# Patient Record
Sex: MALE | Race: WHITE | NOT HISPANIC OR LATINO | Employment: PART TIME | URBAN - METROPOLITAN AREA
[De-identification: names, ages, dates, MRNs, and addresses within clinical notes are randomized per-mention and may not be internally consistent; named-entity substitution may affect disease eponyms.]

---

## 2021-01-04 ENCOUNTER — TRANSFERRED RECORDS (OUTPATIENT)
Dept: HEALTH INFORMATION MANAGEMENT | Facility: CLINIC | Age: 66
End: 2021-01-04

## 2021-02-02 ENCOUNTER — OFFICE VISIT (OUTPATIENT)
Dept: FAMILY MEDICINE | Facility: CLINIC | Age: 66
End: 2021-02-02

## 2021-02-02 VITALS
RESPIRATION RATE: 18 BRPM | SYSTOLIC BLOOD PRESSURE: 132 MMHG | OXYGEN SATURATION: 99 % | HEART RATE: 78 BPM | DIASTOLIC BLOOD PRESSURE: 62 MMHG | TEMPERATURE: 97.8 F | BODY MASS INDEX: 31.58 KG/M2 | WEIGHT: 208.4 LBS | HEIGHT: 68 IN

## 2021-02-02 DIAGNOSIS — M54.50 LUMBAR SPINE PAIN: Primary | ICD-10-CM

## 2021-02-02 PROCEDURE — 99213 OFFICE O/P EST LOW 20 MIN: CPT | Performed by: PHYSICIAN ASSISTANT

## 2021-02-02 SDOH — HEALTH STABILITY: MENTAL HEALTH: HOW OFTEN DO YOU HAVE 6 OR MORE DRINKS ON ONE OCCASION?: NOT ASKED

## 2021-02-02 SDOH — HEALTH STABILITY: MENTAL HEALTH: HOW MANY STANDARD DRINKS CONTAINING ALCOHOL DO YOU HAVE ON A TYPICAL DAY?: 1 OR 2

## 2021-02-02 SDOH — HEALTH STABILITY: MENTAL HEALTH: HOW OFTEN DO YOU HAVE A DRINK CONTAINING ALCOHOL?: 2-3 TIMES A WEEK

## 2021-02-02 ASSESSMENT — PAIN SCALES - GENERAL: PAINLEVEL: SEVERE PAIN (6)

## 2021-02-02 ASSESSMENT — MIFFLIN-ST. JEOR: SCORE: 1714.8

## 2021-02-02 NOTE — PATIENT INSTRUCTIONS
Can try - Diclofenac - Voltaren 1% gel for pain. Do not take ibuprofen or Aleve with this topical medication.

## 2021-02-02 NOTE — PROGRESS NOTES
Assessment & Plan     Lumbar spine pain  Patient has been seen by his primary care physician in [place] at [facility]. I am unable to review notes however he did bring a x-ray result (x-ray from 1/4/21). This shows arthritis at multiple levels, greatest at the L5/S1 space. This would coincide with where he is having the most pain. He has been doing physical therapy exercises he had from many years ago. He is interested in what is next as he continues to have significant pain.  Referral to Spine ordered. We discussed PT as well which was ordered. Patient would like to see spine first. I cautioned him that spine may still want formal PT prior to moving forward with other treatments. Patient states understanding but would like to wait to see ortho spine first.  Discussed treatment for pain. Diclofenac gel discussed; he can get this over the counter. Do not take with Aleve or ibuprofen.  ANI signed to obtained records from other physician office.  - [facility]RA[facility] REFERRAL; Future  - Orthopedic & Spine  Referral; Future    Allison Og PA-C  [facility] GLENDY Segal is a 65 year old who presents to clinic today for the following health issues     HPI       Back Pain  Onset/Duration: early november   Description:   Location of pain: low back right  Character of pain: sharp  Pain radiation: none  New numbness or weakness in legs, not attributed to pain: no   Intensity: Currently 7/10  Progression of Symptoms: improving  History:   Specific cause: work-related  Pain interferes with job: YES  History of back problems: no prior back problems  Any previous MRI or X-rays: Yes on xray, no mRI   Sees a specialist for back pain: yes 15 years ago   Alleviating factors:   Improved by: acetaminophen (Tylenol), Ibuprofen, Aleve, cold, heat and muscle relaxants    Precipitating factors:  Worsened by: Walking and stand   Therapies tried and outcome: As  "noted above    Accompanying Signs & Symptoms:  Risk of Fracture: None  Risk of Cauda Equina: None  Risk of Infection: None  Risk of Cancer: None    When the pain first came on the patient notes he was a  and he was walking a lot (the two days prior to the pain starting). 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 It was after this that the pain worsened significantly. He has a history of back pain. He has back exercises from many years ago. He always does them. Has not see physical therapy since all this started.    He has tried a muscle relaxer and cortisone shot (perhaps a trigger point injection).        Review of Systems   GENERAL:  No fevers  MUSCULOSKELETAL: As noted in HPI          Objective    /62 (BP Location: Right arm, Patient Position: Sitting, Cuff Size: Adult Regular)   Pulse 78   Temp 97.8  F (36.6  C) (Oral)   Resp 18   Ht 1.727 m (5' 8\")   Wt 94.5 kg (208 lb 6.4 oz)   SpO2 99%   BMI 31.69 kg/m    Body mass index is 31.69 kg/m .  Physical Exam   GENERAL: No acute distress  HEENT: Normocephalic  EXTREMITIES: No midline tenderness over the cervical or thoracic. Mild tenderness midline lower lumbar back. No tenderness over the right lumbar paraspinous muscles. Tenderness over the left lumbar paraspinous muscles.  Right lower extremity: 5/5 strength with flexion of the knee, 5/5 strength with extension of the knee, 5/5 strength with flexion of the hip, 5/5 strength with dorsiflexion, 5/5 strength with plantar flexion. Straight leg raise negative.  Left lower extremity: 5/5 strength with flexion of the knee, 5/5 strength with extension of the knee, 5/5 strength with flexion of the hip, 5/5 strength with dorsiflexion, 5/5 strength with plantar flexion. Straight leg raise negative.  NEURO: Alert, non-focal          "

## 2021-02-08 ENCOUNTER — OFFICE VISIT (OUTPATIENT)
Dept: NEUROSURGERY | Facility: CLINIC | Age: 66
End: 2021-02-08
Attending: PHYSICIAN ASSISTANT

## 2021-02-08 VITALS
OXYGEN SATURATION: 97 % | DIASTOLIC BLOOD PRESSURE: 98 MMHG | HEART RATE: 102 BPM | SYSTOLIC BLOOD PRESSURE: 156 MMHG | HEIGHT: 68 IN | BODY MASS INDEX: 31.52 KG/M2 | WEIGHT: 208 LBS

## 2021-02-08 DIAGNOSIS — M54.50 LUMBAR SPINE PAIN: ICD-10-CM

## 2021-02-08 PROCEDURE — 99203 OFFICE O/P NEW LOW 30 MIN: CPT | Performed by: PHYSICIAN ASSISTANT

## 2021-02-08 ASSESSMENT — MIFFLIN-ST. JEOR: SCORE: 1712.98

## 2021-02-08 NOTE — LETTER
2/8/2021         RE: Philipp Bland  [address]  [place] [address]        Dear Colleague,    Thank you for referring your patient, Philipp Bland, to the [facility] NEUROSURGERY [facility]INIC [place]. Please see a copy of my visit note below.    NEUROSURGERY CLINIC CONSULT NOTE     DATE OF VISIT: 2/8/2021     SUBJECTIVE:     Philipp Bland is a pleasant 65 year old male who presents to the clinic today for consultation on lumbar spine pain. He is referred to the Neurosurgery Clinic by Dr. Og in Primary Care.   Today, he reports a multi year with a 3-month exacerbation of symptoms. He describes constant, dull, aching pain that initiates in the midline low lumbar region and radiates horizontally in a belt line distribution, nothing distally. This pain is not accompanied by paresthesia, numbness and/or perceived weakness. Prolonged walking and standing seem to aggravate the symptoms, while alleviation is obtained by sitting. No mechanism of injury such as trauma or a fall is associated with the onset of the pain. There are no bowel or bladder changes. He denies saddle anesthesia. He denies changes in gait, instability, or falling episodes, although a walker is used at times for comfort. h  He has not participated in any formal conservative therapies.          Current Outpatient Medications:      lisinopril-hydrochlorothiazide (PRINZIDE,ZESTORETIC) 20-12.5 MG per tablet, Take 2 tablets by mouth daily (with breakfast)., Disp: 60 tablet, Rfl: 0     Allergies   Allergen Reactions     No Known Drug Allergies         Past Medical History:   Diagnosis Date     Essential hypertension, benign 2001     Family history of alcoholism         ROS: 10 point review of symptoms are negative other than the symptoms noted above in the HPI.     Family History has been reviewed with the patient, there are no pertinent findings to presenting concern.     No past surgical history on file.     Social History  "    Tobacco Use     Smoking status: Never Smoker     Smokeless tobacco: Never Used   Substance Use Topics     Alcohol use: Yes     Frequency: 2-3 times a week     Drinks per session: 1 or 2     Drug use: No        OBJECTIVE:   BP (!) 156/98   Pulse 102   Ht 5' 8\" (1.727 m)   Wt 208 lb (94.3 kg)   SpO2 97%   BMI 31.63 kg/m     Body mass index is 31.63 kg/m .     Imaging:     [facility]     Full radiological report in chart. Imaging was reviewed with with patient today.     Exam:     Patient appears comfortable, conversational, and in no apparent distress.   Head: Normocephalic, without obvious abnormality, atraumatic, no facial asymmetry.   Eyes: conjunctivae/corneas clear. PERRL, EOM's intact.   Throat: lips, mucosa, and tongue normal; teeth and gums normal.   Neck: supple, symmetrical, trachea midline, no adenopathy and thyroid: not enlarged, symmetric, no tenderness/mass/nodules.   Lungs: clear to auscultation bilaterally.   Heart: regular rate and rhythm.   Abdomen: soft, non-tender; bowel sounds normal; no masses, no organomegaly.   Pulses: 2+ and symmetric.   Skin: Skin color, texture, turgor normal. No rashes or lesions.     CN II-XII grossly intact, alert and appropriate with conversation and following commands.   Gait is non-antalgic. Able to tandem walk. Able to walk on toes and heels without difficulty.   Cervical spine is non tender to palpation. Appropriate range of motion of neck, not concerning for lhermitte's phenomenon.   Bilateral bicep 2/4 and tricep reflexes 1/4. Sensation intact throughout upper extremities.     UE muscle strength  Right  Left    Deltoid  5/5  5/5    Biceps  5/5  5/5    Triceps  5/5  5/5    Hand intrinsics  5/5  5/5    Hand grasp  5/5  5/5    Hogan signs  neg  neg      Lumbar spine is non tender to palpation   Intact sensation throughout lower extremities.   Bilateral patellar 2/4 and achilles reflex 1/4. Negative for pain with straight leg raise. "     LE muscle strength  Right  Left    Iliopsoas (hip flexion)  5/5  5/5    Quad (knee extension)  5/5  5/5    Hamstring (knee flexion)  5/5  5/5    Gastrocnemius (PF)  5/5  5/5    Tibialis Ant. (DF)  5/5  5/5    EHL  5/5  5/5      Negative Babinski bilaterally. Negative for clonus.   Calves are soft and non-tender bilaterally.     ASSESSMENT/PLAN:     Mr. Bland's most recent imaging exhibits multilevel degenerative changes with a L5-S1 spondylolisthesis. However, we do not recommend surgical intervention at this time.     We feel that it would be in Mr. Bland's best interest to try a conservative approach by participating in a program initiated by our colleagues at the [place] [facility]. He did inquire about possible treatment options that [facility] may consider so we briefly discussed core stretching and strengthening exercises with other modalities as indicated, but again, we explained that treatments will be determined by the team at [facility]. We would like to see him back as needed to further discuss possible surgical interventions. In the event that patient's symptoms worsen or change we would like to see him sooner.     Should he fail to obtain adequate alleviation of his symptoms utilizing non-operative measures, we briefly discussed obtaining a lumbar MRI and possibly an injection.     We also discussed signs of a worsening problem that he should seek being evaluated.        Respectfully,     RICHMOND Duran PA-C  [facility] Neurosurgery  [facility]  Tel: [phone]  Pager: [phone]     Exam, imaging, and plan reviewed by Dr. Aggarwal.       Again, thank you for allowing me to participate in the care of your patient.        Sincerely,        Jeff Lopez PA-C

## 2021-02-08 NOTE — PROGRESS NOTES
"NEUROSURGERY CLINIC CONSULT NOTE     DATE OF VISIT: 2/8/2021     SUBJECTIVE:     Philipp Bland is a pleasant 65 year old male who presents to the clinic today for consultation on lumbar spine pain. He is referred to the Neurosurgery Clinic by Dr. Og in Primary Care.   Today, he reports a multi year with a 3-month exacerbation of symptoms. He describes constant, dull, aching pain that initiates in the midline low lumbar region and radiates horizontally in a belt line distribution, nothing distally. This pain is not accompanied by paresthesia, numbness and/or perceived weakness. Prolonged walking and standing seem to aggravate the symptoms, while alleviation is obtained by sitting. No mechanism of injury such as trauma or a fall is associated with the onset of the pain. There are no bowel or bladder changes. He denies saddle anesthesia. He denies changes in gait, instability, or falling episodes, although a walker is used at times for comfort. h  He has not participated in any formal conservative therapies.          Current Outpatient Medications:      lisinopril-hydrochlorothiazide (PRINZIDE,ZESTORETIC) 20-12.5 MG per tablet, Take 2 tablets by mouth daily (with breakfast)., Disp: 60 tablet, Rfl: 0     Allergies   Allergen Reactions     No Known Drug Allergies         Past Medical History:   Diagnosis Date     Essential hypertension, benign 2001     Family history of alcoholism         ROS: 10 point review of symptoms are negative other than the symptoms noted above in the HPI.     Family History has been reviewed with the patient, there are no pertinent findings to presenting concern.     No past surgical history on file.     Social History     Tobacco Use     Smoking status: Never Smoker     Smokeless tobacco: Never Used   Substance Use Topics     Alcohol use: Yes     Frequency: 2-3 times a week     Drinks per session: 1 or 2     Drug use: No        OBJECTIVE:   BP (!) 156/98   Pulse 102   Ht 5' 8\" (1.727 m)  "  Wt 208 lb (94.3 kg)   SpO2 97%   BMI 31.63 kg/m     Body mass index is 31.63 kg/m .     Imaging:     [facility]     Full radiological report in chart. Imaging was reviewed with with patient today.     Exam:     Patient appears comfortable, conversational, and in no apparent distress.   Head: Normocephalic, without obvious abnormality, atraumatic, no facial asymmetry.   Eyes: conjunctivae/corneas clear. PERRL, EOM's intact.   Throat: lips, mucosa, and tongue normal; teeth and gums normal.   Neck: supple, symmetrical, trachea midline, no adenopathy and thyroid: not enlarged, symmetric, no tenderness/mass/nodules.   Lungs: clear to auscultation bilaterally.   Heart: regular rate and rhythm.   Abdomen: soft, non-tender; bowel sounds normal; no masses, no organomegaly.   Pulses: 2+ and symmetric.   Skin: Skin color, texture, turgor normal. No rashes or lesions.     CN II-XII grossly intact, alert and appropriate with conversation and following commands.   Gait is non-antalgic. Able to tandem walk. Able to walk on toes and heels without difficulty.   Cervical spine is non tender to palpation. Appropriate range of motion of neck, not concerning for lhermitte's phenomenon.   Bilateral bicep 2/4 and tricep reflexes 1/4. Sensation intact throughout upper extremities.     UE muscle strength  Right  Left    Deltoid  5/5  5/5    Biceps  5/5  5/5    Triceps  5/5  5/5    Hand intrinsics  5/5  5/5    Hand grasp  5/5  5/5    Hogan signs  neg  neg      Lumbar spine is non tender to palpation   Intact sensation throughout lower extremities.   Bilateral patellar 2/4 and achilles reflex 1/4. Negative for pain with straight leg raise.     LE muscle strength  Right  Left    Iliopsoas (hip flexion)  5/5  5/5    Quad (knee extension)  5/5  5/5    Hamstring (knee flexion)  5/5  5/5    Gastrocnemius (PF)  5/5  5/5    Tibialis Ant. (DF)  5/5  5/5    EHL  5/5  5/5      Negative Babinski bilaterally. Negative for  clonus.   Calves are soft and non-tender bilaterally.     ASSESSMENT/PLAN:     Mr. Bland's most recent imaging exhibits multilevel degenerative changes with a L5-S1 spondylolisthesis. However, we do not recommend surgical intervention at this time.     We feel that it would be in Mr. Bland's best interest to try a conservative approach by participating in a program initiated by our colleagues at the [place] [facility]. He did inquire about possible treatment options that [facility] may consider so we briefly discussed core stretching and strengthening exercises with other modalities as indicated, but again, we explained that treatments will be determined by the team at [facility]. We would like to see him back as needed to further discuss possible surgical interventions. In the event that patient's symptoms worsen or change we would like to see him sooner.     Should he fail to obtain adequate alleviation of his symptoms utilizing non-operative measures, we briefly discussed obtaining a lumbar MRI and possibly an injection.     We also discussed signs of a worsening problem that he should seek being evaluated.        Respectfully,     RICHMOND Duran, PAHILTON  [facility] Neurosurgery  [facility]  Tel: [phone]  Pager: [phone]     Exam, imaging, and plan reviewed by Dr. Aggarwal.

## 2021-02-08 NOTE — NURSING NOTE
"February 8, 2021 1:53 PM   Philipp Bland is a 65 year old male who presents for:    Chief Complaint   Patient presents with     Consult     Lumbar pain     Initial Vitals: BP (!) 156/98   Pulse 102   Ht 5' 8\" (1.727 m)   Wt 208 lb (94.3 kg)   SpO2 97%   BMI 31.63 kg/m   Estimated body mass index is 31.63 kg/m  as calculated from the following:    Height as of this encounter: 5' 8\" (1.727 m).    Weight as of this encounter: 208 lb (94.3 kg). Body surface area is 2.13 meters squared.  Data Unavailable Comment: Data Unavailable       Clinical concerns: Philipp Bland is here today for low back pain  Alysia Merrill MA  "

## 2021-02-12 ENCOUNTER — THERAPY VISIT (OUTPATIENT)
Dept: PHYSICAL THERAPY | Facility: CLINIC | Age: 66
End: 2021-02-12
Attending: PHYSICIAN ASSISTANT

## 2021-02-12 DIAGNOSIS — M43.16 SPONDYLOLISTHESIS OF LUMBAR REGION: ICD-10-CM

## 2021-02-12 DIAGNOSIS — M54.50 BILATERAL LOW BACK PAIN WITHOUT SCIATICA: ICD-10-CM

## 2021-02-12 DIAGNOSIS — M54.50 LUMBAR SPINE PAIN: ICD-10-CM

## 2021-02-12 DIAGNOSIS — M48.062 SPINAL STENOSIS OF LUMBAR REGION WITH NEUROGENIC CLAUDICATION: ICD-10-CM

## 2021-02-12 PROCEDURE — 97162 PT EVAL MOD COMPLEX 30 MIN: CPT | Mod: GP | Performed by: PHYSICAL THERAPIST

## 2021-02-12 PROCEDURE — 97110 THERAPEUTIC EXERCISES: CPT | Mod: GP | Performed by: PHYSICAL THERAPIST

## 2021-02-12 NOTE — PROGRESS NOTES
[place] [facility] Initial Evaluation  Subjective:  Patient is referred with c/o LBP, L>R along with bilateral L/E weakness and abnormal gait pattern. He has a long hx of LB issues but sxs seemed to increase in November 2020 after doing some heavy lifting. He is unable to stand for more than 5' or walk more than 75 yards before having to sit down. Better sitting. Noting difficulty climbing stairs.     Therapist Generated HPI Evaluation         Type of problem:  Lumbar.    This is a chronic condition.  Condition occurred with:  Degenerative joint disease, insidious onset and lifting.  Where condition occurred: in the community.  Patient reports pain:  Lumbar spine left, lumbar spine right and central lumbar spine.  Pain is described as aching and is intermittent.  Pain radiates to:  No radiation. Pain is the same all the time.  Since onset symptoms are unchanged.  Associated symptoms:  Loss of strength and loss of balance (altered gait). Symptoms are exacerbated by standing, walking and lifting  and relieved by rest.  Special tests included:  X-ray.    Restrictions due to condition include:  Working in normal job without restrictions.      Patient Health History  Philipp Bland being seen for LBP, B L/E weakness.          Pain is reported as 7/10 on pain scale.  General health as reported by patient is excellent.  Pertinent medical history includes: high blood pressure.   Red flags:  Progressive neurological deficits and significant weakness.     Surgeries include:  Orthopedic surgery (ankle).    Current medications:  High blood pressure medication.    Current occupation is teacher, parts delivery.   Primary job tasks include:  Driving and lifting/carrying.                                    Objective:    Gait:    Assistive Devices:  Walker  Deviations:  Hip:  Trendelenberg R and Trendelenberg L               Lumbar/SI Evaluation  ROM:    AROM Lumbar:   Flexion:          Normal  Ext:                     Minor loss   Side Bend:        Left:  Normal    Right:  Normal  Rotation:           Left:     Right:   Side Glide:        Left:     Right:           Lumbar Myotomes:    T12-L3 (Hip Flex):  Left: 3    Right: 5  L2-4 (Quads):  Left:  4    Right:  5  L4 (Ankle DF):  Left:  2+    Right:  5  L5 (Great Toe Ext): Left: 3    Right: 5   S1 (Toe Raise):  Left: 2    Right: 2+  Lumbar DTR's:  not assessed      Cord Signs:  not assessed    Lumbar Dermtomes:  normal                Neural Tension/Mobility:  Lumbar:  Normal        Lumbar Palpation:  normal      Functional Tests:  not assessed        Lumbar Provocation:  not assessed      Spinal Segmental Conclusions: Neurological deficit related to probable central and foraminal stenosis. Neurogenic claudication. BilateralTrendelenberg gait pattern due to gluteus medius weakness. L hip flexor weakness, L anterior tibialis weakness noted as well.                                                       General     ROS    Assessment/Plan:    Patient is a 65 year old male with lumbar complaints.    Patient has the following significant findings with corresponding treatment plan.                Diagnosis 1:  LBP, lumbar stenosis  Pain -  mechanical traction, self management, education, directional preference exercise and home program  Decreased strength - therapeutic exercise, therapeutic activities and home program  Impaired balance - neuro re-education, therapeutic activities and home program  Impaired gait - gait training, assistive devices and home program  Impaired muscle performance - neuro re-education and home program  Decreased function - therapeutic activities and home program    Therapy Evaluation Codes:   1) History comprised of:   Personal factors that impact the plan of care:      Time since onset of symptoms.    Comorbidity factors that impact the plan of care are:      None.     Medications impacting care: None.  2) Examination of Body Systems comprised of:   Body  structures and functions that impact the plan of care:      Lumbar spine.   Activity limitations that impact the plan of care are:      Lifting, Stairs, Standing and Walking.  3) Clinical presentation characteristics are:   Evolving/Changing.  4) Decision-Making    Moderate complexity using standardized patient assessment instrument and/or measureable assessment of functional outcome.  Cumulative Therapy Evaluation is: Moderate complexity.    Previous and current functional limitations:  (See Goal Flow Sheet for this information)    Short term and Long term goals: (See Goal Flow Sheet for this information)     Communication ability:  Patient appears to be able to clearly communicate and understand verbal and written communication and follow directions correctly.  Treatment Explanation - The following has been discussed with the patient:   RX ordered/plan of care  Anticipated outcomes  Possible risks and side effects  This patient would benefit from PT intervention to resume normal activities.   Rehab potential is fair.    Frequency:  1 X week, once daily  Duration:  for 3 weeks  Discharge Plan:  Achieve all LTG.  Independent in home treatment program.  Reach maximal therapeutic benefit.    Please refer to the daily flowsheet for treatment today, total treatment time and time spent performing 1:1 timed codes.

## 2021-03-16 PROBLEM — M43.16 SPONDYLOLISTHESIS OF LUMBAR REGION: Status: RESOLVED | Noted: 2021-02-12 | Resolved: 2021-03-16

## 2021-03-16 PROBLEM — M54.50 BILATERAL LOW BACK PAIN WITHOUT SCIATICA: Status: RESOLVED | Noted: 2021-02-12 | Resolved: 2021-03-16

## 2021-03-16 PROBLEM — M48.062 SPINAL STENOSIS OF LUMBAR REGION WITH NEUROGENIC CLAUDICATION: Status: RESOLVED | Noted: 2021-02-12 | Resolved: 2021-03-16

## 2021-04-15 ENCOUNTER — TELEPHONE (OUTPATIENT)
Dept: FAMILY MEDICINE | Facility: CLINIC | Age: 66
End: 2021-04-15

## 2021-04-15 DIAGNOSIS — M54.50 LUMBAR SPINE PAIN: Primary | ICD-10-CM

## 2021-04-15 NOTE — TELEPHONE ENCOUNTER
Pt is wondering your thoughts on medical marijuana for his back pain. He currently does not have money for surgery so he is looking for a short time option until he can afford surgery. Pt is aware you do not prescribe this.

## 2021-04-15 NOTE — TELEPHONE ENCOUNTER
Medical marijuana is for intractable pain (this means we have tried all other avenues). It may be reasonable to have him see pain medicine to see what they would recommend. I do not provide approvals the medical marijuana cards, and I do not know anyone who does. Some of the pain clinics have providers that do this.

## 2021-04-15 NOTE — TELEPHONE ENCOUNTER
Patient left a message with patient relations team to have care team contact him.    Name : Philipp Bland     Email : [email or web address]     Location : [facility]     Message : I have a question for my doc Dr. Allison Og. please call [phone]. thanks     Want to be contacted : Yes       Please call patient  Macey Quintero, Clinic Manager

## 2021-04-15 NOTE — TELEPHONE ENCOUNTER
Referral placed. If patient does not hear from them in 2 business days he can call [phone]. It can take some time to get in. In the mean time he can do the PT that neurosurgery recommended.

## 2021-04-19 ENCOUNTER — TELEPHONE (OUTPATIENT)
Dept: PALLIATIVE MEDICINE | Facility: CLINIC | Age: 66
End: 2021-04-19

## 2021-04-19 NOTE — TELEPHONE ENCOUNTER
[facility] Referral      1. Confirmed address with patient? Yes  2. Confirmed phone number with patient? Yes  3. Confirmed referring provider? Yes  4. Is the PCP the same as the referring provider? Yes  5. Has the patient been to any previous pain clinics? No  (If yes, send ANI with welcome letter)  6. Which insurance are we to bill for this appointment?  Self Pay - waiting on [employer]    7. Informed pt of cancellation (48 hour) policy? Yes    REGARDING OPIOID MEDICATIONS: We will always address appropriateness of opioid pain medications, but we generally will not automatically take on a prescribing role. When we do take on prescribing of opioids for chronic pain, it is in collaboration with the referring physician for an intermediate period of time (months), with an expectation that the primary physician or provider will assume the prescribing role if medications are effective at stable doses with demonstrated compliance. Therefore, please do not assume that your prescribing responsibilities end on the day of pain clinic consultation.  8. Informed pt of prescribing policy? Yes    9.Please be aware that once you are established with a pain provider and location, you will need to continue have all future visits with that provider and location. It is best to determine what location is the most convenient for you and schedule with that one.    ** PATIENT INFORMED OF THIS POLICY Yes      9. Referring Provider: Allison Og PA-C    10. Criteria for Triage Eval:   -Missed/Failed 1st appointment? N/A     -Medication Focused? N/A     -Mental Health Concerns? (e.g. Recent psych hospitalization/snap shot)? N/A     -Active substance abuse? N/A     -Patient behaviors (e.g. Offensive language/raised voice)? N/A    Luzma STOVER    [facility]

## 2021-04-27 ENCOUNTER — VIRTUAL VISIT (OUTPATIENT)
Dept: PALLIATIVE MEDICINE | Facility: CLINIC | Age: 66
End: 2021-04-27

## 2021-04-27 DIAGNOSIS — M54.50 LUMBAR SPINE PAIN: ICD-10-CM

## 2021-04-27 PROCEDURE — 99207 PR NO CHARGE LOS: CPT | Performed by: PHYSICAL MEDICINE & REHABILITATION

## 2021-04-27 NOTE — PROGRESS NOTES
Patient requested to have an in clinic visit. He does not have the equipment to do a video appointment.

## 2021-07-23 ENCOUNTER — TELEPHONE (OUTPATIENT)
Dept: FAMILY MEDICINE | Facility: CLINIC | Age: 66
End: 2021-07-23

## 2021-07-23 NOTE — TELEPHONE ENCOUNTER
RN placed call to patient to discuss concerns he placed to patient relations     Patient states he is completely out of his blood pressure medication     RN asked who filled that medication originally? As we last filled bp medication in 2014   Patient states he has a doctor in [place] that provided the last refills, however he has last seen them approximately 1 year ago     RN explained that we are happy to see him in clinic and assist with refills of blood pressure medication - however we would need to make an appt, as blood pressure and labs will need to be completed, patient states understanding     Plan:   Patient to contact [facility] in  to see if they can give an emergency supply as patient completely out of meds     Patient to contact his old provider in [place] to see if they are able to provide a refill - which likely will only be a small amount as he also has not seen them in over a year     RN offered to schedule appt for 7/26/2021 and patient states he will call back to schedule if unable to get refills from previous provider     Patient is to return call to schedule an appointment with provider in clinic for HTN visit and medication     Muna Charlton, Registered Nurse, PAL (Patient Advocate Liason)   [facility]

## 2021-08-23 ENCOUNTER — TRANSFERRED RECORDS (OUTPATIENT)
Dept: HEALTH INFORMATION MANAGEMENT | Facility: CLINIC | Age: 66
End: 2021-08-23

## 2021-08-26 NOTE — PROGRESS NOTES
Pre-Visit Planning   Next 5 appointments (look out 90 days)    Aug 27, 2021  8:00 AM  (Arrive by 7:35 AM)  Adult Preventative Visit with Trang Timmons MD  [facility] ([facility] ) [address] [facility] [address] [phone]        Appointment Notes for this encounter:   physical    Questionnaires Reviewed/Assigned  No additional questionnaires are needed      Patient preferred phone number: [phone]    Unable to reach. Left voicemail. Advised patient to call clinic back at [phone].       Rosalind Smiley/

## 2021-08-27 ENCOUNTER — OFFICE VISIT (OUTPATIENT)
Dept: FAMILY MEDICINE | Facility: CLINIC | Age: 66
End: 2021-08-27

## 2021-08-27 VITALS
BODY MASS INDEX: 30.31 KG/M2 | HEIGHT: 68 IN | WEIGHT: 200 LBS | HEART RATE: 94 BPM | OXYGEN SATURATION: 99 % | RESPIRATION RATE: 18 BRPM | SYSTOLIC BLOOD PRESSURE: 138 MMHG | TEMPERATURE: 97 F | DIASTOLIC BLOOD PRESSURE: 86 MMHG

## 2021-08-27 DIAGNOSIS — I10 BENIGN ESSENTIAL HYPERTENSION: Primary | ICD-10-CM

## 2021-08-27 LAB
ANION GAP SERPL CALCULATED.3IONS-SCNC: 9 MMOL/L (ref 3–14)
BUN SERPL-MCNC: 9 MG/DL (ref 7–30)
CALCIUM SERPL-MCNC: 9.1 MG/DL (ref 8.5–10.1)
CHLORIDE BLD-SCNC: 100 MMOL/L (ref 94–109)
CO2 SERPL-SCNC: 23 MMOL/L (ref 20–32)
CREAT SERPL-MCNC: 0.77 MG/DL (ref 0.66–1.25)
GFR SERPL CREATININE-BSD FRML MDRD: >90 ML/MIN/1.73M2
GLUCOSE BLD-MCNC: 86 MG/DL (ref 70–99)
POTASSIUM BLD-SCNC: 4.4 MMOL/L (ref 3.4–5.3)
SODIUM SERPL-SCNC: 132 MMOL/L (ref 133–144)

## 2021-08-27 PROCEDURE — 36415 COLL VENOUS BLD VENIPUNCTURE: CPT | Performed by: FAMILY MEDICINE

## 2021-08-27 PROCEDURE — 99213 OFFICE O/P EST LOW 20 MIN: CPT | Performed by: FAMILY MEDICINE

## 2021-08-27 PROCEDURE — 80048 BASIC METABOLIC PNL TOTAL CA: CPT | Performed by: FAMILY MEDICINE

## 2021-08-27 RX ORDER — CHLORTHALIDONE 25 MG/1
25 TABLET ORAL DAILY
Qty: 90 TABLET | Refills: 4 | Status: SHIPPED | OUTPATIENT
Start: 2021-08-27 | End: 2022-09-21

## 2021-08-27 RX ORDER — LISINOPRIL 40 MG/1
40 TABLET ORAL DAILY
Qty: 90 TABLET | Refills: 4 | Status: SHIPPED | OUTPATIENT
Start: 2021-08-27 | End: 2022-09-21

## 2021-08-27 RX ORDER — CHLORTHALIDONE 25 MG/1
25 TABLET ORAL DAILY
COMMUNITY
End: 2021-08-27

## 2021-08-27 RX ORDER — LISINOPRIL 40 MG/1
40 TABLET ORAL DAILY
COMMUNITY
End: 2021-08-27

## 2021-08-27 ASSESSMENT — ENCOUNTER SYMPTOMS
FREQUENCY: 0
PARESTHESIAS: 0
SORE THROAT: 0
ARTHRALGIAS: 1
MYALGIAS: 0
CONSTIPATION: 0
HEMATURIA: 0
HEARTBURN: 0
EYE PAIN: 0
HEMATOCHEZIA: 0
WEAKNESS: 0
DIARRHEA: 0
ABDOMINAL PAIN: 0
CHILLS: 0
DIZZINESS: 0
NERVOUS/ANXIOUS: 0
JOINT SWELLING: 0
DYSURIA: 0
COUGH: 0
HEADACHES: 0
SHORTNESS OF BREATH: 0
PALPITATIONS: 0
FEVER: 0
NAUSEA: 0

## 2021-08-27 ASSESSMENT — MIFFLIN-ST. JEOR: SCORE: 1676.69

## 2021-08-27 NOTE — PATIENT INSTRUCTIONS
Preventive Health Recommendations  Male Ages 50 - 64    Yearly exam:             See your health care provider every year in order to  o   Review health changes.   o   Discuss preventive care.    o   Review your medicines if your doctor has prescribed any.     Have a cholesterol test every 5 years, or more frequently if you are at risk for high cholesterol/heart disease.     Have a diabetes test (fasting glucose) every three years. If you are at risk for diabetes, you should have this test more often.     Have a colonoscopy at age 50, or have a yearly FIT test (stool test). These exams will check for colon cancer.      Talk with your health care provider about whether or not a prostate cancer screening test (PSA) is right for you.    You should be tested each year for STDs (sexually transmitted diseases), if you re at risk.     Shots: Get a flu shot each year. Get a tetanus shot every 10 years.     Nutrition:    Eat at least 5 servings of fruits and vegetables daily.     Eat whole-grain bread, whole-wheat pasta and brown rice instead of white grains and rice.     Get adequate Calcium and Vitamin D.     Lifestyle    Exercise for at least 150 minutes a week (30 minutes a day, 5 days a week). This will help you control your weight and prevent disease.     Limit alcohol to one drink per day.     No smoking.     Wear sunscreen to prevent skin cancer.     See your dentist every six months for an exam and cleaning.     See your eye doctor every 1 to 2 years.  Due for colonoscopy , prostate cancer screening , lipid panel screening .

## 2021-08-27 NOTE — PROGRESS NOTES
"    Assessment & Plan      Patient describes he only wants his Blood pressure medication renewed .  He do not have insurance and want to defer screening lab and test till he get his insurance  .    Following with neurosurgery for lower back pain     Benign essential hypertension    - Basic metabolic panel  (Ca, Cl, CO2, Creat, Gluc, K, Na, BUN)  - lisinopril (ZESTRIL) 40 MG tablet; Take 1 tablet (40 mg) by mouth daily  - chlorthalidone (HYGROTON) 25 MG tablet; Take 1 tablet (25 mg) by mouth daily         BMI:   Estimated body mass index is 30.41 kg/m  as calculated from the following:    Height as of this encounter: 1.727 m (5' 8\").    Weight as of this encounter: 90.7 kg (200 lb).           Return in about 3 months (around 11/27/2021) for Routine preventive, patient will call.    Trang Timmons MD  [facility] JUAN MANUEL Segal is a 65 year old who presents for the following health issues     HPI     Hypertension Follow-up      Do you check your blood pressure regularly outside of the clinic? Yes     Are you following a low salt diet? No    Are your blood pressures ever more than 140 on the top number (systolic) OR more   than 90 on the bottom number (diastolic), for example 140/90? Yes      How many servings of fruits and vegetables do you eat daily?  0-1    On average, how many sweetened beverages do you drink each day (Examples: soda, juice, sweet tea, etc.  Do NOT count diet or artificially sweetened beverages)?   1    How many days per week do you exercise enough to make your heart beat faster? 3 or less    How many minutes a day do you exercise enough to make your heart beat faster? 9 or less    How many days per week do you miss taking your medication? 0        Review of Systems   Constitutional: Negative for chills and fever.   HENT: Negative for congestion, ear pain, hearing loss and sore throat.    Eyes: Negative for pain and visual disturbance.   Respiratory: Negative for cough " "and shortness of breath.    Cardiovascular: Negative for chest pain, palpitations and peripheral edema.   Gastrointestinal: Negative for abdominal pain, constipation, diarrhea, heartburn, hematochezia and nausea.   Genitourinary: Negative for discharge, dysuria, frequency, genital sores, hematuria, impotence and urgency.   Musculoskeletal: Positive for arthralgias. Negative for joint swelling and myalgias.   Skin: Negative for rash.   Neurological: Negative for dizziness, weakness, headaches and paresthesias.   Psychiatric/Behavioral: Negative for mood changes. The patient is not nervous/anxious.             Objective    /86 (Cuff Size: Adult Large)   Pulse 94   Temp 97  F (36.1  C) (Tympanic)   Resp 18   Ht 1.727 m (5' 8\")   Wt 90.7 kg (200 lb)   SpO2 99%   BMI 30.41 kg/m    Body mass index is 30.41 kg/m .  Physical Exam  Cardiovascular:      Rate and Rhythm: Normal rate and regular rhythm.      Pulses: Normal pulses.      Heart sounds: Normal heart sounds.   Pulmonary:      Effort: Pulmonary effort is normal.      Breath sounds: Normal breath sounds.   Abdominal:      General: Abdomen is flat.   Musculoskeletal:         General: Normal range of motion.      Comments: Gait limited uses walker .   Neurological:      Mental Status: He is alert.                "

## 2021-08-27 NOTE — LETTER
August 27, 2021      Philipp Bland  [address]  [address] [place] [id number]        Dear ,    We are writing to inform you of your test results.    Your kidney function return back normal .   Sodium mildly low recommend to continue with hydration .       Resulted Orders   Basic metabolic panel  (Ca, Cl, CO2, Creat, Gluc, K, Na, BUN)   Result Value Ref Range    Sodium 132 (L) 133 - 144 mmol/L    Potassium 4.4 3.4 - 5.3 mmol/L    Chloride 100 94 - 109 mmol/L    Carbon Dioxide (CO2) 23 20 - 32 mmol/L    Anion Gap 9 3 - 14 mmol/L    Urea Nitrogen 9 7 - 30 mg/dL    Creatinine 0.77 0.66 - 1.25 mg/dL    Calcium 9.1 8.5 - 10.1 mg/dL    Glucose 86 70 - 99 mg/dL    GFR Estimate >90 >60 mL/min/1.73m2      Comment:      As of July 11, 2021, eGFR is calculated by the CKD-EPI creatinine equation, without race adjustment. eGFR can be influenced by muscle mass, exercise, and diet. The reported eGFR is an estimation only and is only applicable if the renal function is stable.       If you have any questions or concerns, please call the clinic at the number listed above.       Sincerely,      Trang Timmons MD

## 2021-08-27 NOTE — PROGRESS NOTES
Review of Systems   Constitutional: Negative for chills and fever.   HENT: Negative for congestion, ear pain, hearing loss and sore throat.    Eyes: Negative for pain and visual disturbance.   Respiratory: Negative for cough and shortness of breath.    Cardiovascular: Negative for chest pain, palpitations and peripheral edema.   Gastrointestinal: Negative for abdominal pain, constipation, diarrhea, heartburn, hematochezia and nausea.   Genitourinary: Negative for discharge, dysuria, frequency, genital sores, hematuria, impotence and urgency.   Musculoskeletal: Positive for arthralgias. Negative for joint swelling and myalgias.   Skin: Negative for rash.   Neurological: Negative for dizziness, weakness, headaches and paresthesias.   Psychiatric/Behavioral: Negative for mood changes. The patient is not nervous/anxious.

## 2021-11-02 ENCOUNTER — OFFICE VISIT (OUTPATIENT)
Dept: FAMILY MEDICINE | Facility: CLINIC | Age: 66
End: 2021-11-02
Payer: COMMERCIAL

## 2021-11-02 VITALS
OXYGEN SATURATION: 100 % | DIASTOLIC BLOOD PRESSURE: 88 MMHG | TEMPERATURE: 97.8 F | SYSTOLIC BLOOD PRESSURE: 138 MMHG | BODY MASS INDEX: 33.15 KG/M2 | HEART RATE: 109 BPM | WEIGHT: 218 LBS

## 2021-11-02 DIAGNOSIS — M54.50 LUMBAR SPINE PAIN: Primary | ICD-10-CM

## 2021-11-02 DIAGNOSIS — M88.9 PAGET DISEASE OF BONE: ICD-10-CM

## 2021-11-02 PROCEDURE — 99214 OFFICE O/P EST MOD 30 MIN: CPT | Performed by: PHYSICIAN ASSISTANT

## 2021-11-02 RX ORDER — LISINOPRIL 40 MG/1
40 TABLET ORAL DAILY
Qty: 90 TABLET | Refills: 4 | Status: CANCELLED | OUTPATIENT
Start: 2021-11-02

## 2021-11-10 ENCOUNTER — TELEPHONE (OUTPATIENT)
Dept: FAMILY MEDICINE | Facility: CLINIC | Age: 66
End: 2021-11-10
Payer: COMMERCIAL

## 2021-11-10 ENCOUNTER — HOSPITAL ENCOUNTER (OUTPATIENT)
Dept: MRI IMAGING | Facility: CLINIC | Age: 66
Discharge: HOME | End: 2021-11-10
Attending: PHYSICIAN ASSISTANT | Admitting: PHYSICIAN ASSISTANT
Payer: COMMERCIAL

## 2021-11-10 DIAGNOSIS — M54.50 LUMBAR SPINE PAIN: ICD-10-CM

## 2021-11-10 PROCEDURE — 72148 MRI LUMBAR SPINE W/O DYE: CPT

## 2021-11-10 NOTE — TELEPHONE ENCOUNTER
Called pt, discussed, agrees, discussed how to sign up for Mychart, will f/u with specialists for appointments    Tatyana Farah RN, BSN  Message handled by CLINIC NURSE.

## 2021-11-10 NOTE — TELEPHONE ENCOUNTER
----- Message from Allison Og PA-C sent at 11/10/2021 12:00 PM CST -----  Please call patient and let him know that MRI shows severe arthritis/degenerative changes in the lumbar spine. This includes some narrowing of the spinal canal as well as narrowing where the spinal nerves come out at several levels. I would like him to follow up with spine again. Referral placed. They should call him to schedule.    MRI also shows possible signs of something called Paget's disease in the left pelvic bone. This is a bone remodeling disease (causing reabsorption of bone in certain areas and overgrowth of bone in others). I would like him to follow up with endocrinology to discuss next steps. They should call him to schedule.

## 2021-11-15 ENCOUNTER — OFFICE VISIT (OUTPATIENT)
Dept: NEUROSURGERY | Facility: CLINIC | Age: 66
End: 2021-11-15
Attending: PHYSICIAN ASSISTANT
Payer: COMMERCIAL

## 2021-11-15 VITALS — OXYGEN SATURATION: 98 % | HEART RATE: 112 BPM | DIASTOLIC BLOOD PRESSURE: 92 MMHG | SYSTOLIC BLOOD PRESSURE: 129 MMHG

## 2021-11-15 DIAGNOSIS — M54.50 LUMBAR SPINE PAIN: ICD-10-CM

## 2021-11-15 PROCEDURE — 99203 OFFICE O/P NEW LOW 30 MIN: CPT | Performed by: PHYSICIAN ASSISTANT

## 2021-11-15 PROCEDURE — G0463 HOSPITAL OUTPT CLINIC VISIT: HCPCS

## 2021-11-15 ASSESSMENT — PAIN SCALES - GENERAL: PAINLEVEL: MILD PAIN (2)

## 2021-11-15 NOTE — PROGRESS NOTES
NEUROSURGERY CLINIC CONSULT NOTE- [facility]     DATE OF VISIT: 11/15/2021     SUBJECTIVE:     Philipp Bland is a pleasant 65 year old male who presents to the clinic today for consultation on low back pain with LLE weakness. He is referred to the Neurosurgery Clinic by Dr. Allison Og in Primary Care. Pertinent medical history consists of injury 1 year ago when lifting heavy boxes for work with low back pain starting morning after this incident.     Today, he reports a 12-month history of symptoms. He describes constant, dull, aching pain that initiates in the low lumbar region (left>>right) and does not radiate distally. This pain is not accompanied by paresthesia, numbness. Pain is accompanied by perceived weakness in the left lower extremity. Prolonged walking, standing aggravate the symptoms, while alleviation is obtained by PT at home exercises. Lifting injury 1 year ago is associated with the onset of the pain. There are no bowel or bladder changes. He denies saddle anesthesia. Admits to LLE giving out on him while walking 3 months ago. Since then, he has been using a walker 2/2 LLE weakness.     He has participated in conservative therapies to include physical therapy, NSAIDs, muscle relaxant. None of these modalities have provided any significant long term relief.        Current Outpatient Medications:      chlorthalidone (HYGROTON) 25 MG tablet, Take 1 tablet (25 mg) by mouth daily, Disp: 90 tablet, Rfl: 4     lisinopril (ZESTRIL) 40 MG tablet, Take 1 tablet (40 mg) by mouth daily, Disp: 90 tablet, Rfl: 4     Allergies   Allergen Reactions     No Known Drug Allergies         Past Medical History:   Diagnosis Date     Essential hypertension, benign 2001     Family history of alcoholism         ROS: 10 point review of symptoms are negative other than the symptoms noted above in the HPI.     Family History has been reviewed with the patient, there are no pertinent findings to presenting concern.     No  past surgical history on file.     Social History     Tobacco Use     Smoking status: Never Smoker     Smokeless tobacco: Never Used   Vaping Use     Vaping Use: Never used   Substance Use Topics     Alcohol use: Yes     Drug use: No        OBJECTIVE:   BP (!) 129/92   Pulse 112   SpO2 98%    There is no height or weight on file to calculate BMI.     Imaging:     MRI LUMBAR SPINE WITHOUT CONTRAST   11/10/2021 10:23 AM      HISTORY: Low back pain, no red flags; Lumbar spine pain.     TECHNIQUE: Multiplanar multisequence MRI of the lumbar spine without  contrast.     COMPARISON: Lumbar spine x-rays 1/4/2021.      FINDINGS:  Transitional lumbosacral segment is considered to be S1 with  transverse process on the right and sacralization on the left.  Alignment is significant for slight dextroconvex curvature. There is  also grade 2 anterolisthesis of L5 on S1 related to bilateral L5 pars  defects. Grade 1 retrolisthesis of T12 on L1, L1 on 2, L2 on L3, and  L3 on L4. Bone marrow demonstrates scattered degenerative endplate  change with marrow edema (Modic 1) most conspicuous surrounding the  L2-3 disc joint. Conus medullaris unremarkable terminating at the  level of the L1-2 disc. Cauda equina demonstrates slight bunching  related to spinal canal stenosis and is otherwise unremarkable.  Bilateral sacroiliac joint degenerative change. Heterogeneous marrow  signal and slight expansion involving the left iliac bone, potentially  related to Paget's disease. Nonspecific T2 hyperintense lesions within  the bilateral kidneys which are incompletely characterized but  statistically likely benign.     Segmental Analysis:      T12-L1:  Mild disc height loss. No herniation. Mild bilateral facet  arthropathy. No foraminal or spinal canal stenosis.      L1-L2:  Mild disc height loss. No herniation. Mild bilateral facet  arthropathy. No foraminal or spinal canal stenosis.       L2-L3:  Severe disc height loss. Disc bulge with  bilateral foraminal  protrusion components. Moderate bilateral facet arthropathy. Severe  bilateral foraminal stenosis. Moderate spinal canal stenosis.       L3-L4:  Moderate disc height loss. Disc bulge with right foraminal  protrusion component. Moderate bilateral facet arthropathy. Moderate  bilateral foraminal stenosis. Moderate spinal canal stenosis.     L4-L5:  Mild disc height loss. Disc bulge with central  protrusion/annular fissure. Moderate right and severe left facet  arthropathy. A 6 mm synovial cyst projects anteriorly off the right  facet joint contributing to spinal canal and right lateral recess  narrowing. Mild right foraminal stenosis. Mild-to-moderate left  foraminal stenosis. Moderate spinal canal stenosis.       L5-S1:  Bilateral L5 pars defects with grade 2 anterolisthesis and  severe disc height loss. Severe bilateral foraminal stenosis. No  spinal canal stenosis.     S1-S2: No foraminal or spinal canal stenosis.                                                                      IMPRESSION:       1. Severe degenerative change with multiple stenoses.  2. Bilateral L5 pars defects with grade 2 anterolisthesis of L5 on S1.  3. Slight expansion and heterogeneous marrow signal involving the left  iliac bone which may be related to Paget's disease.     YOUNG RUIZ MD     Full radiological report in chart. Imaging was reviewed with with patient today.     Exam:   CN II-XII grossly intact, alert and appropriate with conversation and following commands.   Drags left leg when walking and primarily using right leg or ambulating with walker   Cervical spine is non tender to palpation. Appropriate range of motion of neck.   Sensation intact throughout upper extremities.     UE muscle strength  Right  Left    Deltoid  5/5  5/5    Biceps  5/5  5/5    Triceps  5/5  5/5    Hand intrinsics  5/5  5/5    Hand grasp  5/5  5/5    Hogan signs  neg  neg      Lumbar spine is non tender to palpation.  Intact  sensation throughout lower extremities.   Bilateral patellar 1/4 and achilles reflex 1/4.    LE muscle strength  Right  Left    Iliopsoas (hip flexion)  5/5  2/5    Quad (knee extension)  5/5  3/5    Hamstring (knee flexion)  5/5  5/5    Gastrocnemius (PF)  5/5  5/5    Tibialis Ant. (DF)  5/5  2/5    EHL  5/5  5/5      Negative for clonus.   Calves are soft and non-tender bilaterally.     ASSESSMENT/PLAN:     Philipp Bland is a pleasant 65 year old male who presents to the clinic today for consultation on low back pain with LLE weakness. He is referred to the Neurosurgery Clinic by Dr. Allison Og in Primary Care. Pertinent medical history consists of injury 1 year ago when lifting heavy boxes for work with low back pain starting morning after this incident. The patient's most recent imaging was reviewed with him today. On exam, he is noted to have decreased strength in left lower extremity with hip flexion, knee extension and DF. He has attempted conservative management without resolution of symptoms.     Referral placed for pain management. Will review clinical history, imaging and further plans with Dr. Aggarwal and update patient and his son Patrice accordingly. Tate in agreement with plans.     We would like to see him back after pain team visit to further discuss possible surgical interventions or other conservative therapies. In the event that patient's symptoms worsen or change we would like to see him sooner. We also discussed signs of a worsening problem that he should seek being evaluated.     Respectfully,     Macey RAMIREZ [facility]  [address] [address]    Tel [phone]

## 2021-11-15 NOTE — LETTER
11/15/2021         RE: Philipp Bland  [address]  [address] [place] [id number]        Dear Colleague,    Thank you for referring your patient, Philipp Bland, to the [facility] [place]. Please see a copy of my visit note below.    NEUROSURGERY CLINIC CONSULT NOTE- [facility]     DATE OF VISIT: 11/15/2021     SUBJECTIVE:     Philipp Bland is a pleasant 65 year old male who presents to the clinic today for consultation on low back pain with LLE weakness. He is referred to the Neurosurgery Clinic by Dr. Allison Og in Primary Care. Pertinent medical history consists of injury 1 year ago when lifting heavy boxes for work with low back pain starting morning after this incident.     Today, he reports a 12-month history of symptoms. He describes constant, dull, aching pain that initiates in the low lumbar region (left>>right) and does not radiate distally. This pain is not accompanied by paresthesia, numbness. Pain is accompanied by perceived weakness in the left lower extremity. Prolonged walking, standing aggravate the symptoms, while alleviation is obtained by PT at home exercises. Lifting injury 1 year ago is associated with the onset of the pain. There are no bowel or bladder changes. He denies saddle anesthesia. Admits to LLE giving out on him while walking 3 months ago. Since then, he has been using a walker 2/2 LLE weakness.     He has participated in conservative therapies to include physical therapy, NSAIDs, muscle relaxant. None of these modalities have provided any significant long term relief.        Current Outpatient Medications:      chlorthalidone (HYGROTON) 25 MG tablet, Take 1 tablet (25 mg) by mouth daily, Disp: 90 tablet, Rfl: 4     lisinopril (ZESTRIL) 40 MG tablet, Take 1 tablet (40 mg) by mouth daily, Disp: 90 tablet, Rfl: 4     Allergies   Allergen Reactions     No Known Drug Allergies         Past Medical History:   Diagnosis Date      Essential hypertension, benign 2001     Family history of alcoholism         ROS: 10 point review of symptoms are negative other than the symptoms noted above in the HPI.     Family History has been reviewed with the patient, there are no pertinent findings to presenting concern.     No past surgical history on file.     Social History     Tobacco Use     Smoking status: Never Smoker     Smokeless tobacco: Never Used   Vaping Use     Vaping Use: Never used   Substance Use Topics     Alcohol use: Yes     Drug use: No        OBJECTIVE:   BP (!) 129/92   Pulse 112   SpO2 98%    There is no height or weight on file to calculate BMI.     Imaging:     MRI LUMBAR SPINE WITHOUT CONTRAST   11/10/2021 10:23 AM      HISTORY: Low back pain, no red flags; Lumbar spine pain.     TECHNIQUE: Multiplanar multisequence MRI of the lumbar spine without  contrast.     COMPARISON: Lumbar spine x-rays 1/4/2021.      FINDINGS:  Transitional lumbosacral segment is considered to be S1 with  transverse process on the right and sacralization on the left.  Alignment is significant for slight dextroconvex curvature. There is  also grade 2 anterolisthesis of L5 on S1 related to bilateral L5 pars  defects. Grade 1 retrolisthesis of T12 on L1, L1 on 2, L2 on L3, and  L3 on L4. Bone marrow demonstrates scattered degenerative endplate  change with marrow edema (Modic 1) most conspicuous surrounding the  L2-3 disc joint. Conus medullaris unremarkable terminating at the  level of the L1-2 disc. Cauda equina demonstrates slight bunching  related to spinal canal stenosis and is otherwise unremarkable.  Bilateral sacroiliac joint degenerative change. Heterogeneous marrow  signal and slight expansion involving the left iliac bone, potentially  related to Paget's disease. Nonspecific T2 hyperintense lesions within  the bilateral kidneys which are incompletely characterized but  statistically likely benign.     Segmental Analysis:      T12-L1:  Mild disc  height loss. No herniation. Mild bilateral facet  arthropathy. No foraminal or spinal canal stenosis.      L1-L2:  Mild disc height loss. No herniation. Mild bilateral facet  arthropathy. No foraminal or spinal canal stenosis.       L2-L3:  Severe disc height loss. Disc bulge with bilateral foraminal  protrusion components. Moderate bilateral facet arthropathy. Severe  bilateral foraminal stenosis. Moderate spinal canal stenosis.       L3-L4:  Moderate disc height loss. Disc bulge with right foraminal  protrusion component. Moderate bilateral facet arthropathy. Moderate  bilateral foraminal stenosis. Moderate spinal canal stenosis.     L4-L5:  Mild disc height loss. Disc bulge with central  protrusion/annular fissure. Moderate right and severe left facet  arthropathy. A 6 mm synovial cyst projects anteriorly off the right  facet joint contributing to spinal canal and right lateral recess  narrowing. Mild right foraminal stenosis. Mild-to-moderate left  foraminal stenosis. Moderate spinal canal stenosis.       L5-S1:  Bilateral L5 pars defects with grade 2 anterolisthesis and  severe disc height loss. Severe bilateral foraminal stenosis. No  spinal canal stenosis.     S1-S2: No foraminal or spinal canal stenosis.                                                                      IMPRESSION:       1. Severe degenerative change with multiple stenoses.  2. Bilateral L5 pars defects with grade 2 anterolisthesis of L5 on S1.  3. Slight expansion and heterogeneous marrow signal involving the left  iliac bone which may be related to Paget's disease.     YOUNG RUIZ MD     Full radiological report in chart. Imaging was reviewed with with patient today.     Exam:   CN II-XII grossly intact, alert and appropriate with conversation and following commands.   Drags left leg when walking and primarily using right leg or ambulating with walker   Cervical spine is non tender to palpation. Appropriate range of motion of neck.    Sensation intact throughout upper extremities.     UE muscle strength  Right  Left    Deltoid  5/5  5/5    Biceps  5/5  5/5    Triceps  5/5  5/5    Hand intrinsics  5/5  5/5    Hand grasp  5/5  5/5    Hogan signs  neg  neg      Lumbar spine is non tender to palpation.  Intact sensation throughout lower extremities.   Bilateral patellar 1/4 and achilles reflex 1/4.    LE muscle strength  Right  Left    Iliopsoas (hip flexion)  5/5  2/5    Quad (knee extension)  5/5  3/5    Hamstring (knee flexion)  5/5  5/5    Gastrocnemius (PF)  5/5  5/5    Tibialis Ant. (DF)  5/5  2/5    EHL  5/5  5/5      Negative for clonus.   Calves are soft and non-tender bilaterally.     ASSESSMENT/PLAN:     Philipp Bland is a pleasant 65 year old male who presents to the clinic today for consultation on low back pain with LLE weakness. He is referred to the Neurosurgery Clinic by Dr. Allison Og in Primary Care. Pertinent medical history consists of injury 1 year ago when lifting heavy boxes for work with low back pain starting morning after this incident. The patient's most recent imaging was reviewed with him today. On exam, he is noted to have decreased strength in left lower extremity with hip flexion, knee extension and DF. He has attempted conservative management without resolution of symptoms.     Referral placed for pain management. Will review clinical history, imaging and further plans with Dr. Aggarwal and update patient and his son Patrice accordingly. Tate in agreement with plans.     We would like to see him back after pain team visit to further discuss possible surgical interventions or other conservative therapies. In the event that patient's symptoms worsen or change we would like to see him sooner. We also discussed signs of a worsening problem that he should seek being evaluated.     Respectfully,     Macey RAMIREZ [facility] Neurosurgery  [address]  [place] [address]    Tel [phone]        Again, thank you for allowing me to participate in the care of your patient.        Sincerely,        Macey Danielson PA-C

## 2021-11-15 NOTE — PATIENT INSTRUCTIONS
- Pain team referral placed. They will call you to schedule appointment   - Continue at home physical therapy exercises  - Our office will arrange appointment for 2 weeks after injection to review next steps should symptoms continue or progress    Macey Danielson PA-C  [facility] Neurosurgery  [address] [address]    Tel [phone]

## 2021-11-16 DIAGNOSIS — M54.50 LUMBAR SPINE PAIN: Primary | ICD-10-CM

## 2021-11-16 NOTE — PROGRESS NOTES
Reviewed clinical history, imaging and plans with Dr. Jasen Aggarwal recommended EMG/NCS for further evaluation of LLE weakness   I have placed this order and updated Philipp over the phone    I will have our office ensure EMG order has been sent and patient gets reached out to    Philipp and Dr. Aggarwal in agreement

## 2021-11-16 NOTE — PROGRESS NOTES
"[facility] PROCEDURE ONLY Consultation    Date of visit: 11/16/2021    Reason for consultation:    Philipp Bland is a 65 year old male who is seen in consultation today at the request of RHONDA YA PA-C, neurosurgery clinic.     Consultation and Evaluation for: PROCEDURE ONLY CONSULT  \"low back pain with left lower extremity radiculopathy\"    Review of [place] Prescription Monitoring Program (): Today I have also reviewed the patient's history of controlled substance use, as provided by [place] licensed pharmacies and prescriber dispensers. YES, no controlled substances for the last 12 months    Review of Electronic Chart: Today I have also reviewed available medical information in the patient's medical record at [facility] ([facility]), including relevant provider notes, laboratory work, and imaging.       Chief Complaint:    Chief Complaint   Patient presents with     Pain       Pain history:  Philipp Bland is a 65 year old male who presents for initial evaluation of chief pain complaint of left sided low back pain that does not radiate to his buttock or leg.     He also complains of left leg weakness and a foot drop.     His pain began a year ago. He was lifting 70 lb boxes of oil by himself and felt something pop.  The next day he could not get out of bed.  His back has been progressively worsening since then and has been particularly painful for the last 3 months.     He was previously very active with golf, walking and basketball but can no longer do these things.  He is using a walker to ambulate.     He has no history of spine surgery or injections.  He has not completed PT in the past.     Red Flags: The patient denies bowel or bladder incontinence, parasthesias, weakness, saddle anesthesia, unintentional weight loss, or fever/chills/sweats.       Pain Treatments:  Medications:       Current pain medications: NONE          IMAGING:  LUMBAR MRI " 11/10/2021  FINDINGS:  Transitional lumbosacral segment is considered to be S1 with  transverse process on the right and sacralization on the left.  Alignment is significant for slight dextroconvex curvature. There is  also grade 2 anterolisthesis of L5 on S1 related to bilateral L5 pars  defects. Grade 1 retrolisthesis of T12 on L1, L1 on 2, L2 on L3, and  L3 on L4. Bone marrow demonstrates scattered degenerative endplate  change with marrow edema (Modic 1) most conspicuous surrounding the  L2-3 disc joint. Conus medullaris unremarkable terminating at the  level of the L1-2 disc. Cauda equina demonstrates slight bunching  related to spinal canal stenosis and is otherwise unremarkable.  Bilateral sacroiliac joint degenerative change. Heterogeneous marrow  signal and slight expansion involving the left iliac bone, potentially  related to Paget's disease. Nonspecific T2 hyperintense lesions within  the bilateral kidneys which are incompletely characterized but  statistically likely benign.     Segmental Analysis:      T12-L1:  Mild disc height loss. No herniation. Mild bilateral facet  arthropathy. No foraminal or spinal canal stenosis.      L1-L2:  Mild disc height loss. No herniation. Mild bilateral facet  arthropathy. No foraminal or spinal canal stenosis.       L2-L3:  Severe disc height loss. Disc bulge with bilateral foraminal  protrusion components. Moderate bilateral facet arthropathy. Severe  bilateral foraminal stenosis. Moderate spinal canal stenosis.       L3-L4:  Moderate disc height loss. Disc bulge with right foraminal  protrusion component. Moderate bilateral facet arthropathy. Moderate  bilateral foraminal stenosis. Moderate spinal canal stenosis.     L4-L5:  Mild disc height loss. Disc bulge with central  protrusion/annular fissure. Moderate right and severe left facet  arthropathy. A 6 mm synovial cyst projects anteriorly off the right  facet joint contributing to spinal canal and right lateral  recess  narrowing. Mild right foraminal stenosis. Mild-to-moderate left  foraminal stenosis. Moderate spinal canal stenosis.       L5-S1:  Bilateral L5 pars defects with grade 2 anterolisthesis and  severe disc height loss. Severe bilateral foraminal stenosis. No  spinal canal stenosis.     S1-S2: No foraminal or spinal canal stenosis.                                                                   IMPRESSION:    1. Severe degenerative change with multiple stenoses.  2. Bilateral L5 pars defects with grade 2 anterolisthesis of L5 on S1.  3. Slight expansion and heterogeneous marrow signal involving the left  iliac bone which may be related to Paget's disease.    LUMBAR BENDING ONLY TWO - THREE VIEWS 11/17/2021 1:42 PM      COMPARISON: Lumbar spine MRI 11/10/2021.     HISTORY: Spondylolisthesis of lumbar region.                                                                   IMPRESSION: 5 lumbar type vertebrae. Mild degenerative retrolisthesis  of L1 upon L2 and L3 upon L4, moderate degenerative retrolisthesis of  L2 upon L3 and grade 1-2 spondylitic anterolisthesis of L5 upon S1  again noted. Retrolisthesis of L2 upon L3 and L3 upon L4 worsens in  extension compared to flexion. Alignment is otherwise unchanged.  Vertebral body heights normal.      Past Medical History:  Past Medical History:   Diagnosis Date     Essential hypertension, benign 2001     Family history of alcoholism        Past Surgical History:  No past surgical history on file.    Medications:  Current Outpatient Medications   Medication Sig Dispense Refill     chlorthalidone (HYGROTON) 25 MG tablet Take 1 tablet (25 mg) by mouth daily 90 tablet 4     lisinopril (ZESTRIL) 40 MG tablet Take 1 tablet (40 mg) by mouth daily 90 tablet 4       Allergies:     Allergies   Allergen Reactions     No Known Drug Allergies        Family history:  Family History   Problem Relation Age of Onset     Hypertension Father        ROS:  Constitutional, neuro, ENT,  endocrine, pulmonary, cardiac, gastrointestinal, genitourinary, musculoskeletal, integument and psychiatric systems are negative, except as otherwise noted.    Physical Exam:  Vitals:    11/17/21 1254   BP: 122/75   Pulse: (!) 126   SpO2: 97%       GENERAL: Healthy, alert and no distress  EYES: Eyes grossly normal to inspection.  No discharge or erythema, or obvious scleral/conjunctival abnormalities.  RESP: No audible wheeze, cough, or visible cyanosis.  No visible retractions or increased work of breathing.    SKIN: Visible skin clear. No significant rash, abnormal pigmentation or lesions.  NEURO: Cranial nerves grossly intact.  Mentation and speech appropriate for age.  PSYCH: Mentation appears normal, affect normal/bright, judgement and insight intact, normal speech and appearance well-groomed.      Musculoskeletal exam:  Gait/Station/Posture:   Normal stance, arm swing, and stride; no antalgia or Trendelenburg  Normal bulk and tone. Unremarkable spinal curvature. ASIS heights even.     Lumbar spine:  Range of motion within normal limits    Rotation/ext to right: painful    Rotation/ext to left: painful   Myofascial tenderness:  Left lumbar paraspinals  Focal tenderness: No SI joint, gluteal, piriformis, GT, or IT tenderness    Neurologic exam:  CN:  Cranial nerves 2-12 are grossly intact  Motor Strength:  5/5 symmetric UE and LE strength, except for   Hip extension (gluteus maxiumus L5, S1,2)   Hip abduction (gluteus medius L4,5, S1)  Knee flexion (hamstrings L5, S1-2)  Knee extension (quadricepts L2,3,4)  Ankle dorsiflexion (tibialis anterior L4,5)    Sensory:  (upper and lower extremities):   Light touch: normal    Allodynia: absent    Hyperalgesia: absent       ASSESSMENT/PLAN:  The following recommendations were given to the patient. Diagnosis, treatment options, risks, benefits, and alternatives were discussed, and all questions were answered. The patient expressed understanding of the plan for management.        1. Other spondylosis, lumbar region  I believe the etiology of his left sided axial back pain to be secondary to facet arthritis.  We are going to try facet joint injections (LEFT L4-5, L5-S1) which will be both diagnostic and therapeutic. If he gets great pain relief but it does not last long we discussed moving to Left 3,4,5 medial branch block to RFA as a next step.  I also think he has some overlying myofascial pain and does have a muscle band that when palpated reproduces some of his pain.      - PAIN INJECTION EVAL/TREAT/FOLLOW UP    2. Lumbar spine pain  He should consider PT when he is feeling like he can handle the pain better.  Possibly after this injection.     - Pain Management Referral    3. Spondylolisthesis of lumbar region  I obtained an xray today to make sure there was not significant movement of his multiple spondylolithesis in the lumbar spine.      - XR Lumbar Bending Only 2/3 Views; Future      MEDICATIONS:     Orders Placed This Encounter   Medications     ibuprofen (ADVIL/MOTRIN) 200 MG capsule     Sig: Take 200 mg by mouth every 4 hours as needed for fever     zinc gluconate 50 MG tablet     Sig: Take 50 mg by mouth daily     Vitamin D, Cholecalciferol, 25 MCG (1000 UT) CAPS     omeprazole (PRILOSEC) 40 MG DR capsule     Sig: Take 40 mg by mouth daily          - Continue other medications without change           FOLLOW UP: with me for your injection.  Follow up with neurosurgery a couple weeks after the injection to discuss PT and surgical options.       BILLING TIME DOCUMENTATION:   The total TIME spent on this patient on the date of the encounter/appointment was 41 minutes.      TOTAL TIME includes:   Time spent preparing to see the patient (reviewing records and tests) - 4 min  Time spent face to face (or over the phone) with the patient - 25 min  Time spent ordering tests, medications, procedures and referrals - 2 min  Time spent Referring and communicating with other healthcare  professionals - 0 min  Time spent documenting clinical information in Epic - 10 min       LONG PHELPS MD   Pain Management & Addiction Medicine

## 2021-11-17 ENCOUNTER — ANCILLARY PROCEDURE (OUTPATIENT)
Dept: GENERAL RADIOLOGY | Facility: CLINIC | Age: 66
End: 2021-11-17
Attending: ANESTHESIOLOGY
Payer: COMMERCIAL

## 2021-11-17 ENCOUNTER — OFFICE VISIT (OUTPATIENT)
Dept: PALLIATIVE MEDICINE | Facility: CLINIC | Age: 66
End: 2021-11-17
Payer: COMMERCIAL

## 2021-11-17 VITALS — DIASTOLIC BLOOD PRESSURE: 75 MMHG | OXYGEN SATURATION: 97 % | HEART RATE: 126 BPM | SYSTOLIC BLOOD PRESSURE: 122 MMHG

## 2021-11-17 DIAGNOSIS — M43.16 SPONDYLOLISTHESIS OF LUMBAR REGION: ICD-10-CM

## 2021-11-17 DIAGNOSIS — M47.896 OTHER SPONDYLOSIS, LUMBAR REGION: Primary | ICD-10-CM

## 2021-11-17 DIAGNOSIS — M54.50 LUMBAR SPINE PAIN: ICD-10-CM

## 2021-11-17 PROCEDURE — 99203 OFFICE O/P NEW LOW 30 MIN: CPT | Performed by: ANESTHESIOLOGY

## 2021-11-17 PROCEDURE — 72120 X-RAY BEND ONLY L-S SPINE: CPT | Performed by: RADIOLOGY

## 2021-11-17 RX ORDER — FAMOTIDINE 20 MG
TABLET ORAL
Status: ON HOLD | COMMUNITY
End: 2023-01-05

## 2021-11-17 RX ORDER — ZINC GLUCONATE 50 MG
50 TABLET ORAL DAILY
Status: ON HOLD | COMMUNITY
End: 2023-01-05

## 2021-11-17 RX ORDER — OMEGA-3 FATTY ACIDS/FISH OIL 300-1000MG
600-800 CAPSULE ORAL EVERY 8 HOURS PRN
Status: ON HOLD | COMMUNITY
End: 2022-12-23

## 2021-11-17 RX ORDER — OMEPRAZOLE 40 MG/1
40 CAPSULE, DELAYED RELEASE ORAL DAILY
Status: ON HOLD | COMMUNITY
End: 2023-01-05

## 2021-11-17 ASSESSMENT — PAIN SCALES - GENERAL: PAINLEVEL: MILD PAIN (2)

## 2021-11-17 NOTE — PATIENT INSTRUCTIONS
We are going to try a lumbar facet joint injection on the left side at 2 levels (L4-5 and L5-S1).      If you do not get prolonged pain relief from this we can consider a Radiofrequency Ablation in the future.     We did a bending xray today and I will call you if there problem.     Pat Cook MD         ----------------------------------------------------------------  Clinic Number:  [phone]     Call with any questions about your care and for scheduling assistance.     Calls are returned Monday through Friday between 8 AM and 4:30 PM. We usually get back to you within 2 business days depending on the issue/request.    If we are prescribing your medications:    For opioid medication refills, call the clinic or send a [facility] message 7 days in advance.  Please include:    Name of requested medication    Name of the pharmacy.    For non-opioid medications, call your pharmacy directly to request a refill. Please allow 3-4 days to be processed.     Per [place] State Law:    All controlled substance prescriptions must be filled within 30 days of being written.      For those controlled substances allowing refills, pickup must occur within 30 days of last fill.      We believe regular attendance is key to your success in our program!      Any time you are unable to keep your appointment we ask that you call us at least 24 hours in advance to cancel.This will allow us to offer the appointment time to another patient.     Multiple missed appointments may lead to dismissal from the clinic.

## 2021-11-17 NOTE — PROGRESS NOTES
Attempted to reach out to patient, no answer. Left voice message for patient to call clinic back to further discuss.     Contacted scheduling and the referral is being processed.   Left a message with the number if he is not contacted.   Neuro Scheduling [phone]

## 2021-11-18 ENCOUNTER — TELEPHONE (OUTPATIENT)
Dept: PALLIATIVE MEDICINE | Facility: CLINIC | Age: 66
End: 2021-11-18
Payer: COMMERCIAL

## 2021-11-18 NOTE — TELEPHONE ENCOUNTER
Screening Questions for Radiology Injections:    Injection to be done at which interventional clinic site? [facility]    If [facility] location, tell patient that this procedure requires a COVID-19 lab test be done within 4 days of the procedure. Would you still like to move forward with scheduling the procedure?  Not Applicable   If YES, let patient know that someone will call them to schedule the COVID-19 test and that they will only receive a call back if the result is positive. Route to nursing to enter order.     Instruct patient to arrive as directed prior to the scheduled appointment time:    [place] minutes before      [place]: 30 minutes before; if IV needed 1 hour before     Procedure ordered by Joey    Procedure ordered? LEFT L4-5 and L5-S1 facet joint injection and lumbar paraspinal TPI       Transforaminal Cervical JONATHAN -  [facility] does NOT have providers that do these- [facility] and [facility] do have providers that do    As a reminder, receiving steroids can decrease your body's ability to fight infection.   Would you still like to move forward with scheduling the injection?  Yes    What insurance would patient like us to bill for this procedure? [employer]      Worker's comp or MVA (motor vehicle accident) -Any injection DO NOT SCHEDULE and route to Rebecca Tejada.      [employer] insurance - For SI joint injections, DO NOT SCHEDULE and route Rebecca Tejada.       ALL BCBS, [employer] and [employer]-Route to Rebecca for review DO NOT SCHEDULE      IF SCHEDULING IN [place] AND NEEDS A PA, IT IS OKAY TO SCHEDULE. [place] HANDLES THEIR OWN PA'S AFTER THE PATIENT IS SCHEDULED. PLEASE SCHEDULE AT LEAST 1 WEEK OUT SO A PA CAN BE OBTAINED.    Any chance of pregnancy? NO   If YES, do NOT schedule and route to RN pool    Is an  needed? No     Patient has a drive home? (mandatory) YES: ok    Is patient taking any blood thinners (That is: Coumadin, Warfarin, Jantoven, Pradaxa Xarelto,  Eliquis, Edoxaban, Enoxaparin, Lovenox, Heparin, Arixtra, Fondaparinux, or Fragmin? OR Antiplatelet medication such as Plavix, Brilinta, or Effient? )? No   If hold needed, do NOT schedule, route to RN pool     Is patient taking any aspirin products (includes Excedrin and Fiorinal)? No     If more than 325mg/day, OK to schedule; Instruct pt to decrease to less than 325 mg for 7 days AND route to RN pool    For CERVICAL procedures, hold all aspirin products for 6 days.     Tell pt that if aspirin product is not held for 6 days, the procedure WILL BE cancelled.      Does the patient have a bleeding or clotting disorder? No     If YES, okay to schedule AND route to RN nurse pool    For any patients with platelet count <100, must be forwarded to provider    Any allergies to contrast dye, iodine, shellfish, or numbing and steroid medications? No    If YES, add allergy information to appointment notes AND route to the RN pool     If JONATHAN and Contrast Dye / Iodine Allergy? DO NOT SCHEDULE, route to RN pool    Allergies: No known drug allergies     Is patient diabetic?  No  If YES, instruct them to bring their glucometer.    Does patient have an active infection or treated for one within the past week? No     Is patient currently taking any antibiotics?  No     For patients on chronic, preventative, or prophylactic antibiotics, procedures may be scheduled.     For patients on antibiotics for active or recent infection:antibiotic course must have been completed for 4 days    Is patient currently taking any steroid medications? (i.e. Prednisone, Medrol)  No     For patients on steroid medications, course must have been completed for 4 days    Is patient actively being treated for cancer or immunocompromised? No  If YES, do NOT schedule and route to RN pool     Are you able to get on and off an exam table with minimal or no assistance? Yes  If NO, do NOT schedule and route to RN pool    Are you able to roll over and lay on your  stomach with minimal or no assistance? Yes  If NO, do NOT schedule and route to RN pool     Has the patient had a flu shot or any other vaccinations within 7 days before or after the procedure.  No     Have you recently had a COVID vaccine or have plans to get it in the near future? No    If yes, explain that for the vaccine to work best they need to:       wait 1 week before and 1 week after getting Vaccine #1    wait 1 week before and 2 weeks after getting Vaccine #2    wait 1 week before and 2 weeks after getting Vaccine BOOSTER    If patient has concerns about the timing, send to RN pool     Does patient have an MRI/CT?  YES: MRI  Check Procedure Scheduling Grid to see if required.      Was the MRI done within the last 3 years?  Yes    If yes, where was the MRI done i.e.[facility], [facility], [place], [facility] etc? [facility]      If no, do not schedule and route to RN pool    If MRI was not done at [place], [facility] or [facility] do NOT schedule and route to RN pool.      If pt has an imaging disc, the injection MAY be scheduled but pt has to bring disc to appt.     If they show up without the disc the injection cannot be done    Procedure Specific Instructions:      If celiac plexus block, informed patient NPO for 6 hours and that it is okay to take medications with sips of water, especially blood pressure medications  Not Applicable         If this is for a cervical procedure, informed patient that aspirin needs to be held for 6 days.   Not Applicable      If IV needed:    Do not schedule procedures requiring IV placement in the first appointment of the day or first appointment after lunch. Do NOT schedule at 0745, 0815 or 1245. ok    Instructed pt to arrive 30 minutes early for IV start if required. (Check Procedure Scheduling Grid)  Not Applicable    Reminders:      If you are started on any steroids or antibiotics between now and your appointment, you must contact us because the procedure may  need to be cancelled.  Yes      For all procedures except radiofrequency ablations (RFAs) and spinal cord stimulator (SCS) trials, informed patient:    IV sedation is not provided for this procedure.  If you feel that an oral anti-anxiety medication is needed, you can discuss this further with your referring provider or primary care provider.  The Pain Clinic provider will discuss specifics of what the procedure includes at your appointment.  Most procedures last 10-20 minutes.  We use numbing medications to help with any discomfort during the procedure.  Not Applicable      For patients 85 or older we recommend having an adult stay w/ them for the remainder of the day.   ok    Does the patient have any questions?  NO  Dulce Edwards  [facility]

## 2021-11-18 NOTE — TELEPHONE ENCOUNTER
Reason for call:  Other   Patient called regarding (reason for call):   Additional comments: pt calling to follow up with Dr Cook on what injection was recommended for him    Phone number to reach patient:  Home number on file [phone] (home)    Can we leave a detailed message on this number?  YES    Travel screening: Not Applicable         Gabriel RAMIREZ    [facility] [place]

## 2021-11-18 NOTE — TELEPHONE ENCOUNTER
**routing to  to contact pt to schedule facet joint injections with Dr Cook    Interventional Evaluation:     Interventional Injection Only - Type of Injection: LEFT L4-5 and L5-S1 facet joint injection and lumbar paraspinal TPI Radiology? Yes      Called pt. He is confused about where/how to proceed. Received call to schedule EMG, can not get in until Jan for this.     Explained Dr Cook has ordered facet joint injections. EMG ordered through referring Neurosurg clinic.   Per Dr Cook  note:FOLLOW UP: with me for your injection.  Follow up with neurosurgery a couple weeks after the injection to discuss PT and surgical options.     Advised to schedule injection and then call to schedule f/u with neuro surg approx 3 weeks after injection. Advised to schedule his EMG as well.     Stacia RECINOS, RN Care Coordinator  [facility]  Pain Management

## 2021-11-22 NOTE — TELEPHONE ENCOUNTER
Chart review: injection scheduled for 12/01/21    Stacia RECINOS, RN Care Coordinator  [facility]  Pain [facility]

## 2021-11-30 ENCOUNTER — TELEPHONE (OUTPATIENT)
Dept: PALLIATIVE MEDICINE | Facility: CLINIC | Age: 66
End: 2021-11-30
Payer: COMMERCIAL

## 2021-11-30 NOTE — TELEPHONE ENCOUNTER
LVM, reminded patient of date, time and location of appointment.     Asked patient if they have received anti-biotics or vaccines in the past 7 days?     Reminded patient to eat and drink as usual.    Remained to hold any blood thinners or pain medications that they were asked to hold per nurse.     Reminded patient they will need a  for this appointment.      Reminded patient that both patient and  must wear a mask during their visit.        Amber Maki MA  [facility]

## 2021-12-01 ENCOUNTER — RADIOLOGY INJECTION OFFICE VISIT (OUTPATIENT)
Dept: PALLIATIVE MEDICINE | Facility: CLINIC | Age: 66
End: 2021-12-01
Attending: ANESTHESIOLOGY
Payer: COMMERCIAL

## 2021-12-01 VITALS — DIASTOLIC BLOOD PRESSURE: 96 MMHG | SYSTOLIC BLOOD PRESSURE: 133 MMHG | OXYGEN SATURATION: 98 % | HEART RATE: 101 BPM

## 2021-12-01 DIAGNOSIS — M47.897 OTHER SPONDYLOSIS, LUMBOSACRAL REGION: Primary | ICD-10-CM

## 2021-12-01 DIAGNOSIS — M47.816 FACET ARTHROPATHY, LUMBAR: ICD-10-CM

## 2021-12-01 PROCEDURE — 64494 INJ PARAVERT F JNT L/S 2 LEV: CPT | Mod: LT | Performed by: ANESTHESIOLOGY

## 2021-12-01 PROCEDURE — 20552 NJX 1/MLT TRIGGER POINT 1/2: CPT | Performed by: ANESTHESIOLOGY

## 2021-12-01 PROCEDURE — 64493 INJ PARAVERT F JNT L/S 1 LEV: CPT | Mod: LT | Performed by: ANESTHESIOLOGY

## 2021-12-01 RX ORDER — TRIAMCINOLONE ACETONIDE 40 MG/ML
40 INJECTION, SUSPENSION INTRA-ARTICULAR; INTRAMUSCULAR ONCE
Status: COMPLETED | OUTPATIENT
Start: 2021-12-01 | End: 2021-12-01

## 2021-12-01 RX ADMIN — TRIAMCINOLONE ACETONIDE 40 MG: 40 INJECTION, SUSPENSION INTRA-ARTICULAR; INTRAMUSCULAR at 08:31

## 2021-12-01 NOTE — NURSING NOTE
Pre-procedure Intake  If YES to any questions or NO to having a   Please complete laminated checklist and leave on the computer keyboard for Provider, verbally inform provider if able.    For SCS Trial, RFA's or any sedation procedure:  Have you been fasting? NA    If yes, for how long?     Are you taking any any blood thinners such as Coumadin, Warfarin, Jantoven, Pradaxa Xarelto, Eliquis, Edoxaban, Enoxaparin, Lovenox, Heparin, Arixtra, Fondaparinux, or Fragmin? OR Antiplatelet medication such as Plavix, Brilinta, or Effient?   No     If yes, when did you take your last dose?     Do you take aspirin?  No    If cervical procedure, have you held aspirin for 6 days?   NA    Do you have any allergies to contrast dye, iodine, steroid and/or numbing medications?  NO    Are you currently taking antibiotics or have an active infection?  NO    Have you had a fever/elevated temperature within the past week? NO    Are you currently taking oral steroids? NO    Do you have a ? Yes    Are you pregnant or breastfeeding?  NO    Have you received the COVID-19 vaccine? No     If yes, was it your 1st, 2nd or only dose needed?     Date of most recent vaccine:     Vitals: B/P 172/96    Notify provider and RNs if systolic BP >170, diastolic BP >100, P >100 or O2 sats < 90%      Amber Maki MA  [facility] [place]

## 2021-12-01 NOTE — PATIENT INSTRUCTIONS
[facility] Procedure Discharge Instructions    Today you saw:   Dr. Pat Cook      Your procedure:   Facet joint injection     Medications used:  Lidocaine (anesthetic)  Bupivacaine (anesthetic)   Kenalog (steroid)  Omnipaque (contrast)           Be cautious when walking as numbness and/or weakness in the legs may occur up to 6-8 hours after the procedure due to effect of the local anesthetic    Do not drive for 6 hours. The effect of the local anesthetic could slow your reflexes.     Avoid strenuous activity for the first 24 hours. You may resume your regular activities after that.     You may shower, however avoid swimming, tub baths or hot tubs for 24 hours following your procedure    You may have a mild to moderate increase in pain for several days following the injection.      You may use ice packs for 10-15 minutes, 3 to 4 times a day at the injection site for comfort    Do not use heat to painful areas for 6 to 8 hours. This will give the local anesthetic time to wear off and prevent you from accidentally burning your skin.    Unless you have been directed to avoid the use of anti-inflammatory medications (NSAIDS-ibuprofen, Aleve, Motrin), you may use these medications or Tylenol for pain control if needed.     With diabetes, check your blood sugar more frequently than usual as your blood sugar may be higher than normal for 10-14 days following a steroid injection. Contact your doctor who manages your diabetes if your blood sugar is higher than usual    Possible side effects of steroids that you may experience include flushing, elevated blood pressure, increased appetite, mild headaches and restlessness.  All of these symptoms will get better with time.    It may take up to 14 days for the steroid medication to start working although you may feel the effect as early as a few days after the procedure.     Follow up with your referring provider in 2-3 weeks      If you experience any of  the following, call the pain center line during work hours at [phone] or on-call physician after hours at [phone]:  -Fever over 100 degree F  -Swelling, bleeding, redness, drainage, warmth at the injection site  -Progressive weakness or numbness in your legs or arms  -Loss of bowel or bladder function  -Unusual headache that is not relieved by Tylenol or your regular headache medication  -Unusual new onset of pain that is not improving

## 2021-12-01 NOTE — NURSING NOTE
Discharge Information    IV Discontiued Time:  NA    Amount of Fluid Infused:  NA    Discharge Criteria = When patient returns to baseline or as per MD order    Consciousness:  Pt is fully awake    Circulation:  BP +/- 20% of pre-procedure level    Respiration:  Patient is able to breathe deeply    O2 Sat:  Patient is able to maintain O2 Sat >92% on room air    Activity:  Moves 4 extremities on command    Ambulation:  Patient is able to stand and walk or stand and pivot into wheelchair    Dressing:  Clean/dry or No Dressing    Notes:   Discharge instructions and AVS given to patient    Patient meets criteria for discharge?  YES    Admitted to PCU?  No    Responsible adult present to accompany patient home?  Yes    Signature/Title:    Altagracia Hills RN  RN Care Coordinator  [facility] [place]

## 2021-12-22 ENCOUNTER — TELEPHONE (OUTPATIENT)
Dept: NEUROSURGERY | Facility: CLINIC | Age: 66
End: 2021-12-22

## 2021-12-22 ENCOUNTER — VIRTUAL VISIT (OUTPATIENT)
Dept: NEUROSURGERY | Facility: CLINIC | Age: 66
End: 2021-12-22
Attending: PHYSICIAN ASSISTANT
Payer: COMMERCIAL

## 2021-12-22 DIAGNOSIS — M54.50 LUMBAR SPINE PAIN: Primary | ICD-10-CM

## 2021-12-22 DIAGNOSIS — M43.16 SPONDYLOLISTHESIS OF LUMBAR REGION: ICD-10-CM

## 2021-12-22 PROCEDURE — 99213 OFFICE O/P EST LOW 20 MIN: CPT | Mod: 95 | Performed by: PHYSICIAN ASSISTANT

## 2021-12-22 NOTE — TELEPHONE ENCOUNTER
As per Macey WILSON:  Can we please send EMG referral to the [facility] and ensure they call patient to schedule EMG     He will need to see Dr. Aggarwal after EMG is obtained    EMG scheduled for 1/31 with his appointment with Dr Aggarwal scheduled for 2/7/21.

## 2021-12-22 NOTE — PROGRESS NOTES
NEUROSURGERY CLINIC PROGRESS NOTE    DATE OF VISIT: 12/22/2021    HPI:   Philipp Bland is a pleasant 65 year old male who presents via phone call visit today for consultation on low back pain with LLE weakness. Pertinent medical history consists of injury 1 year ago when lifting heavy boxes for work with low back pain starting morning after this incident.    Injections including LEFT L4-5 & L5-S1 facet joint injections & lumbar paraspinal trigger point injections were performed 12/1/2021 by Pain Management Team. After injections, pain improved 15-20%. He admits resting pain is 2/10 and when he is ambulating, pain is 8-9/10. Patrice his son notes that he has noted atrophy in his left lower extremity. Philipp admits there has been no changes with his left lower extremity weakness. He has not obtained EMG yet. No new signs or symptoms since last seen.     MRI LUMBAR SPINE WITHOUT CONTRAST   11/10/2021 10:23 AM                                                           IMPRESSION:       1. Severe degenerative change with multiple stenoses.  2. Bilateral L5 pars defects with grade 2 anterolisthesis of L5 on S1.  3. Slight expansion and heterogeneous marrow signal involving the left  iliac bone which may be related to Paget's disease.     YOUNG RUIZ MD     Current Outpatient Medications   Medication     chlorthalidone (HYGROTON) 25 MG tablet     ibuprofen (ADVIL/MOTRIN) 200 MG capsule     lisinopril (ZESTRIL) 40 MG tablet     omeprazole (PRILOSEC) 40 MG DR capsule     Vitamin D, Cholecalciferol, 25 MCG (1000 UT) CAPS     zinc gluconate 50 MG tablet     No current facility-administered medications for this visit.       Allergies   Allergen Reactions     No Known Drug Allergies        Past Medical History:   Diagnosis Date     Essential hypertension, benign 2001     Family history of alcoholism        Review Of Systems     ROS: 10 point ROS neg other than the symptoms noted above in the HPI.    OBJECTIVE:    There were no  vitals taken for this visit.    Imaging:  MRI LUMBAR SPINE WITHOUT CONTRAST   11/10/2021 10:23 AM      HISTORY: Low back pain, no red flags; Lumbar spine pain.     TECHNIQUE: Multiplanar multisequence MRI of the lumbar spine without  contrast.     COMPARISON: Lumbar spine x-rays 1/4/2021.      FINDINGS:  Transitional lumbosacral segment is considered to be S1 with  transverse process on the right and sacralization on the left.  Alignment is significant for slight dextroconvex curvature. There is  also grade 2 anterolisthesis of L5 on S1 related to bilateral L5 pars  defects. Grade 1 retrolisthesis of T12 on L1, L1 on 2, L2 on L3, and  L3 on L4. Bone marrow demonstrates scattered degenerative endplate  change with marrow edema (Modic 1) most conspicuous surrounding the  L2-3 disc joint. Conus medullaris unremarkable terminating at the  level of the L1-2 disc. Cauda equina demonstrates slight bunching  related to spinal canal stenosis and is otherwise unremarkable.  Bilateral sacroiliac joint degenerative change. Heterogeneous marrow  signal and slight expansion involving the left iliac bone, potentially  related to Paget's disease. Nonspecific T2 hyperintense lesions within  the bilateral kidneys which are incompletely characterized but  statistically likely benign.     Segmental Analysis:      T12-L1:  Mild disc height loss. No herniation. Mild bilateral facet  arthropathy. No foraminal or spinal canal stenosis.      L1-L2:  Mild disc height loss. No herniation. Mild bilateral facet  arthropathy. No foraminal or spinal canal stenosis.       L2-L3:  Severe disc height loss. Disc bulge with bilateral foraminal  protrusion components. Moderate bilateral facet arthropathy. Severe  bilateral foraminal stenosis. Moderate spinal canal stenosis.       L3-L4:  Moderate disc height loss. Disc bulge with right foraminal  protrusion component. Moderate bilateral facet arthropathy. Moderate  bilateral foraminal stenosis.  Moderate spinal canal stenosis.     L4-L5:  Mild disc height loss. Disc bulge with central  protrusion/annular fissure. Moderate right and severe left facet  arthropathy. A 6 mm synovial cyst projects anteriorly off the right  facet joint contributing to spinal canal and right lateral recess  narrowing. Mild right foraminal stenosis. Mild-to-moderate left  foraminal stenosis. Moderate spinal canal stenosis.       L5-S1:  Bilateral L5 pars defects with grade 2 anterolisthesis and  severe disc height loss. Severe bilateral foraminal stenosis. No  spinal canal stenosis.     S1-S2: No foraminal or spinal canal stenosis.                                                                      IMPRESSION:       1. Severe degenerative change with multiple stenoses.  2. Bilateral L5 pars defects with grade 2 anterolisthesis of L5 on S1.  3. Slight expansion and heterogeneous marrow signal involving the left  iliac bone which may be related to Paget's disease.     YOUNG RUIZ MD     Radiographic Findings: Full radiological report in chart. I personally reviewed the images with the patient today.    Exam:    Phone call visit, exam deferred       ASSESSMENT:    1. Lumbar spine pain    2. Spondylolisthesis of lumbar region        PLAN:    Philipp Bland is a pleasant 65 year old male who presents via phone call visit today for consultation on low back pain with LLE weakness. Pertinent medical history consists of injury 1 year ago when lifting heavy boxes for work with low back pain starting morning after this incident.    Injections including LEFT L4-5 & L5-S1 facet joint injections & lumbar paraspinal trigger point injections were performed 12/1/2021 by Pain Management Team. After injections, pain improved 15-20%. He admits resting pain is 2/10 and when he is ambulating, pain is 8-9/10. Patrice his son notes that he has noted atrophy in his left lower extremity. Philipp admits there has been no changes with his left lower extremity  weakness. He has not obtained EMG yet. No new signs or symptoms since last seen.     Dr. Aggarwal has reviewed all clinical history and imaging and recommends further evaluation with bilateral lower extremity EMG. This referral was sent again to the [facility]. Philipp will need to call and schedule appointment after EMG has been completed to set up follow up appointment with Dr. Aggarwal.     Philipp and his son Patrice in agreement with plans. Dr. Aggarwal in agreement with plans.     Respectfully,     Macey Danielson PA-C  [facility]  [address] [address]    Tel [phone]

## 2021-12-22 NOTE — LETTER
12/22/2021         RE: Philipp Bland  [address]  [facility] [address]        Dear Colleague,    Thank you for referring your patient, Philipp Bland, to the [facility] NEUROSURGERY CLINIC [place]. Please see a copy of my visit note below.    NEUROSURGERY CLINIC PROGRESS NOTE    DATE OF VISIT: 12/22/2021    HPI:   Philipp Bland is a pleasant 65 year old male who presents via phone call visit today for consultation on low back pain with LLE weakness. Pertinent medical history consists of injury 1 year ago when lifting heavy boxes for work with low back pain starting morning after this incident.    Injections including LEFT L4-5 & L5-S1 facet joint injections & lumbar paraspinal trigger point injections were performed 12/1/2021 by Pain Management Team. After injections, pain improved 15-20%. He admits resting pain is 2/10 and when he is ambulating, pain is 8-9/10. [place] his son notes that he has noted atrophy in his left lower extremity. Philipp admits there has been no changes with his left lower extremity weakness. He has not obtained EMG yet. No new signs or symptoms since last seen.     MRI LUMBAR SPINE WITHOUT CONTRAST   11/10/2021 10:23 AM                                                           IMPRESSION:       1. Severe degenerative change with multiple stenoses.  2. Bilateral L5 pars defects with grade 2 anterolisthesis of L5 on S1.  3. Slight expansion and heterogeneous marrow signal involving the left  iliac bone which may be related to Paget's disease.     YOUNG RUIZ MD     Current Outpatient Medications   Medication     chlorthalidone (HYGROTON) 25 MG tablet     ibuprofen (ADVIL/MOTRIN) 200 MG capsule     lisinopril (ZESTRIL) 40 MG tablet     omeprazole (PRILOSEC) 40 MG DR capsule     Vitamin D, Cholecalciferol, 25 MCG (1000 UT) CAPS     zinc gluconate 50 MG tablet     No current facility-administered medications for this visit.       Allergies   Allergen  Reactions     No Known Drug Allergies        Past Medical History:   Diagnosis Date     Essential hypertension, benign 2001     Family history of alcoholism        Review Of Systems     ROS: 10 point ROS neg other than the symptoms noted above in the HPI.    OBJECTIVE:    There were no vitals taken for this visit.    Imaging:  MRI LUMBAR SPINE WITHOUT CONTRAST   11/10/2021 10:23 AM      HISTORY: Low back pain, no red flags; Lumbar spine pain.     TECHNIQUE: Multiplanar multisequence MRI of the lumbar spine without  contrast.     COMPARISON: Lumbar spine x-rays 1/4/2021.      FINDINGS:  Transitional lumbosacral segment is considered to be S1 with  transverse process on the right and sacralization on the left.  Alignment is significant for slight dextroconvex curvature. There is  also grade 2 anterolisthesis of L5 on S1 related to bilateral L5 pars  defects. Grade 1 retrolisthesis of T12 on L1, L1 on 2, L2 on L3, and  L3 on L4. Bone marrow demonstrates scattered degenerative endplate  change with marrow edema (Modic 1) most conspicuous surrounding the  L2-3 disc joint. Conus medullaris unremarkable terminating at the  level of the L1-2 disc. Cauda equina demonstrates slight bunching  related to spinal canal stenosis and is otherwise unremarkable.  Bilateral sacroiliac joint degenerative change. Heterogeneous marrow  signal and slight expansion involving the left iliac bone, potentially  related to Paget's disease. Nonspecific T2 hyperintense lesions within  the bilateral kidneys which are incompletely characterized but  statistically likely benign.     Segmental Analysis:      T12-L1:  Mild disc height loss. No herniation. Mild bilateral facet  arthropathy. No foraminal or spinal canal stenosis.      L1-L2:  Mild disc height loss. No herniation. Mild bilateral facet  arthropathy. No foraminal or spinal canal stenosis.       L2-L3:  Severe disc height loss. Disc bulge with bilateral foraminal  protrusion components.  Moderate bilateral facet arthropathy. Severe  bilateral foraminal stenosis. Moderate spinal canal stenosis.       L3-L4:  Moderate disc height loss. Disc bulge with right foraminal  protrusion component. Moderate bilateral facet arthropathy. Moderate  bilateral foraminal stenosis. Moderate spinal canal stenosis.     L4-L5:  Mild disc height loss. Disc bulge with central  protrusion/annular fissure. Moderate right and severe left facet  arthropathy. A 6 mm synovial cyst projects anteriorly off the right  facet joint contributing to spinal canal and right lateral recess  narrowing. Mild right foraminal stenosis. Mild-to-moderate left  foraminal stenosis. Moderate spinal canal stenosis.       L5-S1:  Bilateral L5 pars defects with grade 2 anterolisthesis and  severe disc height loss. Severe bilateral foraminal stenosis. No  spinal canal stenosis.     S1-S2: No foraminal or spinal canal stenosis.                                                                      IMPRESSION:       1. Severe degenerative change with multiple stenoses.  2. Bilateral L5 pars defects with grade 2 anterolisthesis of L5 on S1.  3. Slight expansion and heterogeneous marrow signal involving the left  iliac bone which may be related to Paget's disease.     YOUNG RUIZ MD     Radiographic Findings: Full radiological report in chart. I personally reviewed the images with the patient today.    Exam:    Phone call visit, exam deferred       ASSESSMENT:    1. Lumbar spine pain    2. Spondylolisthesis of lumbar region        PLAN:    Philipp Bland is a pleasant 65 year old male who presents via phone call visit today for consultation on low back pain with LLE weakness. Pertinent medical history consists of injury 1 year ago when lifting heavy boxes for work with low back pain starting morning after this incident.    Injections including LEFT L4-5 & L5-S1 facet joint injections & lumbar paraspinal trigger point injections were performed 12/1/2021  by Pain Management Team. After injections, pain improved 15-20%. He admits resting pain is 2/10 and when he is ambulating, pain is 8-9/10. Patrice his son notes that he has noted atrophy in his left lower extremity. Philipp admits there has been no changes with his left lower extremity weakness. He has not obtained EMG yet. No new signs or symptoms since last seen.     Dr. Aggarwal has reviewed all clinical history and imaging and recommends further evaluation with bilateral lower extremity EMG. This referral was sent again to the  of . Philipp will need to call and schedule appointment after EMG has been completed to set up follow up appointment with Dr. Aggarwal.     Philipp and his son Patrice in agreement with plans. Dr. Aggarwal in agreement with plans.     Respectfully,     Macey Danielson PA-C  [facility] Neurosurgery  [address] [address]    Tel [phone]          Again, thank you for allowing me to participate in the care of your patient.        Sincerely,        Macey Danielson PA-C

## 2022-01-31 ENCOUNTER — OFFICE VISIT (OUTPATIENT)
Dept: NEUROLOGY | Facility: CLINIC | Age: 67
End: 2022-01-31
Attending: PHYSICIAN ASSISTANT
Payer: COMMERCIAL

## 2022-01-31 DIAGNOSIS — M54.50 LUMBAR SPINE PAIN: ICD-10-CM

## 2022-01-31 DIAGNOSIS — R29.898 WEAKNESS OF BOTH LOWER EXTREMITIES: Primary | ICD-10-CM

## 2022-01-31 PROCEDURE — 95886 MUSC TEST DONE W/N TEST COMP: CPT | Mod: RT | Performed by: PSYCHIATRY & NEUROLOGY

## 2022-01-31 PROCEDURE — 95911 NRV CNDJ TEST 9-10 STUDIES: CPT | Performed by: PSYCHIATRY & NEUROLOGY

## 2022-01-31 NOTE — LETTER
1/31/2022         RE: Philipp Bland  [address]  [facility] [address]        Dear Colleague,    Thank you for referring your patient, Philipp Bland, to the [facility] NEUROLOGY CLINIC [place]. Please see a copy of my visit note below.    See procedure note.       Again, thank you for allowing me to participate in the care of your patient.        Sincerely,        Gerald Cruz MD

## 2022-01-31 NOTE — PROCEDURES
[facility]    Formerly [facility]  [address], [address]  [place] [address]  Tel: [phone]  Fax: [phone]          Full Name: Philipp Bland Gender: Male  Patient ID: [id number] YOB: 1955      Visit Date: 1/31/2022 09:46  Age: 66 Years 1 Months Old  Interpreted By: Gerald Cruz MD   Ref Dr.: Macey WILSON  Tech: ST   Height: 5 feet 9 inch  Reason for referral: Evaluate bilateral lowers. c/o pain, weakness in both legs > 1 year. Left > Right.      Motor NCS      Nerve / Sites Lat Amp Dist Gilberto    ms mV cm m/s   L Peroneal - EDB      Ankle NR NR 8       Fib head NR NR 31 NR      Pop fossa NR NR 10 NR   R Peroneal - EDB      Ankle 7.40 0.1 8       Fib head 18.80 0.1 30 26      Pop fossa 15.10 0.2 10 27   L Tibial - AH      Ankle 4.01 3.4 8       Pop fossa 15.36 3.4 41 36   R Tibial - AH      Ankle 4.43 5.1 8       Pop fossa 15.10 4.7 40 37   L Peroneal - Tib Ant      Fib Head NR NR        Pop fossa NR NR 10 NR   R Peroneal - Tib Ant      Fib Head 4.64 1.2        Pop fossa 6.46 1.3 10 55       F  Wave      Nerve Fmin    ms   L Tibial - AH 62.86   R Tibial - AH 59.27       Sensory NCS      Nerve / Sites Onset Lat Pk Lat Amp.2-3 Dist Gilberto    ms ms  V cm m/s   L Sural - Ankle (Calf)      Calf 3.23 3.91 6.3 14 43   R Sural - Ankle (Calf)      Calf 3.02 3.91 4.0 14 46   L Superficial peroneal - Ankle      Lat leg NR NR NR 12 NR   R Superficial peroneal - Ankle      Lat leg NR NR NR 12 NR       EMG Summary Table     Spontaneous MUAP Rcmt Note   Muscle Fib PSW Fasc IA # Amp Dur PPP Rate Type   L. Gluteus medius 2+ 2+ None N Mod Red Incr Incr N N N   L. Gluteus jayson 1+ 1+ None N Sl Mod Red Incr Incr N N N   L. L3 paraspinal None None None N N N N N N N   L. L4 paraspinal None None None N N N N N N N   L. L5 paraspinal 2+ 2+ None N N N N N N N   L. S1 paraspinal 2+ 2+ None N N N N N N N   L. Iliopsoas 1+ 1+ None N Sl Red Incr Incr N N  N   L. Adductor alissa 2+ 2+ None N Sl Red Incr Incr N N N   L. Quadriceps 2+ 2+ None N Mod Red Incr Incr N N N   L. Gastrocnemius (Medial head) 2+ 2+ None N Mod Red Incr Incr N N N   L. Gastrocnemius (Lateral head) 2+ 2+ None N Mod Red Incr Incr N N N   L. Tibialis anterior 2+ 2+ None N None - - - - Can't Contract   L. Peroneus longus 2+ 2+ None N None - - - - Can't Contract   L. Tibialis posterior 2+ 2+ None N Mod Red Incr Incr N N N   R. L3 paraspinal None None None N N N N N N N   R. L4 paraspinal None None None N N N N N N N   R. L5 paraspinal 1+ 1+ None N N N N N N N   R. S1 paraspinal 1+ 1+ None N N N N N N N   R. Iliopsoas 2+ 2+ None N Sl Red Incr Incr N N N   R. Adductor alissa 2+ 2+ None N Sl Mod Red Incr Incr N N N   R. Quadriceps 2+ 2+ None N Sl Mod Red Incr Incr N N N   R. Tibialis anterior 2+ 2+ None N Mod Red Incr Incr N N N   R. Peroneus longus 2+ 2+ None N Mod Philipp Red Incr Incr N N N   R. Gastrocnemius (Medial head) 2+ 2+ None N Mod Philipp Red Incr Incr N N N   R. Tibialis posterior 2+ 2+ None N Mod Philipp Red Incr Incr N N N     SUMMARY  Nerve conduction and EMG study of bilateral lower extremities shows:    Prolonged right peroneal distal motor latency decreased amplitude and decreased conduction velocity.  Absent left peroneal CMAP.  Normal right tibial distal motor latency with low normal amplitude and decreased conduction velocity.  Decreased amplitude of right peroneal motor nerve conduction study from the anterior tibial.   Normal left tibial distal motor latency with decreased normal amplitude and decreased conduction velocity.  Absent left peroneal motor nerve conduction study CMAP from the anterior tibial.  Abnormal bilateral sural SNAP due to low amplitude and and absent bilateral superficial peroneal sensory SNAP.  Monopolar needle exam as above.      CLINICAL INTERPRETATION:  This is an abnormal nerve conduction and EMG study.  The study is suggestive of a severe sensorimotor  polyneuropathy in both legs.  The study is unable to rule out a superimposed active radiculopathy due to ongoing severe neuropathy.  Further clinical correlation is needed.     Gerald Cruz MD  Neurologist  [facility] Neurology [facility]  Tel:- [phone]     none

## 2022-02-07 ENCOUNTER — OFFICE VISIT (OUTPATIENT)
Dept: NEUROSURGERY | Facility: CLINIC | Age: 67
End: 2022-02-07
Attending: NEUROLOGICAL SURGERY
Payer: COMMERCIAL

## 2022-02-07 VITALS
SYSTOLIC BLOOD PRESSURE: 131 MMHG | OXYGEN SATURATION: 98 % | BODY MASS INDEX: 32.29 KG/M2 | HEART RATE: 105 BPM | DIASTOLIC BLOOD PRESSURE: 90 MMHG | HEIGHT: 69 IN | WEIGHT: 218 LBS

## 2022-02-07 DIAGNOSIS — M54.50 LUMBAR SPINE PAIN: ICD-10-CM

## 2022-02-07 DIAGNOSIS — G62.9 NEUROPATHY: Primary | ICD-10-CM

## 2022-02-07 DIAGNOSIS — M43.16 SPONDYLOLISTHESIS OF LUMBAR REGION: ICD-10-CM

## 2022-02-07 PROCEDURE — G0463 HOSPITAL OUTPT CLINIC VISIT: HCPCS

## 2022-02-07 PROCEDURE — 99214 OFFICE O/P EST MOD 30 MIN: CPT | Performed by: NEUROLOGICAL SURGERY

## 2022-02-07 ASSESSMENT — PAIN SCALES - GENERAL: PAINLEVEL: MILD PAIN (2)

## 2022-02-07 ASSESSMENT — MIFFLIN-ST. JEOR: SCORE: 1769.22

## 2022-02-07 NOTE — PROGRESS NOTES
"It was a pleasure to see Philipp Bland today in Neurosurgery Clinic. He is a 66 year old male who is previously seen Macey Danielson PA-C in our clinic.  Please see her note for full details.  He returns after undergoing EMG nerve conduction study.    To summarize the patient has back pain that began after lifting many heavy boxes at work.  He has back pain is his main symptom without significant numbness or tingling or radicular symptoms.    He has significant imaging findings on his x-rays and MRI including grade 2 spondylolisthesis at L5-S1 and multiple levels of significant degenerative changes with varying degrees of stenosis.    He underwent EMG nerve conduction study which revealed a severe polyneuropathy and was unable to identify any radicular findings because of the severity of the neuropathy.    The patient has left leg weakness greater than right.  He denies any upper extremity symptoms.    The patient is unable to walk without a walker and has significant weakness that limits his ability to walk any significant distance.    Vitals:    02/07/22 1132   BP: (!) 131/90   Pulse: 105   SpO2: 98%   Weight: 98.9 kg (218 lb)   Height: 1.753 m (5' 9\")     Estimated body mass index is 32.19 kg/m  as calculated from the following:    Height as of this encounter: 1.753 m (5' 9\").    Weight as of this encounter: 98.9 kg (218 lb).  Mild Pain (2)    Seated in a wheelchair.  Right lower extremity strength is 4 out of 5 diffusely  Left lower extremity strength is 2-3 out of 5 diffusely  Reflexes zero bilaterally patella.    Upper extremity shows 5 out of 5 strength except for 4 out of 5 deltoid.      Imaging: As above his MRI and x-rays show severe degenerative changes with the spondylolisthesis.  However the degree of stenosis and degenerative changes really do not explain his neurologic findings and these are likely more related to his severe neuropathy.    Assessment: 1.  Back pain 2.  Severe polyneuropathy.    Plan: I " have recommended that he be evaluated by neurology to to help determine the etiology of his neuropathy as I think this is the main cause of his weakness at this stage.  I have also recommended physical therapy as I think this would help improve his function and strength regardless of what happens down the road.  I have recommended that he return to clinic to see me in a few months once he is finished with neurology to see if there would be any other potential intervention that would be reasonable to consider.

## 2022-02-07 NOTE — PROGRESS NOTES
"Philipp Bland is a 66 year old male who presents for:  Chief Complaint   Patient presents with     RECHECK     Lumbar spine pain: EMG review (1-31-22) in chart         Initial Vitals:  BP (!) 131/90   Pulse 105   Ht 5' 9\" (1.753 m)   Wt 218 lb (98.9 kg)   SpO2 98%   BMI 32.19 kg/m   Estimated body mass index is 32.19 kg/m  as calculated from the following:    Height as of this encounter: 5' 9\" (1.753 m).    Weight as of this encounter: 218 lb (98.9 kg).. Body surface area is 2.19 meters squared. BP completed using cuff size: regular  Mild Pain (2)    Nursing Comments:     Suzanne Stokes MA  "

## 2022-02-07 NOTE — PATIENT INSTRUCTIONS
Patient Next Steps:      Order placed for physical therapy. You can call the phone number highlighted in the order to schedule your appointment. Please call our clinic if symptoms persist after your course of physical therapy.      A referral has been placed for you to see neurology.     Please call us if you have any further questions or concerns.    [facility] Neurosurgery [facility]   Phone: [phone]  Fax: [phone]

## 2022-02-07 NOTE — LETTER
"    2/7/2022         RE: Philipp Bland  [address]  [facility] [address]        Dear Colleague,    Thank you for referring your patient, Philipp Bland, to the [facility] NEUROSURGERY CLINIC [place]. Please see a copy of my visit note below.    Philipp Bland is a 66 year old male who presents for:  Chief Complaint   Patient presents with     RECHECK     Lumbar spine pain: EMG review (1-31-22) in chart         Initial Vitals:  BP (!) 131/90   Pulse 105   Ht 5' 9\" (1.753 m)   Wt 218 lb (98.9 kg)   SpO2 98%   BMI 32.19 kg/m   Estimated body mass index is 32.19 kg/m  as calculated from the following:    Height as of this encounter: 5' 9\" (1.753 m).    Weight as of this encounter: 218 lb (98.9 kg).. Body surface area is 2.19 meters squared. BP completed using cuff size: regular  Mild Pain (2)    Nursing Comments:     Suzanne Stokes MA    It was a pleasure to see Philipp Bland today in Neurosurgery Clinic. He is a 66 year old male who is previously seen Macey Danielson PA-C in our clinic.  Please see her note for full details.  He returns after undergoing EMG nerve conduction study.    To summarize the patient has back pain that began after lifting many heavy boxes at work.  He has back pain is his main symptom without significant numbness or tingling or radicular symptoms.    He has significant imaging findings on his x-rays and MRI including grade 2 spondylolisthesis at L5-S1 and multiple levels of significant degenerative changes with varying degrees of stenosis.    He underwent EMG nerve conduction study which revealed a severe polyneuropathy and was unable to identify any radicular findings because of the severity of the neuropathy.    The patient has left leg weakness greater than right.  He denies any upper extremity symptoms.    The patient is unable to walk without a walker and has significant weakness that limits his ability to walk any significant distance.    Vitals: " "   02/07/22 1132   BP: (!) 131/90   Pulse: 105   SpO2: 98%   Weight: 98.9 kg (218 lb)   Height: 1.753 m (5' 9\")     Estimated body mass index is 32.19 kg/m  as calculated from the following:    Height as of this encounter: 1.753 m (5' 9\").    Weight as of this encounter: 98.9 kg (218 lb).  Mild Pain (2)    Seated in a wheelchair.  Right lower extremity strength is 4 out of 5 diffusely  Left lower extremity strength is 2-3 out of 5 diffusely  Reflexes zero bilaterally patella.    Upper extremity shows 5 out of 5 strength except for 4 out of 5 deltoid.      Imaging: As above his MRI and x-rays show severe degenerative changes with the spondylolisthesis.  However the degree of stenosis and degenerative changes really do not explain his neurologic findings and these are likely more related to his severe neuropathy.    Assessment: 1.  Back pain 2.  Severe polyneuropathy.    Plan: I have recommended that he be evaluated by neurology to to help determine the etiology of his neuropathy as I think this is the main cause of his weakness at this stage.  I have also recommended physical therapy as I think this would help improve his function and strength regardless of what happens down the road.  I have recommended that he return to clinic to see me in a few months once he is finished with neurology to see if there would be any other potential intervention that would be reasonable to consider.         Again, thank you for allowing me to participate in the care of your patient.        Sincerely,        Eliseo Aggarwal MD    "

## 2022-02-11 ENCOUNTER — THERAPY VISIT (OUTPATIENT)
Dept: PHYSICAL THERAPY | Facility: CLINIC | Age: 67
End: 2022-02-11
Attending: NEUROLOGICAL SURGERY
Payer: COMMERCIAL

## 2022-02-11 DIAGNOSIS — M54.50 LUMBAR SPINE PAIN: ICD-10-CM

## 2022-02-11 DIAGNOSIS — M43.16 SPONDYLOLISTHESIS OF LUMBAR REGION: Primary | ICD-10-CM

## 2022-02-11 DIAGNOSIS — G62.9 NEUROPATHY: ICD-10-CM

## 2022-02-11 PROCEDURE — 97112 NEUROMUSCULAR REEDUCATION: CPT | Mod: GP | Performed by: PHYSICAL THERAPIST

## 2022-02-11 PROCEDURE — 97161 PT EVAL LOW COMPLEX 20 MIN: CPT | Mod: GP | Performed by: PHYSICAL THERAPIST

## 2022-02-11 PROCEDURE — 97110 THERAPEUTIC EXERCISES: CPT | Mod: GP | Performed by: PHYSICAL THERAPIST

## 2022-02-11 NOTE — PROGRESS NOTES
Physical Therapy Initial Evaluation  Subjective:  The history is provided by the patient. No  was used.   Therapist Generated HPI Evaluation  Problem details: Pt c/o LBP since lifting boxes (70#) 11/2022.  Since then has had progressively worsening LBP and L>R L/E weakness.  MD order date 2/7/2022.  Now using walker but only able to do transfer/short distances.  PT 3/2021 for 1 visit and cont with that HEP..         Type of problem:  Lumbar.    This is a chronic condition.  Condition occurred with:  Lifting.  Where condition occurred: at work.  Patient reports pain:  Lower lumbar spine, central lumbar spine, lumbar spine left, lumbar spine right, mid lumbar spine and upper lumbar spine.  Pain is described as aching, sharp, shooting and stabbing and is constant.  Radiates to: weakness vs pain in L/Es. Pain is the same all the time.  Since onset symptoms are unchanged.  Associated symptoms:  Loss of strength and pregnancy. Symptoms are exacerbated by standing, twisting, lifting and walking  and relieved by rest and NSAID's.  Special tests included:  MRI and EMG (EMG: limited by polyneuropathy, MRI: spondy, stenosis, DJD/DDD).    Restrictions due to condition include:  Working in an alternate job.  Barriers include:  None as reported by patient.    Patient Health History  Philipp Bland being seen for Low back pain and leg weakness.     Date of Onset: 11/2020.   Problem occurred: lifting 70# boxes   Pain score: 2/10 at rest, 9/10 stand.  General health as reported by patient is good.  Pertinent medical history includes: high blood pressure.   Red flags:  Significant weakness.  Medical allergies: none.   Surgeries include:  Orthopedic surgery.    Current medications:  High blood pressure medication.    Current occupation is Transport cars.   Primary job tasks include:  Driving.                                    Objective:    Gait:    Gait Type:  Antalgic   Assistive Devices:  Walker  Deviations:   Lumbar:  Trunk flexionHip:  Trendelenberg L and Trendelenberg R               Lumbar/SI Evaluation  ROM:  Arom wnl lumbar: testing limited due to poor tolerance to standing.  AROM Lumbar:   Flexion:          ERT  Ext:                    Mod loss +   Side Bend:        Left:  Min/mod loss +/-    Right:  Min/mod loss +/-  Rotation:           Left:     Right:   Side Glide:        Left:     Right:         Strength: Fair core stab recruitment  Lumbar Myotomes:    T12-L3 (Hip Flex):  Left: 3+    Right: 4-  L2-4 (Quads):  Left:  3+    Right:  4-  L4 (Ankle DF):  Left:  2    Right:  4-  L5 (Great Toe Ext): Left: 2+    Right: 3+   S1 (Toe Raise):  Left: 3+    Right: 3+                                                               General     ROS    Assessment/Plan:    Patient is a 66 year old male with lumbar complaints.    Patient has the following significant findings with corresponding treatment plan.                Diagnosis 1:  LBP with L>R L/E weakness  Pain -  hot/cold therapy, directional preference exercise and home program  Decreased ROM/flexibility - manual therapy and therapeutic exercise  Decreased strength - therapeutic exercise and therapeutic activities  Decreased proprioception - neuro re-education and therapeutic activities  Impaired gait - gait training  Impaired muscle performance - neuro re-education  Decreased function - therapeutic activities  Impaired posture - neuro re-education    Therapy Evaluation Codes:   Cumulative Therapy Evaluation is: Low complexity.    Previous and current functional limitations:  (See Goal Flow Sheet for this information)    Short term and Long term goals: (See Goal Flow Sheet for this information)     Communication ability:  Patient appears to be able to clearly communicate and understand verbal and written communication and follow directions correctly.  Treatment Explanation - The following has been discussed with the patient:   RX ordered/plan of care  Anticipated  outcomes  Possible risks and side effects  This patient would benefit from PT intervention to resume normal activities.   Rehab potential is fair.    Frequency:  1 X week, once daily  Duration:  for 12 weeks  Discharge Plan:  Achieve all LTG.  Independent in home treatment program.  Reach maximal therapeutic benefit.    Please refer to the daily flowsheet for treatment today, total treatment time and time spent performing 1:1 timed codes.

## 2022-02-11 NOTE — PROGRESS NOTES
[facility]    OUTPATIENT Physical Therapy ORTHOPEDIC EVALUATION  PLAN OF TREATMENT FOR OUTPATIENT REHABILITATION  (COMPLETE FOR INITIAL CLAIMS ONLY)  Patient's Last Name, First Name, M.I.  YOB: 1955  Philipp Bland    Provider s Name:  [facility]   Medical Record No.  [id number]   Start of Care Date:  02/11/22   Onset Date:    (11/2020, MD order 2/7/2022)   Type:     _X__PT   ___OT Medical Diagnosis:    Encounter Diagnoses   Name Primary?    Neuropathy     Spondylolisthesis of lumbar region Yes    Lumbar spine pain         Treatment Diagnosis:  LBP        Goals:     02/11/22 0500   Body Part   Goals listed below are for LBP   Goal #1   Goal #1 ambulation   Previous Functional Level No restrictions   Current Functional Level Feet patient can walk;with walker   Performance Level 50 9/10 pain   STG Target Performance Feet patient will be able to walk;with walker   Performance Level 200 with 5/10 pain   Rationale for safe household ambulation;for safe outdoor household ambulation;for safe community ambulation;to maintain proper body mechanics/posture while ambulating to avoid additional compensatory injury due to improper gait mechanics;to promote a healthy and active lifestyle   Due Date 03/04/22    LTG Target Performance Feet patient will be able to walk   Performance Level 500 with 2/10 pain   Rationale for safe household ambulation;for safe outdoor household ambulation;for safe community ambulation;to maintain proper body mechanics/posture while ambulating to avoid additional compensatory injury due to improper gait mechanics;to promote a healthy and active lifestyle   Due Date 05/06/22       Therapy Frequency:  1x/week  Predicted Duration of Therapy Intervention:  12 weeks    Kristofer Ponce PT                 I CERTIFY THE NEED FOR THESE SERVICES FURNISHED  UNDER        THIS PLAN OF TREATMENT AND WHILE UNDER MY CARE     (Physician attestation of this document indicates review and certification of the therapy plan).                       Certification Date From:  02/11/22   Certification Date To:  05/11/22    Referring Provider:  Eliseo Aggarwal    Initial Assessment        See Epic Evaluation SOC Date: 02/11/22

## 2022-06-17 ENCOUNTER — OFFICE VISIT (OUTPATIENT)
Dept: NEUROLOGY | Facility: CLINIC | Age: 67
End: 2022-06-17
Attending: NEUROLOGICAL SURGERY
Payer: COMMERCIAL

## 2022-06-17 ENCOUNTER — LAB (OUTPATIENT)
Dept: LAB | Facility: CLINIC | Age: 67
End: 2022-06-17
Payer: COMMERCIAL

## 2022-06-17 VITALS — HEART RATE: 102 BPM | SYSTOLIC BLOOD PRESSURE: 133 MMHG | DIASTOLIC BLOOD PRESSURE: 92 MMHG

## 2022-06-17 DIAGNOSIS — R29.898 WEAKNESS OF BOTH LOWER EXTREMITIES: ICD-10-CM

## 2022-06-17 DIAGNOSIS — G62.9 NEUROPATHY: ICD-10-CM

## 2022-06-17 DIAGNOSIS — R29.898 WEAKNESS OF BOTH LOWER EXTREMITIES: Primary | ICD-10-CM

## 2022-06-17 LAB
ALBUMIN SERPL-MCNC: 4 G/DL (ref 3.5–5)
ALP SERPL-CCNC: 127 U/L (ref 45–120)
ALT SERPL W P-5'-P-CCNC: 39 U/L (ref 0–45)
ANION GAP SERPL CALCULATED.3IONS-SCNC: 10 MMOL/L (ref 5–18)
AST SERPL W P-5'-P-CCNC: 33 U/L (ref 0–40)
BASOPHILS # BLD AUTO: 0.1 10E3/UL (ref 0–0.2)
BASOPHILS NFR BLD AUTO: 1 %
BILIRUB SERPL-MCNC: 0.7 MG/DL (ref 0–1)
BUN SERPL-MCNC: 13 MG/DL (ref 8–22)
CALCIUM SERPL-MCNC: 9.5 MG/DL (ref 8.5–10.5)
CHLORIDE BLD-SCNC: 101 MMOL/L (ref 98–107)
CK SERPL-CCNC: 112 U/L (ref 30–190)
CO2 SERPL-SCNC: 28 MMOL/L (ref 22–31)
CREAT SERPL-MCNC: 0.75 MG/DL (ref 0.7–1.3)
EOSINOPHIL # BLD AUTO: 0.5 10E3/UL (ref 0–0.7)
EOSINOPHIL NFR BLD AUTO: 11 %
ERYTHROCYTE [DISTWIDTH] IN BLOOD BY AUTOMATED COUNT: 18.4 % (ref 10–15)
ERYTHROCYTE [SEDIMENTATION RATE] IN BLOOD BY WESTERGREN METHOD: 60 MM/HR (ref 0–15)
GFR SERPL CREATININE-BSD FRML MDRD: >90 ML/MIN/1.73M2
GLUCOSE BLD-MCNC: 100 MG/DL (ref 70–125)
HCT VFR BLD AUTO: 36.3 % (ref 40–53)
HGB BLD-MCNC: 10.8 G/DL (ref 13.3–17.7)
IMM GRANULOCYTES # BLD: 0 10E3/UL
IMM GRANULOCYTES NFR BLD: 0 %
LYMPHOCYTES # BLD AUTO: 0.7 10E3/UL (ref 0.8–5.3)
LYMPHOCYTES NFR BLD AUTO: 14 %
MCH RBC QN AUTO: 23.2 PG (ref 26.5–33)
MCHC RBC AUTO-ENTMCNC: 29.8 G/DL (ref 31.5–36.5)
MCV RBC AUTO: 78 FL (ref 78–100)
MONOCYTES # BLD AUTO: 0.6 10E3/UL (ref 0–1.3)
MONOCYTES NFR BLD AUTO: 12 %
NEUTROPHILS # BLD AUTO: 2.8 10E3/UL (ref 1.6–8.3)
NEUTROPHILS NFR BLD AUTO: 62 %
NRBC # BLD AUTO: 0 10E3/UL
NRBC BLD AUTO-RTO: 0 /100
PLATELET # BLD AUTO: 267 10E3/UL (ref 150–450)
POTASSIUM BLD-SCNC: 4.1 MMOL/L (ref 3.5–5)
PROT SERPL-MCNC: 7.5 G/DL (ref 6–8)
RBC # BLD AUTO: 4.66 10E6/UL (ref 4.4–5.9)
RHEUMATOID FACT SER NEPH-ACNC: <15 IU/ML (ref 0–30)
SODIUM SERPL-SCNC: 139 MMOL/L (ref 136–145)
TSH SERPL DL<=0.005 MIU/L-ACNC: 4.17 UIU/ML (ref 0.3–5)
WBC # BLD AUTO: 4.6 10E3/UL (ref 4–11)

## 2022-06-17 PROCEDURE — 84443 ASSAY THYROID STIM HORMONE: CPT

## 2022-06-17 PROCEDURE — 85652 RBC SED RATE AUTOMATED: CPT

## 2022-06-17 PROCEDURE — 80053 COMPREHEN METABOLIC PANEL: CPT

## 2022-06-17 PROCEDURE — 99205 OFFICE O/P NEW HI 60 MIN: CPT | Performed by: PSYCHIATRY & NEUROLOGY

## 2022-06-17 PROCEDURE — 82550 ASSAY OF CK (CPK): CPT

## 2022-06-17 PROCEDURE — 83520 IMMUNOASSAY QUANT NOS NONAB: CPT

## 2022-06-17 PROCEDURE — 85014 HEMATOCRIT: CPT

## 2022-06-17 PROCEDURE — 36415 COLL VENOUS BLD VENIPUNCTURE: CPT

## 2022-06-17 PROCEDURE — 86431 RHEUMATOID FACTOR QUANT: CPT

## 2022-06-17 NOTE — LETTER
6/17/2022         RE: Philipp Bland  [address]  [place] [address]        Dear Colleague,    Thank you for referring your patient, Philipp Bland, to the [facility]. Please see a copy of my visit note below.    New patient referral for evaluation of a diagnosis of neuropathy. He is a 66 year old man who reports the following:    Nov 2020: LBP after heavy physical work. In retrospect, change in his gait going back as much as 20 years - hyperextending at the hip. No positive sensory symptoms. Muscle atrophy L leg more than right, left foot or toe drop. KING but no orthopnea, sleeps well. No UE symptoms. No bulbar symptoms or dysphagia, no cognitive or behavioral symptoms, mood normal. Occasional calf cramping. Rare twitching left thigh only. Negative psych history. Weight stable. Appetite good. Malignancy ROS negative.    Family history:  One sister - no neuromuscular symptoms, query RLS  Mother has developed memory loss and gait difficulty in her 80s.   Father no neuromuscular or neurological symptoms.   4 healthy children. One son had an ablation.    Social history:  No smoking. 2-3 alcoholic beverages 4 times a week. Remote history of heavier drinking during social stressors.  Not a .    Examination    Mental state: Alert, appropriate, speech, language, and thought content normal.     Cranial nerves II-XII normal. No facial weakness, EOMI, no ptosis    Sensory examination:     Right Left   Light touch Normal Normal   Vibration (timed) 10 10   Vibration (Rydell-Seiffer)     Temp     Pin MF Normal   Pos     Legend:   MM = medial malleolus, TT = tibial tuberosity, K = patella, MCP = MCP joint  MF = mid-foot, DC = distal calf, MC = mid calf, PC = proximal calf      Motor examination:     Right Left   Shoulder abduction  5 4   Elbow extension 5 5   Elbow flexion 5 5   Wrist extension  5 5   Finger extension 5 5   FDI 4 5   APB 4 4   Hip flexion 5 5   Knee  flexion 4+ 4   Knee extension 5 4   Dorsiflexion 5 5   Plantar flexion 5 5   A=atrophy    Hip add, abd, extension all 5  FDP, FPL all 5    No convincing scapular winging with any positioning    Tone normal     Reflexes:   Right Left   Biceps 2+ w/spread 2   BRD 2+ 2   Triceps 2+ 2   Nina 0 0   Patellar 2 Tr   Achilles 0 0   Plantar Flexor Flexor   Clonus Absent Absent      Jaw negative    Coordination:  Finger-nose normal.  Heel-shin normal.  RRMs normal.    Gait:  Markedly abnormal, requires 2WW.    Electrodiagnostic studies personally reviewed; they demonstrate a motor-predominant or pure motor process.    Impression:  Progressive asymmetric weakness with a broad differential diagnosis. Of note, imaging raises question of Paget's disease.     Recommendations:  Extend EDx to include UEs and scrutinize findings on needle electromyography.  Extend laboratory studies    Further decisions, including possible genetic testing, to follow.    Babar Issa M.D.      60 minutes spent on the date of the encounter on chart review, history and examination, documentation and further activities as noted above.            Again, thank you for allowing me to participate in the care of your patient.        Sincerely,        Babar Issa MD

## 2022-06-17 NOTE — PATIENT INSTRUCTIONS
1. Lab tests today  2. Another EMG to include arms and hands.  3. Further testing depends upon the findings.

## 2022-06-17 NOTE — PROGRESS NOTES
New patient referral for evaluation of a diagnosis of neuropathy. He is a 66 year old man who reports the following:    Nov 2020: LBP after heavy physical work. In retrospect, change in his gait going back as much as 20 years - hyperextending at the hip. No positive sensory symptoms. Muscle atrophy L leg more than right, left foot or toe drop. KING but no orthopnea, sleeps well. No UE symptoms. No bulbar symptoms or dysphagia, no cognitive or behavioral symptoms, mood normal. Occasional calf cramping. Rare twitching left thigh only. Negative psych history. Weight stable. Appetite good. Malignancy ROS negative.    Family history:  One sister - no neuromuscular symptoms, query RLS  Mother has developed memory loss and gait difficulty in her 80s.   Father no neuromuscular or neurological symptoms.   4 healthy children. One son had an ablation.    Social history:  No smoking. 2-3 alcoholic beverages 4 times a week. Remote history of heavier drinking during social stressors.  Not a .    Examination    Mental state: Alert, appropriate, speech, language, and thought content normal.     Cranial nerves II-XII normal. No facial weakness, EOMI, no ptosis    Sensory examination:     Right Left   Light touch Normal Normal   Vibration (timed) 10 10   Vibration (Rydell-Seiffer)     Temp     Pin MF Normal   Pos     Legend:   MM = medial malleolus, TT = tibial tuberosity, K = patella, MCP = MCP joint  MF = mid-foot, DC = distal calf, MC = mid calf, PC = proximal calf      Motor examination:     Right Left   Shoulder abduction  5 4   Elbow extension 5 5   Elbow flexion 5 5   Wrist extension  5 5   Finger extension 5 5   FDI 4 5   APB 4 4   Hip flexion 5 5   Knee flexion 4+ 4   Knee extension 5 4   Dorsiflexion 5 5   Plantar flexion 5 5   A=atrophy    Hip add, abd, extension all 5  FDP, FPL all 5    No convincing scapular winging with any positioning    Tone normal     Reflexes:   Right Left   Biceps 2+ w/spread 2   BRD 2+ 2    Triceps 2+ 2   Nina 0 0   Patellar 2 Tr   Achilles 0 0   Plantar Flexor Flexor   Clonus Absent Absent      Jaw negative    Coordination:  Finger-nose normal.  Heel-shin normal.  RRMs normal.    Gait:  Markedly abnormal, requires 2WW.    Electrodiagnostic studies personally reviewed; they demonstrate a motor-predominant or pure motor process.    Impression:  Progressive asymmetric weakness with a broad differential diagnosis. Of note, imaging raises question of Paget's disease.     Recommendations:  Extend EDx to include UEs and scrutinize findings on needle electromyography.  Extend laboratory studies    Further decisions, including possible genetic testing, to follow.    Babar Issa M.D.      60 minutes spent on the date of the encounter on chart review, history and examination, documentation and further activities as noted above.

## 2022-06-20 ENCOUNTER — TELEPHONE (OUTPATIENT)
Dept: FAMILY MEDICINE | Facility: CLINIC | Age: 67
End: 2022-06-20
Payer: COMMERCIAL

## 2022-06-20 DIAGNOSIS — M88.9 PAGET DISEASE OF BONE: Primary | ICD-10-CM

## 2022-06-20 DIAGNOSIS — M54.50 LUMBAR SPINE PAIN: ICD-10-CM

## 2022-06-20 PROCEDURE — 99207 E-CONSULT TO ENDOCRINOLOGY (ADULT OUTPT PROVIDER TO SPECIALIST WRITTEN QUESTION & RESPONSE): CPT | Performed by: PHYSICIAN ASSISTANT

## 2022-06-20 NOTE — TELEPHONE ENCOUNTER
Torri calling from Dr. Issa's neurology office to inform of 2 labs obtained on 6/17/22:    Elevated ESR- 60    Hgb- 10.8 (mild anemia)    Routing to provider to review and advise.    Alyson Nicholson RN

## 2022-06-28 ENCOUNTER — VIRTUAL VISIT (OUTPATIENT)
Dept: FAMILY MEDICINE | Facility: CLINIC | Age: 67
End: 2022-06-28
Payer: COMMERCIAL

## 2022-06-28 DIAGNOSIS — D50.9 IRON DEFICIENCY ANEMIA, UNSPECIFIED IRON DEFICIENCY ANEMIA TYPE: ICD-10-CM

## 2022-06-28 DIAGNOSIS — M54.50 LUMBAR SPINE PAIN: Primary | ICD-10-CM

## 2022-06-28 DIAGNOSIS — Z12.11 SCREEN FOR COLON CANCER: ICD-10-CM

## 2022-06-28 DIAGNOSIS — M88.9 PAGET DISEASE OF BONE: ICD-10-CM

## 2022-06-28 PROCEDURE — 99214 OFFICE O/P EST MOD 30 MIN: CPT | Mod: 95 | Performed by: PHYSICIAN ASSISTANT

## 2022-06-28 PROCEDURE — 99207 E-CONSULT TO ENDOCRINOLOGY (ADULT OUTPT PROVIDER TO SPECIALIST WRITTEN QUESTION & RESPONSE): CPT | Performed by: PHYSICIAN ASSISTANT

## 2022-06-28 NOTE — PROGRESS NOTES
Philipp is a 66 year old who is being evaluated via a billable telephone visit.      What phone number would you like to be contacted at? [phone]  How would you like to obtain your AVS? Mail a copy    Assessment & Plan     Lumbar spine pain  Has seen neurosurgery and now seeing neurology.  Following chiropractic care.  EMG scheduled for tomorrow.  Unclear etiology for elevated ESR. Further labs were ordered by neurology.    Iron deficiency anemia, unspecified iron deficiency anemia type  Presumed iron deficiency anemia (need to confirm with labs).  Consider hematology referral if iron testing normal.   - Fecal colorectal cancer screen (FIT); Future  - Iron and iron binding capacity; Future  - Ferritin; Future  - Reticulocyte count; Future    Screen for colon cancer  Due for colon cancer screening. Ordered today.  - GASTROENTEROLOGY ADULT REF PROCEDURE ONLY; Future    Review of external notes as documented elsewhere in note  Ordering of each unique test  30 minutes spent on the date of the encounter doing chart review, history and exam, documentation and further activities per the note        Return in about 2 months (around 8/28/2022) for Annual physical with a PCP.    Allison Og PA-C  [facility]   Philipp is a 66 year old, presenting for the following health issues: follow up lab results      HPI     Patient is here to discuss neurology lab results (elevated ESR and anemia).    The patient is currently seeing neurology (has previously seen neurosurgery and had MRI of the low back). He is having an EMG performed tomorrow.  He is currently seeing a chiropractor, which has improved his back pain (about 40%).    He is having to use a walker to walk. He notes he has to rest secondary to pain (see neurology's notes).  He did have a fall and injured the right shoulder. Otherwise he denies shoulder pain, no hip pain.      Review of Systems   GENERAL:  No fevers  RESP:  No  "shortness of breath  MUSCULOSKELETAL: As noted in HPI          Objective    Vitals - Patient Reported  Systolic (Patient Reported): 130  Diastolic (Patient Reported): (!) 90  Weight (Patient Reported): 90.7 kg (200 lb)  Height (Patient Reported): 175.3 cm (5' 9\")  BMI (Based on Pt Reported Ht/Wt): 29.53      Vitals:  No vitals were obtained today due to virtual visit.    Physical Exam   healthy, alert and no distress  PSYCH: Alert and oriented; coherent speech, normal   rate and volume, able to articulate logical thoughts, able   to abstract reason, no tangential thoughts, no hallucinations   or delusions  His affect is normal  RESP: No cough, no audible wheezing, able to talk in full sentences  Remainder of exam unable to be completed due to telephone visits      Phone call duration: 23 minutes    .  ..  "

## 2022-06-29 ENCOUNTER — OFFICE VISIT (OUTPATIENT)
Dept: NEUROLOGY | Facility: CLINIC | Age: 67
End: 2022-06-29
Payer: COMMERCIAL

## 2022-06-29 DIAGNOSIS — R29.898 WEAKNESS OF BOTH LOWER EXTREMITIES: ICD-10-CM

## 2022-06-29 PROCEDURE — 95887 MUSC TST DONE W/N TST NONEXT: CPT | Performed by: PSYCHIATRY & NEUROLOGY

## 2022-06-29 PROCEDURE — 95910 NRV CNDJ TEST 7-8 STUDIES: CPT | Performed by: PSYCHIATRY & NEUROLOGY

## 2022-06-29 PROCEDURE — 95886 MUSC TEST DONE W/N TEST COMP: CPT | Performed by: PSYCHIATRY & NEUROLOGY

## 2022-06-29 NOTE — PROGRESS NOTES
Results discussed briefly with patient. While his presentation raised concern for a neuromuscular degenerative process, I am not confident of such a process after today's study. At this point I suggest following up the question of Paget's disease and completing his pending laboratory studies. The combination of Paget's disease, progressive weakness, and a family history of dementia may merit consideration of genetic testing for causes of multisystem proteinopathy, but I think this is unlikely and currently favor spinal structural disease as the most likely cause of his weakness.

## 2022-06-29 NOTE — TELEPHONE ENCOUNTER
"Called pt, wants to confirm f/u plan for this with LG, informs saw neurologist today and discussed Paget's, he ordered labs to start work up for this, wants you to \"talk to Dr Issa\" or review his note and confirm it he needs to see endocrinology now, discussed making appointment since booked out and can cancel if not needed    ROUTED to LG, please confirm, route again to inform pt  Tatyana Farah RN, BSN  [facility]      "

## 2022-06-29 NOTE — TELEPHONE ENCOUNTER
Please call patient. I am looking back, and recommended endocrinology evaluation due to the possible Paget's noted on MRI in November (see 11/10/21 phone encounter). I do not see that patient has seen endocrinology. New referral placed. Please assist him with getting this scheduled if needed.

## 2022-06-29 NOTE — Clinical Note
"Jesus Cooper,  I recommend completing your workup for systemic illnesses that might cause elevated ESR and anemia, and determining whether he does or does not have Paget's. If he does I can test for certain conditions that cause Paget's and rare neuromuscular disorders, but I suspect at the end of the day this will be due to his spinal degenerative disease. Jesus, if you think he has markedly thickened PLL perhaps we can test for amyloidosis too - but again these are \"zebras\" and as you know most hoofbeats are caused by horses. I would like to know if you think the weakness is out of proportion to the imaging findings, but I think they may be sufficient to explain the problem. - Babar"

## 2022-06-29 NOTE — Clinical Note
Please arrange a virtual visit to review test results on July 22 at 1 PM. It will be a double book.

## 2022-06-29 NOTE — LETTER
2022       RE: Philipp Bland  [address]  [facility] [address]     Dear Colleague,    Thank you for referring your patient, Philipp Bland, to the [facility] [place] at [facility]. Please see a copy of my visit note below.                        [facility]  Electrodiagnostic Laboratory                 Department of Neurology                                                                                                         Test Date:  2022    Patient: Philipp Bland : 1955 Physician: Babar Issa MD   Sex: Male AGE: 66 year Ref Phys:    ID#: [id number]   Technician: Kristy Behling     History and Examination:  Philipp Bland is a 66 year old man with leg weakness and spinal stenosis. He carries a diagnosis of neuropathy. He is referred for further evaluation in follow up to a study performed in January and to evaluate the upper limb to help distinguish a local from a widespread process.    Techniques:  Motor conduction studies were done with surface recording electrodes. Sensory conduction studies were performed with surface electrodes, unless indicated otherwise by (n), designating the use of subdermal recording electrodes. Temperature was monitored and recorded throughout the study. Upper extremities were maintained at a temperature of 32 degrees Centigrade or higher.  EMG was done with a concentric needle electrode.     Results:  Evaluation of the left Median (APB) motor nerve showed prolonged distal onset latency (4.5 ms) and reduced amplitude (3.8 mV).  The left Ulnar (ADM) motor nerve showed decreased conduction velocity (Abv Elbow-Bel Elbow, 36 m/s).  The left median sensory nerve showed decreased conduction velocity (42 m/s).  The left sural sensory and the left ulnar sensory nerves showed reduced amplitude (L4, L5, L7  V) and reduced amplitude (4  V).  All remaining nerves (as indicated in the  following tables) were within normal limits.      Needle evaluation of the left Vastus Lateralis muscle showed increased Fibs/PSW, slightly increased motor unit amplitude, slightly increased motor unit duration, slightly increased polyphasic potentials, and severely reduced recruitment.  The left Tibialis Anterior muscle showed moderately increased Fibs/PSW and none recruitment.  The left Gastrocnemius (Medial Hd) muscle showed very increased Fibs/PSW and recruitment.  The left Biceps Femoris (Short Hd) muscle showed moderately increased Fibs/PSW, slightly increased motor unit amplitude, slightly increased motor unit duration, slightly increased polyphasic potentials, and moderately reduced recruitment.  The left Gluteus Medius muscle showed moderately increased Fibs/PSW, moderately increased motor unit amplitude, moderately increased motor unit duration, and recruitment.  All remaining muscles (as indicated in the following table) showed no evidence of electrical instability.      Interpretation:  This is an abnormal study, demonstrating electrophysiologic evidence of the followin. Moderately severe left lumbosacral polyradiculopathy. Anterior myelopathy, plexopathy, or a more widespread neuromuscular disorder are not fully excluded, but taken in total and in the clinical context a polyradiculopathy remains most likely.   2. No evidence of myopathy.  3. Marginal attenuation of the sural sensory nerve action potential, inadequate to confirm a diagnosis of sensory polyneuropathy.  4. Moderate left ulnar neuropathy at the elbow.  5. Mild left median neuropathy at the wrist.    _____________________________  Babar Issa MD  Board Certified in Clinical Neurophysiology and Neuromuscular Medicine        Nerve Conduction Studies  Motor Sites      Latency Amplitude Neg. Amp Diff Segment Distance Velocity Neg. Dur Neg Area Diff Temperature Comment   Site (ms) Norm (mV) Norm %  cm m/s Norm ms %  C    Left Median (APB)  Motor   Wrist 4.5  < 4.4 3.8  > 5.0  Wrist-APB 8   5.1  34.1    Elbow 9.1 - 3.6 - -5.3 Elbow-Wrist 24 52  > 48 4.9 -5.9 34.1    Left Median/Ulnar (Lumb-Dors Int II) Motor        Median (Lumb I)   Wrist 5.0 - 1.26 -      5.0  33.8         Ulnar (Dorsal Int (manus))   Wrist 3.3 - 1.28 -      4.8  33.7    Left Tibial (AHB) Motor   Ankle 4.9  < 6.5 4.8  > 4.4  Ankle-AHB 8   5.0  31.2    Knee 14.9 - 2.6 - -45.8 Knee-Ankle 39 39  > 38 5.3 -45.5 31.1    Left Ulnar (ADM) Motor   Wrist 2.9  < 3.5 7.7  > 5.0  Wrist-ADM 8   4.8  34    Bel Elbow 6.9 - 6.2 - -19.5 Bel Elbow-Wrist 20 50  > 48 4.9 -19.8 34    Abv Elbow 9.4 - 6.0 - -3.2 Abv Elbow-Bel Elbow 9 36  > 48 5.0 8.2 34      Sensory Sites      Onset Lat Peak Lat Amp (O-P) Amp (P-P) Segment Distance Velocity Temperature   Site ms ms  V Norm  V  cm m/s Norm  C   Left Median Sensory   Wrist-Dig II 3.3 4.6 13  > 10 14 Wrist-Dig II 14 42  > 48 34.2   Left Radial Sensory   Forearm-Wrist 1.43 2.1 17  > 15 14 Forearm-Wrist 8 56 - 33.8   Left Sural Sensory   Calf-Lat Mall 2.7 3.4 4  > 5 4 Calf-Lat Mall 14 52  > 38 30.3   Left Ulnar Sensory   Wrist-Dig V 2.4 3.0 5  > 8 7 Wrist-Dig V 12.5 52  > 48 34.2       Electromyography     Side Muscle Ins Act Fibs/PSW Fasc HF Amp Dur Poly Recrt Int Pat   Left Vastus Lat Nml 1+ Nml 0 1+ 1+ 1+ SevRed Nml   Left Tib Anterior Nml 2+ Nml 0   0 None Nml   Left Gastroc MH Nml 3+ Nml 0 Nml Nml 0 Norah Nml   Left FCR Nml None Nml 0 Nml Nml 0 Nml Nml   Left EDC Nml None Nml 0 Nml Nml 0 Nml Nml   Left FDI Nml None Nml 0 Nml Nml 0 Nml Nml   Left Biceps Nml None Nml 0 Nml Nml 0 Nml Nml   Left Triceps Nml None Nml 0 Nml Nml 0 Nml Nml   Left Flex dig prof (uln) Nml None Nml 0 Nml Nml 0 Nml Nml   Left Biceps Fem SH Nml 2+ Nml 0 1+ 1+ 1+ ModRed Nml   Left Gluteus Med Nml 2+ Nml 0 2+ 2+ 0 Discrete Nml   Left L5 Parasp Nml None Nml 0        Left T6 Parasp Nml None Nml 0              NCS Waveforms:    Motor                Sensory                  Results  discussed briefly with patient. While his presentation raised concern for a neuromuscular degenerative process, I am not confident of such a process after today's study. At this point I suggest following up the question of Paget's disease and completing his pending laboratory studies. The combination of Paget's disease, progressive weakness, and a family history of dementia may merit consideration of genetic testing for causes of multisystem proteinopathy, but I think this is unlikely and currently favor spinal structural disease as the most likely cause of his weakness.      Sincerely,    Babar Issa MD

## 2022-06-29 NOTE — PROGRESS NOTES
[facility]  Electrodiagnostic Laboratory                 Department of Neurology                                                                                                         Test Date:  2022    Patient: Philipp Bland : 1955 Physician: Babar Issa MD   Sex: Male AGE: 66 year Ref Phys:    ID#: [id number]   Technician: Kristy Behling     History and Examination:  Philipp Bland is a 66 year old man with leg weakness and spinal stenosis. He carries a diagnosis of neuropathy. He is referred for further evaluation in follow up to a study performed in January and to evaluate the upper limb to help distinguish a local from a widespread process.    Techniques:  Motor conduction studies were done with surface recording electrodes. Sensory conduction studies were performed with surface electrodes, unless indicated otherwise by (n), designating the use of subdermal recording electrodes. Temperature was monitored and recorded throughout the study. Upper extremities were maintained at a temperature of 32 degrees Centigrade or higher.  EMG was done with a concentric needle electrode.     Results:  Evaluation of the left Median (APB) motor nerve showed prolonged distal onset latency (4.5 ms) and reduced amplitude (3.8 mV).  The left Ulnar (ADM) motor nerve showed decreased conduction velocity (Abv Elbow-Bel Elbow, 36 m/s).  The left median sensory nerve showed decreased conduction velocity (42 m/s).  The left sural sensory and the left ulnar sensory nerves showed reduced amplitude (L4, L5, L7  V) and reduced amplitude (4  V).  All remaining nerves (as indicated in the following tables) were within normal limits.      Needle evaluation of the left Vastus Lateralis muscle showed increased Fibs/PSW, slightly increased motor unit amplitude, slightly increased motor unit duration, slightly increased polyphasic potentials, and severely reduced recruitment.  The left Tibialis Anterior  muscle showed moderately increased Fibs/PSW and none recruitment.  The left Gastrocnemius (Medial Hd) muscle showed very increased Fibs/PSW and recruitment.  The left Biceps Femoris (Short Hd) muscle showed moderately increased Fibs/PSW, slightly increased motor unit amplitude, slightly increased motor unit duration, slightly increased polyphasic potentials, and moderately reduced recruitment.  The left Gluteus Medius muscle showed moderately increased Fibs/PSW, moderately increased motor unit amplitude, moderately increased motor unit duration, and recruitment.  All remaining muscles (as indicated in the following table) showed no evidence of electrical instability.      Interpretation:  This is an abnormal study, demonstrating electrophysiologic evidence of the followin. Moderately severe left lumbosacral polyradiculopathy. Anterior myelopathy, plexopathy, or a more widespread neuromuscular disorder are not fully excluded, but taken in total and in the clinical context a polyradiculopathy remains most likely.   2. No evidence of myopathy.  3. Marginal attenuation of the sural sensory nerve action potential, inadequate to confirm a diagnosis of sensory polyneuropathy.  4. Moderate left ulnar neuropathy at the elbow.  5. Mild left median neuropathy at the wrist.    _____________________________  Babar Issa MD  Board Certified in Clinical Neurophysiology and Neuromuscular Medicine        Nerve Conduction Studies  Motor Sites      Latency Amplitude Neg. Amp Diff Segment Distance Velocity Neg. Dur Neg Area Diff Temperature Comment   Site (ms) Norm (mV) Norm %  cm m/s Norm ms %  C    Left Median (APB) Motor   Wrist 4.5  < 4.4 3.8  > 5.0  Wrist-APB 8   5.1  34.1    Elbow 9.1 - 3.6 - -5.3 Elbow-Wrist 24 52  > 48 4.9 -5.9 34.1    Left Median/Ulnar (Lumb-Dors Int II) Motor        Median (Lumb I)   Wrist 5.0 - 1.26 -      5.0  33.8         Ulnar (Dorsal Int (manus))   Wrist 3.3 - 1.28 -      4.8  33.7    Left Tibial  (AHB) Motor   Ankle 4.9  < 6.5 4.8  > 4.4  Ankle-AHB 8   5.0  31.2    Knee 14.9 - 2.6 - -45.8 Knee-Ankle 39 39  > 38 5.3 -45.5 31.1    Left Ulnar (ADM) Motor   Wrist 2.9  < 3.5 7.7  > 5.0  Wrist-ADM 8   4.8  34    Bel Elbow 6.9 - 6.2 - -19.5 Bel Elbow-Wrist 20 50  > 48 4.9 -19.8 34    Abv Elbow 9.4 - 6.0 - -3.2 Abv Elbow-Bel Elbow 9 36  > 48 5.0 8.2 34      Sensory Sites      Onset Lat Peak Lat Amp (O-P) Amp (P-P) Segment Distance Velocity Temperature   Site ms ms  V Norm  V  cm m/s Norm  C   Left Median Sensory   Wrist-Dig II 3.3 4.6 13  > 10 14 Wrist-Dig II 14 42  > 48 34.2   Left Radial Sensory   Forearm-Wrist 1.43 2.1 17  > 15 14 Forearm-Wrist 8 56 - 33.8   Left Sural Sensory   Calf-Lat Mall 2.7 3.4 4  > 5 4 Calf-Lat Mall 14 52  > 38 30.3   Left Ulnar Sensory   Wrist-Dig V 2.4 3.0 5  > 8 7 Wrist-Dig V 12.5 52  > 48 34.2       Electromyography     Side Muscle Ins Act Fibs/PSW Fasc HF Amp Dur Poly Recrt Int Pat   Left Vastus Lat Nml 1+ Nml 0 1+ 1+ 1+ SevRed Nml   Left Tib Anterior Nml 2+ Nml 0   0 None Nml   Left Gastroc MH Nml 3+ Nml 0 Nml Nml 0 Norah Nml   Left FCR Nml None Nml 0 Nml Nml 0 Nml Nml   Left EDC Nml None Nml 0 Nml Nml 0 Nml Nml   Left FDI Nml None Nml 0 Nml Nml 0 Nml Nml   Left Biceps Nml None Nml 0 Nml Nml 0 Nml Nml   Left Triceps Nml None Nml 0 Nml Nml 0 Nml Nml   Left Flex dig prof (uln) Nml None Nml 0 Nml Nml 0 Nml Nml   Left Biceps Fem SH Nml 2+ Nml 0 1+ 1+ 1+ ModRed Nml   Left Gluteus Med Nml 2+ Nml 0 2+ 2+ 0 Discrete Nml   Left L5 Parasp Nml None Nml 0        Left T6 Parasp Nml None Nml 0              NCS Waveforms:    Motor                Sensory

## 2022-06-30 ENCOUNTER — E-CONSULT (OUTPATIENT)
Dept: ENDOCRINOLOGY | Facility: CLINIC | Age: 67
End: 2022-06-30
Payer: COMMERCIAL

## 2022-06-30 PROCEDURE — 99207 PR NO BILLABLE SERVICE THIS VISIT: CPT

## 2022-06-30 NOTE — TELEPHONE ENCOUNTER
I have reviewed his (Dr. Issa's) note and he actually sent me a message as well. Paget's work up would need to be completed with endocrinology (to confirm the diagnosis). We have imaging that suggests it and labs (slightly elevated alk phos) that also points to Paget's. We will continue the rest of the work up, we have been, with labs for the elevated ESR and anemia.    We might be able to do an e-consult. This is where endocrinology reviews all the information and makes a recommendation based on chart review and not actually a consult with the patient. If he would like to do this we could hear back from endocrinology in 1-3 days. Sometimes they will still want to see the patient with a visit. The cost is 114 dollars if insurance dose not cover it. If consult/appointment is recommended patient does not get charged this fee.

## 2022-06-30 NOTE — PROGRESS NOTES
ALL SMARTFIELDS MUST BE COMPLETED FOR PATIENT CARE AND BILLING    6/30/2022     E-Consult has been denied due to: Doesn't meet criteria for E-Consult - Did not include a specific clinical question, or no question provided.    Interprofessional consultation requested by:  Allison Og PA-C      Clinical Question/Purpose: MY CLINICAL QUESTION IS: Possible diagnosis of Paget's        The recommendations provided in this E-Consult are based on a review of clinical data pertinent to the clinical question presented, without a review of the patient's complete medical record or, the benefit of a comprehensive in-person or virtual patient evaluation. This consultation should not replace the clinical judgement and evaluation of the provider ordering this E-Consult. Any new clinical issues, or changes in patient status since the filing of this E-Consult will need to be taken into account when assessing these recommendations. Please contact me if you have further questions.    My total time spent reviewing clinical information and formulating assessment was 5 minutes.    Report sent automatically to requesting provider once signed.     Samantha He MD

## 2022-07-05 ENCOUNTER — LAB (OUTPATIENT)
Dept: LAB | Facility: CLINIC | Age: 67
End: 2022-07-05
Payer: COMMERCIAL

## 2022-07-05 ENCOUNTER — TELEPHONE (OUTPATIENT)
Dept: NEUROSURGERY | Facility: CLINIC | Age: 67
End: 2022-07-05

## 2022-07-05 ENCOUNTER — E-CONSULT (OUTPATIENT)
Dept: ENDOCRINOLOGY | Facility: CLINIC | Age: 67
End: 2022-07-05
Payer: COMMERCIAL

## 2022-07-05 DIAGNOSIS — D50.9 IRON DEFICIENCY ANEMIA, UNSPECIFIED IRON DEFICIENCY ANEMIA TYPE: ICD-10-CM

## 2022-07-05 LAB
FERRITIN SERPL-MCNC: 12 NG/ML (ref 26–388)
RETICS # AUTO: 0.06 10E6/UL (ref 0.03–0.1)
RETICS/RBC NFR AUTO: 1.3 % (ref 0.5–2)

## 2022-07-05 PROCEDURE — 82728 ASSAY OF FERRITIN: CPT

## 2022-07-05 PROCEDURE — 85045 AUTOMATED RETICULOCYTE COUNT: CPT

## 2022-07-05 PROCEDURE — 99207 PR NO BILLABLE SERVICE THIS VISIT: CPT

## 2022-07-05 PROCEDURE — 83550 IRON BINDING TEST: CPT

## 2022-07-05 PROCEDURE — 36415 COLL VENOUS BLD VENIPUNCTURE: CPT

## 2022-07-05 NOTE — TELEPHONE ENCOUNTER
CLAY- Dr. Aggarwal would be willing to schedule the video visit following the appointment with Dr. Issa on 7-22-22

## 2022-07-05 NOTE — PROGRESS NOTES
ALL SMARTFIELDS MUST BE COMPLETED FOR PATIENT CARE AND BILLING    7/5/2022     E-Consult has been denied due to: Complexity of question, needs in-person referral.      Interprofessional consultation requested by:  Allison Og PA-C      Clinical Question/Purpose: MY CLINICAL QUESTION IS: Patient has a possible diagnosis of Paget's. I am wondering if there is anything further needed to confirm this diagnosis or if he needs to see endocrinology to discuss?    Patient assessment and information reviewed:   Imaging dating to 11/2021 raising questions of paget disease.     4/8/13 alk phos 117  6/17/2022 alk phos 127, Ca 9.5, albumin 4, TSH 4.17,     Recommendations:   Patient should be seen face to face by either Dr Aguilar or Dr Collazo.        The recommendations provided in this E-Consult are based on a review of clinical data pertinent to the clinical question presented, without a review of the patient's complete medical record or, the benefit of a comprehensive in-person or virtual patient evaluation. This consultation should not replace the clinical judgement and evaluation of the provider ordering this E-Consult. Any new clinical issues, or changes in patient status since the filing of this E-Consult will need to be taken into account when assessing these recommendations. Please contact me if you have further questions.    My total time spent reviewing clinical information and formulating assessment was 10 minutes.    Report sent automatically to requesting provider once signed.     Samantha He MD

## 2022-07-05 NOTE — TELEPHONE ENCOUNTER
Patient notified he is to see either Dr Aguilar or Dr Collazo.      Patient was given an opportunity to ask questions, verbalized understanding of plan, and is agreeable.     Zehra Rose RN

## 2022-07-05 NOTE — TELEPHONE ENCOUNTER
Please call patient.  E-consult recommendation is to do an in person visit see below.  Recommendations:   Patient should be seen face to face by either Dr Aguilar or Dr Collazo.    I placed the endocrine order again to inform scheduling of this recommendation (for these specific physicians).

## 2022-07-06 LAB
IRON SATN MFR SERPL: 5 % (ref 15–46)
IRON SERPL-MCNC: 20 UG/DL (ref 35–180)
TIBC SERPL-MCNC: 378 UG/DL (ref 240–430)

## 2022-07-07 ENCOUNTER — TELEPHONE (OUTPATIENT)
Dept: FAMILY MEDICINE | Facility: CLINIC | Age: 67
End: 2022-07-07

## 2022-07-07 RX ORDER — FERROUS GLUCONATE 324(38)MG
324 TABLET ORAL
Qty: 30 TABLET | Refills: 5 | Status: SHIPPED | OUTPATIENT
Start: 2022-07-07 | End: 2022-12-02

## 2022-07-07 NOTE — TELEPHONE ENCOUNTER
----- Message from Allison Og PA-C sent at 7/7/2022 10:19 AM CDT -----  Please let patient know that his labs show iron deficiency. I will send in iron tablets for him to start. He should take them daily with vitamin C or a small glass of orange juice (this helps with absorption). Recheck in 2-3 months.

## 2022-07-07 NOTE — TELEPHONE ENCOUNTER
Called pt and relayed provider message below. Patient was given an opportunity to ask questions, verbalized understanding of plan, and is agreeable.    Scheduled pt for 9/23/22 for lab only to recheck iron levels.    Alyson Nicholson RN

## 2022-07-08 LAB — SCANNED LAB RESULT: NORMAL

## 2022-07-10 LAB — HEMOCCULT STL QL IA: NEGATIVE

## 2022-07-10 PROCEDURE — 82274 ASSAY TEST FOR BLOOD FECAL: CPT

## 2022-07-11 ENCOUNTER — E-CONSULT (OUTPATIENT)
Dept: ENDOCRINOLOGY | Facility: CLINIC | Age: 67
End: 2022-07-11
Payer: COMMERCIAL

## 2022-07-11 PROCEDURE — 99207 PR NO BILLABLE SERVICE THIS VISIT: CPT

## 2022-07-11 NOTE — TELEPHONE ENCOUNTER
Please let patient know that his stool testing shows no blood. This further confirms that iron deficiency is the cause of his low hemoglobin (no signs of blood loss in stool).

## 2022-07-11 NOTE — TELEPHONE ENCOUNTER
Called pt and relayed provider message below. Patient was given an opportunity to ask questions, verbalized understanding of plan, and is agreeable.    Alyson Nicholson RN

## 2022-07-12 NOTE — PROGRESS NOTES
ALL SMARTFIELDS MUST BE COMPLETED FOR PATIENT CARE AND BILLING    7/11/2022     E-Consult has been denied due to: Doesn't meet criteria for E-Consult - Asked a logistical question (scheduling, referral request).    Interprofessional consultation requested by:  Allison Og PA-C      Clinical Question/Purpose: MY CLINICAL QUESTION IS: Patient has a possible diagnosis of Paget's. I am wondering if there is anything further needed to confirm this diagnosis or if he needs to see endocrinology to discuss?     Patient assessment and information reviewed: this appears to be a duplicate     Recommendations: this appears to be a duplicate       The recommendations provided in this E-Consult are based on a review of clinical data pertinent to the clinical question presented, without a review of the patient's complete medical record or, the benefit of a comprehensive in-person or virtual patient evaluation. This consultation should not replace the clinical judgement and evaluation of the provider ordering this E-Consult. Any new clinical issues, or changes in patient status since the filing of this E-Consult will need to be taken into account when assessing these recommendations. Please contact me if you have further questions.    My total time spent reviewing clinical information and formulating assessment was 5 minutes.    Report sent automatically to requesting provider once signed.     Samantha He MD

## 2022-07-22 ENCOUNTER — VIRTUAL VISIT (OUTPATIENT)
Dept: NEUROLOGY | Facility: CLINIC | Age: 67
End: 2022-07-22
Payer: COMMERCIAL

## 2022-07-22 ENCOUNTER — VIRTUAL VISIT (OUTPATIENT)
Dept: NEUROSURGERY | Facility: CLINIC | Age: 67
End: 2022-07-22
Payer: COMMERCIAL

## 2022-07-22 DIAGNOSIS — Z81.8 FAMILY HISTORY OF DEMENTIA: ICD-10-CM

## 2022-07-22 DIAGNOSIS — M80.00XA AGE-RELATED OSTEOPOROSIS WITH CURRENT PATHOLOGICAL FRACTURE, INITIAL ENCOUNTER: Primary | ICD-10-CM

## 2022-07-22 DIAGNOSIS — M88.9 PAGET DISEASE OF BONE: Primary | ICD-10-CM

## 2022-07-22 DIAGNOSIS — M43.16 SPONDYLOLISTHESIS OF LUMBAR REGION: ICD-10-CM

## 2022-07-22 DIAGNOSIS — M48.061 SPINAL STENOSIS OF LUMBAR REGION WITH RADICULOPATHY: ICD-10-CM

## 2022-07-22 DIAGNOSIS — M54.16 SPINAL STENOSIS OF LUMBAR REGION WITH RADICULOPATHY: ICD-10-CM

## 2022-07-22 PROCEDURE — 99215 OFFICE O/P EST HI 40 MIN: CPT | Mod: 95 | Performed by: PSYCHIATRY & NEUROLOGY

## 2022-07-22 PROCEDURE — 99213 OFFICE O/P EST LOW 20 MIN: CPT | Mod: TEL | Performed by: NEUROLOGICAL SURGERY

## 2022-07-22 NOTE — NURSING NOTE
"Philipp Bland is a 66 year old male who presents for:  Chief Complaint   Patient presents with     RECHECK     2 spondylolisthesis at L5-S1   Multiple levels of significant degenerative         Initial Vitals:  There were no vitals taken for this visit. Estimated body mass index is 32.19 kg/m  as calculated from the following:    Height as of 2/7/22: 5' 9\" (1.753 m).    Weight as of 2/7/22: 218 lb (98.9 kg).. There is no height or weight on file to calculate BSA. BP completed using cuff size: NA (Not Taken)      Fredy Devries    "

## 2022-07-22 NOTE — NURSING NOTE
Chief Complaint   Patient presents with     Follow Up       JENNY Philippe on 7/22/2022 at 1:47 PM

## 2022-07-22 NOTE — PROGRESS NOTES
"It was a pleasure to see Philipp Bland today in Neurosurgery Clinic. He is a 66 year old male who is been previously seen by myself and one of our PAs.    Please see her previous notes for his full symptoms.    The patient has been evaluated by neurology and initially felt to have neuropathy but reevaluation suggestive of a lumbar radiculopathy that would be consistent with his imaging findings on MRI.    We discussed this finding and that in this setting, surgical intervention for his back and lower extremity symptoms would actually be a reasonable undertaking.    There were no vitals filed for this visit.  Estimated body mass index is 32.19 kg/m  as calculated from the following:    Height as of 2/7/22: 1.753 m (5' 9\").    Weight as of 2/7/22: 98.9 kg (218 lb).  Data Unavailable    Phone visit, no exam.    Imaging: MRI and x-rays of the lumbar spine show grade 2 spondylolisthesis at L5-S1 as well as significant degenerative changes at L4-5 with significant stenosis correlating with his radicular symptoms and EMG findings.    Assessment: Lumbar stenosis, spondylolisthesis with radiculopathy.    Plan: The patient is scheduled to see endocrinology later this year for evaluation of potential Paget's disease.  I think this would be important to do prior to considering any lumbar fusion if it would impact his ability to heal.  I have also recommended a DEXA scan to ensure there are no other bone health abnormalities prior to considering any fusion.     This phone visit took 8 minutes.   "

## 2022-07-22 NOTE — PROGRESS NOTES
I spoke with Mr Bland and explained the followin. EMG did not demonstrate evidence of myopathy or neuromuscular disease beyond the L4-S1 segments. Imaging findings may be sufficient to explain this.  2. Labs had been ordered in mid- to follow up on elevated ESR but were not drawn. These need to be done and followed up by PCP. A systemic inflammatory, infectious, or neoplastic disorder should be excluded.  3. Endocrine e-consult recommended a full consultation. Unfortunately this is not scheduled until November. Discussed with [facility]; they feel Paget's is likely and recommended a dedicated pelvis x-ray and bone scan.  4. Consider genetic testing; however a + SQSTM1 or VCP mutation does not confirm a diagnosis of multisystem proteinopathy, and could be presenting with Paget's alone.    Babar Issa M.D.        45 minutes spent on the date of the encounter on chart review, history and examination, documentation and further activities as noted above.

## 2022-07-22 NOTE — NURSING NOTE
Ordered Dexascan per Dr. Aggarwal. Called patient to provide scheduling number. Patient to call our clinic later this year after following up with his endocrinologist. Sent patient [employer] activation via epic text.

## 2022-07-22 NOTE — Clinical Note
Jesus - I explained that he needs workup for elevated ESR but I have found nothing other than moderately severe radiculopathy. I'm looking into a rare condition, multisystem proteinopathy (causes Paget's disease and usually IBM but can cause ALS and dementia), but that is rare and I would not want to deny him a needed surgical procedure because of the possibility of a zebra that is not strongly supported by EMG. Do you think his imaging is bad enough to explain his weakness?

## 2022-07-26 ENCOUNTER — TELEPHONE (OUTPATIENT)
Dept: FAMILY MEDICINE | Facility: CLINIC | Age: 67
End: 2022-07-26

## 2022-07-26 NOTE — TELEPHONE ENCOUNTER
Patient Quality Outreach    Patient is due for the following:   Colon Cancer Screening -  FIT    NEXT STEPS:   No follow up needed at this time.    Type of outreach:    Chart review performed, no outreach needed.    Next Steps:  Reach out within 90 days via Not needed at this time. Patient completed a FIT test 7/10/2022.    Max number of attempts reached: Yes. Will try again in 90 days if patient still on fail list.    Questions for provider review:    None     Jacquelyn Camara MA  Chart routed to Care Team.

## 2022-08-01 ENCOUNTER — ANCILLARY PROCEDURE (OUTPATIENT)
Dept: GENERAL RADIOLOGY | Facility: CLINIC | Age: 67
End: 2022-08-01
Attending: PSYCHIATRY & NEUROLOGY
Payer: COMMERCIAL

## 2022-08-01 ENCOUNTER — ANCILLARY PROCEDURE (OUTPATIENT)
Dept: BONE DENSITY | Facility: CLINIC | Age: 67
End: 2022-08-01
Attending: NEUROLOGICAL SURGERY
Payer: COMMERCIAL

## 2022-08-01 DIAGNOSIS — M88.9 PAGET DISEASE OF BONE: ICD-10-CM

## 2022-08-01 DIAGNOSIS — M80.00XA AGE-RELATED OSTEOPOROSIS WITH CURRENT PATHOLOGICAL FRACTURE, INITIAL ENCOUNTER: ICD-10-CM

## 2022-08-01 PROCEDURE — 72170 X-RAY EXAM OF PELVIS: CPT | Mod: TC | Performed by: RADIOLOGY

## 2022-08-01 PROCEDURE — 77080 DXA BONE DENSITY AXIAL: CPT | Performed by: INTERNAL MEDICINE

## 2022-08-02 ENCOUNTER — HOSPITAL ENCOUNTER (OUTPATIENT)
Dept: NUCLEAR MEDICINE | Facility: CLINIC | Age: 67
Setting detail: NUCLEAR MEDICINE
Discharge: HOME | End: 2022-08-02
Attending: PSYCHIATRY & NEUROLOGY
Payer: COMMERCIAL

## 2022-08-02 ENCOUNTER — HOSPITAL ENCOUNTER (OUTPATIENT)
Dept: NUCLEAR MEDICINE | Facility: CLINIC | Age: 67
Setting detail: OBSERVATION
Discharge: HOME | End: 2022-08-02
Attending: PSYCHIATRY & NEUROLOGY
Payer: COMMERCIAL

## 2022-08-02 DIAGNOSIS — M88.9 PAGET DISEASE OF BONE: ICD-10-CM

## 2022-08-02 PROCEDURE — 343N000001 HC RX 343: Performed by: PSYCHIATRY & NEUROLOGY

## 2022-08-02 PROCEDURE — A9561 TC99M OXIDRONATE: HCPCS | Performed by: PSYCHIATRY & NEUROLOGY

## 2022-08-02 PROCEDURE — 78306 BONE IMAGING WHOLE BODY: CPT

## 2022-08-02 RX ADMIN — Medication 26 MILLICURIE: at 09:57

## 2022-08-11 NOTE — TELEPHONE ENCOUNTER
RECORDS RECEIVED FROM: internal    DATE RECEIVED:8/17/22    NOTES (FOR ALL VISITS) STATUS DETAILS   OFFICE NOTES from referring provider internal  Eliseo Aggarwal MD   OFFICE NOTES from other specialist internal  6.28.22 [facility]      ED NOTES     OPERATIVE REPORT  (thyroid, pituitary, adrenal, parathyroid)     MEDICATION LIST internal     IMAGING      DEXASCAN internal  8/1/22     LABS     DIABETES: HBGA1C, CREATININE, FASTING LIPIDS, MICROALBUMIN URINE, POTASSIUM, TSH, T4    THYROID: TSH, T4, CBC, THYRODLONULIN, TOTAL T3, FREE T4, CALCITONIN, CEA internal  Cbc- 6/17/22  CMP- 6/17/22  TSH/T4- 6/17/22  BMP- 8/27/21

## 2022-08-17 ENCOUNTER — PRE VISIT (OUTPATIENT)
Dept: ENDOCRINOLOGY | Facility: CLINIC | Age: 67
End: 2022-08-17

## 2022-08-17 ENCOUNTER — OFFICE VISIT (OUTPATIENT)
Dept: ENDOCRINOLOGY | Facility: CLINIC | Age: 67
End: 2022-08-17
Payer: COMMERCIAL

## 2022-08-17 VITALS — SYSTOLIC BLOOD PRESSURE: 133 MMHG | DIASTOLIC BLOOD PRESSURE: 89 MMHG | HEART RATE: 89 BPM

## 2022-08-17 DIAGNOSIS — M81.8 OTHER OSTEOPOROSIS WITHOUT CURRENT PATHOLOGICAL FRACTURE: Primary | ICD-10-CM

## 2022-08-17 DIAGNOSIS — M54.50 LUMBAR SPINE PAIN: ICD-10-CM

## 2022-08-17 DIAGNOSIS — M88.9 PAGET DISEASE OF BONE: ICD-10-CM

## 2022-08-17 PROCEDURE — 99204 OFFICE O/P NEW MOD 45 MIN: CPT | Performed by: INTERNAL MEDICINE

## 2022-08-17 ASSESSMENT — PAIN SCALES - GENERAL: PAINLEVEL: NO PAIN (0)

## 2022-08-17 NOTE — LETTER
8/17/2022       RE: Philipp Bland  [address]  [place] [address]     Dear Colleague,    Thank you for referring your patient, Philipp Bland, to the [facility] ENDOCRINOLOGY CLINIC [place] at [facility]. Please see a copy of my visit note below.      Philipp Bland is a 66 year old male who is being evaluated via a billable video visit.        Endocrinology and Diabetes Clinic    Consulting provider: Referred Self, MD  No address on file    Reason for consultation: Question of Paget's disease, recent DXA scan    HPI:   Philipp Bland is a 66 year old male coming in regards to evaluation of Paget's disease, abnormal DXA scan in the setting of radiculopathy with spinal stenosis and spondylolisthesis and immobility.  Pt notes pain in his lower back towards the left, worse with ambulation, better with rest, better with laying down or sitting.    Pt was in his usual state of health until about 11/2020, when he moved a number of heavy boxes. The next day pt had pain in his left lower back. Since then, back pain has persisted, and patient developed progressive left sided weakness. Pt has required a walker for ambulation. With PT over the last 6 months, he is able to ambulate for about 24 yards, requiring a walker. He remains unable to lift his left leg.  Evaluation by neurology Dr Segura on 6/29/22 reveals  moderately severe left lumbosacral polyradiculopathy.    Pt notes that prior to the incidence in 11/2020 he had only minor back issues and never missed work for this beforehand. 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     Calcium intake: some cheese, not much dairy otherwise, not many vegetables, some legumes    Vitamin D: 1000 units daily for 2 years    Dentition: in his 20s oral injury, resulting in damage of several teeth, had this initially repaired  however unsuccessful and not lasting. He has not seen a dentist for 5 years or longer. No dental pain. Does daily oral care.      Current Problem List:   Patient Active Problem List   Diagnosis     Onychomycosis     CARDIOVASCULAR SCREENING; LDL GOAL LESS THAN 130     HTN, goal below 140/90     Advanced directives, counseling/discussion     Spondylolisthesis of lumbar region     Spinal stenosis of lumbar region with radiculopathy     Other spondylosis, lumbar region     Lumbar spine pain         Assessment:  1. Lumbar spine pain    2. Paget disease of bone    Middle aged man with lower back pain with mildly increase in alk phos and abnormal bone scan with radiculopathy with limitation in ambulation, abnormal DXA scan spinal stenosis and spondylolisthesis.     Increased alk phos and abnormal bone scan/Paget's disease  Most consistent with Pagets disease at the left iliac one and right major trochanter. Pt's pain is in the area of the left iliac site of increased uptake. Nature of pain not quite consistent with Pagets, as pain with Pagets typically is worse at rest and better with ambulation, his pattern is the opposite. Pagets location at the iliac bone poses concern for being a weight bearing site. Therefore treatment is indicated.   Typically Zoledronic acid 5 mg IV x 1 is given. Indication Paget's affecting weight bearing bone area; pain that would be related to Paget's is expected to improve starting within 2months and with further improvement over the 6 months after the infusion  Adverse effects including flu like symptoms and ONJ were discussed.  Pt has poor dentition, however does not seem to have acute oral infection and does not plan dental treatments in the near future.    Abnormal bone density  T-score in the osteoporotic range at the left and right femur, however positioning is quite suboptimal, L-spine is in the normal range. Unable to make clear diagnosis. As patient ambulation has been limited, he is  at increased risk for low bone density/osteoporosis    Plan:   Recheck labs for alk phos, VitD, C-telopeptide  Order Zoledronic acid infusion 5 mg iv x1 if ok with Neurosurgery  Follow up labs 10 days post infusion, where alk phos and C-telopeptide should be at a loida, however this is optional  Labs need to be done fasting, in am, VitD needs to be held day of lab draw     It reviewed imaging as below, notes from neurology and neurosurgery, labs, medication list, history    Katarina Aguilar MD  Endocrinology and Diabetes  Telephone contact:  [facility] [place] [phone]  [facility] [phone]          Past Medical and Past Surgical History:  Past Medical History:   Diagnosis Date     Essential hypertension, benign 2001     Family history of alcoholism        No past surgical history on file.    Medications:   Current Outpatient Medications   Medication Sig Dispense Refill     chlorthalidone (HYGROTON) 25 MG tablet Take 1 tablet (25 mg) by mouth daily 90 tablet 4     ferrous gluconate (FERGON) 324 (38 Fe) MG tablet Take 1 tablet (324 mg) by mouth daily (with breakfast) 30 tablet 5     ibuprofen (ADVIL/MOTRIN) 200 MG capsule Take 200 mg by mouth every 4 hours as needed for fever       lisinopril (ZESTRIL) 40 MG tablet Take 1 tablet (40 mg) by mouth daily 90 tablet 4     omeprazole (PRILOSEC) 40 MG DR capsule Take 40 mg by mouth daily       Vitamin D, Cholecalciferol, 25 MCG (1000 UT) CAPS        Vitamin K 100 MCG TABS        zinc gluconate 50 MG tablet Take 50 mg by mouth daily         Allergies:   Allergies   Allergen Reactions     No Known Drug Allergies        Social History     Tobacco Use     Smoking status: Never Smoker     Smokeless tobacco: Never Used   Substance Use Topics     Alcohol use: Yes       Family History   Problem Relation Age of Onset     Hypertension Father        Review of Systems:  12 point review of system was negative except for above  mentioned    Physical Examination:  Blood pressure 133/89, pulse 89.  There is no height or weight on file to calculate BMI.    Wt Readings from Last 6 Encounters:   22 98.9 kg (218 lb)   21 98.9 kg (218 lb)   21 90.7 kg (200 lb)   21 94.3 kg (208 lb)   21 94.5 kg (208 lb 6.4 oz)   13 100.1 kg (220 lb 11.2 oz)       Reported vitals:  There were no vitals taken for this visit.   healthy, alert and no distress  PSYCH: Alert and oriented times 3; coherent speech, normal   rate and volume, able to articulate logical thoughts, able   to abstract reason, no tangential thoughts, no hallucinations   or delusions  Her affect is normal and pleasant  RESP: No cough, no audible wheezing, able to talk in full sentences  Remainder of exam unable to be completed due to telephone visits     Labs and Studies:   Lab Results   Component Value Date     2022    CO2 28 2022    CHLORIDE 101 2022    CR 0.75 2022    TRIG 229 (H) 2010    HDL 51 2010    HGB 10.8 (L) 2022     @resufast(tsh:5    No results found for this or any previous visit.  ;  Results for orders placed in visit on 22    DX Hip/Pelvis/Spine    Narrative  BONE DENSITOMETRY  [facility] [address] [place] [address]  2022    PATIENT: Philipp Bland  CHART: [id number]  :  1955  AGE:  66 year old  SEX:  male  REFERRING PROVIDER:  Eliseo Aggarwal MD    PROCEDURE:  Bone density scanning was performed using DXA technology of the lumbar spine and hip.  Scanning was performed on a Lunar Prodigy scanner.  Reporting is completed in the form of a T-score.  The T-score represents the standard deviation from peak bone mass based on a young healthy adult.    REFERENCE T-SCORES:  Normal                -1.0 and greater  Osteopenia         Between -1.0 and -2.5  Osteoporosis     -2.5 and less    RISK FACTORS:  Height loss of 3 inches, possible Paget's  "disease  CURRENT TREATMENT:  Vitamin D    FINDINGS:  Lumbar Spine (L1-L4)      T-score:  0.7, marked degenerative changes present  Left Femoral Neck            T-score:  -3.0  Right Femoral Neck          T-score:  -1.7    Lumbar (L1-L4) BMD: 1.323  Total Hip Mean BMD: 0.747    IMPRESSION  Osteoporosis., Degenerative changes of the lumbar spine which may falsely elevate results.    Recommendations include ensuring adequate Calcium and Vitamin D.    The current NOF Guidelines recommend treatment for patients with prior hip or vertebral fracture, T-score -2.5 or below, or 10 year risk of any major osteoporotic fracture 20% or greater, or 10 year risk of hip fracture 3% or greater as calculated using the FRAX calculator (www.shef.ac.uk/FRAX or you can google \"FRAX\").  Based on these guidelines, treatment (in addition to calcium and vitamin D) is recommended for this patient, after ruling out other causes of osteoporosis.  This is meant as an aid to clinical decision making; one must still use clinical judgement.    Follow up can be considered in 2 years.  ___________________  Tatyana Georges M.D.  Electronically signed        Results for orders placed during the hospital encounter of 08/02/22    NM Bone Scan Whole Body    Narrative  EXAM: NM BONE SCAN WHOLE BODY  LOCATION: [facility]  DATE/TIME: 8/2/2022 9:37 AM    INDICATION:  Paget disease of bone.  COMPARISON: Pelvic x-ray dated 08/01/2022.  TECHNIQUE: 26.0 mCi technetium-99m MDP, IV. Anterior and posterior delayed whole-body images at 3 hours with additional spot images of the skull.    FINDINGS: Radiotracer uptake in a pattern typical of degenerative change involving the shoulders, right sternoclavicular joint, spine, knees, and ankle/mid feet. Asymmetric uptake with associated expansion involving the left iliac bone and right proximal  femur typical of Paget's disease.    Impression  IMPRESSION:    Findings typical of Paget's disease involving " the left iliac bone and right proximal femur.                Again, thank you for allowing me to participate in the care of your patient.      Sincerely,    Katarina Aguilar MD

## 2022-08-17 NOTE — NURSING NOTE
"Chief Complaint   Patient presents with     Endocrine Problem     Vital signs:      BP: 133/89 Pulse: 89                Estimated body mass index is 32.19 kg/m  as calculated from the following:    Height as of 2/7/22: 1.753 m (5' 9\").    Weight as of 2/7/22: 98.9 kg (218 lb).          " alert/follows commands

## 2022-08-17 NOTE — LETTER
Date:August 19, 2022      Patient was self referred, no letter generated. Do not send.        [facility] Health Information

## 2022-08-17 NOTE — PROGRESS NOTES
Philipp Bland is a 66 year old male who is being evaluated via a billable video visit.        Endocrinology and Diabetes Clinic    Consulting provider: Referred Self, MD  No address on file    Reason for consultation: Question of Paget's disease, recent DXA scan    HPI:   Philipp Bland is a 66 year old male coming in regards to evaluation of Paget's disease, abnormal DXA scan in the setting of radiculopathy with spinal stenosis and spondylolisthesis and immobility.  Pt notes pain in his lower back towards the left, worse with ambulation, better with rest, better with laying down or sitting.    Pt was in his usual state of health until about 11/2020, when he moved a number of heavy boxes. The next day pt had pain in his left lower back. Since then, back pain has persisted, and patient developed progressive left sided weakness. Pt has required a walker for ambulation. With PT over the last 6 months, he is able to ambulate for about 24 yards, requiring a walker. He remains unable to lift his left leg.  Evaluation by neurology Dr Segura on 6/29/22 reveals  moderately severe left lumbosacral polyradiculopathy.    Pt notes that prior to the incidence in 11/2020 he had only minor back issues and never missed work for this beforehand. 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     Calcium intake: some cheese, not much dairy otherwise, not many vegetables, some legumes    Vitamin D: 1000 units daily for 2 years    Dentition: in his 20s oral injury, resulting in damage of several teeth, had this initially repaired however unsuccessful and not lasting. He has not seen a dentist for 5 years or longer. No dental pain. Does daily oral care.      Current Problem List:   Patient Active Problem List   Diagnosis     Onychomycosis     CARDIOVASCULAR SCREENING; LDL GOAL LESS THAN 130     HTN, goal below 140/90     Advanced directives,  counseling/discussion     Spondylolisthesis of lumbar region     Spinal stenosis of lumbar region with radiculopathy     Other spondylosis, lumbar region     Lumbar spine pain         Assessment:  1. Lumbar spine pain    2. Paget disease of bone    Middle aged man with lower back pain with mildly increase in alk phos and abnormal bone scan with radiculopathy with limitation in ambulation, abnormal DXA scan spinal stenosis and spondylolisthesis.     Increased alk phos and abnormal bone scan/Paget's disease  Most consistent with Pagets disease at the left iliac one and right major trochanter. Pt's pain is in the area of the left iliac site of increased uptake. Nature of pain not quite consistent with Pagets, as pain with Pagets typically is worse at rest and better with ambulation, his pattern is the opposite. Pagets location at the iliac bone poses concern for being a weight bearing site. Therefore treatment is indicated.   Typically Zoledronic acid 5 mg IV x 1 is given. Indication Paget's affecting weight bearing bone area; pain that would be related to Paget's is expected to improve starting within 2months and with further improvement over the 6 months after the infusion  Adverse effects including flu like symptoms and ONJ were discussed.  Pt has poor dentition, however does not seem to have acute oral infection and does not plan dental treatments in the near future.    Abnormal bone density  T-score in the osteoporotic range at the left and right femur, however positioning is quite suboptimal, L-spine is in the normal range. Unable to make clear diagnosis. As patient ambulation has been limited, he is at increased risk for low bone density/osteoporosis    Plan:   Recheck labs for alk phos, VitD, C-telopeptide  Order Zoledronic acid infusion 5 mg iv x1 if ok with Neurosurgery  Follow up labs 10 days post infusion, where alk phos and C-telopeptide should be at a loida, however this is optional  Labs need to be done  fasting, in am, VitD needs to be held day of lab draw     It reviewed imaging as below, notes from neurology and neurosurgery, labs, medication list, history    Katarina Aguilar MD  Endocrinology and Diabetes  Telephone contact:  [facility] [place] [phone]  [facility] [place] [phone]          Past Medical and Past Surgical History:  Past Medical History:   Diagnosis Date     Essential hypertension, benign 2001     Family history of alcoholism        No past surgical history on file.    Medications:   Current Outpatient Medications   Medication Sig Dispense Refill     chlorthalidone (HYGROTON) 25 MG tablet Take 1 tablet (25 mg) by mouth daily 90 tablet 4     ferrous gluconate (FERGON) 324 (38 Fe) MG tablet Take 1 tablet (324 mg) by mouth daily (with breakfast) 30 tablet 5     ibuprofen (ADVIL/MOTRIN) 200 MG capsule Take 200 mg by mouth every 4 hours as needed for fever       lisinopril (ZESTRIL) 40 MG tablet Take 1 tablet (40 mg) by mouth daily 90 tablet 4     omeprazole (PRILOSEC) 40 MG DR capsule Take 40 mg by mouth daily       Vitamin D, Cholecalciferol, 25 MCG (1000 UT) CAPS        Vitamin K 100 MCG TABS        zinc gluconate 50 MG tablet Take 50 mg by mouth daily         Allergies:   Allergies   Allergen Reactions     No Known Drug Allergies        Social History     Tobacco Use     Smoking status: Never Smoker     Smokeless tobacco: Never Used   Substance Use Topics     Alcohol use: Yes       Family History   Problem Relation Age of Onset     Hypertension Father        Review of Systems:  12 point review of system was negative except for above mentioned    Physical Examination:  Blood pressure 133/89, pulse 89.  There is no height or weight on file to calculate BMI.    Wt Readings from Last 6 Encounters:   02/07/22 98.9 kg (218 lb)   11/02/21 98.9 kg (218 lb)   08/27/21 90.7 kg (200 lb)   02/08/21 94.3 kg (208 lb)   02/02/21 94.5 kg (208 lb 6.4 oz)   04/08/13  100.1 kg (220 lb 11.2 oz)       Reported vitals:  There were no vitals taken for this visit.   healthy, alert and no distress  PSYCH: Alert and oriented times 3; coherent speech, normal   rate and volume, able to articulate logical thoughts, able   to abstract reason, no tangential thoughts, no hallucinations   or delusions  Her affect is normal and pleasant  RESP: No cough, no audible wheezing, able to talk in full sentences  Remainder of exam unable to be completed due to telephone visits     Labs and Studies:   Lab Results   Component Value Date     2022    CO2 28 2022    CHLORIDE 101 2022    CR 0.75 2022    TRIG 229 (H) 2010    HDL 51 2010    HGB 10.8 (L) 2022     @resufast(tsh:5    No results found for this or any previous visit.  ;  Results for orders placed in visit on 22    DX Hip/Pelvis/Spine    Narrative  BONE DENSITOMETRY  [facility] [address] [place] [address]  2022    PATIENT: Philipp Bland  CHART: [id number]  :  1955  AGE:  66 year old  SEX:  male  REFERRING PROVIDER:  Eliseo Aggarwal MD    PROCEDURE:  Bone density scanning was performed using DXA technology of the lumbar spine and hip.  Scanning was performed on a Lunar Prodigy scanner.  Reporting is completed in the form of a T-score.  The T-score represents the standard deviation from peak bone mass based on a young healthy adult.    REFERENCE T-SCORES:  Normal                -1.0 and greater  Osteopenia         Between -1.0 and -2.5  Osteoporosis     -2.5 and less    RISK FACTORS:  Height loss of 3 inches, possible Paget's disease  CURRENT TREATMENT:  Vitamin D    FINDINGS:  Lumbar Spine (L1-L4)      T-score:  0.7, marked degenerative changes present  Left Femoral Neck            T-score:  -3.0  Right Femoral Neck          T-score:  -1.7    Lumbar (L1-L4) BMD: 1.323  Total Hip Mean BMD: 0.747    IMPRESSION  Osteoporosis., Degenerative  "changes of the lumbar spine which may falsely elevate results.    Recommendations include ensuring adequate Calcium and Vitamin D.    The current NOF Guidelines recommend treatment for patients with prior hip or vertebral fracture, T-score -2.5 or below, or 10 year risk of any major osteoporotic fracture 20% or greater, or 10 year risk of hip fracture 3% or greater as calculated using the FRAX calculator (www.shef.ac.uk/FRAX or you can google \"FRAX\").  Based on these guidelines, treatment (in addition to calcium and vitamin D) is recommended for this patient, after ruling out other causes of osteoporosis.  This is meant as an aid to clinical decision making; one must still use clinical judgement.    Follow up can be considered in 2 years.  ___________________  Tatyana Georges M.D.  Electronically signed        Results for orders placed during the hospital encounter of 08/02/22    NM Bone Scan Whole Body    Narrative  EXAM: NM BONE SCAN WHOLE BODY  LOCATION: [facility]  DATE/TIME: 8/2/2022 9:37 AM    INDICATION:  Paget disease of bone.  COMPARISON: Pelvic x-ray dated 08/01/2022.  TECHNIQUE: 26.0 mCi technetium-99m MDP, IV. Anterior and posterior delayed whole-body images at 3 hours with additional spot images of the skull.    FINDINGS: Radiotracer uptake in a pattern typical of degenerative change involving the shoulders, right sternoclavicular joint, spine, knees, and ankle/mid feet. Asymmetric uptake with associated expansion involving the left iliac bone and right proximal  femur typical of Paget's disease.    Impression  IMPRESSION:    Findings typical of Paget's disease involving the left iliac bone and right proximal femur.            "

## 2022-08-23 ENCOUNTER — TELEPHONE (OUTPATIENT)
Dept: ENDOCRINOLOGY | Facility: CLINIC | Age: 67
End: 2022-08-23

## 2022-08-23 DIAGNOSIS — M81.8 OTHER OSTEOPOROSIS WITHOUT CURRENT PATHOLOGICAL FRACTURE: ICD-10-CM

## 2022-08-23 NOTE — TELEPHONE ENCOUNTER
Pagets disease and osteoporosis, start Zoledronic acid.    Katarina Aguilar MD  Staff Physician  Division of Endocrinology  [facility] [place]  Pager [phone]

## 2022-08-29 ENCOUNTER — OFFICE VISIT (OUTPATIENT)
Dept: NEUROSURGERY | Facility: CLINIC | Age: 67
End: 2022-08-29
Attending: NEUROLOGICAL SURGERY
Payer: COMMERCIAL

## 2022-08-29 VITALS
OXYGEN SATURATION: 99 % | HEIGHT: 68 IN | WEIGHT: 200 LBS | TEMPERATURE: 98.1 F | BODY MASS INDEX: 30.31 KG/M2 | HEART RATE: 112 BPM | SYSTOLIC BLOOD PRESSURE: 143 MMHG | DIASTOLIC BLOOD PRESSURE: 93 MMHG

## 2022-08-29 DIAGNOSIS — M43.16 SPONDYLOLISTHESIS OF LUMBAR REGION: ICD-10-CM

## 2022-08-29 DIAGNOSIS — M48.061 SPINAL STENOSIS OF LUMBAR REGION WITH RADICULOPATHY: Primary | ICD-10-CM

## 2022-08-29 DIAGNOSIS — M54.16 SPINAL STENOSIS OF LUMBAR REGION WITH RADICULOPATHY: Primary | ICD-10-CM

## 2022-08-29 PROCEDURE — G0463 HOSPITAL OUTPT CLINIC VISIT: HCPCS

## 2022-08-29 PROCEDURE — 99213 OFFICE O/P EST LOW 20 MIN: CPT | Performed by: NEUROLOGICAL SURGERY

## 2022-08-29 ASSESSMENT — PAIN SCALES - GENERAL: PAINLEVEL: MILD PAIN (2)

## 2022-08-29 NOTE — LETTER
"    8/29/2022         RE: Philipp Bland  [address]  [place] [place] [facility] [place] [address]        Dear Colleague,    Thank you for referring your patient, Philipp Bland, to the [facility] NEUROSURGERY CLINIC [place]. Please see a copy of my visit note below.    It was a pleasure to see Philipp Bland today in Neurosurgery Clinic. He is a 66 year old male who is been previously seen by me for his lumbar stenosis and radiculopathy and spondylolisthesis.    He has had an extensive evaluation including neurology and endocrinology.  He has seen Dr. Aguilar who diagnosed Paget's disease and is planning a zoledronic acid injection to treat this in the near future.    After discussion with Dr. Callaway as well we feel that his lower extremity symptoms are most consistent with a radiculopathy in the areas of his lumbar stenosis and spondylolisthesis.    He is here today to discuss further intervention.    Vitals:    08/29/22 1134   BP: (!) 143/93   Pulse: 112   Temp: 98.1  F (36.7  C)   TempSrc: Oral   SpO2: 99%   Weight: 90.7 kg (200 lb)   Height: 1.727 m (5' 8\")     Estimated body mass index is 30.41 kg/m  as calculated from the following:    Height as of this encounter: 1.727 m (5' 8\").    Weight as of this encounter: 90.7 kg (200 lb).  Mild Pain (2)    Exam stable.    Imaging: MRI of the lumbar spine shows spondylolisthesis at L5-S1 with multiple levels of stenosis..  The imaging was reviewed with the patient shown to the patient in clinic today.    Assessment: Lumbar stenosis and spondylolisthesis with radiculopathy.    Plan: At this point I think it would be reasonable to consider proceeding with surgical intervention.  We discussed that this would be fairly extensive surgery with L2 to pelvis fusion.  He should have his zoledronic acid infusion prior to any surgical consideration.    Before surgery we would like to also obtain standing scoliosis films on the EOS machine as well as CT of the lumbar spine " "for planning for robotic hardware placement.  The patient will contact us when he is ready to proceed with surgery and begin the scheduling process.       Philipp Bland is a 66 year old male who presents for:  Chief Complaint   Patient presents with     Back Pain        Initial Vitals:  BP (!) 143/93   Pulse 112   Temp 98.1  F (36.7  C) (Oral)   Ht 5' 8\" (1.727 m)   Wt 200 lb (90.7 kg)   SpO2 99%   BMI 30.41 kg/m   Estimated body mass index is 30.41 kg/m  as calculated from the following:    Height as of this encounter: 5' 8\" (1.727 m).    Weight as of this encounter: 200 lb (90.7 kg).. Body surface area is 2.09 meters squared. BP completed using cuff size: regular  Mild Pain (2)    Nursing Comments:     Suzanne Stokes MA        Again, thank you for allowing me to participate in the care of your patient.        Sincerely,        Eliseo Aggarwal MD    "

## 2022-08-29 NOTE — PROGRESS NOTES
"It was a pleasure to see Philipp Bland today in Neurosurgery Clinic. He is a 66 year old male who is been previously seen by me for his lumbar stenosis and radiculopathy and spondylolisthesis.    He has had an extensive evaluation including neurology and endocrinology.  He has seen Dr. Aguilar who diagnosed Paget's disease and is planning a zoledronic acid injection to treat this in the near future.    After discussion with Dr. Callaway as well we feel that his lower extremity symptoms are most consistent with a radiculopathy in the areas of his lumbar stenosis and spondylolisthesis.    He is here today to discuss further intervention.    Vitals:    08/29/22 1134   BP: (!) 143/93   Pulse: 112   Temp: 98.1  F (36.7  C)   TempSrc: Oral   SpO2: 99%   Weight: 90.7 kg (200 lb)   Height: 1.727 m (5' 8\")     Estimated body mass index is 30.41 kg/m  as calculated from the following:    Height as of this encounter: 1.727 m (5' 8\").    Weight as of this encounter: 90.7 kg (200 lb).  Mild Pain (2)    Exam stable.    Imaging: MRI of the lumbar spine shows spondylolisthesis at L5-S1 with multiple levels of stenosis..  The imaging was reviewed with the patient shown to the patient in clinic today.    Assessment: Lumbar stenosis and spondylolisthesis with radiculopathy.    Plan: At this point I think it would be reasonable to consider proceeding with surgical intervention.  We discussed that this would be fairly extensive surgery with L2 to pelvis fusion.  He should have his zoledronic acid infusion prior to any surgical consideration.    Before surgery we would like to also obtain standing scoliosis films on the EOS machine as well as CT of the lumbar spine for planning for robotic hardware placement.  The patient will contact us when he is ready to proceed with surgery and begin the scheduling process.     "

## 2022-08-29 NOTE — PROGRESS NOTES
"Philipp Bland is a 66 year old male who presents for:  Chief Complaint   Patient presents with     Back Pain        Initial Vitals:  BP (!) 143/93   Pulse 112   Temp 98.1  F (36.7  C) (Oral)   Ht 5' 8\" (1.727 m)   Wt 200 lb (90.7 kg)   SpO2 99%   BMI 30.41 kg/m   Estimated body mass index is 30.41 kg/m  as calculated from the following:    Height as of this encounter: 5' 8\" (1.727 m).    Weight as of this encounter: 200 lb (90.7 kg).. Body surface area is 2.09 meters squared. BP completed using cuff size: regular  Mild Pain (2)    Nursing Comments:     Suzanne Stokes MA    "

## 2022-09-12 ENCOUNTER — LAB (OUTPATIENT)
Dept: LAB | Facility: CLINIC | Age: 67
End: 2022-09-12
Payer: COMMERCIAL

## 2022-09-12 DIAGNOSIS — M81.8 OTHER OSTEOPOROSIS WITHOUT CURRENT PATHOLOGICAL FRACTURE: ICD-10-CM

## 2022-09-12 DIAGNOSIS — M54.50 LUMBAR SPINE PAIN: ICD-10-CM

## 2022-09-12 DIAGNOSIS — M88.9 PAGET DISEASE OF BONE: ICD-10-CM

## 2022-09-12 DIAGNOSIS — D50.9 IRON DEFICIENCY ANEMIA, UNSPECIFIED IRON DEFICIENCY ANEMIA TYPE: ICD-10-CM

## 2022-09-12 LAB
FERRITIN SERPL-MCNC: 20 NG/ML (ref 26–388)
IRON SATN MFR SERPL: 11 % (ref 15–46)
IRON SERPL-MCNC: 35 UG/DL (ref 35–180)
TIBC SERPL-MCNC: 332 UG/DL (ref 240–430)

## 2022-09-12 PROCEDURE — 83550 IRON BINDING TEST: CPT

## 2022-09-12 PROCEDURE — 36415 COLL VENOUS BLD VENIPUNCTURE: CPT

## 2022-09-12 PROCEDURE — 82306 VITAMIN D 25 HYDROXY: CPT

## 2022-09-12 PROCEDURE — 82728 ASSAY OF FERRITIN: CPT

## 2022-09-12 PROCEDURE — 99000 SPECIMEN HANDLING OFFICE-LAB: CPT

## 2022-09-12 PROCEDURE — 84075 ASSAY ALKALINE PHOSPHATASE: CPT

## 2022-09-12 PROCEDURE — 84080 ASSAY ALKALINE PHOSPHATASES: CPT | Mod: 90

## 2022-09-13 ENCOUNTER — TELEPHONE (OUTPATIENT)
Dept: FAMILY MEDICINE | Facility: CLINIC | Age: 67
End: 2022-09-13

## 2022-09-13 LAB
ALP BONE SERPL-MCNC: 23.9 UG/L
ALP BONE SERPL-MCNC: 30.5 UG/L
ALP SERPL-CCNC: 138 U/L (ref 40–150)

## 2022-09-13 NOTE — TELEPHONE ENCOUNTER
"Called [facility] pharmacy who informs ferrous gluconate is on hold due to insurance issues.    Pharmacy informs no ferrous gluconate in stock that is completely covered by pt's insurance. Pharmacy informs they have a  of ferrous gluconate in stock that will cost pt $2.57 for 30 day supply (pt's insurance covers half of cost).    Called pt to inform of this. Pt states \"That works for me. I can pay $2.57. I won't be back in [place] until this weekend though but I will  the medication then\"    Alyson Nicholson RN    "

## 2022-09-13 NOTE — TELEPHONE ENCOUNTER
----- Message from Allison Og PA-C sent at 9/13/2022  7:34 AM CDT -----  Please call patient and let him know that the iron levels are looking much better. I would recommend continuing with iron supplements at this time.

## 2022-09-13 NOTE — TELEPHONE ENCOUNTER
"Called pt and relayed provider message below. Pt states \"the pharmacy retracted my ferrous gluconate tablet prescription\". Upon review of pt chart, rx looks active (see below):    ferrous gluconate (FERGON) 324 (38 Fe) MG tablet 30 tablet 5 7/7/2022  --   Sig - Route: Take 1 tablet (324 mg) by mouth daily (with breakfast) - Oral   Sent to pharmacy as: Ferrous Gluconate 324 (38 Fe) MG Oral Tablet (FERGON)   Class: E-Prescribe   Order: [id number]   E-Prescribing Status: Receipt confirmed by pharmacy (7/7/2022 10:21 AM CDT)       Called [facility] pharmacy in [place] to confirm rx is active but pharmacy is closed and opens at 9am. Will try pharmacy back at 9am to speak with pharmacy to confirm rx is still active and then call pt back to inform.    Alyson Nicholson RN      .    "

## 2022-09-14 LAB
COLLAGEN CTX SERPL-MCNC: 498 PG/ML
COLLAGEN CTX SERPL-MCNC: 520 PG/ML

## 2022-09-15 ENCOUNTER — TELEPHONE (OUTPATIENT)
Dept: ENDOCRINOLOGY | Facility: CLINIC | Age: 67
End: 2022-09-15

## 2022-09-15 NOTE — RESULT ENCOUNTER NOTE
Please let pt know, labs shows increase bone specific alk phos, indicating and consistent with Pagest disease.  He has an infusion order for Zoledronic acid, if has has not done so yet, he should set up an appointment for the infusion soon. C-telopeptide is an bone degradation marker, that is also higher than desired.    Thanks    Katarina Aguilar MD  Staff Physician  Division of Endocrinology  [facility]  Pager [phone]

## 2022-09-15 NOTE — TELEPHONE ENCOUNTER
Spoke w/ Pt: Confirms understanding of review and recommendation from Dr Aguilar. Number to schedule Infusion provided.   Ariella Mendieta, RN on 9/15/2022 at 11:33 AM       Re     Katarina Aguilar MD   9/15/2022 10:58 AM CDT Back to Top        Please let pt know, labs shows increase bone specific alk phos, indicating and consistent with Pagest disease.  He has an infusion order for Zoledronic acid, if has has not done so yet, he should set up an appointment for the infusion soon. C-telopeptide is an bone degradation marker, that is also higher than desired.     Thanks     Katarina Aguilar MD  Staff Physician

## 2022-09-20 DIAGNOSIS — I10 BENIGN ESSENTIAL HYPERTENSION: ICD-10-CM

## 2022-09-20 RX ORDER — LISINOPRIL 40 MG/1
40 TABLET ORAL DAILY
Qty: 90 TABLET | Refills: 4 | OUTPATIENT
Start: 2022-09-20

## 2022-09-20 RX ORDER — CHLORTHALIDONE 25 MG/1
25 TABLET ORAL DAILY
Qty: 90 TABLET | Refills: 4 | OUTPATIENT
Start: 2022-09-20

## 2022-09-20 NOTE — TELEPHONE ENCOUNTER
Routing refill request to provider for review/approval because:  Labs out of range:  BP    BP Readings from Last 3 Encounters:   08/29/22 (!) 143/93   08/17/22 133/89   06/17/22 (!) 133/92     Yosef SIERRA RN

## 2022-09-21 DIAGNOSIS — I10 BENIGN ESSENTIAL HYPERTENSION: ICD-10-CM

## 2022-09-21 RX ORDER — LISINOPRIL 40 MG/1
40 TABLET ORAL DAILY
Qty: 30 TABLET | Refills: 0 | Status: SHIPPED | OUTPATIENT
Start: 2022-09-21 | End: 2022-10-07

## 2022-09-21 RX ORDER — CHLORTHALIDONE 25 MG/1
25 TABLET ORAL DAILY
Qty: 30 TABLET | Refills: 0 | Status: SHIPPED | OUTPATIENT
Start: 2022-09-21 | End: 2022-10-07

## 2022-09-21 NOTE — TELEPHONE ENCOUNTER
Has 2 bp medications and both given by Dr. Timmons.  Need to know what he is asking for and route for refill protocol.  Had virtual with Allison 6/28/22.  Was advised to RTC in 2 months for physical.  No showed physical/est care visit with Fallon 8/4/22.  Allison is listed as provider and is not a provider.  Natasha Centeno RN

## 2022-09-21 NOTE — TELEPHONE ENCOUNTER
Reason for Call:  Other prescription    Detailed comments: patient called and is out of medication for BP prescribed by Allison Og CNP at [facility].  Doesn't know the name.    Would like to pickup today at [facility].    Please contact patient today.  Thank you.    Phone Number Patient can be reached at: Home number on file [phone] (home)    Best Time: any    Can we leave a detailed message on this number? YES    Call taken on 9/21/2022 at 2:34 PM by Bobbi Arroyo

## 2022-09-21 NOTE — TELEPHONE ENCOUNTER
Spoke with patient, he is not able to come into clinic due to back issues, he was asking if someone could just look at his records to see BP results as he has been in and out of clinics so many times due to this back issue. Informed unfortunately we would need to see him for visit as it has been over 1 year. He wondered if his PCP would be willing to refill until he can get in, he will connect with his PCP. Offered to set up appointment and he declines at this time.  Cristiane Denton,

## 2022-09-25 RX ORDER — ALBUTEROL SULFATE 0.83 MG/ML
2.5 SOLUTION RESPIRATORY (INHALATION)
Status: CANCELLED | OUTPATIENT
Start: 2022-09-25

## 2022-09-25 RX ORDER — METHYLPREDNISOLONE SODIUM SUCCINATE 125 MG/2ML
125 INJECTION, POWDER, LYOPHILIZED, FOR SOLUTION INTRAMUSCULAR; INTRAVENOUS
Status: CANCELLED
Start: 2022-09-25

## 2022-09-25 RX ORDER — HEPARIN SODIUM,PORCINE 10 UNIT/ML
5 VIAL (ML) INTRAVENOUS
Status: CANCELLED | OUTPATIENT
Start: 2022-09-25

## 2022-09-25 RX ORDER — MEPERIDINE HYDROCHLORIDE 25 MG/ML
25 INJECTION INTRAMUSCULAR; INTRAVENOUS; SUBCUTANEOUS EVERY 30 MIN PRN
Status: CANCELLED | OUTPATIENT
Start: 2022-09-25

## 2022-09-25 RX ORDER — DIPHENHYDRAMINE HYDROCHLORIDE 50 MG/ML
50 INJECTION INTRAMUSCULAR; INTRAVENOUS
Status: CANCELLED
Start: 2022-09-25

## 2022-09-25 RX ORDER — HEPARIN SODIUM (PORCINE) LOCK FLUSH IV SOLN 100 UNIT/ML 100 UNIT/ML
5 SOLUTION INTRAVENOUS
Status: CANCELLED | OUTPATIENT
Start: 2022-09-25

## 2022-09-25 RX ORDER — EPINEPHRINE 1 MG/ML
0.3 INJECTION, SOLUTION INTRAMUSCULAR; SUBCUTANEOUS EVERY 5 MIN PRN
Status: CANCELLED | OUTPATIENT
Start: 2022-09-25

## 2022-09-25 RX ORDER — ALBUTEROL SULFATE 90 UG/1
1-2 AEROSOL, METERED RESPIRATORY (INHALATION)
Status: CANCELLED
Start: 2022-09-25

## 2022-09-25 RX ORDER — ZOLEDRONIC ACID 5 MG/100ML
5 INJECTION, SOLUTION INTRAVENOUS ONCE
Status: CANCELLED
Start: 2022-09-25

## 2022-09-26 ENCOUNTER — INFUSION THERAPY VISIT (OUTPATIENT)
Dept: INFUSION THERAPY | Facility: CLINIC | Age: 67
End: 2022-09-26
Attending: INTERNAL MEDICINE
Payer: COMMERCIAL

## 2022-09-26 ENCOUNTER — APPOINTMENT (OUTPATIENT)
Dept: LAB | Facility: CLINIC | Age: 67
End: 2022-09-26
Attending: INTERNAL MEDICINE
Payer: COMMERCIAL

## 2022-09-26 VITALS
WEIGHT: 200 LBS | DIASTOLIC BLOOD PRESSURE: 91 MMHG | OXYGEN SATURATION: 100 % | BODY MASS INDEX: 30.41 KG/M2 | HEART RATE: 103 BPM | RESPIRATION RATE: 16 BRPM | SYSTOLIC BLOOD PRESSURE: 149 MMHG | TEMPERATURE: 98 F

## 2022-09-26 DIAGNOSIS — M81.8 OTHER OSTEOPOROSIS WITHOUT CURRENT PATHOLOGICAL FRACTURE: Primary | ICD-10-CM

## 2022-09-26 LAB
CALCIUM SERPL-MCNC: 9.6 MG/DL (ref 8.8–10.2)
CREAT SERPL-MCNC: 0.64 MG/DL (ref 0.67–1.17)
GFR SERPL CREATININE-BSD FRML MDRD: >90 ML/MIN/1.73M2

## 2022-09-26 PROCEDURE — 250N000011 HC RX IP 250 OP 636: Performed by: INTERNAL MEDICINE

## 2022-09-26 PROCEDURE — 82310 ASSAY OF CALCIUM: CPT | Performed by: INTERNAL MEDICINE

## 2022-09-26 PROCEDURE — 36415 COLL VENOUS BLD VENIPUNCTURE: CPT | Performed by: INTERNAL MEDICINE

## 2022-09-26 PROCEDURE — 96365 THER/PROPH/DIAG IV INF INIT: CPT

## 2022-09-26 PROCEDURE — 82565 ASSAY OF CREATININE: CPT | Performed by: INTERNAL MEDICINE

## 2022-09-26 RX ORDER — ZOLEDRONIC ACID 5 MG/100ML
5 INJECTION, SOLUTION INTRAVENOUS ONCE
Status: COMPLETED | OUTPATIENT
Start: 2022-09-26 | End: 2022-09-26

## 2022-09-26 RX ADMIN — ZOLEDRONIC ACID 5 MG: 0.05 INJECTION, SOLUTION INTRAVENOUS at 13:49

## 2022-09-26 ASSESSMENT — PAIN SCALES - GENERAL: PAINLEVEL: MILD PAIN (3)

## 2022-09-26 NOTE — PROGRESS NOTES
Nursing Note  Philipp Bland [facility] for:   Chief Complaint   Patient presents with     Infusion     Reclast     During today's [facility] appointment, orders from Dr. Aguilar were completed.  Frequency: once    Progress note:  Patient identification verified by name and date of birth.  Assessment completed.  Vitals recorded in Doc Flowsheets.  Patient was provided with education regarding medication/procedure and possible side effects.  Patient verbalized understanding.     present during visit today: Not Applicable.    Treatment Conditions: Patient's Creatinine Clearance and calcium level are within paramaters to administer medication per orders.    Premedications: were not ordered.    Drug Waste Record: No    Infusion length and rate:  infusion given over approximately 30 minutes at 200 ml/hr.    Labs: drawn today, prior to appointment in second floor lab.    Vascular access: peripheral IV placed today, prior to appointment in second floor lab.    Is the next appt scheduled? NA  Asymptomatic COVID test completed? no    Post Infusion Assessment:  Patient tolerated infusion without incident.     Discharge Plan:   Follow up plan of care with: ongoing infusions at [facility]., ordering provider as scheduled. and after visit summary given to patient  Discharge instructions were reviewed with patient.  Patient/representative verbalized understanding of discharge instructions and all questions answered.  Patient discharged from [facility] in stable condition.    Tierra Luciano RN       Administrations This Visit     zoledronic Acid (RECLAST) infusion 5 mg     Admin Date  09/26/2022 Action  New Bag Dose  5 mg Rate  200 mL/hr Route  Intravenous Administered By  Tierra Luciano, RN                BP (!) 149/91   Pulse 103   Temp 98  F (36.7  C) (Oral)   Resp 16   Wt 90.7 kg  (200 lb)   SpO2 100%   BMI 30.41 kg/m

## 2022-09-26 NOTE — NURSING NOTE
Chief Complaint   Patient presents with     Infusion     Reclast     Blood Draw     Labs drawn from PIV placed by RN. Line flushed with saline. Vitals taken. Pt checked in for appointment(s).        Ashlie CHOI RN PHN BSN  BMT/Oncology Lab

## 2022-09-26 NOTE — LETTER
9/26/2022         RE: Philipp Bland  [address]  [place] [facility] [address]        Dear Colleague,    Thank you for referring your patient, Philipp Bland, to the [facility]. Please see a copy of my visit note below.    Nursing Note  Philipp Bland presents [facility] for:   Chief Complaint   Patient presents with     Infusion     Reclast     During [facility]'s [facility] appointment, orders from Dr. Aguilar were completed.  Frequency: once    Progress note:  Patient identification verified by name and date of birth.  Assessment completed.  Vitals recorded in Doc Flowsheets.  Patient was provided with education regarding medication/procedure and possible side effects.  Patient verbalized understanding.     present during visit today: Not Applicable.    Treatment Conditions: Patient's Creatinine Clearance and calcium level are within paramaters to administer medication per orders.    Premedications: were not ordered.    Drug Waste Record: No    Infusion length and rate:  infusion given over approximately 30 minutes at 200 ml/hr.    Labs: drawn today, prior to appointment in second floor lab.    Vascular access: peripheral IV placed today, prior to appointment in second floor lab.    Is the next appt scheduled? NA  Asymptomatic COVID test completed? no    Post Infusion Assessment:  Patient tolerated infusion without incident.     Discharge Plan:   Follow up plan of care with: ongoing infusions at [facility]., ordering provider as scheduled. and after visit summary given to patient  Discharge instructions were reviewed with patient.  Patient/representative verbalized understanding of discharge instructions and all questions answered.  Patient discharged from [facility] in stable condition.    Tierra Lee RN        Administrations This Visit     zoledronic Acid (RECLAST) infusion 5 mg     Admin Date  09/26/2022 Action  New Bag Dose  5 mg Rate  200 mL/hr Route  Intravenous Administered By  Tierra Luciano, RN                BP (!) 149/91   Pulse 103   Temp 98  F (36.7  C) (Oral)   Resp 16   Wt 90.7 kg (200 lb)   SpO2 100%   BMI 30.41 kg/m          Again, thank you for allowing me to participate in the care of your patient.        Sincerely,        [facility]

## 2022-09-26 NOTE — PATIENT INSTRUCTIONS
Dear Philipp Bland    Thank you for choosing [facility] ([facility]) for your Reclast infusion.  The following information is a summary of our appointment as well as important reminders.      We look forward in seeing you on your next appointment here at [facility] ([facility]).  Please don t hesitate to call us at [phone] to reschedule any of your appointments or to speak with one of the [facility] registered nurses.  It was a pleasure taking care of you today.    Sincerely,    [facility]  [facility]  [address] [place] [address]  [id number]  Phone:  [phone]

## 2022-09-27 LAB
DEPRECATED CALCIDIOL+CALCIFEROL SERPL-MC: <100 UG/L (ref 20–75)
VITAMIN D2 SERPL-MCNC: <5 UG/L
VITAMIN D3 SERPL-MCNC: 95 UG/L

## 2022-10-04 DIAGNOSIS — M41.56 OTHER SECONDARY SCOLIOSIS, LUMBAR REGION: ICD-10-CM

## 2022-10-04 DIAGNOSIS — M43.16 SPONDYLOLISTHESIS OF LUMBAR REGION: Primary | ICD-10-CM

## 2022-10-04 DIAGNOSIS — M48.061 SPINAL STENOSIS OF LUMBAR REGION WITH RADICULOPATHY: ICD-10-CM

## 2022-10-04 DIAGNOSIS — M54.16 SPINAL STENOSIS OF LUMBAR REGION WITH RADICULOPATHY: ICD-10-CM

## 2022-10-07 ENCOUNTER — OFFICE VISIT (OUTPATIENT)
Dept: FAMILY MEDICINE | Facility: CLINIC | Age: 67
End: 2022-10-07
Payer: COMMERCIAL

## 2022-10-07 VITALS
BODY MASS INDEX: 30.41 KG/M2 | HEIGHT: 68 IN | SYSTOLIC BLOOD PRESSURE: 124 MMHG | DIASTOLIC BLOOD PRESSURE: 80 MMHG | RESPIRATION RATE: 20 BRPM | TEMPERATURE: 98 F | OXYGEN SATURATION: 98 % | HEART RATE: 116 BPM

## 2022-10-07 DIAGNOSIS — M43.16 SPONDYLOLISTHESIS OF LUMBAR REGION: ICD-10-CM

## 2022-10-07 DIAGNOSIS — M88.9 PAGET DISEASE OF BONE: ICD-10-CM

## 2022-10-07 DIAGNOSIS — G62.9 NEUROPATHY: ICD-10-CM

## 2022-10-07 DIAGNOSIS — D47.2 MONOCLONAL GAMMOPATHY: ICD-10-CM

## 2022-10-07 DIAGNOSIS — R97.20 ELEVATED PROSTATE SPECIFIC ANTIGEN (PSA): ICD-10-CM

## 2022-10-07 DIAGNOSIS — Z13.220 SCREENING FOR HYPERLIPIDEMIA: ICD-10-CM

## 2022-10-07 DIAGNOSIS — I10 BENIGN ESSENTIAL HYPERTENSION: Primary | ICD-10-CM

## 2022-10-07 DIAGNOSIS — R29.898 WEAKNESS OF BOTH LOWER EXTREMITIES: ICD-10-CM

## 2022-10-07 DIAGNOSIS — Z12.5 SCREENING FOR PROSTATE CANCER: ICD-10-CM

## 2022-10-07 LAB
CRP SERPL-MCNC: <3 MG/L
TOTAL PROTEIN SERUM FOR ELP: 7 G/DL (ref 6.4–8.3)

## 2022-10-07 PROCEDURE — 84155 ASSAY OF PROTEIN SERUM: CPT | Performed by: NURSE PRACTITIONER

## 2022-10-07 PROCEDURE — 86038 ANTINUCLEAR ANTIBODIES: CPT | Performed by: NURSE PRACTITIONER

## 2022-10-07 PROCEDURE — 86160 COMPLEMENT ANTIGEN: CPT | Mod: 59 | Performed by: NURSE PRACTITIONER

## 2022-10-07 PROCEDURE — 86160 COMPLEMENT ANTIGEN: CPT | Performed by: NURSE PRACTITIONER

## 2022-10-07 PROCEDURE — 86036 ANCA SCREEN EACH ANTIBODY: CPT | Performed by: NURSE PRACTITIONER

## 2022-10-07 PROCEDURE — 86140 C-REACTIVE PROTEIN: CPT | Performed by: NURSE PRACTITIONER

## 2022-10-07 PROCEDURE — 80048 BASIC METABOLIC PNL TOTAL CA: CPT | Performed by: NURSE PRACTITIONER

## 2022-10-07 PROCEDURE — 99214 OFFICE O/P EST MOD 30 MIN: CPT | Performed by: NURSE PRACTITIONER

## 2022-10-07 PROCEDURE — 80061 LIPID PANEL: CPT | Performed by: NURSE PRACTITIONER

## 2022-10-07 PROCEDURE — 84165 PROTEIN E-PHORESIS SERUM: CPT

## 2022-10-07 PROCEDURE — G0103 PSA SCREENING: HCPCS | Performed by: NURSE PRACTITIONER

## 2022-10-07 PROCEDURE — 86256 FLUORESCENT ANTIBODY TITER: CPT | Performed by: NURSE PRACTITIONER

## 2022-10-07 PROCEDURE — 36415 COLL VENOUS BLD VENIPUNCTURE: CPT | Performed by: NURSE PRACTITIONER

## 2022-10-07 PROCEDURE — 86334 IMMUNOFIX E-PHORESIS SERUM: CPT

## 2022-10-07 PROCEDURE — 86803 HEPATITIS C AB TEST: CPT | Performed by: NURSE PRACTITIONER

## 2022-10-07 RX ORDER — LISINOPRIL 40 MG/1
40 TABLET ORAL DAILY
Qty: 90 TABLET | Refills: 1 | Status: ON HOLD | OUTPATIENT
Start: 2022-10-07 | End: 2023-01-05

## 2022-10-07 RX ORDER — CHLORTHALIDONE 25 MG/1
25 TABLET ORAL DAILY
Qty: 90 TABLET | Refills: 1 | Status: ON HOLD | OUTPATIENT
Start: 2022-10-07 | End: 2023-01-05

## 2022-10-07 NOTE — PROGRESS NOTES
Assessment & Plan     Benign essential hypertension  Controlled. Continue current medication. Wishes to defer annual exam until after upcoming spinal surgery.   - Basic metabolic panel  (Ca, Cl, CO2, Creat, Gluc, K, Na, BUN)  - chlorthalidone (HYGROTON) 25 MG tablet  Dispense: 90 tablet; Refill: 1  - lisinopril (ZESTRIL) 40 MG tablet  Dispense: 90 tablet; Refill: 1  - Basic metabolic panel  (Ca, Cl, CO2, Creat, Gluc, K, Na, BUN)    Neuropathy  Per Neurology   - Anti Nuclear Karen IgG by IFA with Reflex  - ELP  - Hepatitis C antibody  - ANCA IgG by IFA with Reflex to Titer  - CRP inflammation  - Complement C3  - Complement C4    Weakness of both lower extremities  Per Neurology   - Anti Nuclear Karen IgG by IFA with Reflex  - ELP  - Hepatitis C antibody  - ANCA IgG by IFA with Reflex to Titer  - CRP inflammation  - Complement C3  - Complement C4    Screening for prostate cancer  screening  - PSA, screen  - PSA, screen    Screening for hyperlipidemia  screening  - Lipid panel reflex to direct LDL Non-fasting  - Lipid panel reflex to direct LDL Non-fasting                       No follow-ups on file.    DAWIT Castrejon [facility] GLENDY Segal is a 66 year old presenting for the following health issues:  Hypertension      History of Present Illness       Reason for visit:  Blood pressure meds    He eats 2-3 servings of fruits and vegetables daily.He consumes 1 sweetened beverage(s) daily.He exercises with enough effort to increase his heart rate 10 to 19 minutes per day.  He exercises with enough effort to increase his heart rate 7 days per week.   He is taking medications regularly.       Hypertension Follow-up      Do you check your blood pressure regularly outside of the clinic? No     Are you following a low salt diet? Yes    Are your blood pressures ever more than 140 on the top number (systolic) OR more   than 90 on the bottom number (diastolic), for example 140/90? Does  "not check      Review of Systems   Constitutional, HEENT, cardiovascular, pulmonary, gi and gu systems are negative, except as otherwise noted.      Objective    /80 (BP Location: Right arm, Patient Position: Sitting, Cuff Size: Adult Large)   Pulse 116   Temp 98  F (36.7  C) (Oral)   Resp 20   Ht 1.727 m (5' 8\")   SpO2 98%   BMI 30.41 kg/m    Body mass index is 30.41 kg/m .  Physical Exam   GENERAL: healthy, alert and no distress  RESP: lungs clear to auscultation - no rales, rhonchi or wheezes  CV: regular rate and rhythm, normal S1 S2, no S3 or S4, no murmur, click or rub, no peripheral edema and peripheral pulses strong  MS: no gross musculoskeletal defects noted, no edema                    "

## 2022-10-08 LAB
ANION GAP SERPL CALCULATED.3IONS-SCNC: 9 MMOL/L (ref 3–14)
BUN SERPL-MCNC: 13 MG/DL (ref 7–30)
CALCIUM SERPL-MCNC: 8.8 MG/DL (ref 8.5–10.1)
CHLORIDE BLD-SCNC: 99 MMOL/L (ref 94–109)
CHOLEST SERPL-MCNC: 180 MG/DL
CO2 SERPL-SCNC: 24 MMOL/L (ref 20–32)
CREAT SERPL-MCNC: 0.65 MG/DL (ref 0.66–1.25)
FASTING STATUS PATIENT QL REPORTED: NO
GFR SERPL CREATININE-BSD FRML MDRD: >90 ML/MIN/1.73M2
GLUCOSE BLD-MCNC: 97 MG/DL (ref 70–99)
HDLC SERPL-MCNC: 66 MG/DL
LDLC SERPL CALC-MCNC: 96 MG/DL
NONHDLC SERPL-MCNC: 114 MG/DL
POTASSIUM BLD-SCNC: 4.1 MMOL/L (ref 3.4–5.3)
PSA SERPL-MCNC: 15 UG/L (ref 0–4)
SODIUM SERPL-SCNC: 132 MMOL/L (ref 133–144)
TRIGL SERPL-MCNC: 90 MG/DL

## 2022-10-10 ENCOUNTER — TELEPHONE (OUTPATIENT)
Dept: NEUROSURGERY | Facility: CLINIC | Age: 67
End: 2022-10-10

## 2022-10-10 LAB
ALBUMIN SERPL ELPH-MCNC: 4 G/DL (ref 3.7–5.1)
ALPHA1 GLOB SERPL ELPH-MCNC: 0.4 G/DL (ref 0.2–0.4)
ALPHA2 GLOB SERPL ELPH-MCNC: 0.6 G/DL (ref 0.5–0.9)
ANA SER QL IF: NEGATIVE
ANCA AB PATTERN SER IF-IMP: NORMAL
B-GLOBULIN SERPL ELPH-MCNC: 0.9 G/DL (ref 0.6–1)
C-ANCA TITR SER IF: NORMAL {TITER}
C3 SERPL-MCNC: 99 MG/DL (ref 81–157)
C4 SERPL-MCNC: 35 MG/DL (ref 13–39)
GAMMA GLOB SERPL ELPH-MCNC: 1.1 G/DL (ref 0.7–1.6)
HCV AB SERPL QL IA: NONREACTIVE
M PROTEIN SERPL ELPH-MCNC: 0.1 G/DL
PROT PATTERN SERPL ELPH-IMP: ABNORMAL

## 2022-10-10 NOTE — TELEPHONE ENCOUNTER
----- Message from Eliseo Aggarwal MD sent at 10/4/2022  1:13 PM CDT -----  Regarding: RE: Surgical Order Request  I have placed orders for standing scoli xrays on the EOS machine at the [facility] and a CT mazor protocol.     Once these are done, I can put in a surgery request.     Natasha: we will also want to use the prebent UNiD rods for this, so Cain Alfonso will need to know once the Xray and CT are done.    Jesus  ----- Message -----  From: Natasha Lucas  Sent: 9/28/2022   1:20 PM CDT  To: Eliseo Aggarwal MD  Subject: Surgical Order Request                           Pt would like to schedule surgery

## 2022-10-10 NOTE — TELEPHONE ENCOUNTER
Called patient and given phone number to the [facility] schedulers to get the needed xr at the EOS and the CT Mazor scheduled. Once done, will notify Dr Aggarwal to enter surgery request

## 2022-10-11 ENCOUNTER — DOCUMENTATION ONLY (OUTPATIENT)
Dept: NEUROLOGY | Facility: CLINIC | Age: 67
End: 2022-10-11

## 2022-10-11 ENCOUNTER — TELEPHONE (OUTPATIENT)
Dept: UROLOGY | Facility: CLINIC | Age: 67
End: 2022-10-11

## 2022-10-11 DIAGNOSIS — D47.2 MONOCLONAL GAMMOPATHY: ICD-10-CM

## 2022-10-11 DIAGNOSIS — M43.16 SPONDYLOLISTHESIS OF LUMBAR REGION: Primary | ICD-10-CM

## 2022-10-11 DIAGNOSIS — M88.9 PAGET DISEASE OF BONE: ICD-10-CM

## 2022-10-11 NOTE — TELEPHONE ENCOUNTER
[facility] Call Center    Phone Message    May a detailed message be left on voicemail: yes     Reason for Call: Appointment Intake    Referring Provider Name: Patient is being referred to Urology for an urgent appointment in 1-2 weeks. Please triage and call patient to schedule          Action Taken: Message routed to:  [facility] ([facility] Urology     Travel Screening: Not Applicable

## 2022-10-11 NOTE — PROGRESS NOTES
Needs further evaluation with hematology consultation, kappa lambda ratio, and LISA. This was ordered in June as far as I can determine but not run until last week, and I do not know why.    Discussed with patient. He will have labs done tomorrow.

## 2022-10-12 ENCOUNTER — ANCILLARY PROCEDURE (OUTPATIENT)
Dept: CT IMAGING | Facility: CLINIC | Age: 67
End: 2022-10-12
Attending: NEUROLOGICAL SURGERY
Payer: COMMERCIAL

## 2022-10-12 ENCOUNTER — ANCILLARY PROCEDURE (OUTPATIENT)
Dept: GENERAL RADIOLOGY | Facility: CLINIC | Age: 67
End: 2022-10-12
Attending: NEUROLOGICAL SURGERY
Payer: COMMERCIAL

## 2022-10-12 DIAGNOSIS — M43.16 SPONDYLOLISTHESIS OF LUMBAR REGION: ICD-10-CM

## 2022-10-12 DIAGNOSIS — M41.56 OTHER SECONDARY SCOLIOSIS, LUMBAR REGION: ICD-10-CM

## 2022-10-12 DIAGNOSIS — M48.061 SPINAL STENOSIS OF LUMBAR REGION WITH RADICULOPATHY: ICD-10-CM

## 2022-10-12 DIAGNOSIS — M54.16 SPINAL STENOSIS OF LUMBAR REGION WITH RADICULOPATHY: ICD-10-CM

## 2022-10-12 LAB — PROT PATTERN SERPL IFE-IMP: NORMAL

## 2022-10-12 PROCEDURE — 72131 CT LUMBAR SPINE W/O DYE: CPT | Performed by: STUDENT IN AN ORGANIZED HEALTH CARE EDUCATION/TRAINING PROGRAM

## 2022-10-12 PROCEDURE — 72082 X-RAY EXAM ENTIRE SPI 2/3 VW: CPT | Performed by: STUDENT IN AN ORGANIZED HEALTH CARE EDUCATION/TRAINING PROGRAM

## 2022-10-13 ENCOUNTER — PATIENT OUTREACH (OUTPATIENT)
Dept: ONCOLOGY | Facility: CLINIC | Age: 67
End: 2022-10-13

## 2022-10-13 NOTE — PROGRESS NOTES
Late entry October 11, 2022    Hematology/Oncology  referral reviewed.   Referred By 10/11/2022 10:02 AM  Referred To   Babar Issa MD    My Clinical Question Is:monoclonal gammopathy, elevated ESR      [id number]   [place] [facility] [place] [address]   Phone: [phone]   Fax: [phone]    Diagnoses: Spondylolisthesis of lumbar region   Paget disease of bone   Monoclonal gammopathy   Order: Adult Oncology/Hematology  Referral    My Clinical Question Is:monoclonal gammopathy, elevated ESR       Hematology         Pertinent labs -- BOOKMARKED    Referring provider  note -- BOOKMARKED    October 13, 2022 OUTGOING CALL to pt:  Introduced my role as nurse navigator with [facility] Hematology/Oncology dept and that we have recd the referral to hematology from Dr Issa. Explained to pt that he needs to have the labs drawn as well that Dr Issa ordered.   Pt confirms they are aware of the referral and that labs were supposed to be drawn but he reports he was told by [facility] staff yesterday that they couldn't see the lab orders.  He declined being transferred to hematology  now, will call his local [facility] clinic for a lab draw appt, undersands our intake  will call him to set up the hematology consult Pt voiced understanding of above instructions and information and denied further questions    Deena Piedra RN, BSN, OCN  [facility] Hematology/Oncology Nurse Navigator  [phone]

## 2022-10-19 ENCOUNTER — OFFICE VISIT (OUTPATIENT)
Dept: UROLOGY | Facility: CLINIC | Age: 67
End: 2022-10-19
Payer: COMMERCIAL

## 2022-10-19 VITALS — HEIGHT: 69 IN | WEIGHT: 200 LBS | BODY MASS INDEX: 29.62 KG/M2

## 2022-10-19 DIAGNOSIS — M54.16 SPINAL STENOSIS OF LUMBAR REGION WITH RADICULOPATHY: ICD-10-CM

## 2022-10-19 DIAGNOSIS — R97.20 ELEVATED PROSTATE SPECIFIC ANTIGEN (PSA): Primary | ICD-10-CM

## 2022-10-19 DIAGNOSIS — M48.061 SPINAL STENOSIS OF LUMBAR REGION WITH RADICULOPATHY: ICD-10-CM

## 2022-10-19 LAB
ALBUMIN UR-MCNC: NEGATIVE MG/DL
APPEARANCE UR: CLEAR
BILIRUB UR QL STRIP: NEGATIVE
COLOR UR AUTO: YELLOW
GLUCOSE UR STRIP-MCNC: NEGATIVE MG/DL
HGB UR QL STRIP: NEGATIVE
KETONES UR STRIP-MCNC: NEGATIVE MG/DL
LEUKOCYTE ESTERASE UR QL STRIP: ABNORMAL
NITRATE UR QL: NEGATIVE
PH UR STRIP: 7 [PH] (ref 5–7)
RESIDUAL VOLUME (RV) (EXTERNAL): 18
SP GR UR STRIP: 1.02 (ref 1–1.03)
UROBILINOGEN UR STRIP-ACNC: 0.2 E.U./DL

## 2022-10-19 PROCEDURE — 81003 URINALYSIS AUTO W/O SCOPE: CPT | Mod: QW | Performed by: STUDENT IN AN ORGANIZED HEALTH CARE EDUCATION/TRAINING PROGRAM

## 2022-10-19 PROCEDURE — 99203 OFFICE O/P NEW LOW 30 MIN: CPT | Mod: 25 | Performed by: STUDENT IN AN ORGANIZED HEALTH CARE EDUCATION/TRAINING PROGRAM

## 2022-10-19 PROCEDURE — 51798 US URINE CAPACITY MEASURE: CPT | Performed by: STUDENT IN AN ORGANIZED HEALTH CARE EDUCATION/TRAINING PROGRAM

## 2022-10-19 ASSESSMENT — PAIN SCALES - GENERAL: PAINLEVEL: NO PAIN (0)

## 2022-10-19 NOTE — PROGRESS NOTES
Chief Complaint:    Elevated PSA (Prostate Specific Antigen)           Consult or Referral:     Mr. Philipp Bland is a 66 year old male seen at the request of  No ref. provider found.         History of Present Illness:     Philipp Bland is a 66 year old male being seen for elevated PSA.  Duration of problem: 1 months  Previous treatments: Currently undergoing evaluation with Paget's disease of the bone      Reviewed previous notes from Dr. Aggarwal    Philipp is currently being evaluated for possible decompression/spinal fusion surgery for his back issues due to Paget's disease of the bone  On evaluation he was also found to have an elevated PSA  Does not have significant neurological symptoms  No family history of prostate cancer  Last PSA done 13 years ago was borderline high           Past Medical History:     Past Medical History:   Diagnosis Date     Essential hypertension, benign 01/01/2001     Family history of alcoholism             Past Surgical History:   History reviewed. No pertinent surgical history.         Medications     Current Outpatient Medications   Medication     chlorthalidone (HYGROTON) 25 MG tablet     ferrous gluconate (FERGON) 324 (38 Fe) MG tablet     ibuprofen (ADVIL/MOTRIN) 200 MG capsule     lisinopril (ZESTRIL) 40 MG tablet     omeprazole (PRILOSEC) 40 MG DR capsule     Vitamin D, Cholecalciferol, 25 MCG (1000 UT) CAPS     Vitamin K 100 MCG TABS     zinc gluconate 50 MG tablet     No current facility-administered medications for this visit.            Family History:     Family History   Problem Relation Age of Onset     Hypertension Father             Social History:     Social History     Socioeconomic History     Marital status: Single     Spouse name: Not on file     Number of children: Not on file     Years of education: Not on file     Highest education level: Not on file   Occupational History     Not on file   Tobacco Use     Smoking status: Never     Smokeless tobacco:  "Never   Vaping Use     Vaping Use: Never used   Substance and Sexual Activity     Alcohol use: Yes     Drug use: No     Sexual activity: Yes     Partners: Female   Other Topics Concern     Parent/sibling w/ CABG, MI or angioplasty before 65F 55M? No   Social History Narrative     Not on file     Social Determinants of Health     Financial Resource Strain: Not on file   Food Insecurity: Not on file   Transportation Needs: Not on file   Physical Activity: Not on file   Stress: Not on file   Social Connections: Not on file   Intimate Partner Violence: Not on file   Housing Stability: Not on file            Allergies:   No known drug allergies         Review of Systems:  From intake questionnaire     Skin: negative  Eyes: negative  Ears/Nose/Throat: negative  Respiratory: No shortness of breath, dyspnea on exertion, cough, or hemoptysis  Cardiovascular: No chest pain or palpitations  Gastrointestinal: negative; no nausea/vomiting, constipation or diarrhea  Genitourinary: as per HPI  Musculoskeletal: negative  Neurologic: negative  Psychiatric: negative  Hematologic/Lymphatic/Immunologic: negative  Endocrine: negative         Physical Exam:     Patient is a 66 year old  male   Vitals: Height 1.74 m (5' 8.5\"), weight 90.7 kg (200 lb).  Constitutional: Body mass index is 29.97 kg/m .  Alert, no acute distress, oriented, conversant  Eyes: no scleral icterus; extraocular muscles intact, moist conjunctivae  Neck: trachea midline, no thyromegaly  Ears/nose/mouth: throat/mouth:normal, good dentition  Respiratory: no respiratory distress, or pursed lip breathing  Cardiovascular: pulses strong and intact; no obvious jugular venous distension present  Gastrointestinal: soft, nontender, no organomegaly or masses,   Lymphatics: No inguinal adenopathy  Musculoskeletal: extremities normal, no peripheral edema  Skin: no suspicious lesions or rashes  Neuro: Alert, oriented, speech and mentation normal  Psych: affect and mood normal, " alert and oriented to person, place and time  Gait: Normal  : DONAL anodular, symmetric      Labs and Pathology:    The following labs were reviewed by me and discussed with the patient:  UA: Normal  Significant for   Lab Results   Component Value Date    CR 0.65 10/07/2022    CR 0.64 09/26/2022    CR 0.75 06/17/2022    CR 0.77 08/27/2021    CR 1.27 04/08/2013    CR 0.81 12/27/2008     PSA   Date Value Ref Range Status   12/27/2008 4.74 (H) 0 - 4 ug/L Final     Prostate Specific Antigen Screen   Date Value Ref Range Status   10/07/2022 15.00 (H) 0.00 - 4.00 ug/L Final             Imaging:    The following imaging exams were independently viewed and interpreted by me and discussed with patient:  PVR:: Normal  CT lumbar spine:  Multilevel degenerative changes as described in great detail above.  Most significant findings are as follows;  - Grade 2 anterolisthesis with posterior disc osteophyte complex and  chronic spondylolysis at L5-S1. Resultant bilateral severe neural  foraminal stenosis.  - Bilateral moderate/severe neural foraminal stenosis at L2-3.  - Partially lumbarized sacral S1.  - Multilevel retrolisthesis involving L2-3 and L3-4.  2. Multifocal presumed Paget's disease involving left iliac wing and  T11.   3. Cholelithiasis without cholecystitis.  4. Diverticulosis without the radiculitis.  5. Prostatomegaly with diffuse bladder wall thickening     Standardized Questionnaire:      AUASS: 4/35 QOL:2           Assessment and Plan:     Elevated prostate specific antigen (PSA)  No lower urinary tract symptoms  PSA has been high at 13 years ago and currently significantly high at the level of 15  Though he does not have any nodularity on rectal examination PSA levels are significant considering the his ongoing back issues therefore, I would want to get further evaluation with MRI of the prostate for possible prostate biopsy  He is significantly concerned that these evaluations will further delay his treatment  for the back and therefore I  discussed with him that if he needs a surgery for his back as per his discussions with his neurosurgeon I do not think there is anything limiting his except debility/weakness for that procedure from a urological perspective  We can continue our evaluation and plan for a prostate biopsy around his anticipated procedure.  We will touch base with him once his MRI results  - UA without Microscopic [[id number]]; Future  - MEASURE POST-VOID RESIDUAL URINE/BLADDER CAPACITY, US NON-IMAGING (54817)  - UA without Microscopic [[id number]]  - MR Prostate wo & w Contrast; Future    Spinal stenosis of lumbar region with radiculopathy  Currently being managed by neurosurgery/neurology  No deterrents from urology perspective is currently being considered for surgery.    Plan:  MRI prostate    Orders  Orders Placed This Encounter   Procedures     MEASURE POST-VOID RESIDUAL URINE/BLADDER CAPACITY, US NON-IMAGING (96176)     UA without Microscopic [[id number]]       Rubio Albright MD  [facility] [place]      ==========================    Additional Billing and Coding Information:  Review of external notes as documented above   Review of the result(s) of each unique test - PSA, creatinine, UA, PVR, CT lumbar spine    Independent interpretation of a test performed by another physician/other qualified health care professional (not separately reported) -       Discussion of management or test interpretation with external physician/other qualified healthcare professional/appropriate source -           16 minutes spent on the date of the encounter doing chart review, review of test results, interpretation of tests, patient visit and documentation     ==========================

## 2022-10-19 NOTE — LETTER
10/19/2022       RE: Philipp Bland  [address]  [address]  [place] [address]     Dear Colleague,    Thank you for referring your patient, Philipp Bland, to the [facility] [place] at [facility]. Please see a copy of my visit note below.          Chief Complaint:    Elevated PSA (Prostate Specific Antigen)           Consult or Referral:     Mr. Philipp Bland is a 66 year old male seen at the request of . No ref. provider found.         History of Present Illness:     Philipp Bland is a 66 year old male being seen for elevated PSA.  Duration of problem: 1 months  Previous treatments: Currently undergoing evaluation with Paget's disease of the bone      Reviewed previous notes from Dr. Aggarwal    Philipp is currently being evaluated for possible decompression/spinal fusion surgery for his back issues due to Paget's disease of the bone  On evaluation he was also found to have an elevated PSA  Does not have significant neurological symptoms  No family history of prostate cancer  Last PSA done 13 years ago was borderline high           Past Medical History:     Past Medical History:   Diagnosis Date     Essential hypertension, benign 01/01/2001     Family history of alcoholism             Past Surgical History:   History reviewed. No pertinent surgical history.         Medications     Current Outpatient Medications   Medication     chlorthalidone (HYGROTON) 25 MG tablet     ferrous gluconate (FERGON) 324 (38 Fe) MG tablet     ibuprofen (ADVIL/MOTRIN) 200 MG capsule     lisinopril (ZESTRIL) 40 MG tablet     omeprazole (PRILOSEC) 40 MG DR capsule     Vitamin D, Cholecalciferol, 25 MCG (1000 UT) CAPS     Vitamin K 100 MCG TABS     zinc gluconate 50 MG tablet     No current facility-administered medications for this visit.            Family History:     Family History   Problem Relation Age of Onset     Hypertension Father             Social History:  "    Social History     Socioeconomic History     Marital status: Single     Spouse name: Not on file     Number of children: Not on file     Years of education: Not on file     Highest education level: Not on file   Occupational History     Not on file   Tobacco Use     Smoking status: Never     Smokeless tobacco: Never   Vaping Use     Vaping Use: Never used   Substance and Sexual Activity     Alcohol use: Yes     Drug use: No     Sexual activity: Yes     Partners: Female   Other Topics Concern     Parent/sibling w/ CABG, MI or angioplasty before 65F 55M? No   Social History Narrative     Not on file     Social Determinants of Health     Financial Resource Strain: Not on file   Food Insecurity: Not on file   Transportation Needs: Not on file   Physical Activity: Not on file   Stress: Not on file   Social Connections: Not on file   Intimate Partner Violence: Not on file   Housing Stability: Not on file            Allergies:   No known drug allergies         Review of Systems:  From intake questionnaire     Skin: negative  Eyes: negative  Ears/Nose/Throat: negative  Respiratory: No shortness of breath, dyspnea on exertion, cough, or hemoptysis  Cardiovascular: No chest pain or palpitations  Gastrointestinal: negative; no nausea/vomiting, constipation or diarrhea  Genitourinary: as per HPI  Musculoskeletal: negative  Neurologic: negative  Psychiatric: negative  Hematologic/Lymphatic/Immunologic: negative  Endocrine: negative         Physical Exam:     Patient is a 66 year old  male   Vitals: Height 1.74 m (5' 8.5\"), weight 90.7 kg (200 lb).  Constitutional: Body mass index is 29.97 kg/m .  Alert, no acute distress, oriented, conversant  Eyes: no scleral icterus; extraocular muscles intact, moist conjunctivae  Neck: trachea midline, no thyromegaly  Ears/nose/mouth: throat/mouth:normal, good dentition  Respiratory: no respiratory distress, or pursed lip breathing  Cardiovascular: pulses strong and intact; no obvious " jugular venous distension present  Gastrointestinal: soft, nontender, no organomegaly or masses,   Lymphatics: No inguinal adenopathy  Musculoskeletal: extremities normal, no peripheral edema  Skin: no suspicious lesions or rashes  Neuro: Alert, oriented, speech and mentation normal  Psych: affect and mood normal, alert and oriented to person, place and time  Gait: Normal  : DONAL anodular, symmetric      Labs and Pathology:    The following labs were reviewed by me and discussed with the patient:  UA: Normal  Significant for   Lab Results   Component Value Date    CR 0.65 10/07/2022    CR 0.64 09/26/2022    CR 0.75 06/17/2022    CR 0.77 08/27/2021    CR 1.27 04/08/2013    CR 0.81 12/27/2008     PSA   Date Value Ref Range Status   12/27/2008 4.74 (H) 0 - 4 ug/L Final     Prostate Specific Antigen Screen   Date Value Ref Range Status   10/07/2022 15.00 (H) 0.00 - 4.00 ug/L Final             Imaging:    The following imaging exams were independently viewed and interpreted by me and discussed with patient:  PVR:: Normal  CT lumbar spine:  Multilevel degenerative changes as described in great detail above.  Most significant findings are as follows;  - Grade 2 anterolisthesis with posterior disc osteophyte complex and  chronic spondylolysis at L5-S1. Resultant bilateral severe neural  foraminal stenosis.  - Bilateral moderate/severe neural foraminal stenosis at L2-3.  - Partially lumbarized sacral S1.  - Multilevel retrolisthesis involving L2-3 and L3-4.  2. Multifocal presumed Paget's disease involving left iliac wing and  T11.   3. Cholelithiasis without cholecystitis.  4. Diverticulosis without the radiculitis.  5. Prostatomegaly with diffuse bladder wall thickening     Standardized Questionnaire:      AUASS: 4/35 QOL:2           Assessment and Plan:     Elevated prostate specific antigen (PSA)  No lower urinary tract symptoms  PSA has been high at 13 years ago and currently significantly high at the level of  15  Though he does not have any nodularity on rectal examination PSA levels are significant considering the his ongoing back issues therefore, I would want to get further evaluation with MRI of the prostate for possible prostate biopsy  He is significantly concerned that these evaluations will further delay his treatment for the back and therefore I  discussed with him that if he needs a surgery for his back as per his discussions with his neurosurgeon I do not think there is anything limiting his except debility/weakness for that procedure from a urological perspective  We can continue our evaluation and plan for a prostate biopsy around his anticipated procedure.  We will touch base with him once his MRI results  - UA without Microscopic [[id number]]; Future  - MEASURE POST-VOID RESIDUAL URINE/BLADDER CAPACITY, US NON-IMAGING (85365)  - UA without Microscopic [[id number]]  - MR Prostate wo & w Contrast; Future    Spinal stenosis of lumbar region with radiculopathy  Currently being managed by neurosurgery/neurology  No deterrents from urology perspective is currently being considered for surgery.    Plan:  MRI prostate    Orders  Orders Placed This Encounter   Procedures     MEASURE POST-VOID RESIDUAL URINE/BLADDER CAPACITY, US NON-IMAGING (44115)     UA without Microscopic [[id number]]       Rubio Albright MD  [facility] [place]      ==========================    Additional Billing and Coding Information:  Review of external notes as documented above   Review of the result(s) of each unique test - PSA, creatinine, UA, PVR, CT lumbar spine    Independent interpretation of a test performed by another physician/other qualified health care professional (not separately reported) -       Discussion of management or test interpretation with external physician/other qualified healthcare professional/appropriate source -       16 minutes spent on the date of the encounter doing chart review, review of  test results, interpretation of tests, patient visit and documentation     ==========================

## 2022-10-19 NOTE — NURSING NOTE
Chief Complaint   Patient presents with     Elevated PSA     Pt has no urinary concerns today, pt is waking 3 times a night to urinate.    PVR: 18 mL    Ankita Abbott, CMA

## 2022-10-31 ENCOUNTER — PREP FOR PROCEDURE (OUTPATIENT)
Dept: NEUROLOGY | Facility: CLINIC | Age: 67
End: 2022-10-31

## 2022-10-31 DIAGNOSIS — M48.061 SPINAL STENOSIS OF LUMBAR REGION WITH RADICULOPATHY: ICD-10-CM

## 2022-10-31 DIAGNOSIS — M54.16 SPINAL STENOSIS OF LUMBAR REGION WITH RADICULOPATHY: ICD-10-CM

## 2022-10-31 DIAGNOSIS — Z01.818 PREOP TESTING: Primary | ICD-10-CM

## 2022-10-31 DIAGNOSIS — M43.16 SPONDYLOLISTHESIS OF LUMBAR REGION: Primary | ICD-10-CM

## 2022-11-11 ENCOUNTER — TELEPHONE (OUTPATIENT)
Dept: NEUROSURGERY | Facility: CLINIC | Age: 67
End: 2022-11-11

## 2022-11-11 DIAGNOSIS — Z01.818 PREOP TESTING: Primary | ICD-10-CM

## 2022-11-11 NOTE — TELEPHONE ENCOUNTER
Called to go over surgery education. Patient wished to do another day. Will send patient instruction via [employer] and call patient back on Monday her his request

## 2022-11-11 NOTE — PATIENT INSTRUCTIONS
Patient Instructions    Surgery scheduled at [facility] for Thoracic 10 to Sacral 1 posterior segmental instrumentation. Pelvic instrumentation. Possible screw cement augmentation. Lumbar 2 to Sacral 1 bilateral decompression and transforaminal interbody fusion. Posterior arthrodesis Thoracic 10-Sacral 1 with Dr. Aggarwal on 12/15/22    Pre-Operative  Surgical risks: blood clots in the leg or lung, problems urinating, nerve damage, drainage from the incision, infection,stiffness  Pre-operative physical with primary care physician within 30 days of surgical date (11/29/22)  2-4 night hospitalization stay  Shower procedure  Please shower with antimicrobial soap the night before surgery and morning of surgery. Please refer to showering instruction sheet in folder.  Eating/Drinking  Stop all solid foods 8 hours before surgery.  Keep drinking clear liquids until 4 hours before surgery  Clear liquids include water, clear juice, black coffee, or clear tea without milk, Gatorade, clear soda.   Medications  Hold Aspirin, NSAIDs (Advil/Ibuprofen, Indocin, Naproxen,Nuprin,Relafen/Nabumetone, Diclofenac,Meloxicam, Aleve, Celebrex) x 7 days prior to surgical date   You can take Tylenol (Acetaminophen) for pain, 1000 mg  Do not exceed 3,000 mg per day   Any other medications prescribed, please discuss with your primary care provider at your pre-operative physical   As part of preparation for your upcoming procedure you are required to have a test for the novel Coronavirus, COVID-19.  Please call the drive-up testing number to schedule your appointment. The test needs to be completed within 4 days (96) hours of surgery.   Scheduling Number: [phone]  You may NOT receive the COVID-19 vaccine 72 hours before or after surgery.    Pain Management  Dealing with pain  As your body heals, you might feel a stabbing, burning, or aching pain. You may also have some numbness.  Everyone feels pain differently, we may ask you to  rate your pain using a pain scale. This will let us know how much pain you feel.   Keep in mind that medicine won't take away all of your pain. It helps to try other ways to relax and ease pain.   Things to help with pain  After surgery, we will give you medicine for your pain. These medications work well, but they can make you drowsy, itchy, or sick to your stomach. If we give you narcotics for pain, try to take the pills with food.   For mild to moderate pain, you can take medication such as Tylenol. These can be used with narcotics, but make sure that your narcotic does not contain Tylenol.   Do NOT drive while taking narcotic pain medication  Do NOT drink alcohol while using any pain medication  You can utilize ice as needed (no longer than 20 minutes at one time)  You may resume taking NSAIDs (ex. Ibuprofen, aleve, naproxen) 12 weeks after surgery as it may cause bleeding and interfere with bone healing     Incision Care  No submerging incision in water such as pools, hot tubs, baths for at least 8 weeks or until incision is healed  You may get your incision wet in the shower. Allow water to run over incision, and gently pat dry.   Remove dressing as instructed upon discharge  Watch for signs of infection  Redness, swelling, warmth, drainage, and fever of 101 degrees or higher  Notify clinic [phone].    Activity Restrictions  For the first 6-8 weeks, no lifting > 10 pounds, no bending, twisting, or overhead reaching.  Take stairs in moderation   Ok to walk as tolerated, take short frequent walks. You may gradually increase the distance as tolerated.   Avoid bed rest and prolonged sitting for longer than 30 minutes (change positions frequently while awake)  No contact sports until after follow up visit  No high impact activities such as; running/jogging, snowmobile or 4 hartmann riding or any other recreational vehicles  Please call the clinic if you develop any of the following symptoms:  Swelling and/or  warmth in one or both legs  Pain or tenderness in your leg, ankle, foot, or arm   Red or discolored skin     Post-Op Follow Up Appointments  2 week staple/suture removal with RN (1/3/23)  6 week post op with xray prior   3 months post op with xray prior   1 year post op with xray prior  Please call to schedule follow up and xray appointments at [phone]    Resources  If you are currently employed, you will need to be off work for 4-6 weeks for post op recovery and healing.  Please fax any FMLA/short term disability paperwork to [phone]  You may call our clinic when you'd like to return to work and we can provide a work letter  To allow staff adequate time to complete paperwork, please send as soon as possible     [facility]  Phone: [phone]  Fax: [phone]

## 2022-11-14 NOTE — PROGRESS NOTES
RECORDS STATUS - ALL OTHER DIAGNOSIS    Spondylolisthesis of lumbar region  Paget disease of bone   Monoclonal gammopath  RECORDS RECEIVED FROM: [facility]   DATE RECEIVED: 11/22/2022   NOTES STATUS DETAILS   OFFICE NOTE from referring provider Complete Babar Lopez MD   DISCHARGE SUMMARY from hospital Complete 12/15/2022   OPERATIVE REPORT Complete Measure Post Void 10/19/2022   MEDICATION LIST Complete [facility]   CLINICAL TRIAL TREATMENTS TO DATE     LABS     PATHOLOGY REPORTS     ANYTHING RELATED TO DIAGNOSIS Complete Labs last updated on 10/19/2022    GENONOMIC TESTING     TYPE:     IMAGING (NEED IMAGES & REPORT)     CT SCANS Complete CT Lumbar Spine 10/12/2022   Xray Spine Complete 10/12/2022   NM Bone Complete 8/2/2022   MRI     MAMMO     ULTRASOUND     PET

## 2022-11-14 NOTE — TELEPHONE ENCOUNTER
Called patient as promised to discuss surgery. This is also not a good time. [employer] sent on 11/11 with instructions.   Will mail out instructions   Preop scheduled   Covid ordered

## 2022-11-21 ENCOUNTER — HEALTH MAINTENANCE LETTER (OUTPATIENT)
Age: 67
End: 2022-11-21

## 2022-11-22 ENCOUNTER — PRE VISIT (OUTPATIENT)
Dept: ONCOLOGY | Facility: CLINIC | Age: 67
End: 2022-11-22

## 2022-12-02 ENCOUNTER — OFFICE VISIT (OUTPATIENT)
Dept: FAMILY MEDICINE | Facility: CLINIC | Age: 67
End: 2022-12-02
Payer: COMMERCIAL

## 2022-12-02 VITALS
RESPIRATION RATE: 20 BRPM | WEIGHT: 200 LBS | TEMPERATURE: 98.2 F | HEART RATE: 96 BPM | BODY MASS INDEX: 29.62 KG/M2 | SYSTOLIC BLOOD PRESSURE: 130 MMHG | OXYGEN SATURATION: 96 % | DIASTOLIC BLOOD PRESSURE: 80 MMHG | HEIGHT: 69 IN

## 2022-12-02 DIAGNOSIS — M43.16 SPONDYLOLISTHESIS OF LUMBAR REGION: ICD-10-CM

## 2022-12-02 DIAGNOSIS — Z01.818 PREOP GENERAL PHYSICAL EXAM: Primary | ICD-10-CM

## 2022-12-02 DIAGNOSIS — D50.9 IRON DEFICIENCY ANEMIA, UNSPECIFIED IRON DEFICIENCY ANEMIA TYPE: ICD-10-CM

## 2022-12-02 DIAGNOSIS — M88.9 PAGET DISEASE OF BONE: ICD-10-CM

## 2022-12-02 DIAGNOSIS — D47.2 MONOCLONAL GAMMOPATHY: ICD-10-CM

## 2022-12-02 DIAGNOSIS — Z11.4 SCREENING FOR HIV (HUMAN IMMUNODEFICIENCY VIRUS): ICD-10-CM

## 2022-12-02 LAB
ANION GAP SERPL CALCULATED.3IONS-SCNC: 12 MMOL/L (ref 7–15)
BUN SERPL-MCNC: 11.2 MG/DL (ref 8–23)
CALCIUM SERPL-MCNC: 9.2 MG/DL (ref 8.8–10.2)
CHLORIDE SERPL-SCNC: 94 MMOL/L (ref 98–107)
CREAT SERPL-MCNC: 0.65 MG/DL (ref 0.67–1.17)
DEPRECATED HCO3 PLAS-SCNC: 25 MMOL/L (ref 22–29)
ERYTHROCYTE [DISTWIDTH] IN BLOOD BY AUTOMATED COUNT: 20.3 % (ref 10–15)
ERYTHROCYTE [SEDIMENTATION RATE] IN BLOOD BY WESTERGREN METHOD: 9 MM/HR (ref 0–20)
GFR SERPL CREATININE-BSD FRML MDRD: >90 ML/MIN/1.73M2
GLUCOSE SERPL-MCNC: 100 MG/DL (ref 70–99)
HCT VFR BLD AUTO: 40.3 % (ref 40–53)
HGB BLD-MCNC: 13.2 G/DL (ref 13.3–17.7)
HIV 1+2 AB+HIV1 P24 AG SERPL QL IA: NONREACTIVE
MCH RBC QN AUTO: 28.4 PG (ref 26.5–33)
MCHC RBC AUTO-ENTMCNC: 32.8 G/DL (ref 31.5–36.5)
MCV RBC AUTO: 87 FL (ref 78–100)
PLATELET # BLD AUTO: 241 10E3/UL (ref 150–450)
POTASSIUM SERPL-SCNC: 4.3 MMOL/L (ref 3.4–5.3)
RBC # BLD AUTO: 4.65 10E6/UL (ref 4.4–5.9)
SODIUM SERPL-SCNC: 131 MMOL/L (ref 136–145)
WBC # BLD AUTO: 3.2 10E3/UL (ref 4–11)

## 2022-12-02 PROCEDURE — 85027 COMPLETE CBC AUTOMATED: CPT | Performed by: PHYSICIAN ASSISTANT

## 2022-12-02 PROCEDURE — 87389 HIV-1 AG W/HIV-1&-2 AB AG IA: CPT | Performed by: PHYSICIAN ASSISTANT

## 2022-12-02 PROCEDURE — 99214 OFFICE O/P EST MOD 30 MIN: CPT | Performed by: PHYSICIAN ASSISTANT

## 2022-12-02 PROCEDURE — 36415 COLL VENOUS BLD VENIPUNCTURE: CPT | Performed by: PHYSICIAN ASSISTANT

## 2022-12-02 PROCEDURE — 85652 RBC SED RATE AUTOMATED: CPT | Performed by: PHYSICIAN ASSISTANT

## 2022-12-02 PROCEDURE — 80048 BASIC METABOLIC PNL TOTAL CA: CPT | Performed by: PHYSICIAN ASSISTANT

## 2022-12-02 RX ORDER — FERROUS GLUCONATE 324(38)MG
324 TABLET ORAL
Qty: 90 TABLET | Refills: 5 | Status: ON HOLD | OUTPATIENT
Start: 2022-12-02 | End: 2023-01-05

## 2022-12-02 SDOH — ECONOMIC STABILITY: TRANSPORTATION INSECURITY
IN THE PAST 12 MONTHS, HAS THE LACK OF TRANSPORTATION KEPT YOU FROM MEDICAL APPOINTMENTS OR FROM GETTING MEDICATIONS?: NO

## 2022-12-02 SDOH — ECONOMIC STABILITY: INCOME INSECURITY: HOW HARD IS IT FOR YOU TO PAY FOR THE VERY BASICS LIKE FOOD, HOUSING, MEDICAL CARE, AND HEATING?: NOT HARD AT ALL

## 2022-12-02 SDOH — ECONOMIC STABILITY: FOOD INSECURITY: WITHIN THE PAST 12 MONTHS, YOU WORRIED THAT YOUR FOOD WOULD RUN OUT BEFORE YOU GOT MONEY TO BUY MORE.: NEVER TRUE

## 2022-12-02 SDOH — HEALTH STABILITY: PHYSICAL HEALTH: ON AVERAGE, HOW MANY DAYS PER WEEK DO YOU ENGAGE IN MODERATE TO STRENUOUS EXERCISE (LIKE A BRISK WALK)?: 4 DAYS

## 2022-12-02 SDOH — ECONOMIC STABILITY: TRANSPORTATION INSECURITY
IN THE PAST 12 MONTHS, HAS LACK OF TRANSPORTATION KEPT YOU FROM MEETINGS, WORK, OR FROM GETTING THINGS NEEDED FOR DAILY LIVING?: NO

## 2022-12-02 SDOH — ECONOMIC STABILITY: INCOME INSECURITY: IN THE LAST 12 MONTHS, WAS THERE A TIME WHEN YOU WERE NOT ABLE TO PAY THE MORTGAGE OR RENT ON TIME?: NO

## 2022-12-02 SDOH — HEALTH STABILITY: PHYSICAL HEALTH: ON AVERAGE, HOW MANY MINUTES DO YOU ENGAGE IN EXERCISE AT THIS LEVEL?: 20 MIN

## 2022-12-02 SDOH — ECONOMIC STABILITY: FOOD INSECURITY: WITHIN THE PAST 12 MONTHS, THE FOOD YOU BOUGHT JUST DIDN'T LAST AND YOU DIDN'T HAVE MONEY TO GET MORE.: NEVER TRUE

## 2022-12-02 ASSESSMENT — LIFESTYLE VARIABLES
HOW MANY STANDARD DRINKS CONTAINING ALCOHOL DO YOU HAVE ON A TYPICAL DAY: 1 OR 2
HOW OFTEN DO YOU HAVE SIX OR MORE DRINKS ON ONE OCCASION: NEVER
AUDIT-C TOTAL SCORE: 3
HOW OFTEN DO YOU HAVE A DRINK CONTAINING ALCOHOL: 2-3 TIMES A WEEK
SKIP TO QUESTIONS 9-10: 1

## 2022-12-02 ASSESSMENT — SOCIAL DETERMINANTS OF HEALTH (SDOH)
HOW OFTEN DO YOU ATTEND CHURCH OR RELIGIOUS SERVICES?: MORE THAN 4 TIMES PER YEAR
DO YOU BELONG TO ANY CLUBS OR ORGANIZATIONS SUCH AS CHURCH GROUPS UNIONS, FRATERNAL OR ATHLETIC GROUPS, OR SCHOOL GROUPS?: YES
HOW OFTEN DO YOU GET TOGETHER WITH FRIENDS OR RELATIVES?: MORE THAN THREE TIMES A WEEK
IN A TYPICAL WEEK, HOW MANY TIMES DO YOU TALK ON THE PHONE WITH FAMILY, FRIENDS, OR NEIGHBORS?: MORE THAN THREE TIMES A WEEK

## 2022-12-02 NOTE — PROGRESS NOTES
[address] [address]  Phone: [phone]  Primary Provider: Fallon Mauricio  Pre-op Performing Provider: MOUSTAPHA BUNCH      PREOPERATIVE EVALUATION:  Today's date: 12/2/2022    Philipp Bland is a 66 year old male who presents for a preoperative evaluation.    Surgical Information:  Surgery/Procedure: Thoracic 10 to Sacral 1 posterior segmental instrumentation.   Pelvic instrumentation.   Possible screw cement augmentation. Lumbar 2 to Sacral 1 bilateral decompression and transforaminal interbody fusion.   Posterior arthrodesis Thoracic 10-Sacral 1  Surgery Location: [facility]  Surgeon: Dr. Aggarwal  Surgery Date: 12/15/22  Time of Surgery: 7:30  Where patient plans to recover: At home with family  Fax number for surgical facility: Note does not need to be faxed, will be available electronically in Epic.    Type of Anesthesia Anticipated: General    Assessment & Plan     The proposed surgical procedure is considered INTERMEDIATE risk.    Preop general physical exam    - CBC with platelets; Future  - Basic metabolic panel  (Ca, Cl, CO2, Creat, Gluc, K, Na, BUN); Future  - CBC with platelets  - Basic metabolic panel  (Ca, Cl, CO2, Creat, Gluc, K, Na, BUN)    Spondylolisthesis of lumbar region    - Protein electrophoresis, timed urine  - Erythrocyte sedimentation rate auto    Iron deficiency anemia, unspecified iron deficiency anemia type    - ferrous gluconate (FERGON) 324 (38 Fe) MG tablet; Take 1 tablet (324 mg) by mouth daily (with breakfast)    Screening for HIV (human immunodeficiency virus)    - HIV Antigen Antibody Combo; Future  - HIV Antigen Antibody Combo    Paget disease of bone    - Protein electrophoresis, timed urine  - Erythrocyte sedimentation rate auto    Monoclonal gammopathy    - Protein electrophoresis, timed urine  - Erythrocyte sedimentation rate auto           Risks and Recommendations:  The patient has the following additional risks  and recommendations for perioperative complications:   - No identified additional risk factors other than previously addressed    Medication Instructions:   - ACE/ARB: May be continued on the day of surgery.    - Diuretics: HOLD on the day of surgery.   - ibuprofen (Advil, Motrin): HOLD 1 day before surgery.     RECOMMENDATION:  APPROVAL GIVEN to proceed with proposed procedure, without further diagnostic evaluation.  Subjective     HPI related to upcoming procedure: spondylolisthesis of lumbar region    Preop Questions 12/2/2022   1. Have you ever had a heart attack or stroke? No   2. Have you ever had surgery on your heart or blood vessels, such as a stent placement, a coronary artery bypass, or surgery on an artery in your head, neck, heart, or legs? No   3. Do you have chest pain with activity? No   4. Do you have a history of  heart failure? No   5. Do you currently have a cold, bronchitis or symptoms of other infection? No   6. Do you have a cough, shortness of breath, or wheezing? No   7. Do you or anyone in your family have previous history of blood clots? No   8. Do you or does anyone in your family have a serious bleeding problem such as prolonged bleeding following surgeries or cuts? No   9. Have you ever had problems with anemia or been told to take iron pills? YES - taking iron    10. Have you had any abnormal blood loss such as black, tarry or bloody stools? No   11. Have you ever had a blood transfusion? No   12. Are you willing to have a blood transfusion if it is medically needed before, during, or after your surgery? Yes   13. Have you or any of your relatives ever had problems with anesthesia? No   14. Do you have sleep apnea, excessive snoring or daytime drowsiness? No   15. Do you have any artifical heart valves or other implanted medical devices like a pacemaker, defibrillator, or continuous glucose monitor? No   16. Do you have artificial joints? No   17. Are you allergic to latex? No        Health Care Directive:  Patient does not have a Health Care Directive or Living Will: not discussed    Preoperative Review of :   reviewed - no record of controlled substances prescribed.      Status of Chronic Conditions:  HYPERTENSION - Patient has longstanding history of HTN , currently denies any symptoms referable to elevated blood pressure. Specifically denies chest pain, palpitations, dyspnea, orthopnea, PND or peripheral edema. Blood pressure readings have been in normal range. Current medication regimen is as listed below. Patient denies any side effects of medication.       Review of Systems  Constitutional, neuro, ENT, endocrine, pulmonary, cardiac, gastrointestinal, genitourinary, musculoskeletal, integument and psychiatric systems are negative, except as otherwise noted.    Patient Active Problem List    Diagnosis Date Noted     Other secondary scoliosis, lumbar region 10/04/2022     Priority: Medium     Other osteoporosis without current pathological fracture 08/23/2022     Priority: Medium     Lumbar spine pain 02/11/2022     Priority: Medium     Other spondylosis, lumbar region 11/17/2021     Priority: Medium     Spondylolisthesis of lumbar region 02/12/2021     Priority: Medium     Spinal stenosis of lumbar region with radiculopathy 02/12/2021     Priority: Medium     Advanced directives, counseling/discussion 04/08/2013     Priority: Medium                    HTN, goal below 140/90 02/06/2012     Priority: Medium     CARDIOVASCULAR SCREENING; LDL GOAL LESS THAN 130 10/31/2010     Priority: Medium     Onychomycosis 09/29/2008     Priority: Medium      Past Medical History:   Diagnosis Date     Essential hypertension, benign 01/01/2001     Family history of alcoholism      No past surgical history on file.  Current Outpatient Medications   Medication Sig Dispense Refill     chlorthalidone (HYGROTON) 25 MG tablet Take 1 tablet (25 mg) by mouth daily 90 tablet 1     ferrous gluconate  "(FERGON) 324 (38 Fe) MG tablet Take 1 tablet (324 mg) by mouth daily (with breakfast) 30 tablet 5     ibuprofen (ADVIL/MOTRIN) 200 MG capsule Take 200 mg by mouth every 4 hours as needed for fever       lisinopril (ZESTRIL) 40 MG tablet Take 1 tablet (40 mg) by mouth daily 90 tablet 1     omeprazole (PRILOSEC) 40 MG DR capsule Take 40 mg by mouth daily       Vitamin D, Cholecalciferol, 25 MCG (1000 UT) CAPS        Vitamin K 100 MCG TABS        zinc gluconate 50 MG tablet Take 50 mg by mouth daily         Allergies   Allergen Reactions     No Known Drug Allergies         Social History     Tobacco Use     Smoking status: Never     Smokeless tobacco: Never   Substance Use Topics     Alcohol use: Yes     Family History   Problem Relation Age of Onset     Hypertension Father      History   Drug Use No         Objective     BP (!) 150/104 (BP Location: Right arm, Patient Position: Sitting, Cuff Size: Adult Large)   Pulse 96   Temp 98.2  F (36.8  C) (Oral)   Resp 20   Ht 1.74 m (5' 8.5\")   Wt 90.7 kg (200 lb)   SpO2 96%   BMI 29.97 kg/m      Physical Exam    GENERAL APPEARANCE: healthy, alert and no distress     EYES: EOMI,  PERRL     HENT: ear canals and TM's normal and nose and mouth without ulcers or lesions     NECK: no adenopathy, no asymmetry, masses, or scars and thyroid normal to palpation     RESP: lungs clear to auscultation - no rales, rhonchi or wheezes     CV: regular rates and rhythm, normal S1 S2, no S3 or S4 and no murmur, click or rub     ABDOMEN:  soft, nontender, no HSM or masses and bowel sounds normal     SKIN: no suspicious lesions or rashes     NEURO: Normal strength and tone, sensory exam grossly normal, mentation intact and speech normal     PSYCH: mentation appears normal. and affect normal/bright     LYMPHATICS: No cervical adenopathy    Recent Labs   Lab Test 10/07/22  1523 09/26/22  1246 06/17/22  1526   HGB  --   --  10.8*   PLT  --   --  267   *  --  139   POTASSIUM 4.1  --  4.1 "   CR 0.65* 0.64* 0.75        Diagnostics:  Recent Results (from the past 24 hour(s))   Erythrocyte sedimentation rate auto    Collection Time: 12/02/22  8:50 AM   Result Value Ref Range    Erythrocyte Sedimentation Rate 9 0 - 20 mm/hr   CBC with platelets    Collection Time: 12/02/22  8:50 AM   Result Value Ref Range    WBC Count 3.2 (L) 4.0 - 11.0 10e3/uL    RBC Count 4.65 4.40 - 5.90 10e6/uL    Hemoglobin 13.2 (L) 13.3 - 17.7 g/dL    Hematocrit 40.3 40.0 - 53.0 %    MCV 87 78 - 100 fL    MCH 28.4 26.5 - 33.0 pg    MCHC 32.8 31.5 - 36.5 g/dL    RDW 20.3 (H) 10.0 - 15.0 %    Platelet Count 241 150 - 450 10e3/uL    BMP Pending    No EKG required, no history of coronary heart disease, significant arrhythmia, peripheral arterial disease or other structural heart disease.    Revised Cardiac Risk Index (RCRI):  The patient has the following serious cardiovascular risks for perioperative complications:   - No serious cardiac risks = 0 points     RCRI Interpretation:0 points: Class I (very low risk - 0.4% complication rate)           Signed Electronically by: Leah Barlow PA-C  Copy of this evaluation report is provided to requesting physician.

## 2022-12-02 NOTE — PATIENT INSTRUCTIONS
Preparing for Your Surgery  Getting started  A nurse will call you to review your health history and instructions. They will give you an arrival time based on your scheduled surgery time. Please be ready to share:    Your doctor's clinic name and phone number    Your medical, surgical, and anesthesia history    A list of allergies and sensitivities    A list of medicines, including herbal treatments and over-the-counter drugs    Whether the patient has a legal guardian (ask how to send us the papers in advance)  Please tell us if you're pregnant--or if there's any chance you might be pregnant. Some surgeries may injure a fetus (unborn baby), so they require a pregnancy test. Surgeries that are safe for a fetus don't always need a test, and you can choose whether to have one.   If you have a child who's having surgery, please ask for a copy of Preparing for Your Child's Surgery.    Preparing for surgery    Within 10 to 30 days of surgery: Have a pre-op exam (sometimes called an H&P, or History and Physical). This can be done at a clinic or pre-operative center.  ? If you're having a , you may not need this exam. Talk to your care team.    At your pre-op exam, talk to your care team about all medicines you take. If you need to stop any medicines before surgery, ask when to start taking them again.  ? We do this for your safety. Many medicines can make you bleed too much during surgery. Some change how well surgery (anesthesia) drugs work.    Call your insurance company to let them know you're having surgery. (If you don't have insurance, call [phone].)    Call your clinic if there's any change in your health. This includes signs of a cold or flu (sore throat, runny nose, cough, rash, fever). It also includes a scrape or scratch near the surgery site.    If you have questions on the day of surgery, call your hospital or surgery center.  COVID testing  You may need to be tested for COVID-19 before having  surgery. If so, we will give you instructions (or click here).  Eating and drinking guidelines  For your safety: Unless your surgeon tells you otherwise, follow the guidelines below.    Eat and drink as usual until 8 hours before you arrive for surgery. After that, no food or milk.    Drink clear liquids until 2 hours before you arrive. These are liquids you can see through, like water, Gatorade, and Propel Water. They also include plain black coffee and tea (no cream or milk), candy, and breath mints. You can spit out gum when you arrive.    If you drink alcohol: Stop drinking it the night before surgery.    If your care team tells you to take medicine on the morning of surgery, it's okay to take it with a sip of water.  Preventing infection    Shower or bathe the night before and morning of your surgery. Follow the instructions your clinic gave you. (If no instructions, use regular soap.)    Don't shave or clip hair near your surgery site. We'll remove the hair if needed.    Don't smoke or vape the morning of surgery. You may chew nicotine gum up to 2 hours before surgery. A nicotine patch is okay.  ? Note: Some surgeries require you to completely quit smoking and nicotine. Check with your surgeon.    Your care team will make every effort to keep you safe from infection. We will:  ? Clean our hands often with soap and water (or an alcohol-based hand rub).  ? Clean the skin at your surgery site with a special soap that kills germs.  ? Give you a special gown to keep you warm. (Cold raises the risk of infection.)  ? Wear special hair covers, masks, gowns and gloves during surgery.  ? Give antibiotic medicine, if prescribed. Not all surgeries need antibiotics.  What to bring on the day of surgery    Photo ID and insurance card    Copy of your health care directive, if you have one    Glasses and hearing aids (bring cases)  ? You can't wear contacts during surgery    Inhaler and eye drops, if you use them (tell us  about these when you arrive)    CPAP machine or breathing device, if you use them    A few personal items, if spending the night    If you have . . .  ? A pacemaker, ICD (cardiac defibrillator) or other implant: Bring the ID card.  ? An implanted stimulator: Bring the remote control.  ? A legal guardian: Bring a copy of the certified (court-stamped) guardianship papers.  Please remove any jewelry, including body piercings. Leave jewelry and other valuables at home.  If you're going home the day of surgery    You must have a responsible adult drive you home. They should stay with you overnight as well.    If you don't have someone to stay with you, and you aren't safe to go home alone, we may keep you overnight. Insurance often won't pay for this.  After surgery  If it's hard to control your pain or you need more pain medicine, please call your surgeon's office.  Questions?   If you have any questions for your care team, list them here: _________________________________________________________________________________________________________________________________________________________________________ ____________________________________ ____________________________________ ____________________________________  For informational purposes only. Not to replace the advice of your health care provider. Copyright   2003, 2019 [facility]. All rights reserved. Clinically reviewed by Juanita Whyte MD. VANCL [id number] - REV 10/22.

## 2022-12-02 NOTE — H&P (VIEW-ONLY)
[address] [address]  Phone: [phone]  Primary Provider: Fallon Mauricio  Pre-op Performing Provider: MOUSTAPHA BUNCH      PREOPERATIVE EVALUATION:  Today's date: 12/2/2022    Philipp Bland is a 66 year old male who presents for a preoperative evaluation.    Surgical Information:  Surgery/Procedure: Thoracic 10 to Sacral 1 posterior segmental instrumentation.   Pelvic instrumentation.   Possible screw cement augmentation. Lumbar 2 to Sacral 1 bilateral decompression and transforaminal interbody fusion.   Posterior arthrodesis Thoracic 10-Sacral 1  Surgery Location: [facility]  Surgeon: Dr. Aggarwal  Surgery Date: 12/15/22  Time of Surgery: 7:30  Where patient plans to recover: At home with family  Fax number for surgical facility: Note does not need to be faxed, will be available electronically in Epic.    Type of Anesthesia Anticipated: General    Assessment & Plan     The proposed surgical procedure is considered INTERMEDIATE risk.    Preop general physical exam    - CBC with platelets; Future  - Basic metabolic panel  (Ca, Cl, CO2, Creat, Gluc, K, Na, BUN); Future  - CBC with platelets  - Basic metabolic panel  (Ca, Cl, CO2, Creat, Gluc, K, Na, BUN)    Spondylolisthesis of lumbar region    - Protein electrophoresis, timed urine  - Erythrocyte sedimentation rate auto    Iron deficiency anemia, unspecified iron deficiency anemia type    - ferrous gluconate (FERGON) 324 (38 Fe) MG tablet; Take 1 tablet (324 mg) by mouth daily (with breakfast)    Screening for HIV (human immunodeficiency virus)    - HIV Antigen Antibody Combo; Future  - HIV Antigen Antibody Combo    Paget disease of bone    - Protein electrophoresis, timed urine  - Erythrocyte sedimentation rate auto    Monoclonal gammopathy    - Protein electrophoresis, timed urine  - Erythrocyte sedimentation rate auto           Risks and Recommendations:  The patient has the following additional risks  and recommendations for perioperative complications:   - No identified additional risk factors other than previously addressed    Medication Instructions:   - ACE/ARB: May be continued on the day of surgery.    - Diuretics: HOLD on the day of surgery.   - ibuprofen (Advil, Motrin): HOLD 1 day before surgery.     RECOMMENDATION:  APPROVAL GIVEN to proceed with proposed procedure, without further diagnostic evaluation.  Subjective     HPI related to upcoming procedure: spondylolisthesis of lumbar region    Preop Questions 12/2/2022   1. Have you ever had a heart attack or stroke? No   2. Have you ever had surgery on your heart or blood vessels, such as a stent placement, a coronary artery bypass, or surgery on an artery in your head, neck, heart, or legs? No   3. Do you have chest pain with activity? No   4. Do you have a history of  heart failure? No   5. Do you currently have a cold, bronchitis or symptoms of other infection? No   6. Do you have a cough, shortness of breath, or wheezing? No   7. Do you or anyone in your family have previous history of blood clots? No   8. Do you or does anyone in your family have a serious bleeding problem such as prolonged bleeding following surgeries or cuts? No   9. Have you ever had problems with anemia or been told to take iron pills? YES - taking iron    10. Have you had any abnormal blood loss such as black, tarry or bloody stools? No   11. Have you ever had a blood transfusion? No   12. Are you willing to have a blood transfusion if it is medically needed before, during, or after your surgery? Yes   13. Have you or any of your relatives ever had problems with anesthesia? No   14. Do you have sleep apnea, excessive snoring or daytime drowsiness? No   15. Do you have any artifical heart valves or other implanted medical devices like a pacemaker, defibrillator, or continuous glucose monitor? No   16. Do you have artificial joints? No   17. Are you allergic to latex? No        Health Care Directive:  Patient does not have a Health Care Directive or Living Will: not discussed    Preoperative Review of :   reviewed - no record of controlled substances prescribed.      Status of Chronic Conditions:  HYPERTENSION - Patient has longstanding history of HTN , currently denies any symptoms referable to elevated blood pressure. Specifically denies chest pain, palpitations, dyspnea, orthopnea, PND or peripheral edema. Blood pressure readings have been in normal range. Current medication regimen is as listed below. Patient denies any side effects of medication.       Review of Systems  Constitutional, neuro, ENT, endocrine, pulmonary, cardiac, gastrointestinal, genitourinary, musculoskeletal, integument and psychiatric systems are negative, except as otherwise noted.    Patient Active Problem List    Diagnosis Date Noted     Other secondary scoliosis, lumbar region 10/04/2022     Priority: Medium     Other osteoporosis without current pathological fracture 08/23/2022     Priority: Medium     Lumbar spine pain 02/11/2022     Priority: Medium     Other spondylosis, lumbar region 11/17/2021     Priority: Medium     Spondylolisthesis of lumbar region 02/12/2021     Priority: Medium     Spinal stenosis of lumbar region with radiculopathy 02/12/2021     Priority: Medium     Advanced directives, counseling/discussion 04/08/2013     Priority: Medium                    HTN, goal below 140/90 02/06/2012     Priority: Medium     CARDIOVASCULAR SCREENING; LDL GOAL LESS THAN 130 10/31/2010     Priority: Medium     Onychomycosis 09/29/2008     Priority: Medium      Past Medical History:   Diagnosis Date     Essential hypertension, benign 01/01/2001     Family history of alcoholism      No past surgical history on file.  Current Outpatient Medications   Medication Sig Dispense Refill     chlorthalidone (HYGROTON) 25 MG tablet Take 1 tablet (25 mg) by mouth daily 90 tablet 1     ferrous gluconate  "(FERGON) 324 (38 Fe) MG tablet Take 1 tablet (324 mg) by mouth daily (with breakfast) 30 tablet 5     ibuprofen (ADVIL/MOTRIN) 200 MG capsule Take 200 mg by mouth every 4 hours as needed for fever       lisinopril (ZESTRIL) 40 MG tablet Take 1 tablet (40 mg) by mouth daily 90 tablet 1     omeprazole (PRILOSEC) 40 MG DR capsule Take 40 mg by mouth daily       Vitamin D, Cholecalciferol, 25 MCG (1000 UT) CAPS        Vitamin K 100 MCG TABS        zinc gluconate 50 MG tablet Take 50 mg by mouth daily         Allergies   Allergen Reactions     No Known Drug Allergies         Social History     Tobacco Use     Smoking status: Never     Smokeless tobacco: Never   Substance Use Topics     Alcohol use: Yes     Family History   Problem Relation Age of Onset     Hypertension Father      History   Drug Use No         Objective     BP (!) 150/104 (BP Location: Right arm, Patient Position: Sitting, Cuff Size: Adult Large)   Pulse 96   Temp 98.2  F (36.8  C) (Oral)   Resp 20   Ht 1.74 m (5' 8.5\")   Wt 90.7 kg (200 lb)   SpO2 96%   BMI 29.97 kg/m      Physical Exam    GENERAL APPEARANCE: healthy, alert and no distress     EYES: EOMI,  PERRL     HENT: ear canals and TM's normal and nose and mouth without ulcers or lesions     NECK: no adenopathy, no asymmetry, masses, or scars and thyroid normal to palpation     RESP: lungs clear to auscultation - no rales, rhonchi or wheezes     CV: regular rates and rhythm, normal S1 S2, no S3 or S4 and no murmur, click or rub     ABDOMEN:  soft, nontender, no HSM or masses and bowel sounds normal     SKIN: no suspicious lesions or rashes     NEURO: Normal strength and tone, sensory exam grossly normal, mentation intact and speech normal     PSYCH: mentation appears normal. and affect normal/bright     LYMPHATICS: No cervical adenopathy    Recent Labs   Lab Test 10/07/22  1523 09/26/22  1246 06/17/22  1526   HGB  --   --  10.8*   PLT  --   --  267   *  --  139   POTASSIUM 4.1  --  4.1 "   CR 0.65* 0.64* 0.75        Diagnostics:  Recent Results (from the past 24 hour(s))   Erythrocyte sedimentation rate auto    Collection Time: 12/02/22  8:50 AM   Result Value Ref Range    Erythrocyte Sedimentation Rate 9 0 - 20 mm/hr   CBC with platelets    Collection Time: 12/02/22  8:50 AM   Result Value Ref Range    WBC Count 3.2 (L) 4.0 - 11.0 10e3/uL    RBC Count 4.65 4.40 - 5.90 10e6/uL    Hemoglobin 13.2 (L) 13.3 - 17.7 g/dL    Hematocrit 40.3 40.0 - 53.0 %    MCV 87 78 - 100 fL    MCH 28.4 26.5 - 33.0 pg    MCHC 32.8 31.5 - 36.5 g/dL    RDW 20.3 (H) 10.0 - 15.0 %    Platelet Count 241 150 - 450 10e3/uL    BMP Pending    No EKG required, no history of coronary heart disease, significant arrhythmia, peripheral arterial disease or other structural heart disease.    Revised Cardiac Risk Index (RCRI):  The patient has the following serious cardiovascular risks for perioperative complications:   - No serious cardiac risks = 0 points     RCRI Interpretation:0 points: Class I (very low risk - 0.4% complication rate)           Signed Electronically by: Leah Barlow PA-C  Copy of this evaluation report is provided to requesting physician.

## 2022-12-04 PROCEDURE — 81050 URINALYSIS VOLUME MEASURE: CPT

## 2022-12-04 PROCEDURE — 84166 PROTEIN E-PHORESIS/URINE/CSF: CPT

## 2022-12-05 ENCOUNTER — TELEPHONE (OUTPATIENT)
Dept: NEUROSURGERY | Facility: CLINIC | Age: 67
End: 2022-12-05

## 2022-12-05 LAB — PROT PATTERN UR ELPH-IMP: NORMAL

## 2022-12-05 NOTE — TELEPHONE ENCOUNTER
Son has lots of questions about recovery and what his father can and cannot do after surgery.  Please call Patrice at [phone] to discuss.

## 2022-12-05 NOTE — TELEPHONE ENCOUNTER
Patient is scheduled 12/15/22 for Thoracic 10 to Sacral 1 posterior segmental instrumentation. Pelvic instrumentation. Possible screw cement augmentation. Lumbar 2 to Sacral 1 bilateral decompression and transforaminal interbody fusion. Posterior arthrodesis Thoracic 10-Sacral 1 with Dr. Aggarwal.     Per chat notes, nursing sent patient surgery instructions via [employer] message which appears patient has not read. Folder was also mailed to patient. Per notes, a phone call was made by nursing to review education over the phone 11/14/22 and was not a good time for patient.     Called Patrice patient's son (C2C on file). Answered all of Patrice's questions to the best of my ability. Emailed Leandrolissy patient instructions for surgery for his dad per his request to [email or web address]. Updated Patrice that we will try to contact his dad again to review education. Patrice said that his dad is a  and has been working a lot. Advised Patrice to contact clinic with any further questions.

## 2022-12-07 ENCOUNTER — MYC MEDICAL ADVICE (OUTPATIENT)
Dept: FAMILY MEDICINE | Facility: CLINIC | Age: 67
End: 2022-12-07

## 2022-12-08 ENCOUNTER — TELEPHONE (OUTPATIENT)
Dept: NEUROSURGERY | Facility: CLINIC | Age: 67
End: 2022-12-08

## 2022-12-08 ENCOUNTER — MYC MEDICAL ADVICE (OUTPATIENT)
Dept: FAMILY MEDICINE | Facility: CLINIC | Age: 67
End: 2022-12-08

## 2022-12-08 NOTE — TELEPHONE ENCOUNTER
[employer] message was sent to patient to inquire about a good time to discuss surg edu and answer any questions. Message sent 12/6/22. Awaiting response back from patient.     Attempted to reach out to patient via phone to review surgical education/instructions for upcoming procedure with Dr. Aggarwal on 12/15/22, no answer. Left voice message asking patient to call clinic back to further discuss.

## 2022-12-09 NOTE — TELEPHONE ENCOUNTER
Reviewed pre- and post-operative instructions over the phone with patient.   Surgery folder was mailed to patient . Patient confirmed he received folder in mail. Patient inst sheet in telephone encounter from 11/11/22. Patient said he will purchase soap at his pharmacy. He's not able to stop by one of our clinics to pick one up.    Patient Education Topic: Procedure with Dr. Aggarwal     Learner(s): Patient    Knowledge Level: Basic    Readiness to Learn: Ready    Method:  Verbal explanation    Outcome: Able to verbalize instructions    Barriers to Learning: No barrier    Covid Testing: patient educated they will need to have a test for the novel Coronavirus, COVID-19.The PCR test needs to be completed within 4 days (96) hours of surgery. . Order Placed.Patient will call to get this scheduled.     Scheduling Number: [phone]    NDI/DAVE: Confirmation of completion within last 6 months    Pain Clinic: N/A     Patient had the opportunity for questions to be answered. Advised Patient to call clinic with any questions/concerns.

## 2022-12-10 ENCOUNTER — MYC MEDICAL ADVICE (OUTPATIENT)
Dept: FAMILY MEDICINE | Facility: CLINIC | Age: 67
End: 2022-12-10

## 2022-12-12 NOTE — TELEPHONE ENCOUNTER
See pt's MC message.     Called pt at [phone] for clarification. He states that his son went to the pharmacy to  tylenol as he has had increased pain with holding ibuprofen prior to surgery and that there was only acetaminophen available. He asked whether he should take acetaminophen for pain management before and after surgery.      Advised pt that acetaminophen can be taken in place of Tylenol, however he needs to reach out to Dr. Aggarwal's office with neurology regarding what medications he should be taking pre and post surgery for pain.     Pt verbalizes understanding and states he will send a MC message to Dr. Aggarwal.     Luzma Bolivar RN  PAL ([facility])  [facility]

## 2022-12-14 ENCOUNTER — ANESTHESIA EVENT (OUTPATIENT)
Dept: SURGERY | Facility: CLINIC | Age: 67
DRG: 454 | End: 2022-12-14
Payer: COMMERCIAL

## 2022-12-14 RX ORDER — ACETAMINOPHEN 500 MG
500-1000 TABLET ORAL EVERY 6 HOURS PRN
Status: ON HOLD | COMMUNITY
End: 2023-01-05

## 2022-12-14 NOTE — PROGRESS NOTES
PTA medications updated by Medication Scribe prior to surgery via phone call with patient (last doses completed by Nurse)     Medication history sources: Patient, Surescripts and H&P  In the past week, patient estimated taking medication this percent of the time: Greater than 90%  Adherence assessment: N/A Not Observed    Significant changes made to the medication list:  None      Additional medication history information:   None    Medication reconciliation completed by provider prior to medication history? No    Time spent in this activity: 35 minutes    The information provided in this note is only as accurate as the sources available at the time of update(s)      Prior to Admission medications    Medication Sig Last Dose Taking? Auth Provider Long Term End Date   acetaminophen (TYLENOL) 500 MG tablet Take 500-1,000 mg by mouth every 6 hours as needed for mild pain PRN Yes Reported, Patient     chlorthalidone (HYGROTON) 25 MG tablet Take 1 tablet (25 mg) by mouth daily 12/14/2022 at AM Yes Fallon Mauricio APRN CNP Yes    ferrous gluconate (FERGON) 324 (38 Fe) MG tablet Take 1 tablet (324 mg) by mouth daily (with breakfast) 12/4/2022 at AM Yes Leah Barlow PA-C     ibuprofen (ADVIL/MOTRIN) 200 MG capsule Take 600-800 mg by mouth every 8 hours as needed for fever 12/7/2022 at PM Yes Reported, Patient     lisinopril (ZESTRIL) 40 MG tablet Take 1 tablet (40 mg) by mouth daily  at AM Yes Fallon Mauricio APRN CNP Yes    omeprazole (PRILOSEC) 40 MG DR capsule Take 40 mg by mouth daily  at AM Yes Reported, Patient     Vitamin D, Cholecalciferol, 25 MCG (1000 UT) CAPS  12/7/2022 at AM Yes Reported, Patient     Vitamin K 100 MCG TABS Take 1 tablet by mouth every morning 12/7/2022 at AM Yes Reported, Patient     zinc gluconate 50 MG tablet Take 50 mg by mouth daily 12/7/2022 at AM Yes Reported, Patient

## 2022-12-14 NOTE — ANESTHESIA PREPROCEDURE EVALUATION
Anesthesia Pre-Procedure Evaluation    Patient: Philipp Bland   MRN: [id number] : 1955        Procedure : Procedure(s):  Thoracic 10 to Sacral 1 posterior segmental instrumentation. Pelvic instrumentation. Possible screw cement augmentation. Lumbar 2 to Sacral 1 bilateral decompression and transforaminal interbody fusion. Posterior arthrodesis Thoracic 10-Sacral 1.          Past Medical History:   Diagnosis Date     Essential hypertension, benign 2001     Family history of alcoholism       History reviewed. No pertinent surgical history.   Allergies   Allergen Reactions     No Known Drug Allergies       Social History     Tobacco Use     Smoking status: Never     Smokeless tobacco: Never   Substance Use Topics     Alcohol use: Yes      Wt Readings from Last 1 Encounters:   22 90.7 kg (200 lb)        Anesthesia Evaluation            ROS/MED HX  ENT/Pulmonary:       Neurologic: Comment: Spinal stenosis of lumbar region with radiculopathy      Cardiovascular:     (+) hypertension-----    METS/Exercise Tolerance:     Hematologic:     (+) anemia,     Musculoskeletal: Comment: Spondylolisthesis of lumbar region  Paget disease of bone  Monoclonal gammopathy  (+) arthritis,     GI/Hepatic:       Renal/Genitourinary:     (+) BPH,     Endo:       Psychiatric/Substance Use:       Infectious Disease:       Malignancy:       Other:      (+) , H/O Chronic Pain,           OUTSIDE LABS:  CBC:   Lab Results   Component Value Date    WBC 3.2 (L) 2022    WBC 4.6 2022    HGB 13.2 (L) 2022    HGB 10.8 (L) 2022    HCT 40.3 2022    HCT 36.3 (L) 2022     2022     2022     BMP:   Lab Results   Component Value Date     (L) 2022     (L) 10/07/2022    POTASSIUM 4.3 2022    POTASSIUM 4.1 10/07/2022    CHLORIDE 94 (L) 2022    CHLORIDE 99 10/07/2022    CO2 25 2022    CO2 24 10/07/2022    BUN 11.2 2022    BUN 13 10/07/2022     CR 0.65 (L) 12/02/2022    CR 0.65 (L) 10/07/2022     (H) 12/02/2022    GLC 97 10/07/2022     COAGS: No results found for: PTT, INR, FIBR  POC: No results found for: BGM, HCG, HCGS  HEPATIC:   Lab Results   Component Value Date    ALBUMIN 4.0 06/17/2022    PROTTOTAL 7.5 06/17/2022    ALT 39 06/17/2022    AST 33 06/17/2022    ALKPHOS 138 09/12/2022    BILITOTAL 0.7 06/17/2022     OTHER:   Lab Results   Component Value Date    SHELBY 9.2 12/02/2022    TSH 4.17 06/17/2022    CRP <3.00 10/07/2022    SED 9 12/02/2022       Anesthesia Plan    ASA Status:  3   NPO Status:  NPO Appropriate    Anesthesia Type: General.     - Airway: ETT   Induction: Intravenous.   Maintenance: Balanced.   Techniques and Equipment:     - Lines/Monitors: 2nd IV, Arterial Line     Consents    Anesthesia Plan(s) and associated risks, benefits, and realistic alternatives discussed. Questions answered and patient/representative(s) expressed understanding.    - Discussed:     - Discussed with:  Patient      - Extended Intubation/Ventilatory Support Discussed: No.      - Patient is DNR/DNI Status: No    Use of blood products discussed: Yes.     - Discussed with: Patient.     - Consented: consented to blood products            Reason for refusal: other.     Postoperative Care    Pain management: IV analgesics, Oral pain medications, Multi-modal analgesia.   PONV prophylaxis: Ondansetron (or other 5HT-3), Dexamethasone or Solumedrol, Background Propofol Infusion     Comments:                Rubio Sinha DO

## 2022-12-15 ENCOUNTER — APPOINTMENT (OUTPATIENT)
Dept: GENERAL RADIOLOGY | Facility: CLINIC | Age: 67
DRG: 454 | End: 2022-12-15
Attending: NEUROLOGICAL SURGERY
Payer: COMMERCIAL

## 2022-12-15 ENCOUNTER — HOSPITAL ENCOUNTER (INPATIENT)
Facility: CLINIC | Age: 67
LOS: 9 days | Discharge: NURSING, REHAB OR LONG-TERM CARE | DRG: 454 | End: 2022-12-24
Attending: NEUROLOGICAL SURGERY | Admitting: NEUROLOGICAL SURGERY
Payer: COMMERCIAL

## 2022-12-15 ENCOUNTER — ANESTHESIA (OUTPATIENT)
Dept: SURGERY | Facility: CLINIC | Age: 67
DRG: 454 | End: 2022-12-15
Payer: COMMERCIAL

## 2022-12-15 DIAGNOSIS — Z98.1 HISTORY OF THORACIC SPINAL FUSION: Primary | ICD-10-CM

## 2022-12-15 LAB
ABO/RH(D): NORMAL
ANION GAP SERPL CALCULATED.3IONS-SCNC: 7 MMOL/L (ref 3–14)
ANTIBODY SCREEN: NEGATIVE
APTT PPP: 32 SECONDS (ref 22–38)
BASOPHILS # BLD AUTO: 0.1 10E3/UL (ref 0–0.2)
BASOPHILS NFR BLD AUTO: 2 %
BUN SERPL-MCNC: 11 MG/DL (ref 7–30)
CALCIUM SERPL-MCNC: 8.1 MG/DL (ref 8.5–10.1)
CHLORIDE BLD-SCNC: 100 MMOL/L (ref 94–109)
CO2 SERPL-SCNC: 26 MMOL/L (ref 20–32)
COHGB MFR BLD: 100 % (ref 92–100)
CPB POCT: NO
CREAT SERPL-MCNC: 0.52 MG/DL (ref 0.66–1.25)
EOSINOPHIL # BLD AUTO: 0.2 10E3/UL (ref 0–0.7)
EOSINOPHIL NFR BLD AUTO: 3 %
ERYTHROCYTE [DISTWIDTH] IN BLOOD BY AUTOMATED COUNT: 18.2 % (ref 10–15)
ERYTHROCYTE [DISTWIDTH] IN BLOOD BY AUTOMATED COUNT: 18.2 % (ref 10–15)
ERYTHROCYTE [DISTWIDTH] IN BLOOD BY AUTOMATED COUNT: 18.4 % (ref 10–15)
GFR SERPL CREATININE-BSD FRML MDRD: >90 ML/MIN/1.73M2
GLUCOSE BLD-MCNC: 113 MG/DL (ref 70–99)
GLUCOSE BLDC GLUCOMTR-MCNC: 162 MG/DL (ref 70–99)
HCO3 BLDA-SCNC: 24 MMOL/L (ref 21–28)
HCT VFR BLD AUTO: 29.2 % (ref 40–53)
HCT VFR BLD AUTO: 30.8 % (ref 40–53)
HCT VFR BLD AUTO: 44.2 % (ref 40–53)
HCT VFR BLD CALC: 34 % (ref 40–53)
HGB BLD-MCNC: 10.2 G/DL (ref 13.3–17.7)
HGB BLD-MCNC: 11.6 G/DL (ref 13.3–17.7)
HGB BLD-MCNC: 14.5 G/DL (ref 13.3–17.7)
HGB BLD-MCNC: 9.5 G/DL (ref 13.3–17.7)
IMM GRANULOCYTES # BLD: 0 10E3/UL
IMM GRANULOCYTES NFR BLD: 0 %
INR PPP: 1.05 (ref 0.85–1.15)
LYMPHOCYTES # BLD AUTO: 0.8 10E3/UL (ref 0.8–5.3)
LYMPHOCYTES NFR BLD AUTO: 16 %
MCH RBC QN AUTO: 28.7 PG (ref 26.5–33)
MCH RBC QN AUTO: 28.7 PG (ref 26.5–33)
MCH RBC QN AUTO: 29.1 PG (ref 26.5–33)
MCHC RBC AUTO-ENTMCNC: 32.5 G/DL (ref 31.5–36.5)
MCHC RBC AUTO-ENTMCNC: 32.8 G/DL (ref 31.5–36.5)
MCHC RBC AUTO-ENTMCNC: 33.1 G/DL (ref 31.5–36.5)
MCV RBC AUTO: 87 FL (ref 78–100)
MCV RBC AUTO: 88 FL (ref 78–100)
MCV RBC AUTO: 90 FL (ref 78–100)
MONOCYTES # BLD AUTO: 0.5 10E3/UL (ref 0–1.3)
MONOCYTES NFR BLD AUTO: 10 %
NEUTROPHILS # BLD AUTO: 3.4 10E3/UL (ref 1.6–8.3)
NEUTROPHILS NFR BLD AUTO: 69 %
NRBC # BLD AUTO: 0 10E3/UL
NRBC BLD AUTO-RTO: 0 /100
PCO2 BLDA: 40 MM HG (ref 35–45)
PH BLDA: 7.39 [PH] (ref 7.35–7.45)
PLATELET # BLD AUTO: 183 10E3/UL (ref 150–450)
PLATELET # BLD AUTO: 248 10E3/UL (ref 150–450)
PLATELET # BLD AUTO: 273 10E3/UL (ref 150–450)
PO2 BLDA: 232 MM HG (ref 80–105)
POTASSIUM BLD-SCNC: 3.9 MMOL/L (ref 3.4–5.3)
POTASSIUM BLD-SCNC: 4.3 MMOL/L (ref 3.4–5.3)
POTASSIUM BLD-SCNC: 4.3 MMOL/L (ref 3.4–5.3)
RBC # BLD AUTO: 3.26 10E6/UL (ref 4.4–5.9)
RBC # BLD AUTO: 3.56 10E6/UL (ref 4.4–5.9)
RBC # BLD AUTO: 5.05 10E6/UL (ref 4.4–5.9)
SODIUM BLD-SCNC: 132 MMOL/L (ref 133–144)
SODIUM SERPL-SCNC: 129 MMOL/L (ref 133–144)
SODIUM SERPL-SCNC: 133 MMOL/L (ref 133–144)
SPECIMEN EXPIRATION DATE: NORMAL
WBC # BLD AUTO: 5 10E3/UL (ref 4–11)
WBC # BLD AUTO: 7 10E3/UL (ref 4–11)
WBC # BLD AUTO: 8.7 10E3/UL (ref 4–11)

## 2022-12-15 PROCEDURE — 22853 INSJ BIOMECHANICAL DEVICE: CPT | Mod: AS | Performed by: PHYSICIAN ASSISTANT

## 2022-12-15 PROCEDURE — 250N000009 HC RX 250: Performed by: ANESTHESIOLOGY

## 2022-12-15 PROCEDURE — 22634 ARTHRD CMBN 1NTRSPC EA ADDL: CPT | Mod: AS | Performed by: PHYSICIAN ASSISTANT

## 2022-12-15 PROCEDURE — 61783 SCAN PROC SPINAL: CPT | Mod: AS | Performed by: PHYSICIAN ASSISTANT

## 2022-12-15 PROCEDURE — 84295 ASSAY OF SERUM SODIUM: CPT

## 2022-12-15 PROCEDURE — 20930 SP BONE ALGRFT MORSEL ADD-ON: CPT | Performed by: NEUROLOGICAL SURGERY

## 2022-12-15 PROCEDURE — 36415 COLL VENOUS BLD VENIPUNCTURE: CPT | Performed by: ANESTHESIOLOGY

## 2022-12-15 PROCEDURE — 22853 INSJ BIOMECHANICAL DEVICE: CPT | Performed by: NEUROLOGICAL SURGERY

## 2022-12-15 PROCEDURE — 0SG10AJ FUSION OF 2 OR MORE LUMBAR VERTEBRAL JOINTS WITH INTERBODY FUSION DEVICE, POSTERIOR APPROACH, ANTERIOR COLUMN, OPEN APPROACH: ICD-10-PCS | Performed by: NEUROLOGICAL SURGERY

## 2022-12-15 PROCEDURE — 0QH204Z INSERTION OF INTERNAL FIXATION DEVICE INTO RIGHT PELVIC BONE, OPEN APPROACH: ICD-10-PCS | Performed by: NEUROLOGICAL SURGERY

## 2022-12-15 PROCEDURE — 22843 INSERT SPINE FIXATION DEVICE: CPT | Performed by: NEUROLOGICAL SURGERY

## 2022-12-15 PROCEDURE — 0SG1071 FUSION OF 2 OR MORE LUMBAR VERTEBRAL JOINTS WITH AUTOLOGOUS TISSUE SUBSTITUTE, POSTERIOR APPROACH, POSTERIOR COLUMN, OPEN APPROACH: ICD-10-PCS | Performed by: NEUROLOGICAL SURGERY

## 2022-12-15 PROCEDURE — 00NY0ZZ RELEASE LUMBAR SPINAL CORD, OPEN APPROACH: ICD-10-PCS | Performed by: NEUROLOGICAL SURGERY

## 2022-12-15 PROCEDURE — 0SG3071 FUSION OF LUMBOSACRAL JOINT WITH AUTOLOGOUS TISSUE SUBSTITUTE, POSTERIOR APPROACH, POSTERIOR COLUMN, OPEN APPROACH: ICD-10-PCS | Performed by: NEUROLOGICAL SURGERY

## 2022-12-15 PROCEDURE — 22614 ARTHRD PST TQ 1NTRSPC EA ADD: CPT | Performed by: NEUROLOGICAL SURGERY

## 2022-12-15 PROCEDURE — 22634 ARTHRD CMBN 1NTRSPC EA ADDL: CPT | Performed by: NEUROLOGICAL SURGERY

## 2022-12-15 PROCEDURE — 85027 COMPLETE CBC AUTOMATED: CPT | Performed by: ANESTHESIOLOGY

## 2022-12-15 PROCEDURE — 272N000001 HC OR GENERAL SUPPLY STERILE: Performed by: NEUROLOGICAL SURGERY

## 2022-12-15 PROCEDURE — 22848 INSERT PELV FIXATION DEVICE: CPT | Performed by: NEUROLOGICAL SURGERY

## 2022-12-15 PROCEDURE — 22848 INSERT PELV FIXATION DEVICE: CPT | Mod: AS | Performed by: PHYSICIAN ASSISTANT

## 2022-12-15 PROCEDURE — 8E0WXBF COMPUTER ASSISTED PROCEDURE OF TRUNK REGION, WITH FLUOROSCOPY: ICD-10-PCS | Performed by: NEUROLOGICAL SURGERY

## 2022-12-15 PROCEDURE — 120N000001 HC R&B MED SURG/OB

## 2022-12-15 PROCEDURE — P9045 ALBUMIN (HUMAN), 5%, 250 ML: HCPCS

## 2022-12-15 PROCEDURE — 250N000011 HC RX IP 250 OP 636: Performed by: PHYSICIAN ASSISTANT

## 2022-12-15 PROCEDURE — 258N000003 HC RX IP 258 OP 636: Performed by: ANESTHESIOLOGY

## 2022-12-15 PROCEDURE — 278N000051 HC OR IMPLANT GENERAL: Performed by: NEUROLOGICAL SURGERY

## 2022-12-15 PROCEDURE — 36415 COLL VENOUS BLD VENIPUNCTURE: CPT | Performed by: PHYSICIAN ASSISTANT

## 2022-12-15 PROCEDURE — 250N000011 HC RX IP 250 OP 636: Performed by: NURSE ANESTHETIST, CERTIFIED REGISTERED

## 2022-12-15 PROCEDURE — 0QH304Z INSERTION OF INTERNAL FIXATION DEVICE INTO LEFT PELVIC BONE, OPEN APPROACH: ICD-10-PCS | Performed by: NEUROLOGICAL SURGERY

## 2022-12-15 PROCEDURE — 85610 PROTHROMBIN TIME: CPT | Performed by: PHYSICIAN ASSISTANT

## 2022-12-15 PROCEDURE — 22633 ARTHRD CMBN 1NTRSPC LUMBAR: CPT | Mod: AS | Performed by: PHYSICIAN ASSISTANT

## 2022-12-15 PROCEDURE — 0RG7071 FUSION OF 2 TO 7 THORACIC VERTEBRAL JOINTS WITH AUTOLOGOUS TISSUE SUBSTITUTE, POSTERIOR APPROACH, POSTERIOR COLUMN, OPEN APPROACH: ICD-10-PCS | Performed by: NEUROLOGICAL SURGERY

## 2022-12-15 PROCEDURE — 01NR0ZZ RELEASE SACRAL NERVE, OPEN APPROACH: ICD-10-PCS | Performed by: NEUROLOGICAL SURGERY

## 2022-12-15 PROCEDURE — 63052 LAM FACETC/FRMT ARTHRD LUM 1: CPT | Mod: AS | Performed by: PHYSICIAN ASSISTANT

## 2022-12-15 PROCEDURE — 22614 ARTHRD PST TQ 1NTRSPC EA ADD: CPT | Mod: AS | Performed by: PHYSICIAN ASSISTANT

## 2022-12-15 PROCEDURE — 84295 ASSAY OF SERUM SODIUM: CPT | Performed by: ANESTHESIOLOGY

## 2022-12-15 PROCEDURE — 999N000141 HC STATISTIC PRE-PROCEDURE NURSING ASSESSMENT: Performed by: NEUROLOGICAL SURGERY

## 2022-12-15 PROCEDURE — 63053 LAM FACTC/FRMT ARTHRD LUM EA: CPT | Mod: AS | Performed by: PHYSICIAN ASSISTANT

## 2022-12-15 PROCEDURE — 250N000011 HC RX IP 250 OP 636

## 2022-12-15 PROCEDURE — 82310 ASSAY OF CALCIUM: CPT | Performed by: NEUROLOGICAL SURGERY

## 2022-12-15 PROCEDURE — 250N000009 HC RX 250: Performed by: PHYSICIAN ASSISTANT

## 2022-12-15 PROCEDURE — 250N000011 HC RX IP 250 OP 636: Performed by: ANESTHESIOLOGY

## 2022-12-15 PROCEDURE — 258N000003 HC RX IP 258 OP 636: Performed by: PHYSICIAN ASSISTANT

## 2022-12-15 PROCEDURE — 250N000025 HC SEVOFLURANE, PER MIN: Performed by: NEUROLOGICAL SURGERY

## 2022-12-15 PROCEDURE — 85025 COMPLETE CBC W/AUTO DIFF WBC: CPT | Performed by: PHYSICIAN ASSISTANT

## 2022-12-15 PROCEDURE — 01NB0ZZ RELEASE LUMBAR NERVE, OPEN APPROACH: ICD-10-PCS | Performed by: NEUROLOGICAL SURGERY

## 2022-12-15 PROCEDURE — 85027 COMPLETE CBC AUTOMATED: CPT | Performed by: NEUROLOGICAL SURGERY

## 2022-12-15 PROCEDURE — 0SB20ZZ EXCISION OF LUMBAR VERTEBRAL DISC, OPEN APPROACH: ICD-10-PCS | Performed by: NEUROLOGICAL SURGERY

## 2022-12-15 PROCEDURE — 20936 SP BONE AGRFT LOCAL ADD-ON: CPT | Performed by: NEUROLOGICAL SURGERY

## 2022-12-15 PROCEDURE — 22633 ARTHRD CMBN 1NTRSPC LUMBAR: CPT | Performed by: NEUROLOGICAL SURGERY

## 2022-12-15 PROCEDURE — 250N000009 HC RX 250: Performed by: NEUROLOGICAL SURGERY

## 2022-12-15 PROCEDURE — 22843 INSERT SPINE FIXATION DEVICE: CPT | Mod: AS | Performed by: PHYSICIAN ASSISTANT

## 2022-12-15 PROCEDURE — 250N000009 HC RX 250: Performed by: NURSE ANESTHETIST, CERTIFIED REGISTERED

## 2022-12-15 PROCEDURE — 250N000009 HC RX 250

## 2022-12-15 PROCEDURE — C1713 ANCHOR/SCREW BN/BN,TIS/BN: HCPCS | Performed by: NEUROLOGICAL SURGERY

## 2022-12-15 PROCEDURE — 86901 BLOOD TYPING SEROLOGIC RH(D): CPT | Performed by: NEUROLOGICAL SURGERY

## 2022-12-15 PROCEDURE — 250N000011 HC RX IP 250 OP 636: Performed by: NEUROLOGICAL SURGERY

## 2022-12-15 PROCEDURE — 82803 BLOOD GASES ANY COMBINATION: CPT

## 2022-12-15 PROCEDURE — 63053 LAM FACTC/FRMT ARTHRD LUM EA: CPT | Performed by: NEUROLOGICAL SURGERY

## 2022-12-15 PROCEDURE — 250N000013 HC RX MED GY IP 250 OP 250 PS 637: Performed by: PHYSICIAN ASSISTANT

## 2022-12-15 PROCEDURE — 272N000002 HC OR SUPPLY OTHER OPNP: Performed by: NEUROLOGICAL SURGERY

## 2022-12-15 PROCEDURE — 272N000004 HC RX 272: Performed by: NEUROLOGICAL SURGERY

## 2022-12-15 PROCEDURE — 84132 ASSAY OF SERUM POTASSIUM: CPT | Performed by: ANESTHESIOLOGY

## 2022-12-15 PROCEDURE — 86850 RBC ANTIBODY SCREEN: CPT | Performed by: NEUROLOGICAL SURGERY

## 2022-12-15 PROCEDURE — 360N000085 HC SURGERY LEVEL 5 W/ FLUORO, PER MIN: Performed by: NEUROLOGICAL SURGERY

## 2022-12-15 PROCEDURE — C1762 CONN TISS, HUMAN(INC FASCIA): HCPCS | Performed by: NEUROLOGICAL SURGERY

## 2022-12-15 PROCEDURE — 258N000003 HC RX IP 258 OP 636

## 2022-12-15 PROCEDURE — 0SG30AJ FUSION OF LUMBOSACRAL JOINT WITH INTERBODY FUSION DEVICE, POSTERIOR APPROACH, ANTERIOR COLUMN, OPEN APPROACH: ICD-10-PCS | Performed by: NEUROLOGICAL SURGERY

## 2022-12-15 PROCEDURE — 85730 THROMBOPLASTIN TIME PARTIAL: CPT | Performed by: PHYSICIAN ASSISTANT

## 2022-12-15 PROCEDURE — 36415 COLL VENOUS BLD VENIPUNCTURE: CPT | Performed by: NEUROLOGICAL SURGERY

## 2022-12-15 PROCEDURE — 999N000179 XR SURGERY CARM FLUORO LESS THAN 5 MIN W STILLS

## 2022-12-15 PROCEDURE — 63052 LAM FACETC/FRMT ARTHRD LUM 1: CPT | Performed by: NEUROLOGICAL SURGERY

## 2022-12-15 PROCEDURE — 0RGA071 FUSION OF THORACOLUMBAR VERTEBRAL JOINT WITH AUTOLOGOUS TISSUE SUBSTITUTE, POSTERIOR APPROACH, POSTERIOR COLUMN, OPEN APPROACH: ICD-10-PCS | Performed by: NEUROLOGICAL SURGERY

## 2022-12-15 PROCEDURE — 710N000010 HC RECOVERY PHASE 1, LEVEL 2, PER MIN: Performed by: NEUROLOGICAL SURGERY

## 2022-12-15 PROCEDURE — 370N000017 HC ANESTHESIA TECHNICAL FEE, PER MIN: Performed by: NEUROLOGICAL SURGERY

## 2022-12-15 PROCEDURE — 250N000013 HC RX MED GY IP 250 OP 250 PS 637: Performed by: ANESTHESIOLOGY

## 2022-12-15 PROCEDURE — P9045 ALBUMIN (HUMAN), 5%, 250 ML: HCPCS | Performed by: ANESTHESIOLOGY

## 2022-12-15 PROCEDURE — 61783 SCAN PROC SPINAL: CPT | Mod: 59 | Performed by: NEUROLOGICAL SURGERY

## 2022-12-15 DEVICE — IMP SCREW BN MEDT SPINE 50X6.5MM 5.5/6MM ROD
Type: IMPLANTABLE DEVICE | Site: BACK | Status: NON-FUNCTIONAL
Removed: 2024-04-29

## 2022-12-15 DEVICE — IMPLANTABLE DEVICE
Type: IMPLANTABLE DEVICE | Site: BACK | Status: NON-FUNCTIONAL
Removed: 2024-04-29

## 2022-12-15 DEVICE — SPACER 4011222 12 DEG 12X22
Type: IMPLANTABLE DEVICE | Site: BACK | Status: FUNCTIONAL
Brand: CAPSTONE CONTROL™ SPINAL SYSTEM

## 2022-12-15 DEVICE — SPACER 4001322 6 DEG 13X22
Type: IMPLANTABLE DEVICE | Site: BACK | Status: FUNCTIONAL
Brand: CAPSTONE CONTROL™ SPINAL SYSTEM

## 2022-12-15 DEVICE — DBM 7509141 MAGNIFUSE 1.75 X 10CM
Type: IMPLANTABLE DEVICE | Site: BACK | Status: FUNCTIONAL
Brand: MAGNIFUSE® BONE GRAFT

## 2022-12-15 DEVICE — IMP SCR SET MEDT SOLERA BREAK OFF 5.5MM TI 5540030
Type: IMPLANTABLE DEVICE | Site: BACK | Status: NON-FUNCTIONAL
Removed: 2024-04-29

## 2022-12-15 DEVICE — BONE CEMENT C01A HV-R US
Type: IMPLANTABLE DEVICE | Site: BACK | Status: FUNCTIONAL
Brand: KYPHON® HV-R® BONE CEMENT

## 2022-12-15 DEVICE — IMP SCR MEDT 5.5/6.0MM SOLERA 5.5X55MM MA 55840005555
Type: IMPLANTABLE DEVICE | Site: BACK | Status: NON-FUNCTIONAL
Removed: 2024-04-29

## 2022-12-15 DEVICE — DBM 7509212 MAGNIFUSE 1 X 20CM
Type: IMPLANTABLE DEVICE | Site: BACK | Status: FUNCTIONAL
Brand: MAGNIFUSE® BONE GRAFT

## 2022-12-15 DEVICE — IMP SCR MEDT 5.5/6.0MM SOLERA 7.5X45MM MA 55840007545
Type: IMPLANTABLE DEVICE | Site: BACK | Status: NON-FUNCTIONAL
Removed: 2024-04-29

## 2022-12-15 DEVICE — IMPLANTABLE DEVICE: Type: IMPLANTABLE DEVICE | Site: BACK | Status: FUNCTIONAL

## 2022-12-15 DEVICE — IMP SCREW BN MEDT SPINE 40X6.5MM 5.5/6MM ROD
Type: IMPLANTABLE DEVICE | Site: BACK | Status: NON-FUNCTIONAL
Removed: 2024-04-29

## 2022-12-15 DEVICE — IMP SCREW BN MEDT SPINE 45X6.5MM 5.5/6MM ROD
Type: IMPLANTABLE DEVICE | Site: BACK | Status: NON-FUNCTIONAL
Removed: 2024-04-29

## 2022-12-15 DEVICE — IMP SCREW BN MEDT SPINE 45X5.5MM 5.5/6MM ROD
Type: IMPLANTABLE DEVICE | Site: BACK | Status: NON-FUNCTIONAL
Removed: 2024-04-29

## 2022-12-15 DEVICE — IMP SCR MEDT 5.5/6.0MM SOLERA 7.5X50MM MA 55840007550
Type: IMPLANTABLE DEVICE | Site: BACK | Status: NON-FUNCTIONAL
Removed: 2024-04-29

## 2022-12-15 DEVICE — SCREW BN 90MM 9.5MM MA CNN NS CD HZN SPNE LF BS CNMAS TC
Type: IMPLANTABLE DEVICE | Site: BACK | Status: NON-FUNCTIONAL
Removed: 2024-04-29

## 2022-12-15 DEVICE — VERTEBROPLASTY CEMENT W/BONE MIXER C01B: Type: IMPLANTABLE DEVICE | Site: BACK | Status: FUNCTIONAL

## 2022-12-15 RX ORDER — ACETAMINOPHEN 325 MG/1
650 TABLET ORAL EVERY 4 HOURS PRN
Status: DISCONTINUED | OUTPATIENT
Start: 2022-12-18 | End: 2022-12-24 | Stop reason: HOSPADM

## 2022-12-15 RX ORDER — HYDROMORPHONE HCL IN WATER/PF 6 MG/30 ML
0.2 PATIENT CONTROLLED ANALGESIA SYRINGE INTRAVENOUS
Status: DISCONTINUED | OUTPATIENT
Start: 2022-12-15 | End: 2022-12-24 | Stop reason: HOSPADM

## 2022-12-15 RX ORDER — POLYETHYLENE GLYCOL 3350 17 G/17G
17 POWDER, FOR SOLUTION ORAL DAILY
Status: DISCONTINUED | OUTPATIENT
Start: 2022-12-16 | End: 2022-12-24 | Stop reason: HOSPADM

## 2022-12-15 RX ORDER — ONDANSETRON 4 MG/1
4 TABLET, ORALLY DISINTEGRATING ORAL EVERY 6 HOURS PRN
Status: DISCONTINUED | OUTPATIENT
Start: 2022-12-15 | End: 2022-12-24 | Stop reason: HOSPADM

## 2022-12-15 RX ORDER — TRANEXAMIC ACID 10 MG/ML
1000 INJECTION, SOLUTION INTRAVENOUS ONCE
Status: DISCONTINUED | OUTPATIENT
Start: 2022-12-15 | End: 2022-12-15 | Stop reason: HOSPADM

## 2022-12-15 RX ORDER — LIDOCAINE HYDROCHLORIDE 20 MG/ML
INJECTION, SOLUTION INFILTRATION; PERINEURAL PRN
Status: DISCONTINUED | OUTPATIENT
Start: 2022-12-15 | End: 2022-12-15

## 2022-12-15 RX ORDER — KETAMINE HYDROCHLORIDE 10 MG/ML
INJECTION INTRAMUSCULAR; INTRAVENOUS PRN
Status: DISCONTINUED | OUTPATIENT
Start: 2022-12-15 | End: 2022-12-15

## 2022-12-15 RX ORDER — AMOXICILLIN 250 MG
1 CAPSULE ORAL 2 TIMES DAILY
Status: DISCONTINUED | OUTPATIENT
Start: 2022-12-15 | End: 2022-12-24 | Stop reason: HOSPADM

## 2022-12-15 RX ORDER — LABETALOL HYDROCHLORIDE 5 MG/ML
10 INJECTION, SOLUTION INTRAVENOUS
Status: DISCONTINUED | OUTPATIENT
Start: 2022-12-15 | End: 2022-12-15 | Stop reason: HOSPADM

## 2022-12-15 RX ORDER — FENTANYL CITRATE 0.05 MG/ML
25 INJECTION, SOLUTION INTRAMUSCULAR; INTRAVENOUS EVERY 5 MIN PRN
Status: DISCONTINUED | OUTPATIENT
Start: 2022-12-15 | End: 2022-12-15 | Stop reason: HOSPADM

## 2022-12-15 RX ORDER — HYDROXYZINE HYDROCHLORIDE 10 MG/1
10 TABLET, FILM COATED ORAL EVERY 6 HOURS PRN
Status: DISCONTINUED | OUTPATIENT
Start: 2022-12-15 | End: 2022-12-24 | Stop reason: HOSPADM

## 2022-12-15 RX ORDER — CEFAZOLIN SODIUM/WATER 2 G/20 ML
2 SYRINGE (ML) INTRAVENOUS SEE ADMIN INSTRUCTIONS
Status: DISCONTINUED | OUTPATIENT
Start: 2022-12-15 | End: 2022-12-15 | Stop reason: HOSPADM

## 2022-12-15 RX ORDER — ZINC SULFATE 50(220)MG
220 CAPSULE ORAL DAILY
Status: DISCONTINUED | OUTPATIENT
Start: 2022-12-16 | End: 2022-12-24 | Stop reason: HOSPADM

## 2022-12-15 RX ORDER — ONDANSETRON 4 MG/1
4 TABLET, ORALLY DISINTEGRATING ORAL EVERY 30 MIN PRN
Status: DISCONTINUED | OUTPATIENT
Start: 2022-12-15 | End: 2022-12-15 | Stop reason: HOSPADM

## 2022-12-15 RX ORDER — FENTANYL CITRATE 0.05 MG/ML
50 INJECTION, SOLUTION INTRAMUSCULAR; INTRAVENOUS EVERY 5 MIN PRN
Status: DISCONTINUED | OUTPATIENT
Start: 2022-12-15 | End: 2022-12-15 | Stop reason: HOSPADM

## 2022-12-15 RX ORDER — ONDANSETRON 2 MG/ML
4 INJECTION INTRAMUSCULAR; INTRAVENOUS EVERY 30 MIN PRN
Status: DISCONTINUED | OUTPATIENT
Start: 2022-12-15 | End: 2022-12-15 | Stop reason: HOSPADM

## 2022-12-15 RX ORDER — PANTOPRAZOLE SODIUM 40 MG/1
40 TABLET, DELAYED RELEASE ORAL DAILY
Status: DISCONTINUED | OUTPATIENT
Start: 2022-12-16 | End: 2022-12-24 | Stop reason: HOSPADM

## 2022-12-15 RX ORDER — PROPOFOL 10 MG/ML
INJECTION, EMULSION INTRAVENOUS PRN
Status: DISCONTINUED | OUTPATIENT
Start: 2022-12-15 | End: 2022-12-15

## 2022-12-15 RX ORDER — ACETAMINOPHEN 325 MG/1
975 TABLET ORAL ONCE
Status: DISCONTINUED | OUTPATIENT
Start: 2022-12-15 | End: 2022-12-15 | Stop reason: HOSPADM

## 2022-12-15 RX ORDER — CHLORTHALIDONE 25 MG/1
25 TABLET ORAL DAILY
Status: DISCONTINUED | OUTPATIENT
Start: 2022-12-16 | End: 2022-12-24 | Stop reason: HOSPADM

## 2022-12-15 RX ORDER — METHOCARBAMOL 750 MG/1
750 TABLET, FILM COATED ORAL EVERY 6 HOURS PRN
Status: DISCONTINUED | OUTPATIENT
Start: 2022-12-15 | End: 2022-12-24 | Stop reason: HOSPADM

## 2022-12-15 RX ORDER — HYDROMORPHONE HCL IN WATER/PF 6 MG/30 ML
0.4 PATIENT CONTROLLED ANALGESIA SYRINGE INTRAVENOUS
Status: DISCONTINUED | OUTPATIENT
Start: 2022-12-15 | End: 2022-12-24 | Stop reason: HOSPADM

## 2022-12-15 RX ORDER — BISACODYL 10 MG
10 SUPPOSITORY, RECTAL RECTAL DAILY PRN
Status: DISCONTINUED | OUTPATIENT
Start: 2022-12-15 | End: 2022-12-24 | Stop reason: HOSPADM

## 2022-12-15 RX ORDER — OXYCODONE HYDROCHLORIDE 5 MG/1
10 TABLET ORAL EVERY 4 HOURS PRN
Status: DISCONTINUED | OUTPATIENT
Start: 2022-12-15 | End: 2022-12-24 | Stop reason: HOSPADM

## 2022-12-15 RX ORDER — HYDROMORPHONE HCL IN WATER/PF 6 MG/30 ML
0.2 PATIENT CONTROLLED ANALGESIA SYRINGE INTRAVENOUS EVERY 5 MIN PRN
Status: DISCONTINUED | OUTPATIENT
Start: 2022-12-15 | End: 2022-12-15 | Stop reason: HOSPADM

## 2022-12-15 RX ORDER — ONDANSETRON 2 MG/ML
4 INJECTION INTRAMUSCULAR; INTRAVENOUS EVERY 6 HOURS PRN
Status: DISCONTINUED | OUTPATIENT
Start: 2022-12-15 | End: 2022-12-24 | Stop reason: HOSPADM

## 2022-12-15 RX ORDER — FENTANYL CITRATE 50 UG/ML
INJECTION, SOLUTION INTRAMUSCULAR; INTRAVENOUS PRN
Status: DISCONTINUED | OUTPATIENT
Start: 2022-12-15 | End: 2022-12-15

## 2022-12-15 RX ORDER — NALOXONE HYDROCHLORIDE 0.4 MG/ML
0.4 INJECTION, SOLUTION INTRAMUSCULAR; INTRAVENOUS; SUBCUTANEOUS
Status: DISCONTINUED | OUTPATIENT
Start: 2022-12-15 | End: 2022-12-24 | Stop reason: HOSPADM

## 2022-12-15 RX ORDER — SODIUM CHLORIDE 9 MG/ML
INJECTION, SOLUTION INTRAVENOUS CONTINUOUS PRN
Status: DISCONTINUED | OUTPATIENT
Start: 2022-12-15 | End: 2022-12-15

## 2022-12-15 RX ORDER — SODIUM CHLORIDE 9 MG/ML
INJECTION, SOLUTION INTRAVENOUS CONTINUOUS
Status: DISCONTINUED | OUTPATIENT
Start: 2022-12-15 | End: 2022-12-22

## 2022-12-15 RX ORDER — HYDROMORPHONE HCL IN WATER/PF 6 MG/30 ML
0.4 PATIENT CONTROLLED ANALGESIA SYRINGE INTRAVENOUS EVERY 5 MIN PRN
Status: DISCONTINUED | OUTPATIENT
Start: 2022-12-15 | End: 2022-12-15 | Stop reason: HOSPADM

## 2022-12-15 RX ORDER — CEFAZOLIN SODIUM/WATER 2 G/20 ML
2 SYRINGE (ML) INTRAVENOUS
Status: COMPLETED | OUTPATIENT
Start: 2022-12-15 | End: 2022-12-15

## 2022-12-15 RX ORDER — ACETAMINOPHEN 325 MG/1
975 TABLET ORAL EVERY 8 HOURS
Status: COMPLETED | OUTPATIENT
Start: 2022-12-15 | End: 2022-12-18

## 2022-12-15 RX ORDER — PROPOFOL 10 MG/ML
INJECTION, EMULSION INTRAVENOUS CONTINUOUS PRN
Status: DISCONTINUED | OUTPATIENT
Start: 2022-12-15 | End: 2022-12-15

## 2022-12-15 RX ORDER — PROCHLORPERAZINE MALEATE 5 MG
5 TABLET ORAL EVERY 6 HOURS PRN
Status: DISCONTINUED | OUTPATIENT
Start: 2022-12-15 | End: 2022-12-24 | Stop reason: HOSPADM

## 2022-12-15 RX ORDER — SODIUM CHLORIDE, SODIUM LACTATE, POTASSIUM CHLORIDE, CALCIUM CHLORIDE 600; 310; 30; 20 MG/100ML; MG/100ML; MG/100ML; MG/100ML
INJECTION, SOLUTION INTRAVENOUS CONTINUOUS
Status: DISCONTINUED | OUTPATIENT
Start: 2022-12-15 | End: 2022-12-15 | Stop reason: HOSPADM

## 2022-12-15 RX ORDER — LISINOPRIL 40 MG/1
40 TABLET ORAL DAILY
Status: DISCONTINUED | OUTPATIENT
Start: 2022-12-15 | End: 2022-12-19

## 2022-12-15 RX ORDER — OXYCODONE HYDROCHLORIDE 5 MG/1
5 TABLET ORAL EVERY 4 HOURS PRN
Status: DISCONTINUED | OUTPATIENT
Start: 2022-12-15 | End: 2022-12-24 | Stop reason: HOSPADM

## 2022-12-15 RX ORDER — FERROUS GLUCONATE 324(38)MG
324 TABLET ORAL
Status: DISCONTINUED | OUTPATIENT
Start: 2022-12-16 | End: 2022-12-24 | Stop reason: HOSPADM

## 2022-12-15 RX ORDER — METHOCARBAMOL 100 MG/ML
500 INJECTION, SOLUTION INTRAMUSCULAR; INTRAVENOUS ONCE
Status: COMPLETED | OUTPATIENT
Start: 2022-12-15 | End: 2022-12-15

## 2022-12-15 RX ORDER — VECURONIUM BROMIDE 1 MG/ML
INJECTION, POWDER, LYOPHILIZED, FOR SOLUTION INTRAVENOUS PRN
Status: DISCONTINUED | OUTPATIENT
Start: 2022-12-15 | End: 2022-12-15

## 2022-12-15 RX ORDER — CALCIUM CARBONATE 500 MG/1
500 TABLET, CHEWABLE ORAL 4 TIMES DAILY PRN
Status: DISCONTINUED | OUTPATIENT
Start: 2022-12-15 | End: 2022-12-24 | Stop reason: HOSPADM

## 2022-12-15 RX ORDER — OXYCODONE HCL 10 MG/1
10 TABLET, FILM COATED, EXTENDED RELEASE ORAL EVERY 8 HOURS
Status: COMPLETED | OUTPATIENT
Start: 2022-12-15 | End: 2022-12-15

## 2022-12-15 RX ORDER — NALOXONE HYDROCHLORIDE 0.4 MG/ML
0.2 INJECTION, SOLUTION INTRAMUSCULAR; INTRAVENOUS; SUBCUTANEOUS
Status: DISCONTINUED | OUTPATIENT
Start: 2022-12-15 | End: 2022-12-24 | Stop reason: HOSPADM

## 2022-12-15 RX ORDER — DEXAMETHASONE SODIUM PHOSPHATE 4 MG/ML
INJECTION, SOLUTION INTRA-ARTICULAR; INTRALESIONAL; INTRAMUSCULAR; INTRAVENOUS; SOFT TISSUE PRN
Status: DISCONTINUED | OUTPATIENT
Start: 2022-12-15 | End: 2022-12-15

## 2022-12-15 RX ORDER — HYDROXYZINE HYDROCHLORIDE 50 MG/ML
50 INJECTION, SOLUTION INTRAMUSCULAR EVERY 6 HOURS PRN
Status: DISCONTINUED | OUTPATIENT
Start: 2022-12-15 | End: 2022-12-15 | Stop reason: HOSPADM

## 2022-12-15 RX ORDER — ONDANSETRON 2 MG/ML
INJECTION INTRAMUSCULAR; INTRAVENOUS PRN
Status: DISCONTINUED | OUTPATIENT
Start: 2022-12-15 | End: 2022-12-15

## 2022-12-15 RX ORDER — LIDOCAINE 40 MG/G
CREAM TOPICAL
Status: DISCONTINUED | OUTPATIENT
Start: 2022-12-15 | End: 2022-12-24 | Stop reason: HOSPADM

## 2022-12-15 RX ORDER — TRANEXAMIC ACID 10 MG/ML
1 INJECTION, SOLUTION INTRAVENOUS CONTINUOUS
Status: DISCONTINUED | OUTPATIENT
Start: 2022-12-15 | End: 2022-12-15 | Stop reason: HOSPADM

## 2022-12-15 RX ADMIN — ROCURONIUM BROMIDE 50 MG: 50 INJECTION, SOLUTION INTRAVENOUS at 07:47

## 2022-12-15 RX ADMIN — SODIUM CHLORIDE, POTASSIUM CHLORIDE, SODIUM LACTATE AND CALCIUM CHLORIDE: 600; 310; 30; 20 INJECTION, SOLUTION INTRAVENOUS at 14:15

## 2022-12-15 RX ADMIN — VECURONIUM BROMIDE 2 MG: 1 INJECTION, POWDER, LYOPHILIZED, FOR SOLUTION INTRAVENOUS at 10:31

## 2022-12-15 RX ADMIN — SODIUM CHLORIDE, POTASSIUM CHLORIDE, SODIUM LACTATE AND CALCIUM CHLORIDE 1000 ML: 600; 310; 30; 20 INJECTION, SOLUTION INTRAVENOUS at 15:00

## 2022-12-15 RX ADMIN — VECURONIUM BROMIDE 3 MG: 1 INJECTION, POWDER, LYOPHILIZED, FOR SOLUTION INTRAVENOUS at 09:31

## 2022-12-15 RX ADMIN — OXYCODONE HYDROCHLORIDE 10 MG: 10 TABLET, FILM COATED, EXTENDED RELEASE ORAL at 07:10

## 2022-12-15 RX ADMIN — SODIUM CHLORIDE, POTASSIUM CHLORIDE, SODIUM LACTATE AND CALCIUM CHLORIDE: 600; 310; 30; 20 INJECTION, SOLUTION INTRAVENOUS at 06:46

## 2022-12-15 RX ADMIN — HYDROMORPHONE HYDROCHLORIDE 0.5 MG: 1 INJECTION, SOLUTION INTRAMUSCULAR; INTRAVENOUS; SUBCUTANEOUS at 13:01

## 2022-12-15 RX ADMIN — HYDROMORPHONE HYDROCHLORIDE 0.4 MG: 0.2 INJECTION, SOLUTION INTRAMUSCULAR; INTRAVENOUS; SUBCUTANEOUS at 18:14

## 2022-12-15 RX ADMIN — Medication 10 MG: at 10:59

## 2022-12-15 RX ADMIN — ALBUMIN HUMAN 12.5 G: 0.05 INJECTION, SOLUTION INTRAVENOUS at 14:45

## 2022-12-15 RX ADMIN — VECURONIUM BROMIDE 2 MG: 1 INJECTION, POWDER, LYOPHILIZED, FOR SOLUTION INTRAVENOUS at 08:45

## 2022-12-15 RX ADMIN — OXYCODONE HYDROCHLORIDE 5 MG: 5 TABLET ORAL at 22:56

## 2022-12-15 RX ADMIN — MIDAZOLAM 2 MG: 1 INJECTION INTRAMUSCULAR; INTRAVENOUS at 07:42

## 2022-12-15 RX ADMIN — PHENYLEPHRINE HYDROCHLORIDE 100 MCG: 10 INJECTION INTRAVENOUS at 12:27

## 2022-12-15 RX ADMIN — SUGAMMADEX 200 MG: 100 INJECTION, SOLUTION INTRAVENOUS at 14:12

## 2022-12-15 RX ADMIN — VECURONIUM BROMIDE 3 MG: 1 INJECTION, POWDER, LYOPHILIZED, FOR SOLUTION INTRAVENOUS at 11:30

## 2022-12-15 RX ADMIN — SODIUM CHLORIDE, POTASSIUM CHLORIDE, SODIUM LACTATE AND CALCIUM CHLORIDE 1000 ML: 600; 310; 30; 20 INJECTION, SOLUTION INTRAVENOUS at 16:11

## 2022-12-15 RX ADMIN — PHENYLEPHRINE HYDROCHLORIDE 100 MCG: 10 INJECTION INTRAVENOUS at 11:34

## 2022-12-15 RX ADMIN — SODIUM CHLORIDE, POTASSIUM CHLORIDE, SODIUM LACTATE AND CALCIUM CHLORIDE: 600; 310; 30; 20 INJECTION, SOLUTION INTRAVENOUS at 11:15

## 2022-12-15 RX ADMIN — Medication 2 G: at 11:50

## 2022-12-15 RX ADMIN — PROPOFOL 50 MG: 10 INJECTION, EMULSION INTRAVENOUS at 07:48

## 2022-12-15 RX ADMIN — PHENYLEPHRINE HYDROCHLORIDE 0.5 MCG/KG/MIN: 10 INJECTION INTRAVENOUS at 08:14

## 2022-12-15 RX ADMIN — SODIUM CHLORIDE: 9 INJECTION, SOLUTION INTRAVENOUS at 17:46

## 2022-12-15 RX ADMIN — PHENYLEPHRINE HYDROCHLORIDE 100 MCG: 10 INJECTION INTRAVENOUS at 08:46

## 2022-12-15 RX ADMIN — PROPOFOL 50 MG: 10 INJECTION, EMULSION INTRAVENOUS at 07:50

## 2022-12-15 RX ADMIN — VECURONIUM BROMIDE 1 MG: 1 INJECTION, POWDER, LYOPHILIZED, FOR SOLUTION INTRAVENOUS at 13:14

## 2022-12-15 RX ADMIN — PHENYLEPHRINE HYDROCHLORIDE 50 MCG: 10 INJECTION INTRAVENOUS at 12:46

## 2022-12-15 RX ADMIN — PROPOFOL 30 MCG/KG/MIN: 10 INJECTION, EMULSION INTRAVENOUS at 08:04

## 2022-12-15 RX ADMIN — PHENYLEPHRINE HYDROCHLORIDE 100 MCG: 10 INJECTION INTRAVENOUS at 12:14

## 2022-12-15 RX ADMIN — DEXAMETHASONE SODIUM PHOSPHATE 4 MG: 4 INJECTION, SOLUTION INTRA-ARTICULAR; INTRALESIONAL; INTRAMUSCULAR; INTRAVENOUS; SOFT TISSUE at 08:00

## 2022-12-15 RX ADMIN — ACETAMINOPHEN 975 MG: 325 TABLET, FILM COATED ORAL at 19:05

## 2022-12-15 RX ADMIN — PHENYLEPHRINE HYDROCHLORIDE 100 MCG: 10 INJECTION INTRAVENOUS at 08:42

## 2022-12-15 RX ADMIN — PHENYLEPHRINE HYDROCHLORIDE 50 MCG: 10 INJECTION INTRAVENOUS at 10:29

## 2022-12-15 RX ADMIN — PHENYLEPHRINE HYDROCHLORIDE 100 MCG: 10 INJECTION INTRAVENOUS at 07:53

## 2022-12-15 RX ADMIN — Medication 2 G: at 07:50

## 2022-12-15 RX ADMIN — ALBUMIN HUMAN: 0.05 INJECTION, SOLUTION INTRAVENOUS at 10:31

## 2022-12-15 RX ADMIN — HYDROXYZINE HYDROCHLORIDE 50 MG: 50 INJECTION, SOLUTION INTRAMUSCULAR at 14:42

## 2022-12-15 RX ADMIN — VECURONIUM BROMIDE 2 MG: 1 INJECTION, POWDER, LYOPHILIZED, FOR SOLUTION INTRAVENOUS at 10:59

## 2022-12-15 RX ADMIN — TRANEXAMIC ACID 1 G: 1 INJECTION, SOLUTION INTRAVENOUS at 07:50

## 2022-12-15 RX ADMIN — LIDOCAINE HYDROCHLORIDE 0.3 ML: 10 INJECTION, SOLUTION EPIDURAL; INFILTRATION; INTRACAUDAL; PERINEURAL at 06:45

## 2022-12-15 RX ADMIN — VECURONIUM BROMIDE 3 MG: 1 INJECTION, POWDER, LYOPHILIZED, FOR SOLUTION INTRAVENOUS at 10:01

## 2022-12-15 RX ADMIN — HYDROMORPHONE HYDROCHLORIDE 0.4 MG: 0.2 INJECTION, SOLUTION INTRAMUSCULAR; INTRAVENOUS; SUBCUTANEOUS at 15:39

## 2022-12-15 RX ADMIN — PHENYLEPHRINE HYDROCHLORIDE 100 MCG: 10 INJECTION INTRAVENOUS at 10:51

## 2022-12-15 RX ADMIN — FENTANYL CITRATE 100 MCG: 50 INJECTION, SOLUTION INTRAMUSCULAR; INTRAVENOUS at 07:46

## 2022-12-15 RX ADMIN — PHENYLEPHRINE HYDROCHLORIDE 100 MCG: 10 INJECTION INTRAVENOUS at 08:29

## 2022-12-15 RX ADMIN — ALBUMIN HUMAN: 0.05 INJECTION, SOLUTION INTRAVENOUS at 08:35

## 2022-12-15 RX ADMIN — METHOCARBAMOL 500 MG: 100 INJECTION INTRAMUSCULAR; INTRAVENOUS at 14:53

## 2022-12-15 RX ADMIN — HYDROMORPHONE HYDROCHLORIDE 0.4 MG: 0.2 INJECTION, SOLUTION INTRAMUSCULAR; INTRAVENOUS; SUBCUTANEOUS at 20:35

## 2022-12-15 RX ADMIN — METHOCARBAMOL 750 MG: 750 TABLET ORAL at 19:06

## 2022-12-15 RX ADMIN — ALBUMIN HUMAN: 0.05 INJECTION, SOLUTION INTRAVENOUS at 12:35

## 2022-12-15 RX ADMIN — SENNOSIDES AND DOCUSATE SODIUM 1 TABLET: 50; 8.6 TABLET ORAL at 20:34

## 2022-12-15 RX ADMIN — SODIUM CHLORIDE: 0.9 INJECTION, SOLUTION INTRAVENOUS at 07:49

## 2022-12-15 RX ADMIN — LIDOCAINE HYDROCHLORIDE 100 MG: 20 INJECTION, SOLUTION INFILTRATION; PERINEURAL at 07:46

## 2022-12-15 RX ADMIN — TRANEXAMIC ACID 1 MG/KG/HR: 1 INJECTION, SOLUTION INTRAVENOUS at 08:04

## 2022-12-15 RX ADMIN — ONDANSETRON 4 MG: 2 INJECTION INTRAMUSCULAR; INTRAVENOUS at 13:34

## 2022-12-15 RX ADMIN — PHENYLEPHRINE HYDROCHLORIDE 50 MCG: 10 INJECTION INTRAVENOUS at 10:34

## 2022-12-15 RX ADMIN — HYDROMORPHONE HYDROCHLORIDE 0.5 MG: 1 INJECTION, SOLUTION INTRAMUSCULAR; INTRAVENOUS; SUBCUTANEOUS at 13:19

## 2022-12-15 RX ADMIN — VECURONIUM BROMIDE 2 MG: 1 INJECTION, POWDER, LYOPHILIZED, FOR SOLUTION INTRAVENOUS at 08:23

## 2022-12-15 RX ADMIN — PROPOFOL 100 MG: 10 INJECTION, EMULSION INTRAVENOUS at 07:46

## 2022-12-15 RX ADMIN — PHENYLEPHRINE HYDROCHLORIDE 100 MCG: 10 INJECTION INTRAVENOUS at 12:54

## 2022-12-15 RX ADMIN — PROPOFOL 50 MG: 10 INJECTION, EMULSION INTRAVENOUS at 08:17

## 2022-12-15 RX ADMIN — PHENYLEPHRINE HYDROCHLORIDE 100 MCG: 10 INJECTION INTRAVENOUS at 13:26

## 2022-12-15 RX ADMIN — PHENYLEPHRINE HYDROCHLORIDE 100 MCG: 10 INJECTION INTRAVENOUS at 13:07

## 2022-12-15 RX ADMIN — PHENYLEPHRINE HYDROCHLORIDE 50 MCG: 10 INJECTION INTRAVENOUS at 10:33

## 2022-12-15 RX ADMIN — PHENYLEPHRINE HYDROCHLORIDE 100 MCG: 10 INJECTION INTRAVENOUS at 13:41

## 2022-12-15 RX ADMIN — PHENYLEPHRINE HYDROCHLORIDE 100 MCG: 10 INJECTION INTRAVENOUS at 12:47

## 2022-12-15 RX ADMIN — PHENYLEPHRINE HYDROCHLORIDE 100 MCG: 10 INJECTION INTRAVENOUS at 13:36

## 2022-12-15 RX ADMIN — PHENYLEPHRINE HYDROCHLORIDE 100 MCG: 10 INJECTION INTRAVENOUS at 11:20

## 2022-12-15 RX ADMIN — Medication 20 MG: at 08:55

## 2022-12-15 RX ADMIN — PHENYLEPHRINE HYDROCHLORIDE 100 MCG: 10 INJECTION INTRAVENOUS at 11:44

## 2022-12-15 RX ADMIN — Medication 10 MG: at 12:03

## 2022-12-15 RX ADMIN — PHENYLEPHRINE HYDROCHLORIDE 100 MCG: 10 INJECTION INTRAVENOUS at 13:54

## 2022-12-15 RX ADMIN — Medication 10 MG: at 09:57

## 2022-12-15 RX ADMIN — PHENYLEPHRINE HYDROCHLORIDE 100 MCG: 10 INJECTION INTRAVENOUS at 12:07

## 2022-12-15 RX ADMIN — PHENYLEPHRINE HYDROCHLORIDE 100 MCG: 10 INJECTION INTRAVENOUS at 13:48

## 2022-12-15 RX ADMIN — PHENYLEPHRINE HYDROCHLORIDE 100 MCG: 10 INJECTION INTRAVENOUS at 08:33

## 2022-12-15 RX ADMIN — VECURONIUM BROMIDE 3 MG: 1 INJECTION, POWDER, LYOPHILIZED, FOR SOLUTION INTRAVENOUS at 12:15

## 2022-12-15 RX ADMIN — REMIFENTANIL HYDROCHLORIDE 0.07 MCG/KG/MIN: 1 INJECTION, POWDER, LYOPHILIZED, FOR SOLUTION INTRAVENOUS at 08:03

## 2022-12-15 ASSESSMENT — ACTIVITIES OF DAILY LIVING (ADL)
ADLS_ACUITY_SCORE: 20
ADLS_ACUITY_SCORE: 35

## 2022-12-15 NOTE — BRIEF OP NOTE
[facility]    Brief Operative Note    Pre-operative diagnosis: Spondylolisthesis of lumbar region [M43.16]  Spinal stenosis of lumbar region with radiculopathy [M48.061, M54.16]  Post-operative diagnosis Same as pre-operative diagnosis    Procedure: Procedure(s):  Thoracic 10 to Sacral 1 posterior segmental instrumentation. Pelvic instrumentation. Screw cement augmentation L10 to L11. Lumbar 2 to Sacral 1 bilateral decompression and transforaminal interbody fusion. Posterior arthrodesis Thoracic 10-Sacral 1.  Surgeon: Surgeon(s) and Role:     * Eliseo Aggarwal MD - Primary     * Jeff Lopez PA-C - Assisting  Anesthesia: General   Estimated Blood Loss: 1100ml    Drains: Hemovac  Specimens:   ID Type Source Tests Collected by Time Destination   A :  Blood Line, arterial CBC WITH PLATELETS, BASIC METABOLIC PANEL Eliseo Aggarwal MD 12/15/2022 11:59 AM      Findings:   None.  Complications: None.  Implants:   Implant Name Type Inv. Item Serial No.  Lot No. LRB No. Used Action   IMP SCR SET MEDT SOLERA BREAK OFF 5.5MM TI [id number] - [id number] Metallic Hardware/[employer] IMP SCR SET MEDT SOLERA BREAK OFF 5.5MM TI [id number]  [employer] 12/14/22 41 09 N/A 1 Implanted   IMP SCR MEDT SOLERA  7.5X55MM DUAL LESLIE [id number] - [id number] Metallic Hardware/[employer] IMP SCR MEDT SOLERA  7.5X55MM DUAL LESLIE [id number]  [employer] 12/14/22 41 09 N/A 2 Implanted   UNID Plan    [employer] 12/14/22 41 09 N/A 1 Implanted   UNID RODS    [employer] 12/14/22 41 09 N/A 2 Implanted   IMP SCREW BN MEDT SPINE 50X5.5MM 5.5/6MM LESLIE - [id number] Metallic Hardware/[employer] IMP SCREW BN MEDT SPINE 50X5.5MM 5.5/6MM LESLIE  [employer] 12/14/22 41 09 N/A 2 Implanted   IMP SCREW BN MEDT SPINE 45X5.5MM 5.5/6MM LESLIE - [id number] Metallic Hardware/[employer] IMP SCREW BN MEDT SPINE 45X5.5MM 5.5/6MM LESLIE  [employer] 12/14/22 41 09 N/A 3 Implanted   IMP SPACER MEDT CAPSTONE CONTROL [id number] 12DEG [id number]  - [id number] Metallic Hardware/[employer] IMP SPACER MEDT CAPSTONE CONTROL [id number] 12DEG [id number]  [employer] 70MR N/A 2 Implanted   IMP SCREW BN MEDT SPINE 40X6.5MM 5.5/6MM LESLIE - [id number] Metallic Hardware/[employer] IMP SCREW BN MEDT SPINE 40X6.5MM 5.5/6MM LESLIE  [employer] 12/14/22 41 09 N/A 2 Implanted   IMP SCREW BN MEDT SPINE 45X6.5MM 5.5/6MM LESLIE - [id number] Metallic Hardware/[employer] IMP SCREW BN MEDT SPINE 45X6.5MM 5.5/6MM LESLIE  [employer] 12/14/22 41 09 N/A 3 Implanted   IMP SCREW BN MEDT SPINE 50X6.5MM 5.5/6MM LESLIE - [id number] Metallic Hardware/[employer] IMP SCREW BN MEDT SPINE 50X6.5MM 5.5/6MM LESLIE  [employer] 12/14/22 41 09 N/A 1 Implanted   IMP SCREW BN MEDT SPINE 55X6.5MM 5.5/6MM LESLIE - [id number] Metallic Hardware/[employer] IMP SCREW BN MEDT SPINE 55X6.5MM 5.5/6MM LESLIE  [employer] 12/14/22 41 09 N/A 2 Implanted   Screw Spine 5.5 x 55 MAS    [employer] 12/14/22 41 09 N/A 1 Implanted   Screw Spine 7.5 x 45 MAS     12/14/22 41 09 N/A 1 Implanted   Spine Screw 7.5 x 50 MAS     12/14/22 41 09 N/A 1 Implanted   SCREW BN 90MM 9.5MM MA CNN NS CD HZN SPNE LF BS CNMAS TC - [id number] Metallic Hardware/[employer] SCREW BN 90MM 9.5MM MA CNN NS CD HZN SPNE LF BS CNMAS TC  [employer] 12/14/22 41 09 N/A 2 Implanted   IMP SCREW BN MEDT SPINE 30X5.5MM 5.5/6MM LESLIE - [id number] Metallic Hardware/[employer] IMP SCREW BN MEDT SPINE 30X5.5MM 5.5/6MM LESLIE  [employer] 12/14/22 41 09 N/A 1 Wasted   SPACER CAPSTONE VS 15X22 12 DEG - [id number] Metallic Hardware/[employer] SPACER CAPSTONE VS 15X22 12 DEG  [employer] 90BK N/A 1 Implanted   SPACER CAPSTONE VS 15X22 12 DEG - [id number] Metallic Hardware/[employer] SPACER CAPSTONE VS 15X22 12 DEG  [employer] [id number] N/A 1 Implanted   SPACER CAPSTONE 6DEG 93D65PM - [id number] Metallic Hardware/[employer] SPACER CAPSTONE 6DEG 68F07ES  [employer] [id number] N/A 1 Implanted   SPACER CAPSTONE 6DEG 16W89NH - [id number] Metallic Hardware/[employer] SPACER CAPSTONE 6DEG 90L47UO  [employer] [id number] N/A 1  Implanted   Capstone Control Spinal System 11mm x 22mm x 6deg    [employer] [id number] N/A 1 Implanted   Capstone Control Spinal System Interbody Fusion Device 11mm x 22mm x 6deg    [employer] [id number] N/A 1 Implanted   BONE CEMENT KYPHX HV-R C01A - [id number] Cement, Bone BONE CEMENT KYPHX HV-R C01A  KYPHON [id number] N/A 1 Implanted   VERTEBROPLASTY CEMENT W/BONE MIXER C01B - [id number] Cement, Bone VERTEBROPLASTY CEMENT W/BONE MIXER C01B  [employer]PHON [id number] N/A 1 Implanted   GRAFT BONE MAGNIFUSE [id number] - [id number] Bone/Tissue/Biologic GRAFT BONE MAGNIFUSE 4DEN95DN [id number] [employer]  N/A 1 Implanted   GRAFT BONE MAGNIFUSE [id number] - [id number] Bone/Tissue/Biologic GRAFT BONE MAGNIFUSE [id number] [employer]  N/A 1 Implanted     [id number]

## 2022-12-15 NOTE — ANESTHESIA PROCEDURE NOTES
Airway       Patient location during procedure: OR  Staff -        Anesthesiologist:  Rubio Sinha DO       CRNA: Darby Isaacs APRN CRNA       Other Anesthesia Staff: Orion Barger       Performed By: SRNA  Consent for Airway        Urgency: elective  Indications and Patient Condition       Indications for airway management: palmer-procedural       Induction type:intravenous       Mask difficulty assessment: 2 - vent by mask + OA or adjuvant +/- NMBA    Final Airway Details       Final airway type: endotracheal airway       Successful airway: ETT - single  Endotracheal Airway Details        ETT size (mm): 8.0       Cuffed: yes       Cuff volume (mL): 9       Successful intubation technique: direct laryngoscopy       DL Blade Type: MAC 3       Grade View of Cords: 1       Adjucts: stylet       Position: Right       Measured from: gums/teeth       Secured at (cm): 22       Bite block used: None    Post intubation assessment        Placement verified by: capnometry, equal breath sounds and chest rise        Number of attempts at approach: 1       Number of other approaches attempted: 0       Secured with: pink tape       Ease of procedure: easy       Dentition: Intact and Unchanged

## 2022-12-15 NOTE — OP NOTE
Procedure Date: 12/15/2022    PREOPERATIVE DIAGNOSIS:  Lumbar stenosis and spondylolisthesis with radiculopathy.    POSTOPERATIVE DIAGNOSIS:  Lumbar stenosis and spondylolisthesis with radiculopathy.    PROCEDURE:  1.  T10 to S1 posterior segmental instrumentation.  2.  Pelvic instrumentation with S2 alar iliac screws.    3.  Screws cement augmentation T10 and T11.   4.  L2 to S1 bilateral decompression and transforaminal interbody fusion.  5.  Posterior arthrodesis, T10 to S1 using allograft cancellous chips.    SURGEON:  Eliseo Aggarwal MD.    ASSISTANT:  Jeff Lopez PA-C.    ANESTHESIA:  General endotracheal anesthesia.    ESTIMATED BLOOD LOSS:  1100 mL.    INDICATIONS FOR PROCEDURE:  The patient is a 67-year-old male with back and lower extremity symptoms with severe degenerative deformity and spondylolisthesis.  He was brought for the above-mentioned procedure. Please note that Jeff Lopez PA-C's assistance was needed for positioning, retraction, suctioning, and closure.     DESCRIPTION OF PROCEDURE:  The patient was brought to the operating room.  General endotracheal anesthesia was induced.  The patient was positioned in the prone position on the Gipsy 4-poster table.  The back was prepped and draped in sterile fashion.  Tranexamic acid infusion and bolus were given.  A midline exposure was performed from approximately T10 to the pelvis.  Once this was done, the O-arm was sterilely draped and brought in the field, and using Stealth neuronavigation and 3-dimensional imaging, pedicle screws were placed bilaterally from S1 to T10 using the Medtronic Solera system using fenestrated screws as needed in order to cement augmentation later.  Once these were in good position, Ballast screws were used to place bilateral S2 alar iliac screws, and again all the screws were confirmed to be in good position with repeat 3-dimensional imaging.  Once this was done, starting at L5 and S1, bilateral complete  facetectomies were performed and then the ligamentum flavum and facet was resected, decompressing the thecal sac.  This disk space was narrowly fused and the navigated osteotome was then used to cut open the disk space.  Turn and rotating distractors were placed and then eventually Capstone control cages were placed and packed with local autograft, with local autograft around the cages.  In a similar fashion, this was performed at L4 to L5, L3 to L4 and L2 to L3, again decompressing the thecal sac by complete facetectomy and resection of ligamentum flavum, opening the disk space and then debriding it, placing cages and local autograft.  Once these were all in good position, cement augmentation of the top 4 screws was performed at T10 and T11.  This was done under fluoroscopic guidance and the cement was noted to be in good position.  Once this was done, then the rods, which had been precontoured, were trimmed and seated and secured with set screws.  Final x-rays were obtained.  The posterior elements were decorticated and MagniFuse bags used along the posterior elements from T10 to S1.  Once this was done, a medium Hemovac drain on the left was placed in the subfascial space, and on the right in the suprafascial space.  The fascia was closed with 0 Vicryl, 2-0 inverted interrupted Vicryl was used for subcutaneous layer, running 3-0 nylon was used for skin.  A sterile dressing was applied.  The patient was awakened, extubated, and taken to the recovery room in good condition.    Eliseo Aggarwal MD        D: 12/15/2022   T: 12/15/2022   MT: EVI    Name:     AGUSTIN EDWARDS  MRN:      [id number]        Account:        [id number]   :      1955           Procedure Date: 12/15/2022     Document: [id number]

## 2022-12-15 NOTE — INTERVAL H&P NOTE
"I have reviewed the surgical (or preoperative) H&P that is linked to this encounter, and examined the patient. There are no significant changes    Clinical Conditions Present on Arrival:  Clinically Significant Risk Factors Present on Admission           # Hyponatremia: Lowest Na = 129 mmol/L in last 2 days, will monitor as appropriate        # Coagulation Defect: INR = 1.05 (Ref range: 0.85 - 1.15) and/or PTT = >240 Seconds (Ref range: 22 - 38 Seconds), will monitor for bleeding   # Obesity: Estimated body mass index is 30.41 kg/m  as calculated from the following:    Height as of this encounter: 1.727 m (5' 8\").    Weight as of this encounter: 90.7 kg (200 lb).       "

## 2022-12-15 NOTE — OR NURSING
Per verbal order from Dr. Sinha.  Give patient dilaudid when stated pain.  Do not start with fentanyl.

## 2022-12-15 NOTE — ANESTHESIA CARE TRANSFER NOTE
Patient: Philipp Bland    Procedure: Procedure(s):  Thoracic 10 to Sacral 1 posterior segmental instrumentation. Pelvic instrumentation. Screw cement augmentation L10 to L11. Lumbar 2 to Sacral 1 bilateral decompression and transforaminal interbody fusion. Posterior arthrodesis Thoracic 10-Sacral 1.       Diagnosis: Spondylolisthesis of lumbar region [M43.16]  Spinal stenosis of lumbar region with radiculopathy [M48.061, M54.16]  Diagnosis Additional Information: No value filed.    Anesthesia Type:   General     Note:    Oropharynx: oropharynx clear of all foreign objects and spontaneously breathing  Level of Consciousness: awake  Oxygen Supplementation: face mask  Level of Supplemental Oxygen (L/min / FiO2): 6  Independent Airway: airway patency satisfactory and stable  Dentition: dentition unchanged  Vital Signs Stable: post-procedure vital signs reviewed and stable  Report to RN Given: handoff report given  Patient transferred to: PACU    Handoff Report: Identifed the Patient, Identified the Reponsible Provider, Reviewed the pertinent medical history, Discussed the surgical course, Reviewed Intra-OP anesthesia mangement and issues during anesthesia, Set expectations for post-procedure period and Allowed opportunity for questions and acknowledgement of understanding      Vitals:  Vitals Value Taken Time   /76 12/15/22 1430   Temp     Pulse 118 12/15/22 1433   Resp 15 12/15/22 1433   SpO2 97 % 12/15/22 1433   Vitals shown include unvalidated device data.    Electronically Signed By: DAWIT Pichardo CRNA  December 15, 2022  2:34 PM

## 2022-12-15 NOTE — ANESTHESIA PROCEDURE NOTES
Arterial Line Procedure Note    Pre-Procedure   Staff -        Anesthesiologist:  Rubio Sinha DO       Performed By: anesthesiologist       Location: pre-op       Pre-Anesthestic Checklist: patient identified, IV checked, site marked, risks and benefits discussed, informed consent, monitors and equipment checked, pre-op evaluation and at physician/surgeon's request  Timeout:       Correct Patient: Yes        Correct Procedure: Yes        Correct Site: Yes        Correct Position: Yes   Line Placement:   This line was placed Pre Induction starting at 12/15/2022 7:17 AM and ending at 12/15/2022 7:23 AM  Procedure   Procedure: arterial line       Laterality: left       Insertion Site: radial.  Sterile Prep        All elements of maximal sterile barrier technique followed       Patient Prep/Sterile Barriers: hand hygiene, sterile gloves, hat, mask, draped, draped       Skin prep: Chloraprep  Insertion/Injection        Technique: Seldinger Technique and ultrasound guided        1. Ultrasound was used to evaluate the access site.       2. Artery evaluated via ultrasound for patency/adequacy.       3. Using real-time ultrasound the needle/catheter was observed entering the artery/vein.       4. Permanent image was captured and entered into the patient's record.       5. The visualized structures were anatomically normal.       6. There were no apparent abnormal pathologic findings.       Catheter Type/Size: 20 gauge, 1.75 in/4.5 cm quick cath (integral wire)  Narrative        Tegaderm dressing used.       Complications: None apparent,        Arterial waveform: Yes        IBP within 10% of NIBP: Yes

## 2022-12-16 ENCOUNTER — APPOINTMENT (OUTPATIENT)
Dept: PHYSICAL THERAPY | Facility: CLINIC | Age: 67
DRG: 454 | End: 2022-12-16
Attending: PHYSICIAN ASSISTANT
Payer: COMMERCIAL

## 2022-12-16 ENCOUNTER — APPOINTMENT (OUTPATIENT)
Dept: GENERAL RADIOLOGY | Facility: CLINIC | Age: 67
DRG: 454 | End: 2022-12-16
Attending: PHYSICIAN ASSISTANT
Payer: COMMERCIAL

## 2022-12-16 LAB
ALBUMIN SERPL-MCNC: 3 G/DL (ref 3.4–5)
ALP SERPL-CCNC: 71 U/L (ref 40–150)
ALT SERPL W P-5'-P-CCNC: 34 U/L (ref 0–70)
ANION GAP SERPL CALCULATED.3IONS-SCNC: 9 MMOL/L (ref 3–14)
AST SERPL W P-5'-P-CCNC: 32 U/L (ref 0–45)
BILIRUB SERPL-MCNC: 0.9 MG/DL (ref 0.2–1.3)
BUN SERPL-MCNC: 20 MG/DL (ref 7–30)
CALCIUM SERPL-MCNC: 7.4 MG/DL (ref 8.5–10.1)
CHLORIDE BLD-SCNC: 100 MMOL/L (ref 94–109)
CO2 SERPL-SCNC: 24 MMOL/L (ref 20–32)
CREAT SERPL-MCNC: 1.22 MG/DL (ref 0.66–1.25)
GFR SERPL CREATININE-BSD FRML MDRD: 66 ML/MIN/1.73M2
GLUCOSE BLD-MCNC: 126 MG/DL (ref 70–99)
GLUCOSE BLDC GLUCOMTR-MCNC: 123 MG/DL (ref 70–99)
HGB BLD-MCNC: 9.2 G/DL (ref 13.3–17.7)
POTASSIUM BLD-SCNC: 3.7 MMOL/L (ref 3.4–5.3)
PROT SERPL-MCNC: 5.6 G/DL (ref 6.8–8.8)
SODIUM SERPL-SCNC: 133 MMOL/L (ref 133–144)

## 2022-12-16 PROCEDURE — 120N000001 HC R&B MED SURG/OB

## 2022-12-16 PROCEDURE — 258N000003 HC RX IP 258 OP 636: Performed by: PHYSICIAN ASSISTANT

## 2022-12-16 PROCEDURE — 36415 COLL VENOUS BLD VENIPUNCTURE: CPT | Performed by: PHYSICIAN ASSISTANT

## 2022-12-16 PROCEDURE — 93010 ELECTROCARDIOGRAM REPORT: CPT | Performed by: INTERNAL MEDICINE

## 2022-12-16 PROCEDURE — 250N000011 HC RX IP 250 OP 636: Performed by: PHYSICIAN ASSISTANT

## 2022-12-16 PROCEDURE — 97530 THERAPEUTIC ACTIVITIES: CPT | Mod: GP

## 2022-12-16 PROCEDURE — 80053 COMPREHEN METABOLIC PANEL: CPT | Performed by: PHYSICIAN ASSISTANT

## 2022-12-16 PROCEDURE — 93005 ELECTROCARDIOGRAM TRACING: CPT

## 2022-12-16 PROCEDURE — 97161 PT EVAL LOW COMPLEX 20 MIN: CPT | Mod: GP

## 2022-12-16 PROCEDURE — 250N000013 HC RX MED GY IP 250 OP 250 PS 637: Performed by: PHYSICIAN ASSISTANT

## 2022-12-16 PROCEDURE — 85018 HEMOGLOBIN: CPT | Performed by: PHYSICIAN ASSISTANT

## 2022-12-16 PROCEDURE — 99222 1ST HOSP IP/OBS MODERATE 55: CPT | Performed by: PHYSICIAN ASSISTANT

## 2022-12-16 PROCEDURE — 999N000065 XR THORACIC SPINE 3 VIEWS

## 2022-12-16 RX ADMIN — OXYCODONE HYDROCHLORIDE 10 MG: 5 TABLET ORAL at 04:01

## 2022-12-16 RX ADMIN — HYDROMORPHONE HYDROCHLORIDE 0.4 MG: 0.2 INJECTION, SOLUTION INTRAMUSCULAR; INTRAVENOUS; SUBCUTANEOUS at 01:09

## 2022-12-16 RX ADMIN — METHOCARBAMOL 750 MG: 750 TABLET ORAL at 07:56

## 2022-12-16 RX ADMIN — FERROUS GLUCONATE 324 MG: 324 TABLET ORAL at 10:04

## 2022-12-16 RX ADMIN — METHOCARBAMOL 750 MG: 750 TABLET ORAL at 16:08

## 2022-12-16 RX ADMIN — SODIUM CHLORIDE: 9 INJECTION, SOLUTION INTRAVENOUS at 05:54

## 2022-12-16 RX ADMIN — HYDROXYZINE HYDROCHLORIDE 10 MG: 10 TABLET ORAL at 11:56

## 2022-12-16 RX ADMIN — METHOCARBAMOL 750 MG: 750 TABLET ORAL at 01:09

## 2022-12-16 RX ADMIN — POLYETHYLENE GLYCOL 3350 17 G: 17 POWDER, FOR SOLUTION ORAL at 10:11

## 2022-12-16 RX ADMIN — SENNOSIDES AND DOCUSATE SODIUM 1 TABLET: 50; 8.6 TABLET ORAL at 22:00

## 2022-12-16 RX ADMIN — ACETAMINOPHEN 975 MG: 325 TABLET, FILM COATED ORAL at 01:09

## 2022-12-16 RX ADMIN — ACETAMINOPHEN 975 MG: 325 TABLET, FILM COATED ORAL at 17:26

## 2022-12-16 RX ADMIN — PANTOPRAZOLE SODIUM 40 MG: 40 TABLET, DELAYED RELEASE ORAL at 10:04

## 2022-12-16 RX ADMIN — ACETAMINOPHEN 975 MG: 325 TABLET, FILM COATED ORAL at 10:04

## 2022-12-16 RX ADMIN — ZINC SULFATE 220 MG (50 MG) CAPSULE 220 MG: CAPSULE at 10:05

## 2022-12-16 RX ADMIN — SENNOSIDES AND DOCUSATE SODIUM 1 TABLET: 50; 8.6 TABLET ORAL at 10:05

## 2022-12-16 RX ADMIN — HYDROMORPHONE HYDROCHLORIDE 0.4 MG: 0.2 INJECTION, SOLUTION INTRAMUSCULAR; INTRAVENOUS; SUBCUTANEOUS at 22:00

## 2022-12-16 RX ADMIN — OXYCODONE HYDROCHLORIDE 5 MG: 5 TABLET ORAL at 10:05

## 2022-12-16 RX ADMIN — HYDROMORPHONE HYDROCHLORIDE 0.2 MG: 0.2 INJECTION, SOLUTION INTRAMUSCULAR; INTRAVENOUS; SUBCUTANEOUS at 13:07

## 2022-12-16 RX ADMIN — SODIUM CHLORIDE: 9 INJECTION, SOLUTION INTRAVENOUS at 15:53

## 2022-12-16 ASSESSMENT — ACTIVITIES OF DAILY LIVING (ADL)
ADLS_ACUITY_SCORE: 20

## 2022-12-16 NOTE — PROVIDER NOTIFICATION
MD Notification    Notified Person: PA    Notified Person Name: Kayce Bose PA    Notification Date/Time: 12/16/22 1026    Notification Interaction: Page    Purpose of Notification: Concerned BP continues to drop. Now 95/64 w/ HR intermittently spiking to 125 bpm at rest. Asking to hold chlorthalidone until BP stabilizes. Hospitalist consult?    Orders Received: VO to hold chlorthalidone  Hospitalist consult active at 1045.    Comments:

## 2022-12-16 NOTE — PLAN OF CARE
Goal Outcome Evaluation:    Summary:    Pt POD 1 A&Ox4 VSS on RA slightly tachy, pain managed with dilaudid, Oxy, scheduled tylenol and robaxin. CMS intact, R piv infusing 75 ml/hr. Expected discharge today     Patient vital signs are at baseline: Yes  Patient able to ambulate as they were prior to admission or with assist devices provided by therapies during their stay:  No,  Reason:  not oob  Patient MUST void prior to discharge:  Yes  Patient able to tolerate oral intake:  Yes  Pain has adequate pain control using Oral analgesics:  Yes  Does patient have an identified :  Yes  Has goal D/C date and time been discussed with patient:  No,  Reason:  TBD

## 2022-12-16 NOTE — ANESTHESIA POSTPROCEDURE EVALUATION
Patient: Philipp Bland    Procedure: Procedure(s):  Thoracic 10 to Sacral 1 posterior segmental instrumentation. Pelvic instrumentation. Screw cement augmentation L10 to L11. Lumbar 2 to Sacral 1 bilateral decompression and transforaminal interbody fusion. Posterior arthrodesis Thoracic 10-Sacral 1.       Anesthesia Type:  General    Note:  Disposition: Inpatient   Postop Pain Control: Uneventful            Sign Out: Well controlled pain   PONV: No   Neuro/Psych: Uneventful            Sign Out: Acceptable/Baseline neuro status   Airway/Respiratory: Uneventful            Sign Out: Acceptable/Baseline resp. status   CV/Hemodynamics: Uneventful            Sign Out: Acceptable CV status; No obvious hypovolemia; No obvious fluid overload   Other NRE: NONE   DID A NON-ROUTINE EVENT OCCUR? No    Event details/Postop Comments:  BP labile in PACU.  Labs drawn and stable. Plan to continue fluid resuscitation. Pain appears controlled.           Last vitals:  Vitals Value Taken Time   /79 12/15/22 1710   Temp     Pulse 121 12/15/22 1720   Resp 35 12/15/22 1720   SpO2 94 % 12/15/22 1720   Vitals shown include unvalidated device data.    Electronically Signed By: Rubio Sinha DO  December 15, 2022  9:03 PM

## 2022-12-16 NOTE — PHARMACY
"The following home medications were NOT continued on inpatient admission per \"Discontinuation of nonessential home medications during hospitalization\" policy: Vitamin K    If a therapeutic holiday is deemed inappropriate per the prescriber, please notify the pharmacist regarding the medication order.    The pharmacist is available to answer any questions and/or concerns the patient may have regarding discontinuation of non-essential medications.    Please ensure that these medications are restarted as needed upon discharge via the medication reconciliation discharge process and included on the discharge medication reconciliation report.    Thank you,  Jenny Santana    "

## 2022-12-16 NOTE — PROGRESS NOTES
Neurosurgery progress note    Postoperative day 1 status post    PROCEDURE performed by Dr. Aggarawl:  1.  T10 to S1 posterior segmental instrumentation.  2.  Pelvic instrumentation with S2 alar iliac screws.    3.  Screws cement augmentation T10 and T11.   4.  L2 to S1 bilateral decompression and transforaminal interbody fusion.  5.  Posterior arthrodesis, T10 to S1 using allograft cancellous chips.    Patient states his pain is well controlled this morning, he notes his right foot is some mild to moderate weakness with dorsiflexion, which was not present before surgery.  RN is noted hypotension and tachycardia this morning, hemoglobin was 9.2 today, down from 14.5 before surgery.  States his back pain is well managed with current medications    Exam    Alert and oriented no acute distress  Moving bilateral lower extremities, baseline weakness with dorsiflexion on the left, new weakness which is mild with dorsiflexion on the right    Imaging    X-rays demonstrate expected postoperative appearance of surgical hardware.    Plan    Hospitalist consult for hypotension and heart rate abnormalities, currently holding blood pressure medication and diuretic    PT OT  Monitor right foot weakness, anticipate this will gradually improve    Discussed with Dr. Aggarwal

## 2022-12-16 NOTE — PROVIDER NOTIFICATION
MD Notification    Notified Person: MD    Notified Person Name: Macey Quiros/Kayce Bose    Notification Date/Time: 12/16/22 5628    Notification Interaction: Page/professional exchange (in-person)    Purpose of Notification: Notified about soft BP (101/64) w/ scheduled Lisinopril and chlorthalidone without parameters and plan to give PRN oxycodone.     Orders Received: Ok to hold Lisinopril. Plan to give chlorthalidone and monitor BP.    Comments:    Also notified about drain tubing disconnection at xray. Tubing reconnected and reinforced with tape.

## 2022-12-16 NOTE — CONSULTS
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 I was asked to see the patient for post op tachycardia and soft BP.     Soft BP, mild tachycardia suspect secondary to blood loss, hypovolemia  S/p T10-S1 posterior segmental instrumentation, cement augmentation T10-T11, L2-S1 bilateral decompression and interbody fusion 12/15/22  Procedure was performed under general anesthesia with an EBL of 1200ml. He received 5000ml crystalloid/colloid intra op. No blood products.   BP has been soft 90-100s systolic today, -120s at times.   HR 96 in clinic at pre-op. No pre op EKG completed.   He reports baseline HR low 100s. I suspect some of his tachycardia is pain mediated at pain has been poorly controlled today after xrays.   He reports mild light headedness when standing this am otherwise asymptomatic at rest. UOP only 150cc today and appears concentrated. Cr elevated compared to POD 0. Overall I suspect he is dry. Low suspicion for infection/PE at this time as cause.   --EKG to confirm rhythm  --telemetry overnight   --give 500cc bolus today, continue maintenance fluid for now and reassess in the am   --monitor I&Os  --follow hgb in am, sooner if worsening vitals or concerning drain output     Acute blood loss anemia  Hx CHANTALE  hgb pre-op 14.5>9.2 post op. EBL 1100  Drain output 105 thus far today   On oral iron PTA  --continue to monitor hgb  --drain management per primary team     ABEL  Cr 0.5>1.2 today. Suspect hypoperfusion given significant blood loss, soft BP.   --bolus, maintenance fluids as above  --hold lisinopril/hctz   --BMP in am     Chronic hypertension  PTA: chlorthalidone 25mg daily, lisinopril 40mg daily  --hold PTA medications given significant blood loss,  soft BP     MGUS  Per chart review hx possible MGUS. He was referred to Hematology and is not sure if he has been seen yet.     DVT Prophylaxis: Defer to primary service  Code Status: Full Code    Disposition: Expected discharge per surgical team. We will follow     Patient was discussed with Dr. Rodriguez who agrees with the above plan     Angelina Araya PA-C    Reason for Consult   Reason for consult: hypotension, tachycardia     Primary Care Physician   Fallon Mauricio    History is obtained from the patient    History of Present Illness   Philipp Bland is a 67 year old male with a past medical history significant for lumbar spondylolisthesis, HTN, CHANTALE, pagets disease, possible MGUS who was admitted on 12/15/2022. I was asked to see the patient for post op tachycardia and soft BP.   His procedure was performed 12/15/2022 under general anesthesia with an EBL of 1200 mL.  He received 2000 mL LR 20 to 50 mL normal saline and 750 mL of albumin Intra-Op.  He did not receive blood products.  On postop day 1 he was noted to have soft blood pressures in the high 90s low 100s systolic as well as intermittent tachycardia with rates up to 122.  Thus hospitalist service was requested.  He is presently evaluated in his room on the Ortho floor.  He reports pain control has been very difficult today after positioning for his x-rays.  When he did stand to get out of bed earlier today he did feel a bit lightheaded briefly.  He has no resting dizziness.  He has no current or recent chest pain, shortness of breath, palpitations.  He has no lower extremity discomfort.  He notes baseline left lower extremity weakness and now new difficulty with right dorsi and plantar flexion following surgery.  This has not changed much today.  He reports his baseline heart rate is in the low 100s.  Appetite has been marginal as has fluid intake.  Per nursing he has had low urine output thus far today.    Past Medical History    Past Medical  History:   Diagnosis Date     Essential hypertension, benign 01/01/2001     Family history of alcoholism        Past Surgical History   History reviewed. No pertinent surgical history.    Prior to Admission Medications   Prior to Admission Medications   Prescriptions Last Dose Informant Patient Reported? Taking?   Vitamin D, Cholecalciferol, 25 MCG (1000 UT) CAPS 12/7/2022 at AM Self Yes Yes   Vitamin K 100 MCG TABS 12/7/2022 at AM Self Yes Yes   Sig: Take 1 tablet by mouth every morning   acetaminophen (TYLENOL) 500 MG tablet 12/15/2022 at 0200 Self Yes Yes   Sig: Take 500-1,000 mg by mouth every 6 hours as needed for mild pain   chlorthalidone (HYGROTON) 25 MG tablet 12/15/2022 at 0200 Self No Yes   Sig: Take 1 tablet (25 mg) by mouth daily   ferrous gluconate (FERGON) 324 (38 Fe) MG tablet 12/4/2022 at AM Self No Yes   Sig: Take 1 tablet (324 mg) by mouth daily (with breakfast)   ibuprofen (ADVIL/MOTRIN) 200 MG capsule 12/7/2022 at PM Self Yes Yes   Sig: Take 600-800 mg by mouth every 8 hours as needed for fever   lisinopril (ZESTRIL) 40 MG tablet 12/15/2022 at 0200 Self No Yes   Sig: Take 1 tablet (40 mg) by mouth daily   omeprazole (PRILOSEC) 40 MG DR capsule 12/15/2022 at AM Self Yes Yes   Sig: Take 40 mg by mouth daily   zinc gluconate 50 MG tablet 12/7/2022 at AM Self Yes Yes   Sig: Take 50 mg by mouth daily      Facility-Administered Medications: None     Allergies   Allergies   Allergen Reactions     No Known Drug Allergies        Social History   Non smoker. Drinks 2-3 drinks 4 days per week. No hx withdrawal.     Family History  reviewed.   Family History   Problem Relation Age of Onset     Hypertension Father        Review of Systems   The 10 point Review of Systems is negative other than noted in the HPI or here.     Physical Exam   Temp: 98.7  F (37.1  C) Temp src: Oral BP: 98/68 Pulse: 108   Resp: 16 SpO2: 100 % O2 Device: None (Room air) Oxygen Delivery: 1/2 LPM  Vitals:    12/15/22 0654   Weight:  90.7 kg (200 lb)     Vital Signs with Ranges  Temp:  [97.3  F (36.3  C)-98.8  F (37.1  C)] 98.7  F (37.1  C)  Pulse:  [104-120] 108  Resp:  [14-26] 16  BP: ()/(52-98) 98/68  MAP:  [58 mmHg-256 mmHg] 79 mmHg  Arterial Line BP: ()/() 87/69  SpO2:  [80 %-100 %] 100 %  I/O last 3 completed shifts:  In: 9625 [I.V.:5165; Other:710; IV Piggyback:3000]  Out: 3440 [Urine:1625; Drains:615; Blood:1200]    Constitutional: Alert and oriented, sitting up in bed. Appears mildly uncomfortable due to pain. appropriately conversant   ENT: moist mucous membranes  Eyes:  Sclera anicteric, EOMI  Respiratory: Lungs clear to auscultation bilaterally, no increased work of breathing or wheezing.   Cardiovascular: Regular rhythm with mild tachycardia. No murmur. No significant LE edema   GI: active bowel sounds, abdomen soft, non-tender  MSK:  Minimal ability to dorsiflex bilaterally.  strength 5/5. Caves are soft without erythema or tenderness  Neuro: speech is clear. Face symmetric. Follows commands       Data   -Data reviewed today: All pertinent laboratory and imaging results from this encounter were reviewed. I personally reviewed no images or EKG's today.  Recent Labs   Lab 12/16/22  0641 12/16/22  0631 12/15/22  2124 12/15/22  1522 12/15/22  1301 12/15/22  1159 12/15/22  0603   WBC  --   --   --  8.7  --  7.0 5.0   HGB 9.2*  --   --  9.5* 11.6* 10.2* 14.5   MCV  --   --   --  90  --  87 88   PLT  --   --   --  183  --  248 273   INR  --   --   --   --   --   --  1.05   NA  --   --   --   --  132* 133 129*   POTASSIUM  --   --   --   --  4.3 4.3 3.9   CHLORIDE  --   --   --   --   --  100  --    CO2  --   --   --   --   --  26  --    BUN  --   --   --   --   --  11  --    CR  --   --   --   --   --  0.52*  --    ANIONGAP  --   --   --   --   --  7  --    SHELBY  --   --   --   --   --  8.1*  --    GLC  --  123* 162*  --   --  113*  --        No results found for this or any previous visit (from the past 24  hour(s)).

## 2022-12-16 NOTE — PROGRESS NOTES
12/16/22 1350   Appointment Info   Signing Clinician's Name / Credentials (PT) Vicky Aguilar, PT, DPT   Living Environment   People in Home child(fortino), adult   Current Living Arrangements house   Home Accessibility stairs to enter home   Number of Stairs, Main Entrance 5   Transportation Anticipated family or friend will provide;car, drives self   Living Environment Comments Patient plans to d/c to son's house where he has been residing. Pt is partially retired, works as a sub teacher. House has 5 DONI   Self-Care   Usual Activity Tolerance moderate   Current Activity Tolerance fair   Equipment Currently Used at Home walker, rolling   Fall history within last six months yes   Number of times patient has fallen within last six months 1   Activity/Exercise/Self-Care Comment Pt ambulates w/ 4WW since initial back injury in Nov 2020. He is independent w/ self cares and does cook/clean mostly by sitting on 4WW. Usually limited to ambulation distances of 25 yards before he needs to sit/rest per pt.   General Information   Onset of Illness/Injury or Date of Surgery 12/15/22   Referring Physician Jeff Lopez PA-C   Patient/Family Therapy Goals Statement (PT) to feel better   Pertinent History of Current Problem (include personal factors and/or comorbidities that impact the POC) T10 to S1 posterior segmental instrumentation, Pelvic instrumentation with S2 alar iliac screws. Screws cement augmentation T10 and T11, L2 to S1 bilateral decompression and transforaminal interbody fusion.   Existing Precautions/Restrictions fall;spinal   Weight-Bearing Status - LLE full weight-bearing   Weight-Bearing Status - RLE full weight-bearing   Cognition   Affect/Mental Status (Cognition) WNL   Pain Assessment   Patient Currently in Pain Yes, see Vital Sign flowsheet  (8.5 back pain)   Integumentary/Edema   Integumentary/Edema Comments spinal incision w/ bandaging and drain   Posture    Posture Comments decreased lumbar  lordosis   Range of Motion (ROM)   ROM Comment limited spine ROM w/ pain and precautions; limited LE d/t pain   Strength (Manual Muscle Testing)   Strength Comments limited by pain, R DF decreased   Bed Mobility   Comment, (Bed Mobility) mod assist supine to sit via log roll   Transfers   Comment, (Transfers) min assist to stand to FWW and transfer w/ FWW   Gait/Stairs (Locomotion)   Comment, (Gait/Stairs) ambulation x 2 ft w/ transfer, min assist and limited d/t pain   Balance   Balance Comments steady at EOB, limited standing d/t pain   Clinical Impression   Criteria for Skilled Therapeutic Intervention Yes, treatment indicated   PT Diagnosis (PT) impaired functional mobility   Influenced by the following impairments decreased strength, activity tolerance. Pain.   Functional limitations due to impairments bed mobility, transfers, ambulation, stairs   Clinical Presentation (PT Evaluation Complexity) Evolving/Changing   Clinical Presentation Rationale clinical judgement   Clinical Decision Making (Complexity) moderate complexity   Planned Therapy Interventions (PT) balance training;bed mobility training;gait training;home exercise program;neuromuscular re-education;patient/family education;stair training;strengthening;transfer training   Anticipated Equipment Needs at Discharge (PT) walker, rolling   Risk & Benefits of therapy have been explained evaluation/treatment results reviewed;care plan/treatment goals reviewed;risks/benefits reviewed;current/potential barriers reviewed;participants voiced agreement with care plan;participants included;patient   PT Total Evaluation Time   PT Eval, Low Complexity Minutes (08985) 14   Physical Therapy Goals   PT Frequency 2x/day   PT Predicted Duration/Target Date for Goal Attainment 12/16/22   PT Goals Bed Mobility;Transfers;Gait;Stairs   PT: Bed Mobility Modified independent;Supine to/from sit;Rolling   PT: Transfers Modified independent;Sit to/from stand;Bed to/from  chair;Assistive device   PT: Gait Modified independent;Rolling walker;100 feet   PT: Stairs Minimal assist;5 stairs   Interventions   Interventions Quick Adds Therapeutic Activity   Therapeutic Activity   Therapeutic Activities: dynamic activities to improve functional performance Minutes (96341) 32   Symptoms Noted During/After Treatment Increased pain   Treatment Detail/Skilled Intervention Supine to seated EOB w/ mod assist and cuing throughout for strategy w/ log roll. Patient needs time d/t pain. Sat EOB w/ SBA, good EOB balance, with cuing for breathing techinques. With cuing for hand placement strategy and breathing strategy, sit to stand to FWW w/ min assist and transfer x 2 ft to chair with short shuffling steps. Set up comfortable in chair. Initiated spinal education/ precautions.   PT Discharge Planning   PT Plan ambulation w/ FWW with chair follow, bring spine handout for education   PT Discharge Recommendation (DC Rec) home with assist;home with home care physical therapy;Transitional Care Facility   PT Rationale for DC Rec Patient w/ significantly decreased functional mobility compared to baseline with limited activity tolerance and needing assist for all mobility. Pt will need continued rehab in order to address this and may need TCU placement given that he needs to perform stairs to return home. If patient is able to make progress to perform stairs, may be able to d/c home with use of FWW with family support.   PT Brief overview of current status mod assist bed mobility, min assist transfers w/ FWW   Total Session Time   Timed Code Treatment Minutes 32   Total Session Time (sum of timed and untimed services) 46

## 2022-12-16 NOTE — PROGRESS NOTES
POD O T10-S1  A&Ox4  Pain manage with PRN Dilaudid, Schedule Tylenol, Robaxin  CMS intact ex of pointing finger numbness  2 PIV L/R NS infusing 75 mL/hr  Patient vital signs are at baseline: Yes  Patient able to ambulate as they were prior to admission or with assist devices provided by therapies during their stay:  Not OOB  Patient MUST void prior to discharge:  No,  Reason:  Acosta in place  Patient able to tolerate oral intake:  Yes  Pain has adequate pain control using Oral analgesics:  Yes  Does patient have an identified :  Yes  Has goal D/C date and time been discussed with patient:  No,  Reason:  TBD

## 2022-12-17 ENCOUNTER — APPOINTMENT (OUTPATIENT)
Dept: PHYSICAL THERAPY | Facility: CLINIC | Age: 67
DRG: 454 | End: 2022-12-17
Attending: NEUROLOGICAL SURGERY
Payer: COMMERCIAL

## 2022-12-17 ENCOUNTER — APPOINTMENT (OUTPATIENT)
Dept: OCCUPATIONAL THERAPY | Facility: CLINIC | Age: 67
DRG: 454 | End: 2022-12-17
Attending: PHYSICIAN ASSISTANT
Payer: COMMERCIAL

## 2022-12-17 LAB
ANION GAP SERPL CALCULATED.3IONS-SCNC: 7 MMOL/L (ref 3–14)
BUN SERPL-MCNC: 18 MG/DL (ref 7–30)
CALCIUM SERPL-MCNC: 7.3 MG/DL (ref 8.5–10.1)
CHLORIDE BLD-SCNC: 101 MMOL/L (ref 94–109)
CO2 SERPL-SCNC: 24 MMOL/L (ref 20–32)
CREAT SERPL-MCNC: 0.7 MG/DL (ref 0.66–1.25)
GFR SERPL CREATININE-BSD FRML MDRD: >90 ML/MIN/1.73M2
GLUCOSE BLD-MCNC: 134 MG/DL (ref 70–99)
GLUCOSE BLDC GLUCOMTR-MCNC: 122 MG/DL (ref 70–99)
HGB BLD-MCNC: 8.1 G/DL (ref 13.3–17.7)
HGB BLD-MCNC: 8.9 G/DL (ref 13.3–17.7)
POTASSIUM BLD-SCNC: 3.4 MMOL/L (ref 3.4–5.3)
SODIUM SERPL-SCNC: 132 MMOL/L (ref 133–144)

## 2022-12-17 PROCEDURE — 97530 THERAPEUTIC ACTIVITIES: CPT | Mod: GP | Performed by: PHYSICAL THERAPIST

## 2022-12-17 PROCEDURE — 99232 SBSQ HOSP IP/OBS MODERATE 35: CPT | Performed by: HOSPITALIST

## 2022-12-17 PROCEDURE — 36415 COLL VENOUS BLD VENIPUNCTURE: CPT | Performed by: HOSPITALIST

## 2022-12-17 PROCEDURE — 80048 BASIC METABOLIC PNL TOTAL CA: CPT | Performed by: HOSPITALIST

## 2022-12-17 PROCEDURE — 85018 HEMOGLOBIN: CPT | Performed by: PHYSICIAN ASSISTANT

## 2022-12-17 PROCEDURE — 97165 OT EVAL LOW COMPLEX 30 MIN: CPT | Mod: GO

## 2022-12-17 PROCEDURE — 85018 HEMOGLOBIN: CPT | Performed by: HOSPITALIST

## 2022-12-17 PROCEDURE — 36415 COLL VENOUS BLD VENIPUNCTURE: CPT | Performed by: PHYSICIAN ASSISTANT

## 2022-12-17 PROCEDURE — 120N000001 HC R&B MED SURG/OB

## 2022-12-17 PROCEDURE — 97530 THERAPEUTIC ACTIVITIES: CPT | Mod: GO

## 2022-12-17 PROCEDURE — 250N000013 HC RX MED GY IP 250 OP 250 PS 637: Performed by: PHYSICIAN ASSISTANT

## 2022-12-17 RX ADMIN — OXYCODONE HYDROCHLORIDE 10 MG: 5 TABLET ORAL at 20:36

## 2022-12-17 RX ADMIN — ACETAMINOPHEN 975 MG: 325 TABLET, FILM COATED ORAL at 17:32

## 2022-12-17 RX ADMIN — OXYCODONE HYDROCHLORIDE 10 MG: 5 TABLET ORAL at 00:00

## 2022-12-17 RX ADMIN — SENNOSIDES AND DOCUSATE SODIUM 1 TABLET: 50; 8.6 TABLET ORAL at 20:36

## 2022-12-17 RX ADMIN — ACETAMINOPHEN 975 MG: 325 TABLET, FILM COATED ORAL at 03:18

## 2022-12-17 RX ADMIN — SENNOSIDES AND DOCUSATE SODIUM 1 TABLET: 50; 8.6 TABLET ORAL at 08:08

## 2022-12-17 RX ADMIN — OXYCODONE HYDROCHLORIDE 10 MG: 5 TABLET ORAL at 08:08

## 2022-12-17 RX ADMIN — POLYETHYLENE GLYCOL 3350 17 G: 17 POWDER, FOR SOLUTION ORAL at 08:08

## 2022-12-17 RX ADMIN — ZINC SULFATE 220 MG (50 MG) CAPSULE 220 MG: CAPSULE at 08:08

## 2022-12-17 RX ADMIN — OXYCODONE HYDROCHLORIDE 10 MG: 5 TABLET ORAL at 14:53

## 2022-12-17 RX ADMIN — PANTOPRAZOLE SODIUM 40 MG: 40 TABLET, DELAYED RELEASE ORAL at 08:08

## 2022-12-17 RX ADMIN — FERROUS GLUCONATE 324 MG: 324 TABLET ORAL at 08:08

## 2022-12-17 RX ADMIN — ACETAMINOPHEN 975 MG: 325 TABLET, FILM COATED ORAL at 11:19

## 2022-12-17 ASSESSMENT — ACTIVITIES OF DAILY LIVING (ADL)
ADLS_ACUITY_SCORE: 20

## 2022-12-17 NOTE — PROGRESS NOTES
Alert oriented x4. VSS on RA. Not OOB, A-1 help with repo. Voiding well per urinal, no BM. Incision site dressing CDI. CMS intact. Has Hemovac x2 with L-110, and R-30, bright red. Pain managed with PRN dilaudid and scheduled tylenol.

## 2022-12-17 NOTE — PROGRESS NOTES
[facility]    Medicine Progress Note - Hospitalist Service    Date of Admission:  12/15/2022    Assessment & Plan   Philipp Bland is a 67 year old male with a past medical history significant for lumbar spondylolisthesis, HTN, CHANTALE, pagets disease, possible MGUS who was admitted on 12/15/2022. I was asked to see the patient for post op tachycardia and soft BP.      Soft BP, mild tachycardia suspect secondary to blood loss, hypovolemia - resolved  S/p T10-S1 posterior segmental instrumentation, cement augmentation T10-T11, L2-S1 bilateral decompression and interbody fusion 12/15/22  Procedure was performed under general anesthesia with an EBL of 1200ml. He received 5000ml crystalloid/colloid intra op. No blood products.   *BP has been soft 90-100s systolic today, -120s at times.   HR 96 in clinic at pre-op. No pre op EKG completed.   He reports baseline HR low 100s. I suspect some of his tachycardia is pain mediated at pain has been poorly controlled today after xrays.   He reports mild light headedness when standing this am otherwise asymptomatic * Likely soft pressures were due to hypovolemia  * received 500cc bolus 12/16  Plan  -- monitor Bps continue to hold antihypertensives for now.     Acute blood loss anemia  Hx CHANTALE  hgb pre-op 14.5>9.2 post op. EBL 1100  On oral iron PTA  *Hemoglobin stable  Plan  -- Continue to monitor hemoglobin  -- drain management per primary team      ABEL -resolved  Cr 0.5>1.2 today. Suspect hypoperfusion given significant blood loss, soft BP.   -- Received hydrate cc bolus 12/16/2022.  -- Continue to hold lisinopril/hctz, plan to restart in AM.  -- Monitor BMP.     Chronic hypertension  PTA: chlorthalidone 25mg daily, lisinopril 40mg daily  -- Continue to hold PTA medications given significant blood loss and soft BP.  Plan to resume in AM.     MGUS  Per chart review hx possible MGUS. He was referred to Hematology and is not sure if he has been seen yet.  "       Diet: Advance Diet as Tolerated: Regular Diet Adult    DVT Prophylaxis: Defer to primary service  Acosta Catheter: Not present  Central Lines: None  Cardiac Monitoring: ACTIVE order. Indication: tachycardia  Code Status: Full Code      Disposition Plan      Expected Discharge Date: 12/18/2022      Destination: home          The patient's care was discussed with the Patient.    Sarai Clemons MD  Hospitalist Service  [facility]  Securely message with the Vocera Web Console (learn more here)  Text page via [employer] Paging/Directory         Clinically Significant Risk Factors         # Hyponatremia: Lowest Na = 132 mmol/L in last 2 days, will monitor as appropriate      # Hypoalbuminemia: Lowest albumin = 3 g/dL at 12/16/2022  1:05 PM, will monitor as appropriate           # Obesity: Estimated body mass index is 30.41 kg/m  as calculated from the following:    Height as of this encounter: 1.727 m (5' 8\").    Weight as of this encounter: 90.7 kg (200 lb)., PRESENT ON ADMISSION         ______________________________________________________________________    Interval History   Patient laying in bed, denies any lightheadedness or dizziness.  Has no complaints today.    Data reviewed today: I reviewed all medications, new labs and imaging results over the last 24 hours. I personally reviewed no images or EKG's today.    Physical Exam   Vital Signs: Temp: 99  F (37.2  C) Temp src: Oral BP: (!) 138/98 Pulse: 116   Resp: 18 SpO2: 97 % O2 Device: None (Room air)    Weight: 200 lbs 0 oz  General Appearance: Well appearing for stated age.  Respiratory: CTAB, no rales or ronchi  Cardiovascular: S1, S2 normal, no murmurs  GI: non-tender on palpation, BS present      Data   Recent Labs   Lab 12/17/22  1150 12/17/22  0810 12/17/22  0612 12/16/22  1305 12/16/22  0641 12/15/22  2124 12/15/22  1522 12/15/22  1301 12/15/22  1159 12/15/22  0603 12/15/22  0603   WBC  --   --   --   --   --   --  8.7  --  7.0  --  " 5.0   HGB 8.9* 8.1*  --   --  9.2*  --  9.5* 11.6* 10.2*  --  14.5   MCV  --   --   --   --   --   --  90  --  87  --  88   PLT  --   --   --   --   --   --  183  --  248  --  273   INR  --   --   --   --   --   --   --   --   --   --  1.05   *  --   --  133  --   --   --  132* 133  --  129*   POTASSIUM 3.4  --   --  3.7  --   --   --  4.3 4.3  --  3.9   CHLORIDE 101  --   --  100  --   --   --   --  100  --   --    CO2 24  --   --  24  --   --   --   --  26  --   --    BUN 18  --   --  20  --   --   --   --  11  --   --    CR 0.70  --   --  1.22  --   --   --   --  0.52*  --   --    ANIONGAP 7  --   --  9  --   --   --   --  7  --   --    SHELBY 7.3*  --   --  7.4*  --   --   --   --  8.1*  --   --    *  --  122* 126*  --    < >  --   --  113*   < >  --    ALBUMIN  --   --   --  3.0*  --   --   --   --   --   --   --    PROTTOTAL  --   --   --  5.6*  --   --   --   --   --   --   --    BILITOTAL  --   --   --  0.9  --   --   --   --   --   --   --    ALKPHOS  --   --   --  71  --   --   --   --   --   --   --    ALT  --   --   --  34  --   --   --   --   --   --   --    AST  --   --   --  32  --   --   --   --   --   --   --     < > = values in this interval not displayed.     No results found for this or any previous visit (from the past 24 hour(s)).

## 2022-12-17 NOTE — PROGRESS NOTES
[facility]    Neurosurgery  Daily Note    Assessment & Plan   Procedure(s):  Thoracic 10 to Sacral 1 posterior segmental instrumentation. Pelvic instrumentation. Screw cement augmentation L10 to L11. Lumbar 2 to Sacral 1 bilateral decompression and transforaminal interbody fusion. Posterior arthrodesis Thoracic 10-Sacral 1.   2 Days Post-Op  Pain controlled   Dressing intact  Drain intact, outputs left= deep= 110 overnight; right=superficial=30 overnight   Working with PT   Right DF weakness similar to yesterday per patient, left foot drop at baseline     Plan:  -Advance activity as tolerated  -Continue supportive and symptomatic treatment  -Start or continue physical therapy  -Pain control measures  -Advance diet as tolerated  -Routine wound care  -continue to monitor right DF weakness, anticipate will improve over time   -Plans reviewed Dr. Aggarwal and patient   -Page or call with questions     Macey Danielson PA-C  [facility] Neurosurgery  [facility]  [address] [place] [id number]    Tel [phone]  Pager [phone]    Principal Problem:    History of thoracic spinal fusion      Macey Danieslon PA-C    Interval History   Stable     Physical Exam   Temp: 99.1  F (37.3  C) Temp src: Oral BP: (!) 144/83 Pulse: 106   Resp: 18 SpO2: 100 % O2 Device: None (Room air)    Vitals:    12/15/22 0654   Weight: 200 lb (90.7 kg)     Vital Signs with Ranges  Temp:  [99  F (37.2  C)-99.9  F (37.7  C)] 99.1  F (37.3  C)  Pulse:  [106-118] 106  Resp:  [18] 18  BP: (116-144)/(76-98) 144/83  SpO2:  [96 %-100 %] 100 %  I/O last 3 completed shifts:  In: -   Out: 1300 [Urine:950; Drains:350]    Awake, alert, sitting in chair, NAD   5/5 at bilateral hips and knees   Left foot drop at baseline   Right 1/5 DF, unchanged from yesterday per patient   PF intact bilaterally       Medications     sodium chloride Stopped (12/17/22 0317)        sodium chloride 0.9%  500 mL  Intravenous Once     acetaminophen  975 mg Oral Q8H     [Held by provider] chlorthalidone  25 mg Oral Daily     ferrous gluconate  324 mg Oral Daily with breakfast     [Held by provider] lisinopril  40 mg Oral Daily     pantoprazole  40 mg Oral Daily     polyethylene glycol  17 g Oral Daily     senna-docusate  1 tablet Oral BID     sodium chloride (PF)  3 mL Intracatheter Q8H     zinc sulfate  220 mg Oral Daily       Plans discussed with Dr. Aggarwal who was in agreement with plans    Macey RAMIREZ [address] [address]    Tel [phone]  Pager [phone]

## 2022-12-17 NOTE — PLAN OF CARE
"Goal Outcome Evaluation:    POD 2 S/p T10-S1 posterior segmental instrumentation, cement augmentation T10-T11, L2-S1 bilateral decompression and interbody fusion 12/15/22.      A&O x 4 . CMS intact w/ exception to absent dorsiflexion in bilateral feet. Patient reports left side was absent prior to surgery, but right foot is new post surgery. Weak pulse palpated on right dorsalis pedis.      Bowel sounds active, passing flatus, tolerating regular diet. Per patient report, last BM 12/14/22. Voiding ok w/ bedside urinal.      Patient continues on telemetry monitor. Sinus tachy.     New incisional dressing applied. Site looks WDL. Hemovac x 2 currently draining.     Up with 2 assist. C/o back pain 8/10 that improves w/ PRN oxycodone 10 mg --> reassess 2/10.    BP (!) 144/83   Pulse 106   Temp 99.1  F (37.3  C) (Oral)   Resp 18   Ht 1.727 m (5' 8\")   Wt 90.7 kg (200 lb)   SpO2 100%   BMI 30.41 kg/m      "

## 2022-12-17 NOTE — PROGRESS NOTES
12/17/22 1120   Appointment Info   Signing Clinician's Name / Credentials (OT) Kaylan Barros, OTR/L   Rehab Comments (OT) spinal precautions   Living Environment   People in Home child(fortino), adult   Current Living Arrangements house   Home Accessibility stairs to enter home   Number of Stairs, Main Entrance 5   Transportation Anticipated family or friend will provide;car, drives self   Living Environment Comments Patient plans to d/c to son's house where he has been residing. Pt is partially retired, works as a sub teacher. House has 5 DONI. Pt has tub shower w/ shower chair and grab bars   Self-Care   Usual Activity Tolerance moderate   Current Activity Tolerance fair   Equipment Currently Used at Home walker, rolling   Fall history within last six months yes   Number of times patient has fallen within last six months 1   Activity/Exercise/Self-Care Comment Pt ambulates w/ 4WW since initial back injury in Nov 2020. He is independent w/ ADL tasks. Usually limited to ambulation distances of 25 yards before he needs to sit/rest per pt.   Instrumental Activities of Daily Living (IADL)   IADL Comments Pt shares IADL tasks w/ son at home   General Information   Onset of Illness/Injury or Date of Surgery 12/15/22   Referring Physician Jeff Lopez PA-C   Patient/Family Therapy Goal Statement (OT) return home   Additional Occupational Profile Info/Pertinent History of Current Problem Pt is a 68 y/o male whos is now s/p T10 to S1 posterior segmental instrumentation, Pelvic instrumentation with S2 alar iliac screws. Screws cement augmentation T10 and T11, L2 to S1 bilateral decompression and transforaminal interbody fusion.   Existing Precautions/Restrictions fall;spinal   Left Lower Extremity (Weight-bearing Status) full weight-bearing (FWB)   Right Lower Extremity (Weight-bearing Status) full weight-bearing (FWB)   Cognitive Status Examination   Orientation Status orientation to person, place and time    Affect/Mental Status (Cognitive) WNL   Follows Commands WNL   Sensory   Sensory Quick Adds UE sensation intact   Pain Assessment   Patient Currently in Pain Yes, see Vital Sign flowsheet   Range of Motion Comprehensive   General Range of Motion bilateral upper extremity ROM WFL   Comment, General Range of Motion WFL for ADL   Strength Comprehensive (MMT)   Comment, General Manual Muscle Testing (MMT) Assessment not formally assessed d/t lifting restrictions after surgery   Coordination   Upper Extremity Coordination No deficits were identified   Transfers   Transfers sit-stand transfer;toilet transfer   Sit-Stand Transfer   Sit-Stand [place] (Transfers) minimum assist (75% patient effort);2 person assist;verbal cues   Assistive Device (Sit-Stand Transfers) walker, standard   Toilet Transfer   Type (Toilet Transfer) sit-stand   [place] Level (Toilet Transfer) minimum assist (75% patient effort);verbal cues   Assistive Device (Toilet Transfer) walker, standard;grab bars/safety frame   Balance   Balance Assessment standing balance: static   Balance Comments pt unsteady   Activities of Daily Living   BADL Assessment/Intervention toileting   Toileting   Comment, (Toileting) total A for posterior cares after using commode, pt able to use urinal w/ set up assist   Clinical Impression   Criteria for Skilled Therapeutic Interventions Met (OT) Yes, treatment indicated   OT Diagnosis decreased I/ADL independence after surgery   OT Problem List-Impairments impacting ADL problems related to;activity tolerance impaired;balance;mobility;motor control;strength;pain;post-surgical precautions   Assessment of Occupational Performance 3-5 Performance Deficits   Identified Performance Deficits decreased independence w/ dressing, bathing, toileting, functional mobility   Planned Therapy Interventions (OT) ADL retraining;IADL retraining;balance training;bed mobility training;strengthening;transfer training;progressive  activity/exercise;risk factor education   Clinical Decision Making Complexity (OT) low complexity   Anticipated Equipment Needs Upon Discharge (OT)   (TBD)   Risk & Benefits of therapy have been explained evaluation/treatment results reviewed;care plan/treatment goals reviewed;risks/benefits reviewed;current/potential barriers reviewed;participants voiced agreement with care plan;participants included;patient   OT Total Evaluation Time   OT Eval, Low Complexity Minutes (18396) 10   OT Goals   Therapy Frequency (OT) Daily   OT Predicted Duration/Target Date for Goal Attainment 12/21/22   OT Goals Upper Body Dressing;Lower Body Dressing;Transfers;Toilet Transfer/Toileting;Bed Mobility   OT: Upper Body Dressing Modified independent;within precautions   OT: Lower Body Dressing Modified independent;using adaptive equipment;within precautions   OT: Bed Mobility Modified independent;supine to/from sitting;rolling;within precautions   OT: Transfer Modified independent;with assistive device;within precautions   OT: Toilet Transfer/Toileting Modified independent;cleaning and garment management;toilet transfer;using adaptive equipment;within precautions   Interventions   Interventions Quick Adds Therapeutic Activity   Therapeutic Activities   Therapeutic Activity Minutes (82328) 15   Symptoms noted during/after treatment fatigue;increased pain   Treatment Detail/Skilled Intervention Pt sitting on BSC w/ nursing staff upon arrival to room. Agrees to OT. Pt requesting to get to chair after he finishes using BSC. Educated pt on post-op spinal precautions and lifting restriction. Discussed how this may impact I/ADL tasks at home. Pt receptive to education. He needed Total A for wiping after sitting on BSC. Pt stood w/ Min Ax2 and FWW. Once in standing, pt unsteady and c/o BLE feeling weak. Pushed chair directly behind him and pt sat w/ Min A x2 to control descent. Pt's HR up into 130s w/ activity - 120s at rest. Educated pt on OT  POC and discussed different d/c options. Pt unable to move into figure-4 position for LB dressing - needing Total A at this time. Elevated BLE on stool and pt left with all needs met, alarm on.   OT Discharge Planning   OT Plan ADLs after back surgery handout, LB dressing w/ AE, toileting independence   OT Discharge Recommendation (DC Rec) [facility]-Mot[facility] patient will benefit from intensive, interdisciplinary therapy.  Anticipate will be able to tolerate 3 hours of therapy per day   OT Rationale for DC Rec Pt functioning well below baseline, currently limited by weakness, pain and decreased activity tolerance after surgery. Pt would benefit from continued intensive IP OT in ARU setting to progress I/ADL independence prior to returning home. Pt motivated and has good family support.   OT Brief overview of current status Min Ax2 stand but weak/unsteady when upright.   Total Session Time   Timed Code Treatment Minutes 15   Total Session Time (sum of timed and untimed services) 25

## 2022-12-17 NOTE — PROGRESS NOTES
Updated Macey HESTER PA-C neurosurg regarding hemovac #1 tubing that was disconnected by the connector this morning. Disinfect and reconnected, taped again.

## 2022-12-18 ENCOUNTER — APPOINTMENT (OUTPATIENT)
Dept: PHYSICAL THERAPY | Facility: CLINIC | Age: 67
DRG: 454 | End: 2022-12-18
Attending: NEUROLOGICAL SURGERY
Payer: COMMERCIAL

## 2022-12-18 ENCOUNTER — APPOINTMENT (OUTPATIENT)
Dept: OCCUPATIONAL THERAPY | Facility: CLINIC | Age: 67
DRG: 454 | End: 2022-12-18
Attending: NEUROLOGICAL SURGERY
Payer: COMMERCIAL

## 2022-12-18 LAB
ANION GAP SERPL CALCULATED.3IONS-SCNC: 7 MMOL/L (ref 3–14)
BUN SERPL-MCNC: 18 MG/DL (ref 7–30)
CALCIUM SERPL-MCNC: 7.8 MG/DL (ref 8.5–10.1)
CHLORIDE BLD-SCNC: 99 MMOL/L (ref 94–109)
CO2 SERPL-SCNC: 24 MMOL/L (ref 20–32)
CREAT SERPL-MCNC: 0.62 MG/DL (ref 0.66–1.25)
GFR SERPL CREATININE-BSD FRML MDRD: >90 ML/MIN/1.73M2
GLUCOSE BLD-MCNC: 149 MG/DL (ref 70–99)
HGB BLD-MCNC: 8.7 G/DL (ref 13.3–17.7)
POTASSIUM BLD-SCNC: 3.4 MMOL/L (ref 3.4–5.3)
POTASSIUM BLD-SCNC: 3.7 MMOL/L (ref 3.4–5.3)
SODIUM SERPL-SCNC: 130 MMOL/L (ref 133–144)

## 2022-12-18 PROCEDURE — 84132 ASSAY OF SERUM POTASSIUM: CPT | Performed by: HOSPITALIST

## 2022-12-18 PROCEDURE — 99232 SBSQ HOSP IP/OBS MODERATE 35: CPT | Performed by: HOSPITALIST

## 2022-12-18 PROCEDURE — 82310 ASSAY OF CALCIUM: CPT | Performed by: HOSPITALIST

## 2022-12-18 PROCEDURE — 36415 COLL VENOUS BLD VENIPUNCTURE: CPT | Performed by: HOSPITALIST

## 2022-12-18 PROCEDURE — 97530 THERAPEUTIC ACTIVITIES: CPT | Mod: GO

## 2022-12-18 PROCEDURE — 85018 HEMOGLOBIN: CPT | Performed by: HOSPITALIST

## 2022-12-18 PROCEDURE — 250N000013 HC RX MED GY IP 250 OP 250 PS 637: Performed by: HOSPITALIST

## 2022-12-18 PROCEDURE — 120N000001 HC R&B MED SURG/OB

## 2022-12-18 PROCEDURE — 97535 SELF CARE MNGMENT TRAINING: CPT | Mod: GO

## 2022-12-18 PROCEDURE — 250N000013 HC RX MED GY IP 250 OP 250 PS 637: Performed by: PHYSICIAN ASSISTANT

## 2022-12-18 PROCEDURE — 97530 THERAPEUTIC ACTIVITIES: CPT | Mod: GP | Performed by: PHYSICAL THERAPIST

## 2022-12-18 PROCEDURE — 97116 GAIT TRAINING THERAPY: CPT | Mod: GP | Performed by: PHYSICAL THERAPIST

## 2022-12-18 RX ORDER — POTASSIUM CHLORIDE 1500 MG/1
40 TABLET, EXTENDED RELEASE ORAL ONCE
Status: COMPLETED | OUTPATIENT
Start: 2022-12-18 | End: 2022-12-18

## 2022-12-18 RX ADMIN — METHOCARBAMOL 750 MG: 750 TABLET ORAL at 08:59

## 2022-12-18 RX ADMIN — ZINC SULFATE 220 MG (50 MG) CAPSULE 220 MG: CAPSULE at 08:59

## 2022-12-18 RX ADMIN — ACETAMINOPHEN 975 MG: 325 TABLET, FILM COATED ORAL at 01:59

## 2022-12-18 RX ADMIN — ACETAMINOPHEN 650 MG: 325 TABLET, FILM COATED ORAL at 20:38

## 2022-12-18 RX ADMIN — OXYCODONE HYDROCHLORIDE 10 MG: 5 TABLET ORAL at 18:10

## 2022-12-18 RX ADMIN — OXYCODONE HYDROCHLORIDE 10 MG: 5 TABLET ORAL at 13:57

## 2022-12-18 RX ADMIN — SENNOSIDES AND DOCUSATE SODIUM 1 TABLET: 50; 8.6 TABLET ORAL at 20:38

## 2022-12-18 RX ADMIN — PANTOPRAZOLE SODIUM 40 MG: 40 TABLET, DELAYED RELEASE ORAL at 09:00

## 2022-12-18 RX ADMIN — FERROUS GLUCONATE 324 MG: 324 TABLET ORAL at 09:00

## 2022-12-18 RX ADMIN — POTASSIUM CHLORIDE 40 MEQ: 1500 TABLET, EXTENDED RELEASE ORAL at 18:10

## 2022-12-18 RX ADMIN — HYDROXYZINE HYDROCHLORIDE 10 MG: 10 TABLET ORAL at 01:59

## 2022-12-18 RX ADMIN — ACETAMINOPHEN 975 MG: 325 TABLET, FILM COATED ORAL at 10:47

## 2022-12-18 RX ADMIN — POLYETHYLENE GLYCOL 3350 17 G: 17 POWDER, FOR SOLUTION ORAL at 09:00

## 2022-12-18 RX ADMIN — OXYCODONE HYDROCHLORIDE 10 MG: 5 TABLET ORAL at 08:59

## 2022-12-18 RX ADMIN — SENNOSIDES AND DOCUSATE SODIUM 1 TABLET: 50; 8.6 TABLET ORAL at 09:00

## 2022-12-18 ASSESSMENT — ACTIVITIES OF DAILY LIVING (ADL)
ADLS_ACUITY_SCORE: 30
ADLS_ACUITY_SCORE: 30
ADLS_ACUITY_SCORE: 20
ADLS_ACUITY_SCORE: 30
ADLS_ACUITY_SCORE: 20
ADLS_ACUITY_SCORE: 30
ADLS_ACUITY_SCORE: 30
ADLS_ACUITY_SCORE: 20

## 2022-12-18 NOTE — PROGRESS NOTES
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 I was asked to see the patient for post op tachycardia and soft BP.      Soft BP, mild tachycardia suspect secondary to blood loss, hypovolemia - resolved  S/p T10-S1 posterior segmental instrumentation, cement augmentation T10-T11, L2-S1 bilateral decompression and interbody fusion 12/15/22  Procedure was performed under general anesthesia with an EBL of 1200ml. He received 5000ml crystalloid/colloid intra op. No blood products.   *BP has been soft 90-100s systolic today, -120s at times.   HR 96 in clinic at pre-op. No pre op EKG completed.   He reports baseline HR low 100s. I suspect some of his tachycardia is pain mediated at pain has been poorly controlled today after xrays.   He reports mild light headedness when standing this am otherwise asymptomatic * Likely soft pressures were due to hypovolemia  * received 500cc bolus 12/16  Plan  -- monitor Bps continue to hold antihypertensives for now.     Acute blood loss anemia  Hx CHANTALE  hgb pre-op 14.5>9.2 post op. EBL 1100  On oral iron PTA  *Hemoglobin stable  Plan  -- Continue to monitor hemoglobin  -- drain management per primary team      ABEL -resolved  Cr 0.5>1.2 today. Suspect hypoperfusion given significant blood loss, soft BP.   -- Received hydrate cc bolus 12/16/2022.  -- Continue to hold lisinopril/hctz, plan to restart in AM.  -- Monitor BMP.     Chronic hypertension  PTA: chlorthalidone 25mg daily, lisinopril 40mg daily  -- Continue to hold PTA medications given significant blood loss and soft BP.  Plan to resume in AM.     MGUS  Per chart review hx possible MGUS. He was referred to Hematology and is not sure if he has been seen yet.  "       Diet: Advance Diet as Tolerated: Regular Diet Adult    DVT Prophylaxis: Defer to primary service  Acosta Catheter: Not present  Central Lines: None  Cardiac Monitoring: ACTIVE order. Indication: tachycardia  Code Status: Full Code      Disposition Plan     Expected Discharge Date: 12/18/2022      Destination: home          The patient's care was discussed with the Patient.    Sarai Clemons MD  Hospitalist Service  [facility]  Securely message with the Vocera Web Console (learn more here)  Text page via [employer] Paging/Directory         Clinically Significant Risk Factors         # Hyponatremia: Lowest Na = 132 mmol/L in last 2 days, will monitor as appropriate      # Hypoalbuminemia: Lowest albumin = 3 g/dL at 12/16/2022  1:05 PM, will monitor as appropriate           # Obesity: Estimated body mass index is 30.41 kg/m  as calculated from the following:    Height as of this encounter: 1.727 m (5' 8\").    Weight as of this encounter: 90.7 kg (200 lb)., PRESENT ON ADMISSION         ______________________________________________________________________    Interval History   Sitting in chair, no complaints at this time.     Data reviewed today: I reviewed all medications, new labs and imaging results over the last 24 hours. I personally reviewed no images or EKG's today.    Physical Exam   Vital Signs: Temp: 98.7  F (37.1  C) Temp src: Oral BP: 137/85 Pulse: 105   Resp: 16 SpO2: 96 % O2 Device: None (Room air)    Weight: 200 lbs 0 oz  General Appearance: Well appearing for stated age.  Respiratory: CTAB, no rales or ronchi  Cardiovascular: S1, S2 normal, no murmurs  GI: non-tender on palpation, BS present      Data   Recent Labs   Lab 12/17/22  1150 12/17/22  0810 12/17/22  0612 12/16/22  1305 12/16/22  0641 12/15/22  2124 12/15/22  1522 12/15/22  1301 12/15/22  1159 12/15/22  0603 12/15/22  0603   WBC  --   --   --   --   --   --  8.7  --  7.0  --  5.0   HGB 8.9* 8.1*  --   --  9.2*  --  9.5* " 11.6* 10.2*  --  14.5   MCV  --   --   --   --   --   --  90  --  87  --  88   PLT  --   --   --   --   --   --  183  --  248  --  273   INR  --   --   --   --   --   --   --   --   --   --  1.05   *  --   --  133  --   --   --  132* 133  --  129*   POTASSIUM 3.4  --   --  3.7  --   --   --  4.3 4.3  --  3.9   CHLORIDE 101  --   --  100  --   --   --   --  100  --   --    CO2 24  --   --  24  --   --   --   --  26  --   --    BUN 18  --   --  20  --   --   --   --  11  --   --    CR 0.70  --   --  1.22  --   --   --   --  0.52*  --   --    ANIONGAP 7  --   --  9  --   --   --   --  7  --   --    SHELBY 7.3*  --   --  7.4*  --   --   --   --  8.1*  --   --    *  --  122* 126*  --    < >  --   --  113*   < >  --    ALBUMIN  --   --   --  3.0*  --   --   --   --   --   --   --    PROTTOTAL  --   --   --  5.6*  --   --   --   --   --   --   --    BILITOTAL  --   --   --  0.9  --   --   --   --   --   --   --    ALKPHOS  --   --   --  71  --   --   --   --   --   --   --    ALT  --   --   --  34  --   --   --   --   --   --   --    AST  --   --   --  32  --   --   --   --   --   --   --     < > = values in this interval not displayed.     No results found for this or any previous visit (from the past 24 hour(s)).

## 2022-12-18 NOTE — PROGRESS NOTES
Alert and oriented x4. VSS on RA. Pain managed with PRN dilaudid, atarax and tylenol. Incision site dressing CDI.  Hemovac with L- 40mL and R-10mL. Not OOB this shift. PIV SL. Tele on SinusTachy.

## 2022-12-19 ENCOUNTER — APPOINTMENT (OUTPATIENT)
Dept: OCCUPATIONAL THERAPY | Facility: CLINIC | Age: 67
DRG: 454 | End: 2022-12-19
Attending: NEUROLOGICAL SURGERY
Payer: COMMERCIAL

## 2022-12-19 ENCOUNTER — APPOINTMENT (OUTPATIENT)
Dept: PHYSICAL THERAPY | Facility: CLINIC | Age: 67
DRG: 454 | End: 2022-12-19
Attending: NEUROLOGICAL SURGERY
Payer: COMMERCIAL

## 2022-12-19 LAB
ANION GAP SERPL CALCULATED.3IONS-SCNC: 10 MMOL/L (ref 3–14)
BUN SERPL-MCNC: 15 MG/DL (ref 7–30)
CALCIUM SERPL-MCNC: 8 MG/DL (ref 8.5–10.1)
CHLORIDE BLD-SCNC: 100 MMOL/L (ref 94–109)
CO2 SERPL-SCNC: 22 MMOL/L (ref 20–32)
CREAT SERPL-MCNC: 0.6 MG/DL (ref 0.66–1.25)
GFR SERPL CREATININE-BSD FRML MDRD: >90 ML/MIN/1.73M2
GLUCOSE BLD-MCNC: 107 MG/DL (ref 70–99)
POTASSIUM BLD-SCNC: 4.2 MMOL/L (ref 3.4–5.3)
POTASSIUM BLD-SCNC: 4.3 MMOL/L (ref 3.4–5.3)
SODIUM SERPL-SCNC: 132 MMOL/L (ref 133–144)

## 2022-12-19 PROCEDURE — 80048 BASIC METABOLIC PNL TOTAL CA: CPT | Performed by: HOSPITALIST

## 2022-12-19 PROCEDURE — 36415 COLL VENOUS BLD VENIPUNCTURE: CPT | Performed by: HOSPITALIST

## 2022-12-19 PROCEDURE — 250N000013 HC RX MED GY IP 250 OP 250 PS 637: Performed by: PHYSICIAN ASSISTANT

## 2022-12-19 PROCEDURE — 99232 SBSQ HOSP IP/OBS MODERATE 35: CPT | Performed by: HOSPITALIST

## 2022-12-19 PROCEDURE — 250N000013 HC RX MED GY IP 250 OP 250 PS 637: Performed by: HOSPITALIST

## 2022-12-19 PROCEDURE — 84132 ASSAY OF SERUM POTASSIUM: CPT | Performed by: HOSPITALIST

## 2022-12-19 PROCEDURE — 97535 SELF CARE MNGMENT TRAINING: CPT | Mod: GO | Performed by: OCCUPATIONAL THERAPIST

## 2022-12-19 PROCEDURE — 97116 GAIT TRAINING THERAPY: CPT | Mod: GP

## 2022-12-19 PROCEDURE — 120N000001 HC R&B MED SURG/OB

## 2022-12-19 PROCEDURE — 97530 THERAPEUTIC ACTIVITIES: CPT | Mod: GP

## 2022-12-19 RX ORDER — LISINOPRIL 40 MG/1
40 TABLET ORAL DAILY
Status: DISCONTINUED | OUTPATIENT
Start: 2022-12-19 | End: 2022-12-24 | Stop reason: HOSPADM

## 2022-12-19 RX ADMIN — ZINC SULFATE 220 MG (50 MG) CAPSULE 220 MG: CAPSULE at 08:06

## 2022-12-19 RX ADMIN — FERROUS GLUCONATE 324 MG: 324 TABLET ORAL at 08:06

## 2022-12-19 RX ADMIN — LISINOPRIL 40 MG: 40 TABLET ORAL at 09:57

## 2022-12-19 RX ADMIN — HYDROXYZINE HYDROCHLORIDE 10 MG: 10 TABLET ORAL at 04:45

## 2022-12-19 RX ADMIN — OXYCODONE HYDROCHLORIDE 10 MG: 5 TABLET ORAL at 12:47

## 2022-12-19 RX ADMIN — OXYCODONE HYDROCHLORIDE 10 MG: 5 TABLET ORAL at 16:55

## 2022-12-19 RX ADMIN — SENNOSIDES AND DOCUSATE SODIUM 1 TABLET: 50; 8.6 TABLET ORAL at 08:06

## 2022-12-19 RX ADMIN — OXYCODONE HYDROCHLORIDE 5 MG: 5 TABLET ORAL at 04:45

## 2022-12-19 RX ADMIN — OXYCODONE HYDROCHLORIDE 5 MG: 5 TABLET ORAL at 20:48

## 2022-12-19 RX ADMIN — SENNOSIDES AND DOCUSATE SODIUM 1 TABLET: 50; 8.6 TABLET ORAL at 20:48

## 2022-12-19 RX ADMIN — OXYCODONE HYDROCHLORIDE 10 MG: 5 TABLET ORAL at 00:46

## 2022-12-19 RX ADMIN — METHOCARBAMOL 750 MG: 750 TABLET ORAL at 18:00

## 2022-12-19 RX ADMIN — PANTOPRAZOLE SODIUM 40 MG: 40 TABLET, DELAYED RELEASE ORAL at 08:06

## 2022-12-19 ASSESSMENT — ACTIVITIES OF DAILY LIVING (ADL)
ADLS_ACUITY_SCORE: 30
ADLS_ACUITY_SCORE: 31
ADLS_ACUITY_SCORE: 31
ADLS_ACUITY_SCORE: 30
ADLS_ACUITY_SCORE: 31
ADLS_ACUITY_SCORE: 31
ADLS_ACUITY_SCORE: 30
ADLS_ACUITY_SCORE: 31

## 2022-12-19 NOTE — PROGRESS NOTES
DATE & TIME: December 19th 2022 [id number]   Cognitive Concerns/ Orientation : AOx4   ABNL VS/O2: VSS on RA, hypertensive bp medications scheduled  MOBILITY: A1 GBW  PAIN MANAGMENT: PRN oxycodone   DIET: Regular   BOWEL/BLADDER: Continent of BB, uses bedside urinal  ABNL LAB/BG: Potassium protocol 4.2 redraw scheduled for am draw  DRAIN/DEVICES: No IV access  TELEMETRY RHYTHM: Sinus Tachycardia  SKIN: Spinal incision, scattered bruising   D/C DATE: Pending

## 2022-12-19 NOTE — PROGRESS NOTES
SW: initial consult not yet completed. ARU referral sent via DOD.    BERNARDO Martinez    [facility]

## 2022-12-19 NOTE — PROGRESS NOTES
Alert oriented x4. VSS on RA. Not OOB this shift. Voiding well per urinal, no Bm. Incision site dressing CDI, drains removed. Pain managed with PRN oxy, atarax and tylenol. No IV Access. CMS intact.

## 2022-12-19 NOTE — PROVIDER NOTIFICATION
Paged Dr. Clemons patient is consistently hypertensive and takes blood pressure medications at home. Wondering if we can please get these reordered.     Lisinopril ordered.

## 2022-12-19 NOTE — PROGRESS NOTES
[facility]     Neurosurgery  Daily Note        Assessment & Plan     Procedure(s):  Thoracic 10 to Sacral 1 posterior segmental instrumentation. Pelvic instrumentation. Screw cement augmentation L10 to L11. Lumbar 2 to Sacral 1 bilateral decompression and transforaminal interbody fusion. Posterior arthrodesis Thoracic 10-Sacral 1.   3 Days Post-Op  Pain controlled   Dressing intact  Drain intact, outputs left= deep= 40 overnight; right=superficial=10 overnight   Working with PT   Right DF weakness similar to yesterday per patient, left foot drop at baseline   PT recommending home with assist versus TCU and OT recommending ARU     Plan:  -Advance activity as tolerated  -Remove hemovac drains.  -Continue supportive and symptomatic treatment  -Continue PT/OT to solidify discharge planning.   -Pain control measures  -Advance diet as tolerated  -Routine wound care  -continue to monitor right DF weakness, anticipate will improve over time     Lois Treviño MPAS  [facility] Neurosurgery  [address] [address]    Tel [phone]  Pager [phone]

## 2022-12-19 NOTE — PROGRESS NOTES
[facility]    Medicine Progress Note - Hospitalist Service    Date of Admission:  12/15/2022    Assessment & Plan   Philipp Bland is a 67 year old male with a past medical history significant for lumbar spondylolisthesis, HTN, CHANTALE, pagets disease, possible MGUS who was admitted on 12/15/2022. I was asked to see the patient for post op tachycardia and soft BP.      Soft BP, mild tachycardia suspect secondary to blood loss, hypovolemia - resolved  S/p T10-S1 posterior segmental instrumentation, cement augmentation T10-T11, L2-S1 bilateral decompression and interbody fusion 12/15/22  Procedure was performed under general anesthesia with an EBL of 1200ml. He received 5000ml crystalloid/colloid intra op. No blood products.   *BP has been soft 90-100s systolic today, -120s at times.   HR 96 in clinic at pre-op. No pre op EKG completed.   He reports baseline HR low 100s. I suspect some of his tachycardia is pain mediated at pain has been poorly controlled today after xrays.   He reports mild light headedness when standing this am otherwise asymptomatic * Likely soft pressures were due to hypovolemia  * received 500cc bolus 12/16  Plan  -- monitor Bps continue to hold antihypertensives for now.     Acute blood loss anemia  Hx CHANTALE  hgb pre-op 14.5>9.2 post op. EBL 1100  On oral iron PTA  *Hemoglobin stable  Plan  -- Continue to monitor hemoglobin  -- drain management per primary team      ABEL -resolved  Cr 0.5>1.2 today. Suspect hypoperfusion given significant blood loss, soft BP.   -- Received hydrate cc bolus 12/16/2022.  -- Continue to hold lisinopril/hctz, plan to restart in AM.  -- Monitor BMP.     Chronic hypertension  PTA: chlorthalidone 25mg daily, lisinopril 40mg daily  -- restarted PTA meds today     MGUS  Per chart review hx possible MGUS. He was referred to Hematology and is not sure if he has been seen yet.        Diet: Advance Diet as Tolerated: Regular Diet Adult    DVT  "Prophylaxis: Defer to primary service  Acosta Catheter: Not present  Central Lines: None  Cardiac Monitoring: ACTIVE order. Indication: tachycardia  Code Status: Full Code      Disposition Plan      Expected Discharge Date: 12/20/2022, 12:00 PM    Destination: home  Discharge Comments: aru- vs tcu        The patient's care was discussed with the Patient.    Sarai Clemons MD  Hospitalist Service  [facility]  Securely message with the Vocera Web Console (learn more here)  Text page via [employer] Paging/Directory         Clinically Significant Risk Factors         # Hyponatremia: Lowest Na = 130 mmol/L in last 2 days, will monitor as appropriate      # Hypoalbuminemia: Lowest albumin = 3 g/dL at 12/16/2022  1:05 PM, will monitor as appropriate           # Obesity: Estimated body mass index is 30.41 kg/m  as calculated from the following:    Height as of this encounter: 1.727 m (5' 8\").    Weight as of this encounter: 90.7 kg (200 lb).          ______________________________________________________________________    Interval History   Patient complaining of significant pain not able to hold a conversation - alerted nurse of this.     Data reviewed today: I reviewed all medications, new labs and imaging results over the last 24 hours. I personally reviewed no images or EKG's today.    Physical Exam   Vital Signs: Temp: 99.7  F (37.6  C) Temp src: Oral BP: (!) 143/82 Pulse: 102   Resp: 18 SpO2: 100 % O2 Device: None (Room air)    Weight: 200 lbs 0 oz  General Appearance: Well appearing for stated age.  Respiratory: CTAB, no rales or ronchi  Cardiovascular: S1, S2 normal, no murmurs  GI: non-tender on palpation, BS present      Data   Recent Labs   Lab 12/19/22  0709 12/18/22  2237 12/18/22  1245 12/17/22  1150 12/17/22  0810 12/17/22  0612 12/16/22  1305 12/15/22  2124 12/15/22  1522 12/15/22  1301 12/15/22  1159 12/15/22  0603 12/15/22  0603   WBC  --   --   --   --   --   --   --   --  8.7  --  7.0  " --  5.0   HGB  --   --  8.7* 8.9* 8.1*  --   --    < > 9.5*   < > 10.2*  --  14.5   MCV  --   --   --   --   --   --   --   --  90  --  87  --  88   PLT  --   --   --   --   --   --   --   --  183  --  248  --  273   INR  --   --   --   --   --   --   --   --   --   --   --   --  1.05   *  --  130* 132*  --   --  133  --   --    < > 133  --  129*   POTASSIUM 4.3  4.2 3.7 3.4 3.4  --   --  3.7  --   --    < > 4.3  --  3.9   CHLORIDE 100  --  99 101  --   --  100  --   --   --  100   < >  --    CO2 22  --  24 24  --   --  24  --   --   --  26   < >  --    BUN 15  --  18 18  --   --  20  --   --   --  11   < >  --    CR 0.60*  --  0.62* 0.70  --   --  1.22  --   --   --  0.52*   < >  --    ANIONGAP 10  --  7 7  --   --  9  --   --   --  7   < >  --    SHELBY 8.0*  --  7.8* 7.3*  --   --  7.4*  --   --   --  8.1*   < >  --    *  --  149* 134*  --    < > 126*   < >  --   --  113*   < >  --    ALBUMIN  --   --   --   --   --   --  3.0*  --   --   --   --   --   --    PROTTOTAL  --   --   --   --   --   --  5.6*  --   --   --   --   --   --    BILITOTAL  --   --   --   --   --   --  0.9  --   --   --   --   --   --    ALKPHOS  --   --   --   --   --   --  71  --   --   --   --   --   --    ALT  --   --   --   --   --   --  34  --   --   --   --   --   --    AST  --   --   --   --   --   --  32  --   --   --   --   --   --     < > = values in this interval not displayed.     No results found for this or any previous visit (from the past 24 hour(s)).

## 2022-12-19 NOTE — PROGRESS NOTES
"[facility]    Neurosurgery Progress Note    Date of Service (when I saw the patient): 12/19/2022     Assessment & Plan     Procedure(s):  Thoracic 10 to Sacral 1 posterior segmental instrumentation. Pelvic instrumentation. Screw cement augmentation L10 to L11. Lumbar 2 to Sacral 1 bilateral decompression and transforaminal interbody fusion. Posterior arthrodesis Thoracic 10-Sacral 1.   -4 Days Post-Op  Doing well. Reports incisional back pain. States his right foot weakness has improved. No new or worsening symptoms.     Plan:  -Advance activity as tolerated  -Continue supportive and symptomatic treatment  -Continue PT/OT to solidify discharge planning.   -Pain control measures  -Advance diet as tolerated  -Routine wound care  -Continue to monitor right DF weakness, anticipate will improve over time      I have discussed the following assessment and plan Dr. Aggarwal who is in agreement with initial plan and will follow up with further consultation recommendations.    Dina Howard, CNP  [facility] Neurosurgery  [facility]  [address] [place] [id number]    Tel [phone]    Interval History   Stable. Ongoing right foot weakness. Chronic left foot weakness.    Physical Exam   Temp: 99.2  F (37.3  C) Temp src: Oral BP: (!) 159/93 Pulse: 103   Resp: 18 SpO2: 100 % O2 Device: None (Room air)    Vitals:    12/15/22 0654   Weight: 200 lb (90.7 kg)     Vital Signs with Ranges  Temp:  [98  F (36.7  C)-99.3  F (37.4  C)] 99.2  F (37.3  C)  Pulse:  [100-112] 103  Resp:  [16-18] 18  BP: (137-159)/(90-93) 159/93  SpO2:  [99 %-100 %] 100 %  I/O last 3 completed shifts:  In: -   Out: 435 [Urine:300; Drains:135]     , Blood pressure (!) 159/93, pulse 103, temperature 99.2  F (37.3  C), temperature source Oral, resp. rate 18, height 5' 8\" (1.727 m), weight 200 lb (90.7 kg), SpO2 100 %.  200 lbs 0 oz  HEENT:  Normocephalic.  PERRLA.   Heart:  No peripheral " edema  Lungs:  No SOB  Skin:  Warm and dry, good capillary refill.  Extremities:  Good radial and dorsalis pedis pulses bilaterally, no edema, cyanosis or clubbing.    NEUROLOGICAL EXAMINATION:   Mental status:  Alert and Oriented x 3, speech is fluent.  Motor:  Left>right DF weakness    Medications     sodium chloride Stopped (12/17/22 0317)       sodium chloride 0.9%  500 mL Intravenous Once     chlorthalidone  25 mg Oral Daily     ferrous gluconate  324 mg Oral Daily with breakfast     lisinopril  40 mg Oral Daily     pantoprazole  40 mg Oral Daily     polyethylene glycol  17 g Oral Daily     senna-docusate  1 tablet Oral BID     sodium chloride (PF)  3 mL Intracatheter Q8H     zinc sulfate  220 mg Oral Daily       Data     CBC RESULTS:   Recent Labs   Lab Test 12/18/22  1245 12/16/22  0641 12/15/22  1522   WBC  --   --  8.7   RBC  --   --  3.26*   HGB 8.7*   < > 9.5*   HCT  --   --  29.2*   MCV  --   --  90   MCH  --   --  29.1   MCHC  --   --  32.5   RDW  --   --  18.2*   PLT  --   --  183    < > = values in this interval not displayed.     Basic Metabolic Panel:  Lab Results   Component Value Date     12/19/2022     04/08/2013      Lab Results   Component Value Date    POTASSIUM 4.2 12/19/2022    POTASSIUM 4.3 12/19/2022    POTASSIUM 4.3 04/08/2013     Lab Results   Component Value Date    CHLORIDE 100 12/19/2022    CHLORIDE 101 04/08/2013     Lab Results   Component Value Date    SHELBY 8.0 12/19/2022    SHELBY 9.5 04/08/2013     Lab Results   Component Value Date    CO2 22 12/19/2022    CO2 25 04/08/2013     Lab Results   Component Value Date    BUN 15 12/19/2022    BUN 26 04/08/2013     Lab Results   Component Value Date    CR 0.60 12/19/2022    CR 1.27 04/08/2013     Lab Results   Component Value Date     12/19/2022    GLC 93 04/08/2013     INR:  Lab Results   Component Value Date    INR 1.05 12/15/2022

## 2022-12-19 NOTE — PROGRESS NOTES
Alert and oriented x4. VSS. Denies SOB. On RA. Midline back incision dressing CDI. Hemo vacs(right and left) were removed. Tolerating regular diet. Denies nausea and vomiting. Voiding via urinal. Assist of 1-2 with GB/W. Worked with PT and OT today. Pain was managed with PRN oxycodone.

## 2022-12-20 ENCOUNTER — APPOINTMENT (OUTPATIENT)
Dept: OCCUPATIONAL THERAPY | Facility: CLINIC | Age: 67
DRG: 454 | End: 2022-12-20
Attending: NEUROLOGICAL SURGERY
Payer: COMMERCIAL

## 2022-12-20 LAB — POTASSIUM BLD-SCNC: 3.6 MMOL/L (ref 3.4–5.3)

## 2022-12-20 PROCEDURE — 84132 ASSAY OF SERUM POTASSIUM: CPT | Performed by: NEUROLOGICAL SURGERY

## 2022-12-20 PROCEDURE — 250N000013 HC RX MED GY IP 250 OP 250 PS 637: Performed by: PHYSICIAN ASSISTANT

## 2022-12-20 PROCEDURE — 99232 SBSQ HOSP IP/OBS MODERATE 35: CPT | Performed by: HOSPITALIST

## 2022-12-20 PROCEDURE — 250N000013 HC RX MED GY IP 250 OP 250 PS 637: Performed by: HOSPITALIST

## 2022-12-20 PROCEDURE — 120N000001 HC R&B MED SURG/OB

## 2022-12-20 PROCEDURE — 36415 COLL VENOUS BLD VENIPUNCTURE: CPT | Performed by: NEUROLOGICAL SURGERY

## 2022-12-20 PROCEDURE — 97535 SELF CARE MNGMENT TRAINING: CPT | Mod: GO | Performed by: OCCUPATIONAL THERAPIST

## 2022-12-20 RX ADMIN — OXYCODONE HYDROCHLORIDE 5 MG: 5 TABLET ORAL at 06:12

## 2022-12-20 RX ADMIN — PANTOPRAZOLE SODIUM 40 MG: 40 TABLET, DELAYED RELEASE ORAL at 08:14

## 2022-12-20 RX ADMIN — FERROUS GLUCONATE 324 MG: 324 TABLET ORAL at 08:15

## 2022-12-20 RX ADMIN — OXYCODONE HYDROCHLORIDE 10 MG: 5 TABLET ORAL at 11:08

## 2022-12-20 RX ADMIN — SENNOSIDES AND DOCUSATE SODIUM 1 TABLET: 50; 8.6 TABLET ORAL at 21:13

## 2022-12-20 RX ADMIN — CHLORTHALIDONE 25 MG: 25 TABLET ORAL at 08:15

## 2022-12-20 RX ADMIN — LISINOPRIL 40 MG: 40 TABLET ORAL at 08:14

## 2022-12-20 RX ADMIN — OXYCODONE HYDROCHLORIDE 10 MG: 5 TABLET ORAL at 01:37

## 2022-12-20 RX ADMIN — OXYCODONE HYDROCHLORIDE 5 MG: 5 TABLET ORAL at 07:03

## 2022-12-20 RX ADMIN — SENNOSIDES AND DOCUSATE SODIUM 1 TABLET: 50; 8.6 TABLET ORAL at 08:15

## 2022-12-20 RX ADMIN — OXYCODONE HYDROCHLORIDE 10 MG: 5 TABLET ORAL at 21:12

## 2022-12-20 RX ADMIN — ZINC SULFATE 220 MG (50 MG) CAPSULE 220 MG: CAPSULE at 08:14

## 2022-12-20 RX ADMIN — METHOCARBAMOL 750 MG: 750 TABLET ORAL at 21:12

## 2022-12-20 RX ADMIN — OXYCODONE HYDROCHLORIDE 10 MG: 5 TABLET ORAL at 15:25

## 2022-12-20 RX ADMIN — ACETAMINOPHEN 650 MG: 325 TABLET, FILM COATED ORAL at 21:12

## 2022-12-20 ASSESSMENT — ACTIVITIES OF DAILY LIVING (ADL)
ADLS_ACUITY_SCORE: 30
DEPENDENT_IADLS:: INDEPENDENT
ADLS_ACUITY_SCORE: 30
ADLS_ACUITY_SCORE: 22
ADLS_ACUITY_SCORE: 30

## 2022-12-20 NOTE — CONSULTS
Care Management Initial Consult    General Information  Assessment completed with: Patient, Patient  Type of CM/SW Visit: Initial Assessment    Primary Care Provider verified and updated as needed: No   Readmission within the last 30 days:        Reason for Consult: discharge planning  Advance Care Planning: Advance Care Planning Reviewed: present on chart          Communication Assessment  Patient's communication style: spoken language (English or Bilingual)    Hearing Difficulty or Deaf: no   Wear Glasses or Blind: no    Cognitive  Cognitive/Neuro/Behavioral: WDL  Level of Consciousness: alert  Arousal Level: opens eyes spontaneously  Orientation: oriented x 4  Mood/Behavior: calm, cooperative  Best Language: 0 - No aphasia  Speech: clear    Living Environment:   People in home: child(fortino), adult  Beau  Current living Arrangements: house      Able to return to prior arrangements: no       Family/Social Support:  Care provided by: self  Provides care for: no one  Marital Status: Single  Children          Description of Support System: Supportive, Involved    Support Assessment: Adequate family and caregiver support, Adequate social supports    Current Resources:   Patient receiving home care services: No     Community Resources: None  Equipment currently used at home: walker, rolling, shower chair, grab bar, tub/shower, grab bar, toilet  Supplies currently used at home: None    Employment/Financial:  Employment Status: employed part-time        Financial Concerns: No concerns identified   Referral to Financial Worker: No       Lifestyle & Psychosocial Needs:  Social Determinants of Health     Tobacco Use: Low Risk      Smoking Tobacco Use: Never     Smokeless Tobacco Use: Never     Passive Exposure: Not on file   Alcohol Use: Not At Risk     Frequency of Alcohol Consumption: 2-3 times a week     Average Number of Drinks: 1 or 2     Frequency of Binge Drinking: Never   Financial Resource Strain: Low Risk       Difficulty of Paying Living Expenses: Not hard at all   Food Insecurity: No Food Insecurity     Worried About Running Out of Food in the Last Year: Never true     Ran Out of Food in the Last Year: Never true   Transportation Needs: No Transportation Needs     Lack of Transportation (Medical): No     Lack of Transportation (Non-Medical): No   Physical Activity: Insufficiently Active     Days of Exercise per Week: 4 days     Minutes of Exercise per Session: 20 min   Stress: No Stress Concern Present     Feeling of Stress : Not at all   Social Connections: Moderately Integrated     Frequency of Communication with Friends and Family: More than three times a week     Frequency of Social Gatherings with Friends and Family: More than three times a week     Attends Mu-ism Services: More than 4 times per year     Active Member of Clubs or Organizations: Yes     Attends Club or Organization Meetings: Not on file     Marital Status:    Intimate Partner Violence: Not on file   Depression: Not at risk     PHQ-2 Score: 1   Housing Stability: Unknown     Unable to Pay for Housing in the Last Year: No     Number of Places Lived in the Last Year: Not on file     Unstable Housing in the Last Year: No       Functional Status:  Prior to admission patient needed assistance:   Dependent ADLs:: Independent  Dependent IADLs:: Independent       Mental Health Status:  Mental Health Status: No Current Concerns       Chemical Dependency Status:  Chemical Dependency Status: No Current Concerns             Values/Beliefs:  Spiritual, Cultural Beliefs, Mu-ism Practices, Values that affect care: no               Additional Information:  Per Care management/social work consult for discharge planning. Patient admitted on 12/15. SW reviewed chart and spoke with patient. Per patient report he resides in a house with his adult son. There are 5 stairs to enter. Patient has walker, grab bars and shower chair at home. At baseline patient is  independent with all ADL's/IADL's. SW discussed recommendation for ARU and patient hesitant due to location. SW informed patient would prefer TCU. SW informed ARU to disregard referral. ARU liaison planing on calling patient to discuss benefit of ARU to ensure patient is making an informed decision.    PAMELA informed patient not interested in ARU and would like referrals to TCU near [place]. PAMELA sent referrals via DOD. PAMELA will continue to follow.    BERNARDO Martinez    [facility]

## 2022-12-20 NOTE — PROGRESS NOTES
[facility]    Neurosurgery  Daily Post-Op Note    Assessment & Plan   Procedure(s):  Thoracic 10 to Sacral 1 posterior segmental instrumentation. Pelvic instrumentation. Screw cement augmentation L10 to L11. Lumbar 2 to Sacral 1 bilateral decompression and transforaminal interbody fusion. Posterior arthrodesis Thoracic 10-Sacral 1.   5 Days Post-Op  Doing well.  Pain well-controlled.  Tolerating physical therapy and rehabilitation well.    Plan:  -Advance activity as tolerated  -Continue supportive and symptomatic treatment  -Start or continue physical therapy  -Pain control measures  -TCU requested, pending    Shaheen Weir PA-C    Interval History   Stable.  Doing well.  Improving slowly.  Pain is reasonably controlled.  No fevers.     Physical Exam   Temp: 99.4  F (37.4  C) Temp src: Oral BP: 122/80 Pulse: 96   Resp: 18 SpO2: 98 % O2 Device: None (Room air)    Vitals:    12/15/22 0654   Weight: 90.7 kg (200 lb)     Vital Signs with Ranges  Temp:  [99  F (37.2  C)-99.7  F (37.6  C)] 99.4  F (37.4  C)  Pulse:  [] 96  Resp:  [18] 18  BP: (120-143)/(74-87) 122/80  SpO2:  [96 %-100 %] 98 %  I/O last 3 completed shifts:  In: 600 [P.O.:600]  Out: 1250 [Urine:1250]    Alert and oriented.      Incision: CDI      Medications     sodium chloride Stopped (12/17/22 0317)        chlorthalidone  25 mg Oral Daily     ferrous gluconate  324 mg Oral Daily with breakfast     lisinopril  40 mg Oral Daily     pantoprazole  40 mg Oral Daily     polyethylene glycol  17 g Oral Daily     senna-docusate  1 tablet Oral BID     sodium chloride (PF)  3 mL Intracatheter Q8H     zinc sulfate  220 mg Oral Daily           Shaheen Weir PA-C  [facility] Neurosurgery  [address]  [address] [place] [address]    Tel [phone]  Pager [phone]

## 2022-12-20 NOTE — PROGRESS NOTES
DATE & TIME: December 20th 2022 2514-6217     Cognitive Concerns/ Orientation : AOx4   ABNL VS/O2: VSS on RA, hypertensive bp medications scheduled  MOBILITY: A1 GBW  PAIN MANAGMENT: PRN oxycodone   DIET: Regular   BOWEL/BLADDER: Continent of BB, uses bedside urinal  ABNL LAB/BG: Potassium protocol 3.6 redraw scheduled for am draw  DRAIN/DEVICES: No IV access  TELEMETRY RHYTHM: Sinus Tachycardia  SKIN: Spinal incision, scattered bruising   D/C DATE: Pending ARU placement

## 2022-12-20 NOTE — PROGRESS NOTES
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 I was asked to see the patient for post op tachycardia and soft BP.      Soft BP, mild tachycardia suspect secondary to blood loss, hypovolemia - resolved  S/p T10-S1 posterior segmental instrumentation, cement augmentation T10-T11, L2-S1 bilateral decompression and interbody fusion 12/15/22  Procedure was performed under general anesthesia with an EBL of 1200ml. He received 5000ml crystalloid/colloid intra op. No blood products.   *BP has been soft 90-100s systolic today, -120s at times.   HR 96 in clinic at pre-op. No pre op EKG completed.   He reports baseline HR low 100s. I suspect some of his tachycardia is pain mediated at pain has been poorly controlled today after xrays.   He reports mild light headedness when standing this am otherwise asymptomatic * Likely soft pressures were due to hypovolemia  * received 500cc bolus 12/16  * BP now stable.   -- monitor     Acute blood loss anemia  Hx CHANTALE  hgb pre-op 14.5>9.2 post op. EBL 1100  On oral iron PTA  *Hemoglobin stable  Plan  -- Continue to monitor hemoglobin     ABEL -resolved  Cr 0.5>1.2 today. Suspect hypoperfusion given significant blood loss, soft BP.   -- creatinine now stable.  --      Chronic hypertension  PTA: chlorthalidone 25mg daily, lisinopril 40mg daily  -- restarted PTA meds today     MGUS  Per chart review hx possible MGUS. He was referred to Hematology and is not sure if he has been seen yet.        Diet: Advance Diet as Tolerated: Regular Diet Adult    DVT Prophylaxis: Defer to primary service  Acosta Catheter: Not present  Central Lines: None  Cardiac Monitoring: ACTIVE order. Indication: tachycardia  Code Status: Full Code  "     Disposition Plan      Expected Discharge Date: 12/21/2022, 12:00 PM    Destination: other (comment) (TCU VS ARU)  Discharge Comments: aru- vs tcu        The patient's care was discussed with the Patient.    Sarai Clemons MD  Hospitalist Service  [facility]  Securely message with the Vocera Web Console (learn more here)  Text page via autoGraph Paging/Directory         Clinically Significant Risk Factors         # Hyponatremia: Lowest Na = 132 mmol/L in last 2 days, will monitor as appropriate      # Hypoalbuminemia: Lowest albumin = 3 g/dL at 12/16/2022  1:05 PM, will monitor as appropriate           # Obesity: Estimated body mass index is 30.41 kg/m  as calculated from the following:    Height as of this encounter: 1.727 m (5' 8\").    Weight as of this encounter: 90.7 kg (200 lb).          ______________________________________________________________________    Interval History   Patient sitting in bed, family at bedside.  Notes he has no complaints.  Pain is well managed today.    Data reviewed today: I reviewed all medications, new labs and imaging results over the last 24 hours. I personally reviewed no images or EKG's today.    Physical Exam   Vital Signs: Temp: 99.4  F (37.4  C) Temp src: Oral BP: 122/80 Pulse: 96   Resp: 18 SpO2: 98 % O2 Device: None (Room air)    Weight: 200 lbs 0 oz  General Appearance: Well appearing for stated age.  Respiratory: CTAB, no rales or ronchi  Cardiovascular: S1, S2 normal, no murmurs  GI: non-tender on palpation, BS present      Data   Recent Labs   Lab 12/20/22  1041 12/19/22  0709 12/18/22  2237 12/18/22  1245 12/17/22  1150 12/17/22  0810 12/17/22  0612 12/16/22  1305 12/15/22  2124 12/15/22  1522 12/15/22  1301 12/15/22  1159 12/15/22  0603 12/15/22  0603   WBC  --   --   --   --   --   --   --   --   --  8.7  --  7.0  --  5.0   HGB  --   --   --  8.7* 8.9* 8.1*  --   --    < > 9.5*   < > 10.2*  --  14.5   MCV  --   --   --   --   --   --   --   " --   --  90  --  87  --  88   PLT  --   --   --   --   --   --   --   --   --  183  --  248  --  273   INR  --   --   --   --   --   --   --   --   --   --   --   --   --  1.05   NA  --  132*  --  130* 132*  --   --  133  --   --    < > 133  --  129*   POTASSIUM 3.6 4.3  4.2 3.7 3.4 3.4  --   --  3.7  --   --    < > 4.3  --  3.9   CHLORIDE  --  100  --  99 101  --   --  100  --   --   --  100   < >  --    CO2  --  22  --  24 24  --   --  24  --   --   --  26   < >  --    BUN  --  15  --  18 18  --   --  20  --   --   --  11   < >  --    CR  --  0.60*  --  0.62* 0.70  --   --  1.22  --   --   --  0.52*   < >  --    ANIONGAP  --  10  --  7 7  --   --  9  --   --   --  7   < >  --    SHELBY  --  8.0*  --  7.8* 7.3*  --   --  7.4*  --   --   --  8.1*   < >  --    GLC  --  107*  --  149* 134*  --    < > 126*   < >  --   --  113*   < >  --    ALBUMIN  --   --   --   --   --   --   --  3.0*  --   --   --   --   --   --    PROTTOTAL  --   --   --   --   --   --   --  5.6*  --   --   --   --   --   --    BILITOTAL  --   --   --   --   --   --   --  0.9  --   --   --   --   --   --    ALKPHOS  --   --   --   --   --   --   --  71  --   --   --   --   --   --    ALT  --   --   --   --   --   --   --  34  --   --   --   --   --   --    AST  --   --   --   --   --   --   --  32  --   --   --   --   --   --     < > = values in this interval not displayed.     No results found for this or any previous visit (from the past 24 hour(s)).

## 2022-12-20 NOTE — PROGRESS NOTES
Pt is vitally stable on RA, sinus-tach at times in low 100s, normally in 90's. CMS intact. Dressing with scant old drainage. Oxy given for pain with good relief. Tolerating diet. Urinal voiding overnight. Drop-foot, left, pt states he is still able to walk with walker- 1A/W. SW consulted for possible rehab.

## 2022-12-20 NOTE — PROGRESS NOTES
Care Management Follow Up    Length of Stay (days): 5    Expected Discharge Date: 12/21/2022     Concerns to be Addressed:       Patient plan of care discussed at interdisciplinary rounds: Yes    Anticipated Discharge Disposition: Acute Rehab, Transitional Care     Anticipated Discharge Services: None  Anticipated Discharge DME: None    Patient/family educated on Medicare website which has current facility and service quality ratings: yes  Education Provided on the Discharge Plan:    Patient/Family in Agreement with the Plan: yes    Referrals Placed by CM/SW:    Private pay costs discussed: Not applicable    Additional Information:  SW informed patient may be able to be accepted to [facility] tomorrow depending on HIV status due to H&P indicating HIV testing. SW spoke with bedside nurse who will be looking into this. SW will continue to follow.      BERNARDO Martinez    [facility]

## 2022-12-20 NOTE — PROGRESS NOTES
Pain well controlled with PRN Oxycodone, Robaxin. Ambulating assist of 1 with walker. A&Ox4. Voiding in urinal. Tolerating regular diet. VSS on room air. CMS intact. Dressing in place with small amount of dried bloody drainage. SW has referrals out for possible ARU placement.

## 2022-12-21 ENCOUNTER — APPOINTMENT (OUTPATIENT)
Dept: OCCUPATIONAL THERAPY | Facility: CLINIC | Age: 67
DRG: 454 | End: 2022-12-21
Attending: NEUROLOGICAL SURGERY
Payer: COMMERCIAL

## 2022-12-21 ENCOUNTER — APPOINTMENT (OUTPATIENT)
Dept: PHYSICAL THERAPY | Facility: CLINIC | Age: 67
DRG: 454 | End: 2022-12-21
Attending: NEUROLOGICAL SURGERY
Payer: COMMERCIAL

## 2022-12-21 LAB
HGB BLD-MCNC: 8.1 G/DL (ref 13.3–17.7)
POTASSIUM BLD-SCNC: 3.7 MMOL/L (ref 3.4–5.3)

## 2022-12-21 PROCEDURE — 120N000001 HC R&B MED SURG/OB

## 2022-12-21 PROCEDURE — 84132 ASSAY OF SERUM POTASSIUM: CPT | Performed by: NEUROLOGICAL SURGERY

## 2022-12-21 PROCEDURE — 97535 SELF CARE MNGMENT TRAINING: CPT | Mod: GO | Performed by: OCCUPATIONAL THERAPIST

## 2022-12-21 PROCEDURE — 97116 GAIT TRAINING THERAPY: CPT | Mod: GP

## 2022-12-21 PROCEDURE — 97530 THERAPEUTIC ACTIVITIES: CPT | Mod: GP

## 2022-12-21 PROCEDURE — 250N000013 HC RX MED GY IP 250 OP 250 PS 637: Performed by: HOSPITALIST

## 2022-12-21 PROCEDURE — 250N000013 HC RX MED GY IP 250 OP 250 PS 637: Performed by: PHYSICIAN ASSISTANT

## 2022-12-21 PROCEDURE — 36415 COLL VENOUS BLD VENIPUNCTURE: CPT | Performed by: HOSPITALIST

## 2022-12-21 PROCEDURE — 85018 HEMOGLOBIN: CPT | Performed by: HOSPITALIST

## 2022-12-21 PROCEDURE — 99232 SBSQ HOSP IP/OBS MODERATE 35: CPT | Performed by: HOSPITALIST

## 2022-12-21 RX ADMIN — SENNOSIDES AND DOCUSATE SODIUM 1 TABLET: 50; 8.6 TABLET ORAL at 09:08

## 2022-12-21 RX ADMIN — PANTOPRAZOLE SODIUM 40 MG: 40 TABLET, DELAYED RELEASE ORAL at 09:08

## 2022-12-21 RX ADMIN — CHLORTHALIDONE 25 MG: 25 TABLET ORAL at 09:08

## 2022-12-21 RX ADMIN — METHOCARBAMOL 750 MG: 750 TABLET ORAL at 02:52

## 2022-12-21 RX ADMIN — OXYCODONE HYDROCHLORIDE 10 MG: 5 TABLET ORAL at 13:15

## 2022-12-21 RX ADMIN — POLYETHYLENE GLYCOL 3350 17 G: 17 POWDER, FOR SOLUTION ORAL at 09:08

## 2022-12-21 RX ADMIN — OXYCODONE HYDROCHLORIDE 10 MG: 5 TABLET ORAL at 02:52

## 2022-12-21 RX ADMIN — FERROUS GLUCONATE 324 MG: 324 TABLET ORAL at 09:08

## 2022-12-21 RX ADMIN — ZINC SULFATE 220 MG (50 MG) CAPSULE 220 MG: CAPSULE at 09:08

## 2022-12-21 RX ADMIN — LISINOPRIL 40 MG: 40 TABLET ORAL at 09:08

## 2022-12-21 RX ADMIN — OXYCODONE HYDROCHLORIDE 10 MG: 5 TABLET ORAL at 17:31

## 2022-12-21 RX ADMIN — OXYCODONE HYDROCHLORIDE 10 MG: 5 TABLET ORAL at 21:57

## 2022-12-21 RX ADMIN — OXYCODONE HYDROCHLORIDE 10 MG: 5 TABLET ORAL at 09:08

## 2022-12-21 ASSESSMENT — ACTIVITIES OF DAILY LIVING (ADL)
ADLS_ACUITY_SCORE: 22

## 2022-12-21 NOTE — PROGRESS NOTES
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 I was asked to see the patient for post op tachycardia and soft BP.      Soft BP, mild tachycardia suspect secondary to blood loss, hypovolemia - resolved  S/p T10-S1 posterior segmental instrumentation, cement augmentation T10-T11, L2-S1 bilateral decompression and interbody fusion 12/15/22  Procedure was performed under general anesthesia with an EBL of 1200ml. He received 5000ml crystalloid/colloid intra op. No blood products.   *BP has been soft 90-100s systolic today, -120s at times.   HR 96 in clinic at pre-op. No pre op EKG completed.   He reports baseline HR low 100s. I suspect some of his tachycardia is pain mediated at pain has been poorly controlled today after xrays.   He reports mild light headedness when standing this am otherwise asymptomatic * Likely soft pressures were due to hypovolemia  * received 500cc bolus 12/16  * BP now stable.   -- antihypertensives now resumed.     Acute blood loss anemia  Hx CHANTALE  hgb pre-op 14.5>9.2 post op. EBL 1100  On oral iron PTA  *Hemoglobin stable       ABEL -resolved  Cr 0.5>1.2 today. Suspect hypoperfusion given significant blood loss, soft BP.   -- creatinine now stable.     Chronic hypertension  PTA: chlorthalidone 25mg daily, lisinopril 40mg daily  -- restarted PTA meds today     MGUS  Per chart review hx possible MGUS. He was referred to Hematology and is not sure if he has been seen yet.       Patients BP and Hgb is now stable. Hospitalist will sign off. Please reconsult us or call with any questions.            Diet: Advance Diet as Tolerated: Regular Diet Adult    DVT Prophylaxis: Defer to primary service  Acosta Catheter: Not  "present  Central Lines: None  Cardiac Monitoring: ACTIVE order. Indication: tachycardia  Code Status: Full Code      Disposition Plan      Expected Discharge Date: 12/22/2022, 12:00 PM    Destination: other (comment) (TCU VS ARU)  Discharge Comments: TCU; HIV tested before discharge        The patient's care was discussed with the Patient.    Sarai Clemons MD  Hospitalist Service  [facility]  Securely message with the Vocera Web Console (learn more here)  Text page via mDialog Paging/Directory         Clinically Significant Risk Factors              # Hypoalbuminemia: Lowest albumin = 3 g/dL at 12/16/2022  1:05 PM, will monitor as appropriate           # Obesity: Estimated body mass index is 30.41 kg/m  as calculated from the following:    Height as of this encounter: 1.727 m (5' 8\").    Weight as of this encounter: 90.7 kg (200 lb).          ______________________________________________________________________    Interval History   Patient sitting in bed, family at bedside.  Notes he has no complaints.  Pain is well managed today.    Data reviewed today: I reviewed all medications, new labs and imaging results over the last 24 hours. I personally reviewed no images or EKG's today.    Physical Exam   Vital Signs: Temp: 99.2  F (37.3  C) Temp src: Oral BP: 129/73 Pulse: 106   Resp: 16 SpO2: 99 % O2 Device: None (Room air)    Weight: 200 lbs 0 oz  General Appearance: Well appearing for stated age.  Respiratory: CTAB, no rales or ronchi  Cardiovascular: S1, S2 normal, no murmurs  GI: non-tender on palpation, BS present      Data   Recent Labs   Lab 12/21/22  0644 12/20/22  1041 12/19/22  0709 12/18/22  2237 12/18/22  1245 12/17/22  1150 12/17/22  0612 12/16/22  1305 12/15/22  2124 12/15/22  1522 12/15/22  1301 12/15/22  1159 12/15/22  0603 12/15/22  0603   WBC  --   --   --   --   --   --   --   --   --  8.7  --  7.0  --  5.0   HGB 8.1*  --   --   --  8.7* 8.9*   < >  --    < > 9.5*   < > 10.2*  " --  14.5   MCV  --   --   --   --   --   --   --   --   --  90  --  87  --  88   PLT  --   --   --   --   --   --   --   --   --  183  --  248  --  273   INR  --   --   --   --   --   --   --   --   --   --   --   --   --  1.05   NA  --   --  132*  --  130* 132*  --  133  --   --    < > 133  --  129*   POTASSIUM 3.7 3.6 4.3  4.2   < > 3.4 3.4  --  3.7  --   --    < > 4.3  --  3.9   CHLORIDE  --   --  100  --  99 101  --  100  --   --   --  100   < >  --    CO2  --   --  22  --  24 24  --  24  --   --   --  26   < >  --    BUN  --   --  15  --  18 18  --  20  --   --   --  11   < >  --    CR  --   --  0.60*  --  0.62* 0.70  --  1.22  --   --   --  0.52*   < >  --    ANIONGAP  --   --  10  --  7 7  --  9  --   --   --  7   < >  --    SHELBY  --   --  8.0*  --  7.8* 7.3*  --  7.4*  --   --   --  8.1*   < >  --    GLC  --   --  107*  --  149* 134*   < > 126*   < >  --   --  113*   < >  --    ALBUMIN  --   --   --   --   --   --   --  3.0*  --   --   --   --   --   --    PROTTOTAL  --   --   --   --   --   --   --  5.6*  --   --   --   --   --   --    BILITOTAL  --   --   --   --   --   --   --  0.9  --   --   --   --   --   --    ALKPHOS  --   --   --   --   --   --   --  71  --   --   --   --   --   --    ALT  --   --   --   --   --   --   --  34  --   --   --   --   --   --    AST  --   --   --   --   --   --   --  32  --   --   --   --   --   --     < > = values in this interval not displayed.     No results found for this or any previous visit (from the past 24 hour(s)).

## 2022-12-21 NOTE — PROVIDER NOTIFICATION
Paged Dr. Clemons to please order an HIV test as patient needs one completed in order to discharge to TCU.     Patient was tested back on 12/2/22 result was negative.

## 2022-12-21 NOTE — PROGRESS NOTES
Care Management Follow Up    Length of Stay (days): 6    Expected Discharge Date: 12/21/2022     Concerns to be Addressed:       Patient plan of care discussed at interdisciplinary rounds: Yes    Anticipated Discharge Disposition: Acute Rehab, Transitional Care     Anticipated Discharge Services: None  Anticipated Discharge DME: None    Patient/family educated on Medicare website which has current facility and service quality ratings: yes  Education Provided on the Discharge Plan:    Patient/Family in Agreement with the Plan: yes    Referrals Placed by CM/SW:    Private pay costs discussed: Not applicable    Additional Information:  PAMELA spoke with bedside nurse who is still unsure of HIV status. SW informed patient would need a test prior to discharge. PAMELA called [facility] and let them know we do not yet know HIV status but will keep them updated. PAMELA informed HIV test is needed. Test will be ordered. PAMELA will continue to follow.    Addendum: PAMELA called [facility] and left vm inquiring about bed availability. PAMELA will continue to follow.          BERNARDO Martinez    [facility]

## 2022-12-21 NOTE — PLAN OF CARE
Goal Outcome Evaluation:      Plan of Care Reviewed With: patient      Patient vital signs are at baseline: Yes  Patient able to ambulate as they were prior to admission or with assist devices provided by therapies during their stay:  Yes  Patient MUST void prior to discharge:  Yes  Patient able to tolerate oral intake:  Yes  Pain has adequate pain control using Oral analgesics:  Yes  Does patient have an identified :  No, TBD   Has goal D/C date and time been discussed with patient:  Yes, pt to discharge to TCU today      AOx4, VSS on RA. Tele NSR. Progressing per plan of care.

## 2022-12-21 NOTE — PROGRESS NOTES
Care Management Follow Up    Length of Stay (days): 6    Expected Discharge Date: 12/22/2022     Concerns to be Addressed:       Patient plan of care discussed at interdisciplinary rounds: Yes    Anticipated Discharge Disposition: Acute Rehab, Transitional Care     Anticipated Discharge Services: None  Anticipated Discharge DME: None    Patient/family educated on Medicare website which has current facility and service quality ratings: yes  Education Provided on the Discharge Plan:    Patient/Family in Agreement with the Plan: yes    Referrals Placed by CM/SW:    Private pay costs discussed: Not applicable    Additional Information:  SW informed patient is HIV negative. SW called [facility] and informed they no longer have a bed available. SW sent additional TCU referrals. PAMELA will continue to follow.    Addendum: PAMELA sent additional referral via DOD.      BERNARDO Martinez    [facility]

## 2022-12-21 NOTE — PROGRESS NOTES
"SPIRITUAL HEALTH SERVICES Progress Note  [facility] -24 Ortho Spine    Referral: Length-of-Stay visit.    Philipp shared that he feels \"strong mentally,\" and that he's \"been dealing with this for a long time.\" He also said that it's not the worst he's ever experienced.    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 Philipp said he's grateful for many things, and that \"without gratitude, you've got nothing.\"    He hopes he can discharge soon to transitional care and get home by 1 January. He said he had no SH needs at this time.    SH remains available.    Rev Jenni Mcclure  Associate   [facility] Spiritual Health Phone Line [phone]  [place] (Tuesdays & Thursdays) [phone]    "

## 2022-12-21 NOTE — PROGRESS NOTES
DATE & TIME: December 21st 2022 [id number]     Cognitive Concerns/ Orientation : AOx4   ABNL VS/O2: VSS on RA, hypertensive bp medications scheduled  MOBILITY: A1 GBW  PAIN MANAGMENT: PRN oxycodone   DIET: Regular   BOWEL/BLADDER: Continent of BB, uses bedside urinal  ABNL LAB/BG: Potassium protocol 3.7 redraw scheduled for am draw  DRAIN/DEVICES: No IV access  TELEMETRY RHYTHM: Sinus Tachycardia  SKIN: Spinal incision, scattered bruising   D/C DATE: Pending ARU placement

## 2022-12-22 ENCOUNTER — APPOINTMENT (OUTPATIENT)
Dept: OCCUPATIONAL THERAPY | Facility: CLINIC | Age: 67
DRG: 454 | End: 2022-12-22
Attending: NEUROLOGICAL SURGERY
Payer: COMMERCIAL

## 2022-12-22 ENCOUNTER — APPOINTMENT (OUTPATIENT)
Dept: PHYSICAL THERAPY | Facility: CLINIC | Age: 67
DRG: 454 | End: 2022-12-22
Attending: NEUROLOGICAL SURGERY
Payer: COMMERCIAL

## 2022-12-22 LAB — POTASSIUM BLD-SCNC: 3.6 MMOL/L (ref 3.4–5.3)

## 2022-12-22 PROCEDURE — 97530 THERAPEUTIC ACTIVITIES: CPT | Mod: GP

## 2022-12-22 PROCEDURE — 120N000001 HC R&B MED SURG/OB

## 2022-12-22 PROCEDURE — 36415 COLL VENOUS BLD VENIPUNCTURE: CPT | Performed by: NEUROLOGICAL SURGERY

## 2022-12-22 PROCEDURE — 84132 ASSAY OF SERUM POTASSIUM: CPT | Performed by: NEUROLOGICAL SURGERY

## 2022-12-22 PROCEDURE — 250N000013 HC RX MED GY IP 250 OP 250 PS 637: Performed by: HOSPITALIST

## 2022-12-22 PROCEDURE — 97116 GAIT TRAINING THERAPY: CPT | Mod: GP

## 2022-12-22 PROCEDURE — 97530 THERAPEUTIC ACTIVITIES: CPT | Mod: GO

## 2022-12-22 PROCEDURE — 250N000013 HC RX MED GY IP 250 OP 250 PS 637: Performed by: PHYSICIAN ASSISTANT

## 2022-12-22 RX ADMIN — HYDROXYZINE HYDROCHLORIDE 10 MG: 10 TABLET ORAL at 19:56

## 2022-12-22 RX ADMIN — OXYCODONE HYDROCHLORIDE 5 MG: 5 TABLET ORAL at 15:47

## 2022-12-22 RX ADMIN — METHOCARBAMOL 750 MG: 750 TABLET ORAL at 19:56

## 2022-12-22 RX ADMIN — LISINOPRIL 40 MG: 40 TABLET ORAL at 08:40

## 2022-12-22 RX ADMIN — POLYETHYLENE GLYCOL 3350 17 G: 17 POWDER, FOR SOLUTION ORAL at 08:40

## 2022-12-22 RX ADMIN — CHLORTHALIDONE 25 MG: 25 TABLET ORAL at 08:40

## 2022-12-22 RX ADMIN — OXYCODONE HYDROCHLORIDE 10 MG: 5 TABLET ORAL at 19:56

## 2022-12-22 RX ADMIN — OXYCODONE HYDROCHLORIDE 10 MG: 5 TABLET ORAL at 02:02

## 2022-12-22 RX ADMIN — OXYCODONE HYDROCHLORIDE 10 MG: 5 TABLET ORAL at 06:21

## 2022-12-22 RX ADMIN — ZINC SULFATE 220 MG (50 MG) CAPSULE 220 MG: CAPSULE at 08:40

## 2022-12-22 RX ADMIN — OXYCODONE HYDROCHLORIDE 10 MG: 5 TABLET ORAL at 11:00

## 2022-12-22 RX ADMIN — FERROUS GLUCONATE 324 MG: 324 TABLET ORAL at 08:40

## 2022-12-22 RX ADMIN — PANTOPRAZOLE SODIUM 40 MG: 40 TABLET, DELAYED RELEASE ORAL at 08:40

## 2022-12-22 RX ADMIN — SENNOSIDES AND DOCUSATE SODIUM 1 TABLET: 50; 8.6 TABLET ORAL at 08:40

## 2022-12-22 ASSESSMENT — ACTIVITIES OF DAILY LIVING (ADL)
ADLS_ACUITY_SCORE: 22

## 2022-12-22 NOTE — PROGRESS NOTES
Pt A/Ox4, VSS on RA, oxy for pain management, urinal at bedside with adequate UOP, refusing to wear bilat AFO's, no IV access, possible d/c to TCU today.

## 2022-12-22 NOTE — PROGRESS NOTES
Care Management Follow Up    Length of Stay (days): 7    Expected Discharge Date: 12/23/2022     Concerns to be Addressed:       Patient plan of care discussed at interdisciplinary rounds: Yes    Anticipated Discharge Disposition: Acute Rehab, Transitional Care     Anticipated Discharge Services: None  Anticipated Discharge DME: None    Patient/family educated on Medicare website which has current facility and service quality ratings: yes  Education Provided on the Discharge Plan:    Patient/Family in Agreement with the Plan: yes    Referrals Placed by CM/SW:    Private pay costs discussed: Not applicable    Additional Information:  Call from margarita [facility] reporting ARU would have a bed for tomorrow. PAMELA spoke with pt who refused placement. Pt states he will not go to [facility] for placement. SW stated this was the current placement option and pt continued to refuse. Pt has one pending Tcu referral with [facility]. SW left a VM with their admission staff.       BERNARDO Christianson

## 2022-12-22 NOTE — PROGRESS NOTES
[facility]    Neurosurgery  Daily Post-Op Note    Assessment & Plan   Procedure(s):  Thoracic 10 to Sacral 1 posterior segmental instrumentation. Pelvic instrumentation. Screw cement augmentation L10 to L11. Lumbar 2 to Sacral 1 bilateral decompression and transforaminal interbody fusion. Posterior arthrodesis Thoracic 10-Sacral 1.   7 Days Post-Op  Doing well.  Pain well-controlled.  Tolerating physical therapy and rehabilitation well.  LLE weakness unchanged    Plan:  -Advance activity as tolerated  -Continue supportive and symptomatic treatment  -Start or continue physical therapy    Shaheen Weir PA-C    Interval History   Stable.  Doing well.  Improving slowly.  Pain is reasonably controlled.  No fevers.     Physical Exam   Temp: 97.6  F (36.4  C) Temp src: Oral BP: 127/88 Pulse: 97   Resp: 16 SpO2: 100 % O2 Device: None (Room air)    Vitals:    12/15/22 0654   Weight: 90.7 kg (200 lb)     Vital Signs with Ranges  Temp:  [97.6  F (36.4  C)-99.2  F (37.3  C)] 97.6  F (36.4  C)  Pulse:  [] 97  Resp:  [16-18] 16  BP: (127-136)/(73-91) 127/88  SpO2:  [98 %-100 %] 100 %  I/O last 3 completed shifts:  In: 780 [P.O.:780]  Out: 1350 [Urine:1350]    Alert and oriented.  Moves all extremities equally.  Reflexes symmetrical.     Incision: CDI      Medications        chlorthalidone  25 mg Oral Daily     ferrous gluconate  324 mg Oral Daily with breakfast     lisinopril  40 mg Oral Daily     pantoprazole  40 mg Oral Daily     polyethylene glycol  17 g Oral Daily     senna-docusate  1 tablet Oral BID     zinc sulfate  220 mg Oral Daily           Shaheen Weir PA-C  [facility] Neurosurgery  [address]  [address] [place] [id number]    Tel [phone]  Pager [phone]

## 2022-12-22 NOTE — PROGRESS NOTES
S:  We received an order for bilateral AFOs.  DX:  Drop foot  O:  I met with the patient in his room and attempted to virginia size large [place] AFOs into his shoes which do not fit with his feet.  I attempted to virginia his shoes onto his feet and am not able to [place] his shoes onto his feet.  I went back to our office and grabbed a pair of large post op shoes to try with the AFOs on.  I donned the AFOs with the post op shoes on to accommodate the AFOs.  I had the patient walk using a walker/gait belt donned.  The AFOs seem to be preventing drop foot.  A:  The AFOs seem to be preventing the drop foot.  The AFOs seem to fit the lower extremities adequate.    P:  The patient will wear the AFOs when walking.  The patient will contact us prn.  Luke BARON

## 2022-12-22 NOTE — PROVIDER NOTIFICATION
Provider paged x's 2 to discuss patient being on telemetry, but no IV access.  Patient to discharge in am, hoping to discontinue telemetry.  No return page at this time.  Will continue to monitor.

## 2022-12-22 NOTE — PROGRESS NOTES
DATE & TIME: December 22ndt 2022 [id number]     Cognitive Concerns/ Orientation : AOx4   ABNL VS/O2: VSS on RA, hypertensive bp medications scheduled  MOBILITY: A1 GBW  PAIN MANAGMENT: PRN oxycodone   DIET: Regular   BOWEL/BLADDER: Continent of BB, uses bedside urinal did have a bowel movement last night  ABNL LAB/BG: Potassium protocol 3.6 redraw scheduled for am draw  DRAIN/DEVICES: No IV access  TELEMETRY RHYTHM: Telemetry discontinued  SKIN: Spinal incision, scattered bruising   D/C DATE: Pending TCU placement

## 2022-12-22 NOTE — PLAN OF CARE
Alert and oriented x's 4.  Up with one assist and walker.  Voiding well per urinal.  Dressing changed.  Oxycodone for pain.  Bilateral foot drop, fitted for AFO's.    Will continue to monitor.

## 2022-12-22 NOTE — PROVIDER NOTIFICATION
MD Notification    Notified Person: MD    Notified Person Name: Jessica Macdonald     Notification Date/Time: 12/21/22 @ 2014  Hrs     Notification Interaction: Amcom    Purpose of Notification: 2401, [place] is it ok to d/c tele? VSS on RA, d/c to TCU tmrw.    Orders Received:    Comments:

## 2022-12-23 ENCOUNTER — APPOINTMENT (OUTPATIENT)
Dept: OCCUPATIONAL THERAPY | Facility: CLINIC | Age: 67
DRG: 454 | End: 2022-12-23
Attending: NEUROLOGICAL SURGERY
Payer: COMMERCIAL

## 2022-12-23 LAB
ATRIAL RATE - MUSE: 116 BPM
DIASTOLIC BLOOD PRESSURE - MUSE: NORMAL MMHG
HGB BLD-MCNC: 8.6 G/DL (ref 13.3–17.7)
INTERPRETATION ECG - MUSE: NORMAL
P AXIS - MUSE: 54 DEGREES
POTASSIUM BLD-SCNC: 3.8 MMOL/L (ref 3.4–5.3)
PR INTERVAL - MUSE: 158 MS
QRS DURATION - MUSE: 90 MS
QT - MUSE: 332 MS
QTC - MUSE: 461 MS
R AXIS - MUSE: 6 DEGREES
SODIUM SERPL-SCNC: 127 MMOL/L (ref 133–144)
SODIUM SERPL-SCNC: 127 MMOL/L (ref 133–144)
SYSTOLIC BLOOD PRESSURE - MUSE: NORMAL MMHG
T AXIS - MUSE: 56 DEGREES
VENTRICULAR RATE- MUSE: 116 BPM

## 2022-12-23 PROCEDURE — 85018 HEMOGLOBIN: CPT | Performed by: PHYSICIAN ASSISTANT

## 2022-12-23 PROCEDURE — 120N000001 HC R&B MED SURG/OB

## 2022-12-23 PROCEDURE — 36415 COLL VENOUS BLD VENIPUNCTURE: CPT | Performed by: NEUROLOGICAL SURGERY

## 2022-12-23 PROCEDURE — 250N000013 HC RX MED GY IP 250 OP 250 PS 637: Performed by: PHYSICIAN ASSISTANT

## 2022-12-23 PROCEDURE — 258N000003 HC RX IP 258 OP 636: Performed by: PHYSICIAN ASSISTANT

## 2022-12-23 PROCEDURE — 99207 PR NO BILLABLE SERVICE THIS VISIT: CPT | Performed by: PHYSICIAN ASSISTANT

## 2022-12-23 PROCEDURE — 84295 ASSAY OF SERUM SODIUM: CPT | Performed by: PHYSICIAN ASSISTANT

## 2022-12-23 PROCEDURE — 97535 SELF CARE MNGMENT TRAINING: CPT | Mod: GO | Performed by: OCCUPATIONAL THERAPIST

## 2022-12-23 PROCEDURE — 250N000013 HC RX MED GY IP 250 OP 250 PS 637: Performed by: HOSPITALIST

## 2022-12-23 PROCEDURE — 84132 ASSAY OF SERUM POTASSIUM: CPT | Performed by: NEUROLOGICAL SURGERY

## 2022-12-23 PROCEDURE — 36415 COLL VENOUS BLD VENIPUNCTURE: CPT | Performed by: PHYSICIAN ASSISTANT

## 2022-12-23 RX ORDER — METHOCARBAMOL 750 MG/1
750 TABLET, FILM COATED ORAL EVERY 6 HOURS PRN
Qty: 60 TABLET | Refills: 2 | Status: SHIPPED | OUTPATIENT
Start: 2022-12-23 | End: 2022-12-23

## 2022-12-23 RX ORDER — METHOCARBAMOL 750 MG/1
750 TABLET, FILM COATED ORAL EVERY 6 HOURS PRN
Qty: 60 TABLET | Refills: 2 | Status: ON HOLD | OUTPATIENT
Start: 2022-12-23 | End: 2023-01-05

## 2022-12-23 RX ORDER — AMOXICILLIN 250 MG
1 CAPSULE ORAL 2 TIMES DAILY PRN
Qty: 30 TABLET | Refills: 1 | Status: ON HOLD | OUTPATIENT
Start: 2022-12-23 | End: 2023-01-05

## 2022-12-23 RX ORDER — OXYCODONE HYDROCHLORIDE 5 MG/1
5 TABLET ORAL EVERY 4 HOURS PRN
Qty: 30 TABLET | Refills: 0 | Status: SHIPPED | OUTPATIENT
Start: 2022-12-23 | End: 2022-12-23

## 2022-12-23 RX ORDER — OXYCODONE HYDROCHLORIDE 5 MG/1
5 TABLET ORAL EVERY 4 HOURS PRN
Qty: 30 TABLET | Refills: 0 | Status: ON HOLD | OUTPATIENT
Start: 2022-12-23 | End: 2023-01-05

## 2022-12-23 RX ORDER — AMOXICILLIN 250 MG
1 CAPSULE ORAL 2 TIMES DAILY PRN
Qty: 30 TABLET | Refills: 1 | Status: SHIPPED | OUTPATIENT
Start: 2022-12-23 | End: 2022-12-23

## 2022-12-23 RX ADMIN — METHOCARBAMOL 750 MG: 750 TABLET ORAL at 08:55

## 2022-12-23 RX ADMIN — ZINC SULFATE 220 MG (50 MG) CAPSULE 220 MG: CAPSULE at 08:55

## 2022-12-23 RX ADMIN — OXYCODONE HYDROCHLORIDE 10 MG: 5 TABLET ORAL at 16:00

## 2022-12-23 RX ADMIN — FERROUS GLUCONATE 324 MG: 324 TABLET ORAL at 08:55

## 2022-12-23 RX ADMIN — METHOCARBAMOL 750 MG: 750 TABLET ORAL at 20:38

## 2022-12-23 RX ADMIN — OXYCODONE HYDROCHLORIDE 10 MG: 5 TABLET ORAL at 08:54

## 2022-12-23 RX ADMIN — CHLORTHALIDONE 25 MG: 25 TABLET ORAL at 08:55

## 2022-12-23 RX ADMIN — PANTOPRAZOLE SODIUM 40 MG: 40 TABLET, DELAYED RELEASE ORAL at 08:55

## 2022-12-23 RX ADMIN — ACETAMINOPHEN 650 MG: 325 TABLET, FILM COATED ORAL at 12:04

## 2022-12-23 RX ADMIN — OXYCODONE HYDROCHLORIDE 10 MG: 5 TABLET ORAL at 20:38

## 2022-12-23 RX ADMIN — ACETAMINOPHEN 650 MG: 325 TABLET, FILM COATED ORAL at 02:35

## 2022-12-23 RX ADMIN — METHOCARBAMOL 750 MG: 750 TABLET ORAL at 02:35

## 2022-12-23 RX ADMIN — OXYCODONE HYDROCHLORIDE 10 MG: 5 TABLET ORAL at 02:35

## 2022-12-23 RX ADMIN — SODIUM CHLORIDE 1000 ML: 9 INJECTION, SOLUTION INTRAVENOUS at 16:00

## 2022-12-23 RX ADMIN — LISINOPRIL 40 MG: 40 TABLET ORAL at 08:55

## 2022-12-23 ASSESSMENT — ACTIVITIES OF DAILY LIVING (ADL)
ADLS_ACUITY_SCORE: 23
ADLS_ACUITY_SCORE: 25
ADLS_ACUITY_SCORE: 23
ADLS_ACUITY_SCORE: 25
ADLS_ACUITY_SCORE: 23
ADLS_ACUITY_SCORE: 22
ADLS_ACUITY_SCORE: 23
ADLS_ACUITY_SCORE: 25

## 2022-12-23 NOTE — PROGRESS NOTES
[facility]    Neurosurgery  Daily Post-Op Note    Assessment & Plan   Procedure(s):  Thoracic 10 to Sacral 1 posterior segmental instrumentation. Pelvic instrumentation. Screw cement augmentation L10 to L11. Lumbar 2 to Sacral 1 bilateral decompression and transforaminal interbody fusion. Posterior arthrodesis Thoracic 10-Sacral 1.   8 Days Post-Op  Doing well.  Pain well-controlled.  TCU placement pending    Plan:  -Advance activity as tolerated  -Continue supportive and symptomatic treatment  -ok for discharge when bed found    Shaheen Weir PA-C    Interval History   Stable.  Doing well.  Improving slowly.  Pain is reasonably controlled.  No fevers.    Physical Exam   Temp: 98.6  F (37  C) Temp src: Oral BP: 112/78 Pulse: 82   Resp: 18 SpO2: 99 % O2 Device: None (Room air)    Vitals:    12/15/22 0654   Weight: 90.7 kg (200 lb)     Vital Signs with Ranges  Temp:  [98  F (36.7  C)-100  F (37.8  C)] 98.6  F (37  C)  Pulse:  [] 82  Resp:  [17-20] 18  BP: (112-139)/(73-83) 112/78  SpO2:  [96 %-100 %] 99 %  I/O last 3 completed shifts:  In: 240 [P.O.:240]  Out: 575 [Urine:575]    Alert and oriented.  Moves all extremities equally.  Reflexes symmetrical.     Incision: CDI      Medications        chlorthalidone  25 mg Oral Daily     ferrous gluconate  324 mg Oral Daily with breakfast     lisinopril  40 mg Oral Daily     pantoprazole  40 mg Oral Daily     polyethylene glycol  17 g Oral Daily     senna-docusate  1 tablet Oral BID     zinc sulfate  220 mg Oral Daily           Shaheen Weir PA-C  [facility] Neurosurgery  [address]  [address] [place] [id number]    Tel [phone]  Pager [phone]

## 2022-12-23 NOTE — PROGRESS NOTES
Care Management Discharge Note    Discharge Date: 12/23/2022       Discharge Disposition: Acute Rehab, Transitional Care    Discharge Services: None    Discharge DME: None    Discharge Transportation: agency, family or friend will provide    Private pay costs discussed: transportation costs    PAS Confirmation Code:    Patient/family educated on Medicare website which has current facility and service quality ratings: yes    Education Provided on the Discharge Plan:    Persons Notified of Discharge Plans: care team  Patient/Family in Agreement with the Plan: yes    Handoff Referral Completed: Yes    Additional Information:    CC spoke with Chandana from [facility] and confirmed that pt is clinically accepted and can arrive today.  Sw met with pt and confirmed that pt is agreeable to this plan and asked for medical transport given weather conditions.  Pt is aware that this is a private pay service.  Sw arranged HE w/c transport ride for pt today at 1430 to [facility].  [facility] confirmed that this discharge date/time will work on their end.  Discharge orders are in.  Sw to continue to follow through discharge.          Sera Stubbs, MALISW

## 2022-12-23 NOTE — INTERIM SUMMARY
[facility] Pre-Admission Screen    Referral Source:  [facility]THOPEDICS SPINE 2401-01  Admit date to referring facility: 12/15/2022    Physical Medicine and Rehab Consult Completed: No    Rehab Diagnosis:    Neurologic Conditions 03.9 Other Neurologic, Lumbar stenosis and spondylolisthesis with radiculopathy    Justification for Acute Inpatient Rehabilitation  The patient is a 67-year-old male with back and lower extremity symptoms with severe degenerative deformity and spondylolisthesis. 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 Post op he was noted to have tachycardia and soft BP. He has bilateral foot drop requiring orthotist consult for application of B AFOs.     The patient is medically ready for transfer to [facility] for rehabilitation. Patient requires an intensive inpatient rehab program to address the following acute impairments:impaired activity tolerance, impaired balance, impaired ROM, impaired strength, impaired tone, impaired weight shifting and pain    Current Active Medical Management Needs/Risks for Clinical Complications  The patient requires the high level of rehabilitation physician supervision that accompanies the provision of intensive rehabilitation therapy.  The patient needs the services of the rehabilitation physician to assess the patient medically and functionally and to modify the course of treatment as needed to maximize the patient's capacity to benefit from the rehabilitation process. The patient requires physician involvement at least 3 days per week to manage:   Orthopedic: in the setting of extensive spinal surgery, assess incision for signs of infection and manage incision to promote healing; reinforce precautions/restrictions for optimal healing and to prevent injury s/p surgery; assess  neuromuscular function regularly in the setting of bilateral foot drop; promote safety with orthotic management, educate in skin checks, wearing schedule. Currently on protonix ppx.   Pain: in the setting of post op pain, assess and manage to ensure participation in all therapies-- currently on Tylenol, Atarax, Robaxin, oxycodone  Cardiac: in the setting of HTN, assess and manage BP to ensure safety within parameters-- currently on chlorthalidone, lisinopril  Heme: in the setting of acute blood loss anemia, possible MGUS, assess and manage-- currently on ferrous gluconate  GI: in the setting of constipation, assess and manage-- currently on Miralax, senna-docusate    The patient is at risk for falls in the setting bilateral foot drop; pain exacerbation post op spinal surgery; sleep disturbance due to pain; constipation in the setting of narcotic pain management; ABEL in the setting of recent ABEL.     Past Medical/Surgical History   Surgery in the past 100 days: Yes   Additional relevant past medical history: severe degenerative deformity and spondylolisthesis. 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 Pt is partially retired, works as a sub teacher. House has 5 DONI. Pt has tub shower w/ shower chair and grab bars    SELF-CARE   Usual Activity Tolerance: moderate     Equipment Currently Used at Home: walker, rolling, shower chair, grab bar, tub/shower, grab bar, toilet   Activity/Exercise/Self-Care Comment: Pt ambulates w/ 4WW since initial back injury in Nov 2020. He is  independent w/ ADL tasks. Usually limited to ambulation distances of 25 yards before he needs to sit/rest per pt.  Additional Comments: Pt reports he has 5 sons locally who are able to assist.     Level of Function: GG Scale (Section GG Functional Ability and Goals; CMS's CONTRERAS Version 3.0 Manual effective 10.1.2019):  PT Current Function Goals for Rehab   Bed Rolling 4 Supervision or touching assitance 6 Independent   Supine to Sit 3 Partial/moderate assistance 6 Independent   Sit to Stand 3 Partial/moderate assistance 6 Independent   Transfer 3 Partial/moderate assistance 6 Independent   Ambulation 3 Partial/moderate assistance 6 Independent   Stairs Not completed 6 Independent     OT Current Function Goals for Rehab   Feeding 6 Independent Not Applicable   Grooming 4 Supervision or touching assitance 6 Independent   Bathing Not completed 6 Independent   Upper Body Dressing 4 Supervision or touching assitance 6 Independent   Lower Body Dressing 3 Partial/moderate assistance 6 Independent   Toileting 3 Partial/moderate assistance 6 Independent   Toilet Transfer 1 Dependent (A x 2) 6 Independent   Tub/Shower Transfer 88 Not attempted due to safety 6 Independent   Cognition Not Impaired Not applicable     SLP Current Function Goals for Rehab   Swallow Not Impaired Not applicable   Communication Not Impaired Not applicable       Current Diet:  0-Thin and 7-Regular    Summary Statement:  In PT the patient ambulated 15' x 2 with FWW and Cristina during the first bout, then CGA during second bout. Pt demonstrates signficant use of UEs, quickly progressing walker anteriorly, hips hinged. Pt very SOB after short bout and required therapeutic rest break. Pt demosntrated improvement in control of FWW during second bout given cues.  In OT the patient was educated in Log roll with VCs to bend knees and roll to R. HOB partially elevated to 15 degrees. MOD A LE management to lower feet to floor. Pt requesting to transfer to standard  toilet w/o commode due to inability to have room on smaller commode seat. Pt cued in t/f to his 4WW with height of bed elevated to facilitate sit>stand. MIN A ambulation to toilet. Pt cued in stand >sit to toilet following precations completed with MIN A. Pt required additional  time on toilet to complete BM. SBA with wiping and VCs for following spinal precautions. Pt. unable to stand with a x 1 and second person called. Cued in sit >stand technique with OT providing supprt in front and rehab aide support to left side. MOD A x 2 sit>stand. MIN A x1 return to EOB sit with VCs for safe technique. Pt required MAX LE management to asume sidelying. Completed log roll to supine with vcs and SBA. Pt set up  with call light phone and bed alarm enaged.    Expected Therapies and Services Required During Inpatient Rehab Admission  Intensity of Therapy: Patient requires intensive therapies not available in a lesser level of care. Patient is motivated, making gains, and can tolerate 3 hours of therapy a day.  Physical Therapy: 90 minutes per day, 7 days a week for 7 days  Occupational Therapy: 90 minutes per day, 7 days a week for 7 days  Speech and Language Therapy: No SLP needs noted at this time  Rehabilitation Nursing Needs: Patient requires 24 hour Rehab Nursing to manage vitals, medication education, tone management, positioning, carryover of new rehab techniques, care coordination, assess neurologic status, pain management, post-surgical incision care to promote healing and prevent infection and provide safe environment for patient at falls risk.    Precautions/restrictions/special needs:   Precautions: fall precautions and spinal precautions   Restrictions: No lifting, pushing, pulling, bending, twisting   Special Needs: NA    Expected Level of Improvement: Anticipate with intensive therapies, close medical management, and rehabilitative nursing care the patient will improve strength, balance, tolerance to activity,  safety, compensatory strategies, and adherence to all precautions with movement to ensure Mod I with basic mobility and ADL performance to allow return home with OP therapies.   Expected Length of time to achieve: 7 days    Anticipated Discharge Needs:  Anticipated Discharge Destination: Home  Anticipated Discharge Support: Family member  24/7 support available : Unknown  Identified caregiver(s):  5 sons   Anticipated Discharge Needs: Home with outpatient therapy    Identified challenges/barriers:  5 DONI son's home    Liaison signature/date/time:    Physician statement of review and agreement:  I have reviewed and am in agreement of the need for IRF stay to address above functional and medical needs. In addition to above statements address, Patient requires intensive active and ongoing therapeutic intervention and multiple therapies; Patient requires medical supervision; Expected to actively participate in the intensive rehab program; Sufficiently stable to actively participate; Expectation for measurable improvement in functional capacity or adaption to impairments.    MD signature/date/time:

## 2022-12-23 NOTE — PLAN OF CARE
Goal Outcome Evaluation:      Plan of Care Reviewed With: patient      Patient vital signs are at baseline: Yes  Patient able to ambulate as they were prior to admission or with assist devices provided by therapies during their stay:  Yes  Patient MUST void prior to discharge:  Yes  Patient able to tolerate oral intake:  Yes  Pain has adequate pain control using Oral analgesics:  Yes  Does patient have an identified :  No,  Reason:  TBD pending TCU placement   Has goal D/C date and time been discussed with patient:  Yes, discharge set for today     Aox4, VSS on RA. A1 w/ GB&W. Tolerates reg diet, adequarte I/Os. Voids in urinal. Continent of B&B. Obn K+ protocol.  Spinal incision dressing CDI. Pt to d/c top TCU today.

## 2022-12-23 NOTE — PROGRESS NOTES
Care Management Follow Up    Length of Stay (days): 8    Expected Discharge Date: 12/23/2022     Concerns to be Addressed:       Patient plan of care discussed at interdisciplinary rounds: Yes    Anticipated Discharge Disposition: Acute Rehab, Transitional Care     Anticipated Discharge Services: None  Anticipated Discharge DME: None    Patient/family educated on Medicare website which has current facility and service quality ratings: yes  Education Provided on the Discharge Plan:    Patient/Family in Agreement with the Plan: yes    Referrals Placed by CM/SW:    Private pay costs discussed: Not applicable    Additional Information:    Per DOD pt has been declined at the following facilities:    [facility]  [facility]  [facility]  [facility]    Pending referrals is [facility].  Per chart review pt would like a facility in or near [place].  Per Medicare website the next closest facilities in [place] that have not been looked into are [facility] and [facility]; Sw sent referrals.      Sera Stubbs, MALISW

## 2022-12-23 NOTE — PROGRESS NOTES
Care Management    [facility] can no longer accept today due to Na 127. Ride rescheduled for 12/24 at 1300.     Persons Notified of above: Charge RN (will notify [facility]), Bedside RN, Patient, Shaheen WILSON, and STEVE Cortes.      Linnette Rice, RN, BSN, PHN, CMSRN  Care Coordination  [facility]

## 2022-12-23 NOTE — PROGRESS NOTES
Hospitalist reconsulted, order received regarding hyponatremia and discharge being held.  Patient has low sodium of 127.  Last check was on 12/17 and was 132.  It looks like he has had hyponatremia in the past.  Ordered 1 L normal saline fluid over 10 hours.  We will repeat sodium level this evening, as well as full BMP in AM.  We will see the patient tomorrow.    Maxim Duarte PA-C

## 2022-12-23 NOTE — PLAN OF CARE
Goal Outcome Evaluation:      Plan of Care Reviewed With: patient    Overall Patient Progress: improving    Patient is AO X 4; VSS; on RA; takes Tylenol and PO dilaudid around the clock.   Feet are swollen. Incision is CDI.

## 2022-12-23 NOTE — PROGRESS NOTES
Patient stated that he doesn't like the orthotics he received for his feet and that he doesn't want them.  He said he discussed the issue with physical therapist and that several other options were presented to him.  He also said that he played basketball for over 2 decades and so he knows how to manage food drops.

## 2022-12-24 ENCOUNTER — HOSPITAL ENCOUNTER (INPATIENT)
Facility: CLINIC | Age: 67
LOS: 13 days | Discharge: HOME HEALTH | DRG: 949 | End: 2023-01-06
Attending: PHYSICAL MEDICINE & REHABILITATION | Admitting: PHYSICAL MEDICINE & REHABILITATION
Payer: COMMERCIAL

## 2022-12-24 VITALS
OXYGEN SATURATION: 99 % | WEIGHT: 200 LBS | RESPIRATION RATE: 16 BRPM | SYSTOLIC BLOOD PRESSURE: 126 MMHG | HEIGHT: 68 IN | BODY MASS INDEX: 30.31 KG/M2 | TEMPERATURE: 98.5 F | HEART RATE: 84 BPM | DIASTOLIC BLOOD PRESSURE: 81 MMHG

## 2022-12-24 DIAGNOSIS — E60 ZINC DEFICIENCY: ICD-10-CM

## 2022-12-24 DIAGNOSIS — M54.50 LUMBAR SPINE PAIN: ICD-10-CM

## 2022-12-24 DIAGNOSIS — D75.839 THROMBOCYTOSIS: ICD-10-CM

## 2022-12-24 DIAGNOSIS — E61.1 IRON DEFICIENCY: ICD-10-CM

## 2022-12-24 DIAGNOSIS — E55.9 VITAMIN D DEFICIENCY: ICD-10-CM

## 2022-12-24 DIAGNOSIS — I10 BENIGN ESSENTIAL HYPERTENSION: ICD-10-CM

## 2022-12-24 DIAGNOSIS — Z98.1 HISTORY OF THORACIC SPINAL FUSION: Primary | ICD-10-CM

## 2022-12-24 DIAGNOSIS — T81.49XA SURGICAL SITE INFECTION: ICD-10-CM

## 2022-12-24 LAB
ANION GAP SERPL CALCULATED.3IONS-SCNC: 7 MMOL/L (ref 3–14)
BUN SERPL-MCNC: 14 MG/DL (ref 7–30)
CALCIUM SERPL-MCNC: 8.2 MG/DL (ref 8.5–10.1)
CHLORIDE BLD-SCNC: 97 MMOL/L (ref 94–109)
CO2 SERPL-SCNC: 26 MMOL/L (ref 20–32)
CREAT SERPL-MCNC: 0.55 MG/DL (ref 0.66–1.25)
GFR SERPL CREATININE-BSD FRML MDRD: >90 ML/MIN/1.73M2
GLUCOSE BLD-MCNC: 128 MG/DL (ref 70–99)
POTASSIUM BLD-SCNC: 3.7 MMOL/L (ref 3.4–5.3)
SODIUM SERPL-SCNC: 130 MMOL/L (ref 133–144)

## 2022-12-24 PROCEDURE — 250N000013 HC RX MED GY IP 250 OP 250 PS 637: Performed by: PHYSICIAN ASSISTANT

## 2022-12-24 PROCEDURE — 99232 SBSQ HOSP IP/OBS MODERATE 35: CPT | Performed by: INTERNAL MEDICINE

## 2022-12-24 PROCEDURE — 99232 SBSQ HOSP IP/OBS MODERATE 35: CPT | Performed by: PHYSICIAN ASSISTANT

## 2022-12-24 PROCEDURE — 36415 COLL VENOUS BLD VENIPUNCTURE: CPT | Performed by: PHYSICIAN ASSISTANT

## 2022-12-24 PROCEDURE — 99223 1ST HOSP IP/OBS HIGH 75: CPT | Mod: GC | Performed by: PHYSICAL MEDICINE & REHABILITATION

## 2022-12-24 PROCEDURE — 250N000013 HC RX MED GY IP 250 OP 250 PS 637: Performed by: HOSPITALIST

## 2022-12-24 PROCEDURE — 128N000003 HC R&B REHAB

## 2022-12-24 PROCEDURE — 80048 BASIC METABOLIC PNL TOTAL CA: CPT | Performed by: PHYSICIAN ASSISTANT

## 2022-12-24 RX ORDER — METHOCARBAMOL 750 MG/1
750 TABLET, FILM COATED ORAL EVERY 6 HOURS PRN
Status: DISCONTINUED | OUTPATIENT
Start: 2022-12-24 | End: 2022-12-24

## 2022-12-24 RX ORDER — METHOCARBAMOL 750 MG/1
750 TABLET, FILM COATED ORAL EVERY 6 HOURS PRN
Status: DISCONTINUED | OUTPATIENT
Start: 2022-12-24 | End: 2022-12-29

## 2022-12-24 RX ORDER — OXYCODONE HYDROCHLORIDE 5 MG/1
5-10 TABLET ORAL EVERY 4 HOURS PRN
Status: DISCONTINUED | OUTPATIENT
Start: 2022-12-24 | End: 2023-01-05

## 2022-12-24 RX ORDER — ZINC SULFATE 50(220)MG
220 CAPSULE ORAL DAILY
Status: DISCONTINUED | OUTPATIENT
Start: 2022-12-25 | End: 2023-01-06 | Stop reason: HOSPADM

## 2022-12-24 RX ORDER — HYDROXYZINE HYDROCHLORIDE 50 MG/1
50 TABLET, FILM COATED ORAL EVERY 6 HOURS PRN
Status: DISCONTINUED | OUTPATIENT
Start: 2022-12-24 | End: 2022-12-27

## 2022-12-24 RX ORDER — NALOXONE HYDROCHLORIDE 0.4 MG/ML
0.4 INJECTION, SOLUTION INTRAMUSCULAR; INTRAVENOUS; SUBCUTANEOUS
Status: DISCONTINUED | OUTPATIENT
Start: 2022-12-24 | End: 2023-01-06 | Stop reason: HOSPADM

## 2022-12-24 RX ORDER — POLYETHYLENE GLYCOL 3350 17 G/17G
17 POWDER, FOR SOLUTION ORAL DAILY PRN
Status: DISCONTINUED | OUTPATIENT
Start: 2022-12-24 | End: 2023-01-06 | Stop reason: HOSPADM

## 2022-12-24 RX ORDER — AMOXICILLIN 250 MG
1 CAPSULE ORAL 2 TIMES DAILY PRN
Status: DISCONTINUED | OUTPATIENT
Start: 2022-12-24 | End: 2023-01-06 | Stop reason: HOSPADM

## 2022-12-24 RX ORDER — CHLORTHALIDONE 25 MG/1
25 TABLET ORAL DAILY
Status: DISCONTINUED | OUTPATIENT
Start: 2022-12-25 | End: 2022-12-24

## 2022-12-24 RX ORDER — NALOXONE HYDROCHLORIDE 0.4 MG/ML
0.2 INJECTION, SOLUTION INTRAMUSCULAR; INTRAVENOUS; SUBCUTANEOUS
Status: DISCONTINUED | OUTPATIENT
Start: 2022-12-24 | End: 2023-01-06 | Stop reason: HOSPADM

## 2022-12-24 RX ORDER — LISINOPRIL 40 MG/1
40 TABLET ORAL DAILY
Status: DISCONTINUED | OUTPATIENT
Start: 2022-12-25 | End: 2023-01-06 | Stop reason: HOSPADM

## 2022-12-24 RX ORDER — ACETAMINOPHEN 500 MG
500-1000 TABLET ORAL EVERY 6 HOURS PRN
Status: DISCONTINUED | OUTPATIENT
Start: 2022-12-24 | End: 2022-12-26

## 2022-12-24 RX ORDER — VITAMIN B COMPLEX
25 TABLET ORAL DAILY
Status: DISCONTINUED | OUTPATIENT
Start: 2022-12-25 | End: 2023-01-06 | Stop reason: HOSPADM

## 2022-12-24 RX ORDER — FERROUS GLUCONATE 324(38)MG
324 TABLET ORAL
Status: DISCONTINUED | OUTPATIENT
Start: 2022-12-25 | End: 2023-01-06 | Stop reason: HOSPADM

## 2022-12-24 RX ORDER — HYDROXYZINE HYDROCHLORIDE 25 MG/1
25 TABLET, FILM COATED ORAL EVERY 6 HOURS PRN
Status: DISCONTINUED | OUTPATIENT
Start: 2022-12-24 | End: 2022-12-27

## 2022-12-24 RX ORDER — PANTOPRAZOLE SODIUM 40 MG/1
40 TABLET, DELAYED RELEASE ORAL DAILY
Status: DISCONTINUED | OUTPATIENT
Start: 2022-12-25 | End: 2022-12-29

## 2022-12-24 RX ADMIN — OXYCODONE HYDROCHLORIDE 10 MG: 5 TABLET ORAL at 12:03

## 2022-12-24 RX ADMIN — LISINOPRIL 40 MG: 40 TABLET ORAL at 08:21

## 2022-12-24 RX ADMIN — OXYCODONE HYDROCHLORIDE 10 MG: 5 TABLET ORAL at 20:23

## 2022-12-24 RX ADMIN — ZINC SULFATE 220 MG (50 MG) CAPSULE 220 MG: CAPSULE at 08:21

## 2022-12-24 RX ADMIN — OXYCODONE HYDROCHLORIDE 10 MG: 5 TABLET ORAL at 16:32

## 2022-12-24 RX ADMIN — OXYCODONE HYDROCHLORIDE 10 MG: 5 TABLET ORAL at 01:22

## 2022-12-24 RX ADMIN — FERROUS GLUCONATE 324 MG: 324 TABLET ORAL at 08:21

## 2022-12-24 RX ADMIN — METHOCARBAMOL TABLETS 750 MG: 750 TABLET, COATED ORAL at 20:23

## 2022-12-24 RX ADMIN — OXYCODONE HYDROCHLORIDE 10 MG: 5 TABLET ORAL at 05:31

## 2022-12-24 RX ADMIN — HYDROXYZINE HYDROCHLORIDE 10 MG: 10 TABLET ORAL at 01:22

## 2022-12-24 RX ADMIN — ACETAMINOPHEN 1000 MG: 500 TABLET ORAL at 19:30

## 2022-12-24 RX ADMIN — PANTOPRAZOLE SODIUM 40 MG: 40 TABLET, DELAYED RELEASE ORAL at 08:21

## 2022-12-24 RX ADMIN — CHLORTHALIDONE 25 MG: 25 TABLET ORAL at 08:21

## 2022-12-24 ASSESSMENT — ACTIVITIES OF DAILY LIVING (ADL)
EQUIPMENT_CURRENTLY_USED_AT_HOME: WALKER, ROLLING;SHOWER CHAIR;GRAB BAR, TUB/SHOWER;GRAB BAR, TOILET
ADLS_ACUITY_SCORE: 31
ADLS_ACUITY_SCORE: 25
DIFFICULTY_COMMUNICATING: NO
ADLS_ACUITY_SCORE: 25
DOING_ERRANDS_INDEPENDENTLY_DIFFICULTY: NO
ADLS_ACUITY_SCORE: 18
ADLS_ACUITY_SCORE: 25
ADLS_ACUITY_SCORE: 18
WEAR_GLASSES_OR_BLIND: NO
ADLS_ACUITY_SCORE: 25
ADLS_ACUITY_SCORE: 31
ADLS_ACUITY_SCORE: 25
FALL_HISTORY_WITHIN_LAST_SIX_MONTHS: YES
ADLS_ACUITY_SCORE: 25
ADLS_ACUITY_SCORE: 18
WALKING_OR_CLIMBING_STAIRS_DIFFICULTY: NO
TOILETING_ISSUES: NO
DIFFICULTY_EATING/SWALLOWING: NO
CONCENTRATING,_REMEMBERING_OR_MAKING_DECISIONS_DIFFICULTY: NO
NUMBER_OF_TIMES_PATIENT_HAS_FALLEN_WITHIN_LAST_SIX_MONTHS: 1
DRESSING/BATHING_DIFFICULTY: NO
ADLS_ACUITY_SCORE: 25
CHANGE_IN_FUNCTIONAL_STATUS_SINCE_ONSET_OF_CURRENT_ILLNESS/INJURY: NO

## 2022-12-24 NOTE — LETTER
Recipient: Intake           Sender: [facility]anna Sullivan [facility] [phone]  Home Friday 01/06/2023  RN, PT, and OT   Address: [address] [place], [place] [id number]  Thanks!           Date: January 3, 2023  Patient Name:  Philipp Bland  Patient YOB: 1955  Routing Message:          The documents accompanying this notice contain confidential information belonging to the sender.  This information is intended only for the use of the individual or entity named above.  The authorized recipient of this information is prohibited from disclosing this information to any other party and is required to destroy the information after its stated need has been fulfilled, unless otherwise required by state law.    If you are not the intended recipient, you are hereby notified that any disclosure, copy, distribution or action taken in reliance on the contents of these documents is strictly prohibited.  If you have received this document in error, please return it by fax to [phone] with a note on the cover sheet explaining why you are returning it (e.g. not your patient, not your provider, etc.).  If you need further assistance, please call .  Documents may also be returned by mail to Health Information Management, , [address], [place], [place] [address].

## 2022-12-24 NOTE — LETTER
Recipient: Intake           Sender: Bryanna Sullivan PH: [phone]  Home Friday 01/06/23  RN, PT, and OT   Address: [address] [place] [address]          Date: January 3, 2023  Patient Name:  Philipp Bland  Patient YOB: 1955  Routing Message:          The documents accompanying this notice contain confidential information belonging to the sender.  This information is intended only for the use of the individual or entity named above.  The authorized recipient of this information is prohibited from disclosing this information to any other party and is required to destroy the information after its stated need has been fulfilled, unless otherwise required by state law.    If you are not the intended recipient, you are hereby notified that any disclosure, copy, distribution or action taken in reliance on the contents of these documents is strictly prohibited.  If you have received this document in error, please return it by fax to [phone] with a note on the cover sheet explaining why you are returning it (e.g. not your patient, not your provider, etc.).  If you need further assistance, please call .  Documents may also be returned by mail to Health Information Management, , [facility] [address], [place], [place] [address].

## 2022-12-24 NOTE — PLAN OF CARE
Goal Outcome Evaluation:    DATE & TIME: 12/24/22, 1025-2026  Summary: POD 9, T10-S1 fusion with L2-S1 decompression  Cognitive Concerns/ Orientation : A&O x 4   BEHAVIOR & AGGRESSION TOOL COLOR: GREEN  ABNL VS/O2: VSS on RA  MOBILITY: Ax 2 , gb/w  PAIN MANAGMENT: Denies   DIET: Regular  BOWEL/BLADDER: Cont B/B  ABNL LAB/BG: Na 130   DRAIN/DEVICES: L PIV SL   SKIN: Back incision- dressing CDI  D/C DAY/GOALS/PLACE: ARU today at 1300, ride arranged

## 2022-12-24 NOTE — PROGRESS NOTES
Maxim Duarte web paged to sign incomplete note from this AM for ARU MD to review prior to transfer.

## 2022-12-24 NOTE — PLAN OF CARE
Goal Outcome Evaluation:         Patient is alert and oriented X4.  VSS on RA with good sat. Up with assist of 2 with walker and gait belt. On regular diet with good appetite. IV NS infusing 100ml/hr, one time order for low sodium. Patient reports pain, prn oxycodone and robaxin  was given with effective results. CMS intact. Dressing dry and intact with dry drainage. Continent of B&B, uses urinal to voiding . Possible discharge to ARU tomorrow.

## 2022-12-24 NOTE — PROGRESS NOTES
Care Management Discharge Note    Discharge Date: 12/24/2022       Discharge Disposition: Acute Rehab, Transitional Care    Discharge Services: None    Discharge DME: None    Discharge Transportation: agency, family or friend will provide    Private pay costs discussed: Not applicable    PAS Confirmation Code:    Patient/family educated on Medicare website which has current facility and service quality ratings: yes    Education Provided on the Discharge Plan:    Persons Notified of Discharge Plans: Charge RN  Patient/Family in Agreement with the Plan: yes    Handoff Referral Completed: No    Additional Information:  Writer called  ARU to make sure they would be able to accept Pt today. They are ok with a Na+ of 130, as this is the reason we had to delay Pt's discharge from yesterday. Ride set up for 1300 today with HE wheelchair.         Chrissie Tripathi, RN, BSN, Care Coordinator

## 2022-12-24 NOTE — PROGRESS NOTES
Patient vital signs are at baseline: Yes  Patient able to ambulate as they were prior to admission or with assist devices provided by therapies during their stay:  Yes  Patient MUST void prior to discharge:  Yes  Patient able to tolerate oral intake:  Yes  Pain has adequate pain control using Oral analgesics:  Yes  Does patient have an identified :  Yes  Has goal D/C date and time been discussed with patient:  Yes     Pt A&Ox4. Pain managed with current regimen. Voiding well using a urinal. Back dressing intact with small dried drainage observed. NS infusing @ 100 mL/hr; was given @ 1600 yesterday but was not infusing until pt care transferred to writer. Writer noticed NS bag was almost full and this was corrected immediately. Lab draw in the morning. Possible discharge to  ARU today.

## 2022-12-24 NOTE — H&P
[facility]   Acute Rehabilitation Unit  Admission History and Physical    CHIEF COMPLAINT   Back pain      HISTORY OF PRESENT ILLNESS  Philipp Bland is a 67 year old right hand dominant male with PMH lumbar spondylolisthesis, hypertension, iron deficiency anemia, Paget's disease, possible MGUS who was electively admitted to neurosurgery service at [facility] 12/15/2022 for T10-S1 spinal fusion and L2-S1 bilateral decompression.  Estimated blood loss of 1200 mL.  On postop day 1 patient reported that his back pain improved but he had some weakness in his left foot.  He also developed tachycardia and decreased blood pressure which required hospitalist consultation.  He received some fluid bolus. Left foot weakness was stable and did not progress.    During acute hospitalization, patient was seen and evaluated by PT and OT, who collectively recommended that patient would benefit from ongoing therapies in the acute inpatient rehabilitation setting.      In review of the therapy notes  PT: Moderate assist for sit to supine, sit to stand, transfers, and ambulation. Ambulated 15 feet FWW with AFOs.  OT: Independent with feeding, requiring assistance with grooming and upper body dressing, moderate assist with lower body dressing and toileting.  Dependent with toilet transfers.  No cognitive deficits.    Patient was seen at bedside today alone.  He reports that he feels well overall and is motivated to participate in therapies to get back to his baseline so he can be with his children and grandchildren as they are an active family.  Patient reports that he uses a front wheel walker with a seat at baseline and was mod I with all mobility and ADLs.  Today he reports mild to moderate back pain at the incision site that is worse when trying to lift his legs or stand up, but improves with oxycodone.  He endorses left more than right foot weakness/foot drop.  He denies headache, vision  changes, neck pain, weakness in upper extremities, chest pain, difficulty breathing, cough, abdomen pain, changes in bowel or bladder habits, numbness or tingling in upper or lower extremities.    PAST MEDICAL HISTORY  Past Medical History:   Diagnosis Date     Essential hypertension, benign 01/01/2001     Family history of alcoholism        SURGICAL HISTORY  Past Surgical History:   Procedure Laterality Date     OPTICAL TRACKING SYSTEM FUSION SPINE POSTERIOR LUMBAR THREE+ LEVELS N/A 12/15/2022    Procedure: Thoracic 10 to Sacral 1 posterior segmental instrumentation. Pelvic instrumentation. Screw cement augmentation L10 to L11. Lumbar 2 to Sacral 1 bilateral decompression and transforaminal interbody fusion. Posterior arthrodesis Thoracic 10-Sacral 1.;  Surgeon: Eliseo Aggarwal MD;  Location:  OR       SOCIAL HISTORY  Marital Status:   Living situation: Lives in a house with 5 steps to enter.  Has a walker, grab bars, shower chair at home.  Family support: Has a son living at home  Vocational History: Works part-time, works as a   Tobacco use: Never smoker  Alcohol use: Drinks 2-3 times a week  Illicit drug use: none    Social History     Socioeconomic History     Marital status: Single     Spouse name: Not on file     Number of children: Not on file     Years of education: Not on file     Highest education level: Not on file   Occupational History     Not on file   Tobacco Use     Smoking status: Never     Smokeless tobacco: Never   Vaping Use     Vaping Use: Never used   Substance and Sexual Activity     Alcohol use: Yes     Comment: 2-3 4x /week     Drug use: No     Sexual activity: Yes     Partners: Female   Other Topics Concern     Parent/sibling w/ CABG, MI or angioplasty before 65F 55M? No   Social History Narrative     Not on file     Social Determinants of Health     Financial Resource Strain: Low Risk      Difficulty of Paying Living Expenses: Not hard at all   Food  Insecurity: No Food Insecurity     Worried About Running Out of Food in the Last Year: Never true     Ran Out of Food in the Last Year: Never true   Transportation Needs: No Transportation Needs     Lack of Transportation (Medical): No     Lack of Transportation (Non-Medical): No   Physical Activity: Insufficiently Active     Days of Exercise per Week: 4 days     Minutes of Exercise per Session: 20 min   Stress: No Stress Concern Present     Feeling of Stress : Not at all   Social Connections: Moderately Integrated     Frequency of Communication with Friends and Family: More than three times a week     Frequency of Social Gatherings with Friends and Family: More than three times a week     Attends Congregational Services: More than 4 times per year     Active Member of Clubs or Organizations: Yes     Attends Club or Organization Meetings: Not on file     Marital Status:    Intimate Partner Violence: Not on file   Housing Stability: Unknown     Unable to Pay for Housing in the Last Year: No     Number of Places Lived in the Last Year: Not on file     Unstable Housing in the Last Year: No       FAMILY HISTORY  Family History   Problem Relation Age of Onset     Hypertension Father          PRIOR FUNCTIONAL HISTORY   Patient was modified independent with all ADLs/IADLs, transfers, mobility and gait.    Had a back injury in November 2020 and ambulated with a four-wheel walker since then.  Ambulation is usually limited to distances 25 yards before he needs to rest.    MEDICATIONS  Scheduled meds  Medications Prior to Admission   Medication Sig Dispense Refill Last Dose     acetaminophen (TYLENOL) 500 MG tablet Take 500-1,000 mg by mouth every 6 hours as needed for mild pain        chlorthalidone (HYGROTON) 25 MG tablet Take 1 tablet (25 mg) by mouth daily 90 tablet 1      ferrous gluconate (FERGON) 324 (38 Fe) MG tablet Take 1 tablet (324 mg) by mouth daily (with breakfast) 90 tablet 5      lisinopril (ZESTRIL) 40 MG  "tablet Take 1 tablet (40 mg) by mouth daily 90 tablet 1      methocarbamol (ROBAXIN) 750 MG tablet Take 1 tablet (750 mg) by mouth every 6 hours as needed for muscle spasms 60 tablet 2      omeprazole (PRILOSEC) 40 MG DR capsule Take 40 mg by mouth daily        oxyCODONE (ROXICODONE) 5 MG tablet Take 1 tablet (5 mg) by mouth every 4 hours as needed for moderate pain (4-6) 30 tablet 0      senna-docusate (SENOKOT-S/PERICOLACE) 8.6-50 MG tablet Take 1 tablet by mouth 2 times daily as needed for constipation 30 tablet 1      Vitamin D, Cholecalciferol, 25 MCG (1000 UT) CAPS         Vitamin K 100 MCG TABS Take 1 tablet by mouth every morning        zinc gluconate 50 MG tablet Take 50 mg by mouth daily          ALLERGIES     Allergies   Allergen Reactions     No Known Drug Allergies          REVIEW OF SYSTEMS  A 10 point ROS was performed and negative unless otherwise noted in HPI.     Constitutional: denied fever, weight loss, night sweats  Eyes: wears glasses for reading. Denied vision changes, blurry vision, eye pain   Ears, Nose, Throat: denied runny nose, sore throat, ear pain or discahrge  Cardiovascular: denied chest pain, palpitations  Respiratory: denied wheezes, dyspnea, cough  Gastrointestinal: denied abdomen pain, N/V, constipation  Genitourinary: denied urine retention, dysuria, incontinence  Musculoskeletal: Endorsed back pain at surgical site. Denied joint pain or swelling   Neurologic: Had bilateral feet weakness prior to surgery, currently right foot is worse than left. Denied numbness, tingling, weakness in upper extremities.  Psychiatric: denied low mood, suicidal ideation       PHYSICAL EXAM  VITAL SIGNS:  /69 (BP Location: Right arm)   Pulse 99   Temp 97.4  F (36.3  C) (Oral)   Resp 16   Ht 1.727 m (5' 8\")   Wt 102 kg (224 lb 13.9 oz)   SpO2 100%   BMI 34.19 kg/m    BMI:  Estimated body mass index is 34.19 kg/m  as calculated from the following:    Height as of this encounter: 1.727 m " "(5' 8\").    Weight as of this encounter: 102 kg (224 lb 13.9 oz).     General: sitting comfortably in bed on room air  HEENT: MMM, AT/NC, anicteric sclera  Pulmonary: equal bilateral air entry, no crackles or wheezes  Cardiovascular: audible S1/S2, no murmurs  Abdomen: soft non-tender on deep and superficial palpation  Extremities: warm, well perfused, no tenderness in calves, bilateral pitting edema Ip to knees +2   MSK/neuro:   Mental Status:  alert and oriented x3    Cranial Nerves:   1. 2nd CN: Pupils equal, round, reactive to light and accomodation. and visual fields intact to confrontation.   2. 3rd,4th,6th CN:  EOMI, appropriate pupillary responses  3. 5th CN: facial sensation intact   4. 7th CN: face symmetrical   5. 8th CN: functional hearing bilaterally  6. 9th, 10th CN: palate elevates symmetrically   7. 11th CN: sternocleidomastoids and trapezii strong   8. 12th CN: tongue midline and without fasciculations     Sensory: Normal to light touch in bilateral upper and lower extremities    Strength:        Right  Left   Deltoid Abduction 5/5  5/5   Bicep flexion  5/5  5/5   Bicep extension  5/5  5/5   Wrist extension  5/5  5/5   Finger   5/5  5/5     Hip extension  2/5  2/5   Knee extension  4/5  3/5   Knee flexion  4/5  3/5   Ankle DF  3/5  1/5   Ankle PF  3/5  3/5   Hallux DF  3/5  1/5       Reflexes:                  Biceps      BR        Triceps   Patella   Achilles  R           +1           +1         +1          +1         +1  L           +1            +1           +1          +1         +1     Hogan's test: negative bilaterally    Babinski reflex: downgoing bilaterally    Tone per modified Jr Scale: 0/4   Abnormal movements: None    Coordination: No dysmetria on finger to nose b/l    Speech: fluent   Cognition: intact     Skin: surgical incision with sutures in place and intact; some serosanguinous drainage on the dressing and mild erythema on the surrounding skin       LABS  Pertinent labs "     CBC RESULTS: Recent Labs   Lab Test 12/23/22  1227 12/16/22  0641 12/15/22  1522   WBC  --   --  8.7   RBC  --   --  3.26*   HGB 8.6*   < > 9.5*   HCT  --   --  29.2*   MCV  --   --  90   MCH  --   --  29.1   MCHC  --   --  32.5   RDW  --   --  18.2*   PLT  --   --  183    < > = values in this interval not displayed.     Last Comprehensive Metabolic Panel:  Sodium   Date Value Ref Range Status   12/24/2022 130 (L) 133 - 144 mmol/L Final   04/08/2013 141 133 - 144 mmol/L Final     Potassium   Date Value Ref Range Status   12/24/2022 3.7 3.4 - 5.3 mmol/L Final   04/08/2013 4.3 3.4 - 5.3 mmol/L Final     Chloride   Date Value Ref Range Status   12/24/2022 97 94 - 109 mmol/L Final   04/08/2013 101 94 - 109 mmol/L Final     Carbon Dioxide   Date Value Ref Range Status   04/08/2013 25 20 - 32 mmol/L Final     Carbon Dioxide (CO2)   Date Value Ref Range Status   12/24/2022 26 20 - 32 mmol/L Final     Anion Gap   Date Value Ref Range Status   12/24/2022 7 3 - 14 mmol/L Final   04/08/2013 14 6 - 17 mmol/L Final     Glucose   Date Value Ref Range Status   12/24/2022 128 (H) 70 - 99 mg/dL Final   04/08/2013 93 60 - 99 mg/dL Final     Urea Nitrogen   Date Value Ref Range Status   12/24/2022 14 7 - 30 mg/dL Final   04/08/2013 26 7 - 30 mg/dL Final     Creatinine   Date Value Ref Range Status   12/24/2022 0.55 (L) 0.66 - 1.25 mg/dL Final   04/08/2013 1.27 (H) 0.66 - 1.25 mg/dL Final     GFR Estimate   Date Value Ref Range Status   12/24/2022 >90 >60 mL/min/1.73m2 Final     Comment:     Effective December 21, 2021 eGFRcr in adults is calculated using the 2021 CKD-EPI creatinine equation which includes age and gender (Mayte et al., NEJM, DOI: 10.1056/RQMMjw7014556)   04/08/2013 59 (L) >60 mL/min/1.7m2 Final     Calcium   Date Value Ref Range Status   12/24/2022 8.2 (L) 8.5 - 10.1 mg/dL Final   04/08/2013 9.5 8.5 - 10.4 mg/dL Final           IMPRESSION/PLAN:  Philipp Bland is a 67 year old right hand dominant male [facility]  significant for lumbar spinal stenosis and radiculopathy, HTN, Paget's disease, iron deficiency anemia, possible MGUS who underwent an elective T10-S1 spinal fusion and L2-S1 bilateral decompression 12/15/2022 with post-operative complication resulting in right foot weakness.     Admission to acute inpatient rehab: December 24, 2022    Impairment group code: Neurologic Conditions 03.9 Other Neurologic, Lumbar stenosis and spondylolisthesis with radiculopathy       1. PT, OT  90 minutes of each on a daily basis, in addition to rehab nursing and close management of physiatrist.       2. Impairment of ADL's: OT for 90 minutes daily to work on ADLs such as grooming, self cares and bathing.      3. Impairment of mobility:  PT for 90 minutes daily focusing on strength, balance, coordination, and endurance.       4. Rehab RN: for med administration, bowel regimen, wound care and patient education.        Medical Conditions  #Spinal stenosis L2-5 and polyradiculopathy   s/p T10-S1 spinal fusion and L2-S1 bilateral decompression  Complicated by left foot weakness likely secondary to polyradiculopathy  - PT/OT for evaluation and treatment  - tried AFOs in [facility] but did not like them, planning to use high top basketball shoes  - Daily wound dressing changes for back, sutures in place  - As needed oxycodone for pain   - Had previous EMGs 6/29/2022 by Dr. Babar Issa, showed severe left lumbosacral polyradiculopathy. Exam noted at the time 5/5 BLE except right KE 4+/5, left KE KF 4/5.    - If weakness worsens or progresses, will consult neurosurgery and consider appropriate spine imaging    #Hyponatremia  - Na levels after surgery were 127-132  - Cause unclear, SIADH vs hypovolemia - improved with IVF  - Medicine consult for co-management   - BMP tomorrow and QMonday, urine osm and NA tomorrow    #Nomocytic Anemia   #Iron deficiency anemia  - Likely due to acute blood loss with previous CHANTALE. Pre-op Hgb 14.5, post-op 9.2 EBL  1200  - PTA ferrous gluconate 324mg   - CBC tomorrow and QMonday    #HTN  - PTA: chlorthalidone 25mg daily, lisinopril 40mg daily  - Continue lisinopril but chlorthalidone on hold due to risk of hyponatremia     #Paget's disease  - Continue with vitamin D supplementation 1000U, received IV Zoledronic infusion prior to surgery 9/26/2022  - outpatient follow up with endo team Dr. Aguilar for maintenance and prevention of complications    #Elevated PSA  - 10/7/2022 was 15   - 10/19 seen by urology and was recommended to undergo prostate MRI for biopsy but did not follow up    - Monitor symptoms while inpatient such as urine retention, hematuria, pelvic pain, worsening back pain    #MGUS   - Per chart review suspected diagnosis, however not evaluated by hematology  - outpatient follow up for further work up and diagnosis      1. Adjustment to disability:  Clinical psychology to eval and treat as indicated  2. FEN: regular diet, thin liquids  3. Bowel: continent, bowel medications as needed   4. Bladder: continent, PVR x3  5. DVT Prophylaxis: Anti-embolism stocking  6. GI Prophylaxis: continue PPIs for total of 2 weeks  7. Code: Full   8. Disposition: Home with son   9. ELOS:  7 days  10. Rehab prognosis:  Good  11. Follow up Appointments on Discharge:  Neurosurgery: 2 weeks for incision check   Heme/Onc: follow up MGUS workup   Endocrinology, saw Dr. Katarina Aguilar for outpatient follow up   Urology: follow up high PSA work up      Seen and discussed with Dr. Karen Irby , PM&R staff physician     Luzma Shell   PGY 4  Physical Medicine and Rehabilitation  Pager [phone]    Physician Attestation   I saw this patient with the resident and agree with the resident/fellow's findings and plan of care as documented in the note.      I personally reviewed vital signs, medications, labs, imaging and notes in the chart.    Clark findings:   Philipp Bland is a 68 yo male with complicated PMH who was admitted on 12/15 for  "elective spinal surgery as above. Post-op course was complicated by blood loss anemia, hyponatremia, questionable new L foot drop/worsening weakness and hypotension. He clinically improved and was transferred to ARU today.    Discussed recommendations by endo team and encouraged him to f/u after discharge. Back pain has improved and he denied any pain or paresthesia in bilateral lower extremities. Bilateral foot drop, likely chronic - he will work on compensatory strategies. He was adamant that he doesn't need AFOs and \"knows what to do\". Wound is healing well.     He was mod I prior to admission; anticipate improvement to the previous level of care. Might need home care pending his course and progress. ELOS is 7 days.     Karen Irby MD  Date of Service (when I saw the patient): 12/24/22    "

## 2022-12-24 NOTE — PLAN OF CARE
Occupational Therapy Discharge Summary    Reason for therapy discharge:    Discharged to acute rehabilitation facility.    Progress towards therapy goal(s). See goals on Care Plan in [facility] electronic health record for goal details.  Goals partially met.  Barriers to achieving goals:   discharge from facility.    Therapy recommendation(s):    Continued therapy is recommended.  Rationale/Recommendations:  Pt functioning well below baseline, currently limited by weakness, pain and decreased activity tolerance after surgery. Pt would benefit from continued intensive IP OT in ARU setting to progress I/ADL independence prior to returning home. Progressing with bed mobility and initiating toilet t/fs. Pt motivated and has good family support.     **Pt not seen by discharging therapist on this date, note written based on previous treating therapist's notes and recommendations             07-Jun-2019 10:49

## 2022-12-24 NOTE — PLAN OF CARE
Goal Outcome Evaluation:    DATE & TIME: 12/24/22, [id number]  Summary: POD 9, T10-S1 fusion with L2-S1 decompression  Cognitive Concerns/ Orientation : A&O x 4   BEHAVIOR & AGGRESSION TOOL COLOR: GREEN  ABNL VS/O2: VSS on RA  MOBILITY: Ax 2 , gb/w  PAIN MANAGMENT: Denies   DIET: Regular  BOWEL/BLADDER: Cont B/B  ABNL LAB/BG: Na 130   DRAIN/DEVICES: L PIV SL   SKIN: Back incision- dressing CDI  D/C DAY/GOALS/PLACE: ARU today at 1300, ride arranged

## 2022-12-24 NOTE — PROGRESS NOTES
[facility]    Medicine Progress Note - Hospitalist Service    Date of Admission:  12/15/2022    Assessment & Plan   Philipp Bland is a 67 year old male with a past medical history significant for lumbar spondylolisthesis, HTN, CHANTALE, pagets disease, possible MGUS who was admitted on 12/15/2022. I was asked to see the patient for post op tachycardia and soft BP.      Soft BP, mild tachycardia suspect secondary to blood loss, hypovolemia - resolved  S/p T10-S1 posterior segmental instrumentation, cement augmentation T10-T11, L2-S1 bilateral decompression and interbody fusion 12/15/22  Procedure was performed under general anesthesia with an EBL of 1200ml. He received 5000ml crystalloid/colloid intra op. No blood products.   *BP has been soft 90-100s and with -120s at times.   HR 96 in clinic at pre-op. No pre op EKG completed.   He reports baseline HR low 100s. I suspect some of his tachycardia is pain mediated at pain has been poorly controlled today after xrays.   Patient had reported mild light headedness when standing this am otherwise asymptomatic * Likely soft pressures were due to hypovolemia  * received 500cc bolus 12/16  * BP now stable.   -- antihypertensives now resumed.    Hyponatremia:  Patient has had low sodium levels since his admission 132--130--132--127--127--130.  Unclear etiology, consider SIADH related to neurosurgery and pain, however patient was given IV fluids overnight and his sodium improved to 130.  Currently not contributing to his symptoms.  Chlorthalidone could be contributing, however this is a chronic medication.  Recommend holding chlorthalidone at the ARU and then reevaluating with a repeat sodium.  Otherwise if persistent, can resume chlorthalidone but have a free water/fluid restriction at the discretion of ARU provider.     Acute blood loss anemia  Hx CHANTALE  hgb pre-op 14.5>9.2 post op. EBL 1100  On oral iron PTA  *Hemoglobin stable  Recent Labs   Lab  "12/23/22  1227 12/21/22  0644 12/18/22  1245   HGB 8.6* 8.1* 8.7*        ABEL -resolved  Cr 0.5>1.2. Suspect hypoperfusion given significant blood loss, soft BP.   -- creatinine now stable.  Down to 0.5  Creatinine   Date Value Ref Range Status   12/24/2022 0.55 (L) 0.66 - 1.25 mg/dL Final   04/08/2013 1.27 (H) 0.66 - 1.25 mg/dL Final        Chronic hypertension  PTA: chlorthalidone 25mg daily, lisinopril 40mg daily  --Restart PTA meds.  Consider holding chlorthalidone if sodium remains low.     MGUS  Per chart review hx possible MGUS. He was referred to Hematology and is not sure if he has been seen yet.        Diet: Advance Diet as Tolerated: Regular Diet Adult  Diet    DVT Prophylaxis: Defer to primary service  Acosta Catheter: Not present  Central Lines: None  Cardiac Monitoring: None  Code Status: Full Code      Disposition Plan      Expected Discharge Date: 12/24/2022,  1:00 PM    Destination: other (comment) (TCU VS ARU)  Discharge Comments: pending improvement in Na        The patient's care was discussed with the Bedside Nurse, Patient and .    This patient was discussed with Dr. Velazco of the Hospitalist Service.  She is in agreement with my assessment and plan of care.    STEVE Ruano  Hospitalist Service  [facility] [place]  Securely message with the Vocera Web Console (learn more here)  Text page via [employer] Paging/Directory         Clinically Significant Risk Factors         # Hyponatremia: Lowest Na = 127 mmol/L in last 2 days, will monitor as appropriate      # Hypoalbuminemia: Lowest albumin = 3 g/dL at 12/16/2022  1:05 PM, will monitor as appropriate           # Obesity: Estimated body mass index is 30.41 kg/m  as calculated from the following:    Height as of this encounter: 1.727 m (5' 8\").    Weight as of this encounter: 90.7 kg (200 lb).          ______________________________________________________________________    Interval History   Patient doing " well.  Sitting up in bed on my arrival.  Awaiting discharge to ARU.  Eager to start with therapies.  Sodium is improved.  Denies any headache, lightheadedness, chest pain, shortness of breath, abdominal pain, nausea, vomiting.  Tolerating diet.  Good fluid intake.  No other issues at this time.  Okay for discharge to ARU from medicine standpoint.    Data reviewed today: I reviewed all medications, new labs and imaging results over the last 24 hours. I personally reviewed no images or EKG's today.    Physical Exam   Vital Signs: Temp: 98.5  F (36.9  C) Temp src: Oral BP: 126/81 Pulse: 84   Resp: 16 SpO2: 99 % O2 Device: None (Room air)    Weight: 200 lbs 0 oz     Constitutional: Appears comfortable, lying in bed in NAD.  HEENT: Sclera white, MMM  Respiratory: Breathing non-labored, no wheezing.  No cough.  Musculoskeletal: Normal muscle bulk and tone, limbs atraumatic  Neuro: Alert and appropriate, VALENCIA, speech normal, no facial droop  Psych:  Normal mood and affect, calm and cooperative      Data   Recent Labs   Lab 12/24/22  0844 12/23/22  2044 12/23/22  1227 12/23/22  0635 12/22/22  0644 12/21/22  0644 12/20/22  1041 12/19/22  0709 12/18/22  2237 12/18/22  1245   HGB  --   --  8.6*  --   --  8.1*  --   --   --  8.7*   * 127* 127*  --   --   --   --  132*  --  130*   POTASSIUM 3.7  --   --  3.8 3.6 3.7   < > 4.3  4.2   < > 3.4   CHLORIDE 97  --   --   --   --   --   --  100  --  99   CO2 26  --   --   --   --   --   --  22  --  24   BUN 14  --   --   --   --   --   --  15  --  18   CR 0.55*  --   --   --   --   --   --  0.60*  --  0.62*   ANIONGAP 7  --   --   --   --   --   --  10  --  7   SHELBY 8.2*  --   --   --   --   --   --  8.0*  --  7.8*   *  --   --   --   --   --   --  107*  --  149*    < > = values in this interval not displayed.     No results found for this or any previous visit (from the past 24 hour(s)).  Medications       chlorthalidone  25 mg Oral Daily     ferrous gluconate  324 mg Oral  Daily with breakfast     lisinopril  40 mg Oral Daily     pantoprazole  40 mg Oral Daily     polyethylene glycol  17 g Oral Daily     senna-docusate  1 tablet Oral BID     zinc sulfate  220 mg Oral Daily

## 2022-12-24 NOTE — PLAN OF CARE
FOCUS/GOAL  Bladder management, Pain management, Mobility, Skin integrity, Psychosocial needs, and Safety management    ASSESSMENT, INTERVENTIONS AND CONTINUING PLAN FOR GOAL:    Goal Outcome Evaluation:        Arrived to unit at apprix 1430. Pt Aox4 this shift. Pleasant and cooperative. No concerns for nurse at this time. Calls appropriately. Needs in reach and safety measures in place. Cont POC  Mobility: A2 FWW  Bowel/Bladder: continent. LBM today  O2: RA  Diet: R/T/W  Psychosocial: appropriate to situation  Pain: can have PRN oxy for pain  Tubes/Lines/Drains: none  Misc:

## 2022-12-24 NOTE — LETTER
Recipient: Intake           Sender: Bryanna Sullivan PH: [phone]  Still on track to discharge today.   Thanks!           Date: January 6, 2023  Patient Name:  Philipp Bland  Patient YOB: 1955  Routing Message:          The documents accompanying this notice contain confidential information belonging to the sender.  This information is intended only for the use of the individual or entity named above.  The authorized recipient of this information is prohibited from disclosing this information to any other party and is required to destroy the information after its stated need has been fulfilled, unless otherwise required by state law.    If you are not the intended recipient, you are hereby notified that any disclosure, copy, distribution or action taken in reliance on the contents of these documents is strictly prohibited.  If you have received this document in error, please return it by fax to [phone] with a note on the cover sheet explaining why you are returning it (e.g. not your patient, not your provider, etc.).  If you need further assistance, please call .  Documents may also be returned by mail to [employer], , [address], [place], [place] [address].

## 2022-12-24 NOTE — PLAN OF CARE
Physical Therapy Discharge Summary    Reason for therapy discharge:    Discharged to acute rehabilitation facility.    Progress towards therapy goal(s). See goals on Care Plan in [facility] electronic health record for goal details.  Goals partially met.  Barriers to achieving goals:   discharge from facility.    Therapy recommendation(s):    Continued therapy is recommended.  Rationale/Recommendations:  to address decreased functional mobility.

## 2022-12-24 NOTE — PROGRESS NOTES
Care Management Discharge Note    Discharge Date: 12/24/2022       Discharge Disposition: Acute Rehab    Discharge Services: Other (see comment) (Therapy)    Discharge DME: None    Discharge Transportation: agency, family or friend will provide    Private pay costs discussed: Not applicable    PAS Confirmation Code:    Patient/family educated on Medicare website which has current facility and service quality ratings: no    Education Provided on the Discharge Plan:  yes  Persons Notified of Discharge Plans: patient  Patient/Family in Agreement with the Plan: yes    Handoff Referral Completed: Yes    Additional Information:  Received discharge orders for patient.  Bed available at [facility] for today.  [facility] wheelchair transport arranged for 13:00 today.  Patient informed of the plan and in agreement to the plan.      DAVID Topete, [facility]    [phone]  [facility]

## 2022-12-24 NOTE — DISCHARGE SUMMARY
[facility]    Discharge Summary  Neurosurgery    Date of Admission:  12/15/2022  Date of Discharge:  12/24/2022  Discharging Provider: Shaheen Weir PA-C  Date of Service (when I saw the patient): 12/24/22    Discharge Diagnoses   Principal Problem:    History of thoracic spinal fusion      History of Present Illness   Philipp Bland is an 67 year old male who presented with T10-S1 fusion with L2-S1 decompression. Post op he was noted to have tachycardia and soft BP. Bilateral LE weakness requiring bilateral AFOs.     Hospital Course   Philipp Bland was admitted on 12/15/2022.  The following problems were addressed during his hospitalization:    Principal Problem:    History of thoracic spinal fusion    Assessment: mobilized with PT/OT, and was doing well at discharge.     Plan: routine post op follow up anticipated        I have discussed the following assessment and plan with Dr. Aggarwal, who is in agreement with initial plan and will follow up with further consultation recommendations.      Shaheen Weir PA-C  [facility] Neurosurgery  [place] [address]    Tel [phone]  Pager [phone]        Significant Results and Procedures       Pending Results   These results will be followed up by   Unresulted Labs Ordered in the Past 30 Days of this Admission     No orders found from 11/15/2022 to 12/16/2022.          Code Status   Full Code    Primary Care Physician   Fallon Mauricio    Physical Exam   Temp: 98.5  F (36.9  C) Temp src: Oral BP: 126/81 Pulse: 84   Resp: 16 SpO2: 99 % O2 Device: None (Room air)    Vitals:    12/15/22 0654   Weight: 90.7 kg (200 lb)     Vital Signs with Ranges  Temp:  [97.8  F (36.6  C)-98.8  F (37.1  C)] 98.5  F (36.9  C)  Pulse:  [84-95] 84  Resp:  [16-17] 16  BP: (102-126)/(69-81) 126/81  SpO2:  [98 %-99 %] 99 %  I/O last 3 completed shifts:  In: 240 [P.O.:240]  Out: 1310  [Urine:1310]    Constitutional: Awake, alert, cooperative, no apparent distress, and appears stated age.  Eyes: Lids and lashes normal, pupils equal, round and reactive to light, extra ocular muscles intact  ENT: Normocephalic, without obvious abnormality, atraumatic  Respiratory: No increased work of breathing  Skin: No bruising or bleeding, normal skin color, texture, turgor, no redness, warmth, or swelling.  Incision with staples intact, minimal drainage, open to air.  Musculoskeletal: There is no redness, warmth, or swelling of the joints.  Full range of motion noted.  Motor strength is 5 out of 5 all extremities bilaterally.  Tone is normal.  Neurologic: Awake, alert, oriented to name, place and time.  Cranial nerves II-XII are grossly intact.  Motor is 5 out of 5 bilaterally.     Neuropsychiatric: Calm, normal eye contact, alert, normal affect, oriented to self, place, time and situation, memory for past and recent events intact and thought process normal.       Time Spent on this Encounter   I, Shaheen Weir PA-C, personally saw the patient today and spent less than or equal to 30 minutes discharging this patient.    Discharge Disposition   Discharged to rehabilitation facility  Condition at discharge: Good    Consultations This Hospital Stay   OCCUPATIONAL THERAPY ADULT IP CONSULT  PHYSICAL THERAPY ADULT IP CONSULT  HOSPITALIST IP CONSULT  SOCIAL WORK IP CONSULT  CARE MANAGEMENT / SOCIAL WORK IP CONSULT  ORTHOSIS BRACE IP CONSULT  PHYSICAL THERAPY ADULT IP CONSULT  OCCUPATIONAL THERAPY ADULT IP CONSULT  HOSPITALIST IP CONSULT    Discharge Orders      Anti-Embolism Stockings    Bilateral below knee length.  On in the morning, off at night     General info for SNF    Length of Stay Estimate: Short Term Care: Estimated # of Days <30  Condition at Discharge: Improving  Level of care:skilled   Rehabilitation Potential: Excellent  Admission H&P remains valid and up-to-date: Yes  Recent Chemotherapy: N/A  Use  Nursing Home Standing Orders: Yes     Mantoux instructions    Give two-step Mantoux (PPD) Per Facility Policy Yes     Follow Up and recommended labs and tests    2 week follow-up for incision check     Reason for your hospital stay    Back surgery     Wound care    Site:   lumbar   Instructions:  keep clean and dry     Activity - Up ad alverto     Physical Therapy Adult Consult    Evaluate and treat as clinically indicated.    Reason:  eval and treat     Occupational Therapy Adult Consult    Evaluate and treat as clinically indicated.    Reason:  eval and treat     Diet    Follow this diet upon discharge: Advance to a regular diet as tolerated     Discharge Medications   Current Discharge Medication List      START taking these medications    Details   methocarbamol (ROBAXIN) 750 MG tablet Take 1 tablet (750 mg) by mouth every 6 hours as needed for muscle spasms  Qty: 60 tablet, Refills: 2    Associated Diagnoses: History of thoracic spinal fusion      oxyCODONE (ROXICODONE) 5 MG tablet Take 1 tablet (5 mg) by mouth every 4 hours as needed for moderate pain (4-6)  Qty: 30 tablet, Refills: 0    Associated Diagnoses: History of thoracic spinal fusion      senna-docusate (SENOKOT-S/PERICOLACE) 8.6-50 MG tablet Take 1 tablet by mouth 2 times daily as needed for constipation  Qty: 30 tablet, Refills: 1    Associated Diagnoses: History of thoracic spinal fusion         CONTINUE these medications which have NOT CHANGED    Details   acetaminophen (TYLENOL) 500 MG tablet Take 500-1,000 mg by mouth every 6 hours as needed for mild pain      chlorthalidone (HYGROTON) 25 MG tablet Take 1 tablet (25 mg) by mouth daily  Qty: 90 tablet, Refills: 1    Associated Diagnoses: Benign essential hypertension      ferrous gluconate (FERGON) 324 (38 Fe) MG tablet Take 1 tablet (324 mg) by mouth daily (with breakfast)  Qty: 90 tablet, Refills: 5    Associated Diagnoses: Iron deficiency anemia, unspecified iron deficiency anemia type       lisinopril (ZESTRIL) 40 MG tablet Take 1 tablet (40 mg) by mouth daily  Qty: 90 tablet, Refills: 1    Associated Diagnoses: Benign essential hypertension      omeprazole (PRILOSEC) 40 MG DR capsule Take 40 mg by mouth daily      Vitamin D, Cholecalciferol, 25 MCG (1000 UT) CAPS       Vitamin K 100 MCG TABS Take 1 tablet by mouth every morning      zinc gluconate 50 MG tablet Take 50 mg by mouth daily         STOP taking these medications       ibuprofen (ADVIL/MOTRIN) 200 MG capsule Comments:   Reason for Stopping:             Allergies   Allergies   Allergen Reactions     No Known Drug Allergies

## 2022-12-24 NOTE — CONSULTS
[facility]  Consult Note - Hospitalist Service  Date of Admission:  12/24/2022      Assessment & Plan     A: Patient is a 68 y/o man who has hypertension, bilateral foot drop and possible MGUS. Patient was hospitalized at [facility] from 15-Dec-2022 to 24-Dec-2022 and was discharged to acute rehabilitation unit on 24-Dec-2022.     Patient underwent Thoracic 10 to Sacral 1 posterior segmental instrumentation, Pelvic instrumentation, Screw cement augmentation L10 to L11. Lumbar 2 to Sacral 1 bilateral decompression and transforaminal interbody fusion, Posterior arthrodesis Thoracic 10-Sacral 1, for lumbar stenosis and spondylolisthesis with radiculopathy on 15-Dec-2022. During post operative course, patient was found to have acute kidney injury, acute blood loss anemia and hyponatremia. Patient apparently did not require blood transfusion during hospital stay. Acute kidney injury had resolved prior to discharge.     Patient is being seen for hyponatremia.    P:  1.) Hyponatremia: Will stop chlorthalidone and monitor response. Labs to be monitored.  2.) Hypertension: Chlorthalidone stopped in light of hyponatremia. Continuing lisinopril. Will monitor blood pressure and adjust medications as needed.  3.) Lumbar stenosis and spondylolisthesis with radiculopathy s/p surgical intervention: Patient to receive PT/OT.  4.) Possible Paget's disease, possible osteoporosis - patient had received reclast on 26-Sep-2022: Patient to f/u with Endocrinology as scheduled.  5.) Possible MGUS: Patient to f/u with Hematology as scheduled.  6.) Acute blood loss anemia; patient has iron deficiency anemia at baseline: Patient on iron supplementation.  7.) Acute kidney injury, resolved: No active intervention indicated.    Randall Cotton MD  [facility]  Securely message with the Vocera Web Console (learn more here)  Text page via [employer]  "Paging/[facility]       Hospitalist Service    Clinically Significant Risk Factors Present on Admission         # Hyponatremia: Lowest Na = 127 mmol/L in last 2 days, will monitor as appropriate          # Hypertension: home medication list includes antihypertensive(s)      # Obesity: Estimated body mass index is 34.19 kg/m  as calculated from the following:    Height as of this encounter: 1.727 m (5' 8\").    Weight as of this encounter: 102 kg (224 lb 13.9 oz).           ______________________________________________________________________    Chief Complaint     Spine surgery    History of Present Illness     Patient is a 66 y/o man who has hypertension, bilateral foot drop and possible MGUS. Patient underwent surgical intervention for lumbar stenosis and spondylolisthesis with radiculopathy on 15-Dec-2022. During post operative course, patient was found to have acute kidney injury, acute blood loss anemia and hyponatremia. Patient apparently did not require blood transfusion during hospital stay.     Patient noted having some pain in his low back. Patient noted no other pain. Patient noted bilateral foot drop, left worse than right. Patient noted no fever, no chills, no nausea, no vomiting and no dyspnea. Patient noted no new problems.    Review of Systems   - 10 point review of systems unremarkable aside from what was mentioned in HPI    Past Medical History    I have reviewed this patient's medical history and updated it with pertinent information if needed.   Past Medical History:   Diagnosis Date     Essential hypertension, benign 01/01/2001     Family history of alcoholism    From chart review:  1.) Possible Paget's disease of bone  2.) Possible osteoporosis  3.) Possible MGUS  4.) Iron deficiency anemia    Past Surgical History   I have reviewed this patient's surgical history and updated it with pertinent information if needed.  Past Surgical History:   Procedure Laterality Date     OPTICAL TRACKING SYSTEM " FUSION SPINE POSTERIOR LUMBAR THREE+ LEVELS N/A 12/15/2022    Procedure: Thoracic 10 to Sacral 1 posterior segmental instrumentation. Pelvic instrumentation. Screw cement augmentation L10 to L11. Lumbar 2 to Sacral 1 bilateral decompression and transforaminal interbody fusion. Posterior arthrodesis Thoracic 10-Sacral 1.;  Surgeon: Eliseo Aggarwal MD;  Location:  OR       Social History   I have reviewed this patient's social history and updated it with pertinent information if needed.  Social History     Tobacco Use     Smoking status: Never     Smokeless tobacco: Never   Vaping Use     Vaping Use: Never used   Substance Use Topics     Alcohol use: Yes     Comment: 2-3 4x /week     Drug use: No       Family History   I have reviewed this patient's family history and updated it with pertinent information if needed.  Family History   Problem Relation Age of Onset     Hypertension Father        Medications   Medications Prior to Admission   Medication Sig Dispense Refill Last Dose     acetaminophen (TYLENOL) 500 MG tablet Take 500-1,000 mg by mouth every 6 hours as needed for mild pain        chlorthalidone (HYGROTON) 25 MG tablet Take 1 tablet (25 mg) by mouth daily 90 tablet 1      ferrous gluconate (FERGON) 324 (38 Fe) MG tablet Take 1 tablet (324 mg) by mouth daily (with breakfast) 90 tablet 5      lisinopril (ZESTRIL) 40 MG tablet Take 1 tablet (40 mg) by mouth daily 90 tablet 1      methocarbamol (ROBAXIN) 750 MG tablet Take 1 tablet (750 mg) by mouth every 6 hours as needed for muscle spasms 60 tablet 2      omeprazole (PRILOSEC) 40 MG DR capsule Take 40 mg by mouth daily        oxyCODONE (ROXICODONE) 5 MG tablet Take 1 tablet (5 mg) by mouth every 4 hours as needed for moderate pain (4-6) 30 tablet 0      senna-docusate (SENOKOT-S/PERICOLACE) 8.6-50 MG tablet Take 1 tablet by mouth 2 times daily as needed for constipation 30 tablet 1      Vitamin D, Cholecalciferol, 25 MCG (1000 UT) CAPS         Vitamin  K 100 MCG TABS Take 1 tablet by mouth every morning        zinc gluconate 50 MG tablet Take 50 mg by mouth daily          Allergies   Allergies   Allergen Reactions     No Known Drug Allergies        Physical Exam   Vital Signs: Temp: 99.4  F (37.4  C) Temp src: Axillary BP: 118/75 Pulse: 117   Resp: 16 SpO2: 100 % O2 Device: None (Room air)    Weight: 224 lbs 13.91 oz    General: Patient comfortable, NAD.  Eyes: No scleral icterus or conjunctival injection.  Heart: RRR, S1 S2 w/o murmurs.  Lungs: Breath sounds present. No crackles/wheezes heard.  Gastrointestinal: Abdomen soft, nontender.  Skin: Warm, dry.  Musculoskeletal: No visible joint swelling or erythema.  Neuro: Cranial nerves II-XII grossly intact. Patient unable to dorsiflex both feet.  Psych: Affect pleasant.     Data   Labs noted.  Sodium 130; Potassium 3.7; Creatinine 0.55

## 2022-12-25 LAB
ANION GAP SERPL CALCULATED.3IONS-SCNC: 4 MMOL/L (ref 3–14)
BUN SERPL-MCNC: 13 MG/DL (ref 7–30)
CALCIUM SERPL-MCNC: 8.2 MG/DL (ref 8.5–10.1)
CHLORIDE BLD-SCNC: 100 MMOL/L (ref 94–109)
CO2 SERPL-SCNC: 28 MMOL/L (ref 20–32)
CREAT SERPL-MCNC: 0.61 MG/DL (ref 0.66–1.25)
ERYTHROCYTE [DISTWIDTH] IN BLOOD BY AUTOMATED COUNT: 17.2 % (ref 10–15)
GFR SERPL CREATININE-BSD FRML MDRD: >90 ML/MIN/1.73M2
GLUCOSE BLD-MCNC: 115 MG/DL (ref 70–99)
HCT VFR BLD AUTO: 26.4 % (ref 40–53)
HGB BLD-MCNC: 8.3 G/DL (ref 13.3–17.7)
MCH RBC QN AUTO: 28.4 PG (ref 26.5–33)
MCHC RBC AUTO-ENTMCNC: 31.4 G/DL (ref 31.5–36.5)
MCV RBC AUTO: 90 FL (ref 78–100)
OSMOLALITY UR: 319 MMOL/KG (ref 100–1200)
PLATELET # BLD AUTO: 520 10E3/UL (ref 150–450)
POTASSIUM BLD-SCNC: 3.9 MMOL/L (ref 3.4–5.3)
RBC # BLD AUTO: 2.92 10E6/UL (ref 4.4–5.9)
SODIUM SERPL-SCNC: 132 MMOL/L (ref 133–144)
SODIUM UR-SCNC: 75 MMOL/L
WBC # BLD AUTO: 6.1 10E3/UL (ref 4–11)

## 2022-12-25 PROCEDURE — 99232 SBSQ HOSP IP/OBS MODERATE 35: CPT | Mod: GC | Performed by: PHYSICAL MEDICINE & REHABILITATION

## 2022-12-25 PROCEDURE — 83935 ASSAY OF URINE OSMOLALITY: CPT | Performed by: STUDENT IN AN ORGANIZED HEALTH CARE EDUCATION/TRAINING PROGRAM

## 2022-12-25 PROCEDURE — 85027 COMPLETE CBC AUTOMATED: CPT | Performed by: STUDENT IN AN ORGANIZED HEALTH CARE EDUCATION/TRAINING PROGRAM

## 2022-12-25 PROCEDURE — 36415 COLL VENOUS BLD VENIPUNCTURE: CPT | Performed by: STUDENT IN AN ORGANIZED HEALTH CARE EDUCATION/TRAINING PROGRAM

## 2022-12-25 PROCEDURE — 82310 ASSAY OF CALCIUM: CPT | Performed by: STUDENT IN AN ORGANIZED HEALTH CARE EDUCATION/TRAINING PROGRAM

## 2022-12-25 PROCEDURE — 128N000003 HC R&B REHAB

## 2022-12-25 PROCEDURE — 99232 SBSQ HOSP IP/OBS MODERATE 35: CPT | Performed by: INTERNAL MEDICINE

## 2022-12-25 PROCEDURE — 250N000013 HC RX MED GY IP 250 OP 250 PS 637: Performed by: PHYSICIAN ASSISTANT

## 2022-12-25 PROCEDURE — 84300 ASSAY OF URINE SODIUM: CPT | Performed by: STUDENT IN AN ORGANIZED HEALTH CARE EDUCATION/TRAINING PROGRAM

## 2022-12-25 RX ADMIN — OXYCODONE HYDROCHLORIDE 10 MG: 5 TABLET ORAL at 09:07

## 2022-12-25 RX ADMIN — ACETAMINOPHEN 1000 MG: 500 TABLET ORAL at 18:10

## 2022-12-25 RX ADMIN — METHOCARBAMOL TABLETS 750 MG: 750 TABLET, COATED ORAL at 18:10

## 2022-12-25 RX ADMIN — Medication 25 MCG: at 08:09

## 2022-12-25 RX ADMIN — OXYCODONE HYDROCHLORIDE 5 MG: 5 TABLET ORAL at 00:25

## 2022-12-25 RX ADMIN — LISINOPRIL 40 MG: 40 TABLET ORAL at 08:09

## 2022-12-25 RX ADMIN — ZINC SULFATE 220 MG (50 MG) CAPSULE 220 MG: CAPSULE at 08:09

## 2022-12-25 RX ADMIN — FERROUS GLUCONATE 324 MG: 324 TABLET ORAL at 08:09

## 2022-12-25 RX ADMIN — PANTOPRAZOLE SODIUM 40 MG: 40 TABLET, DELAYED RELEASE ORAL at 08:09

## 2022-12-25 RX ADMIN — OXYCODONE HYDROCHLORIDE 10 MG: 5 TABLET ORAL at 18:10

## 2022-12-25 RX ADMIN — OXYCODONE HYDROCHLORIDE 10 MG: 5 TABLET ORAL at 22:20

## 2022-12-25 RX ADMIN — OXYCODONE HYDROCHLORIDE 10 MG: 5 TABLET ORAL at 14:01

## 2022-12-25 RX ADMIN — OXYCODONE HYDROCHLORIDE 10 MG: 5 TABLET ORAL at 04:26

## 2022-12-25 ASSESSMENT — ACTIVITIES OF DAILY LIVING (ADL)
ADLS_ACUITY_SCORE: 21

## 2022-12-25 NOTE — PROGRESS NOTES
[facility]    Medicine Progress Note - Hospitalist Service    Date of Admission:  12/24/2022    Assessment & Plan     A: Patient is a 66 y/o man who has hypertension, bilateral foot drop and possible MGUS. Patient was hospitalized at [facility] from 15-Dec-2022 to 24-Dec-2022 and was discharged to acute rehabilitation unit on 24-Dec-2022.      Patient underwent Thoracic 10 to Sacral 1 posterior segmental instrumentation, Pelvic instrumentation, Screw cement augmentation L10 to L11. Lumbar 2 to Sacral 1 bilateral decompression and transforaminal interbody fusion, Posterior arthrodesis Thoracic 10-Sacral 1, for lumbar stenosis and spondylolisthesis with radiculopathy on 15-Dec-2022. During post operative course, patient was found to have acute kidney injury, acute blood loss anemia and hyponatremia. Patient apparently did not require blood transfusion during hospital stay. Acute kidney injury had resolved prior to discharge.      Patient is being seen for hyponatremia. Hyponatremia could be secondary to chlorthalidone. Sodium is higher today.    P:  1.) Hyponatremia: Monitoring labs off chlorthalidone.  2.) Hypertension: Chlorthalidone stopped in light of hyponatremia. Continuing lisinopril. Blood pressure controlled today. Will monitor blood pressure and adjust medications as needed.  3.) Lumbar stenosis and spondylolisthesis with radiculopathy s/p surgical intervention: Patient to receive PT/OT.  4.) Possible Paget's disease, possible osteoporosis - patient had received reclast on 26-Sep-2022: Patient to f/u with Endocrinology as scheduled.  5.) Possible MGUS: Patient to f/u with Hematology as scheduled.  6.) Acute blood loss anemia; patient has iron deficiency anemia at baseline: Patient on iron supplementation.  7.) Acute kidney injury, resolved: No active intervention indicated.       Diet: Room Service  Regular Diet Adult    Acosta Catheter: Not  "present  Central Lines: None  Cardiac Monitoring: None  Code Status: Full Code        Randall Cotton MD  Hospitalist Service  [facility]  Securely message with the Vocera Web Console (learn more here)  Text page via Periscape Paging/Directory         Clinically Significant Risk Factors Present on Admission         # Hyponatremia: Lowest Na = 127 mmol/L in last 2 days, will monitor as appropriate          # Hypertension: home medication list includes antihypertensive(s)      # Obesity: Estimated body mass index is 34.19 kg/m  as calculated from the following:    Height as of this encounter: 1.727 m (5' 8\").    Weight as of this encounter: 102 kg (224 lb 13.9 oz).           ______________________________________________________________________    Interval History     Patient noted feeling well and noted no new problems.    Physical Exam   Vital Signs: Temp: (!) 96.2  F (35.7  C) Temp src: Oral BP: 128/73 Pulse: 91   Resp: 16 SpO2: 99 % O2 Device: None (Room air)    Weight: 224 lbs 13.91 oz    General: Patient comfortable, NAD.  Heart: RRR, S1 S2 w/o murmurs.  Lungs: Breath sounds present. No crackles/wheezes heard.  Abdomen: Soft, nontender.    Data   Labs noted.  Sodium 132; Creatinine 0.61; WBC 6.1; Hb 8.3; Platelets 520  Urine sodium 75; Urine osmolality 319  "

## 2022-12-25 NOTE — PLAN OF CARE
FOCUS/GOAL  Bowel management, Bladder management, Mobility, and Skin integrity    ASSESSMENT, INTERVENTIONS AND CONTINUING PLAN FOR GOAL:    Pt admitted today from [facility] into room 501 on ARC at 1430. A&Ox4. Calls appropriately and cooperative, resistant at times with policy with in unit but cares explained and patient verbalized understanding and agreed w/ cares. Ax2 w/ forward walker brought from home w/ gate belt. Pt not OOB this evening. Continent of B&B, LBM today per report. Uses the urinal at bedside. Needs 1 more PVR. Regular, thin, takes pills whole. C/o chronic pain in back, prn oxy, tylenol and robaxin given and effective throughout shift. Back incision care done sutures in place and new prima pore dressing in place. VSS. Will continue w/ poc.

## 2022-12-25 NOTE — PLAN OF CARE
FOCUS/GOAL  Medical management    ASSESSMENT, INTERVENTIONS AND CONTINUING PLAN FOR GOAL:  Patient alert and oriented, able to make needs known  Calls appropriately, slept most of the night, awaken in between urinal used  Complained of back pain, PRN Oxycodone given x2 provided relief, otherwise patient denied headache, chest pain, N&V, no SOB  Continent of Bladder, utilized independently at night, NO BM this shift  Safety rounding checked completed, 3 side rails UP, bed alarm ON,call light in reach  Continue with POC.   Goal Outcome Evaluation:

## 2022-12-25 NOTE — PROGRESS NOTES
[facility], [place]   Physical Medicine and Rehabilitation Note  Weekend/Holiday coverage           Assessment and Plan of Care:     Philipp Bland is a 67 year old right hand dominant male PMH significant for lumbar spinal stenosis and radiculopathy, HTN, Paget's disease, iron deficiency anemia, possible MGUS who underwent an elective T10-S1 spinal fusion and L2-S1 bilateral decompression 12/15/2022 with post-operative complication resulting in right foot weakness.      Admission to acute inpatient rehab: December 24, 2022    Impairment group code: Neurologic Conditions 03.9 Other Neurologic, Lumbar stenosis and spondylolisthesis with radiculopathy       --Vitals stable. Labs reviewed, Na 132 borderline low, continue to hold diuretic medications   --Has follow up with Neurosurgery CHERELLE 1/27/2023  --Remove sutures 2 weeks from surgery ~12/29  --Continue ongoing medical management.  --Continue therapies and plan of care.           Interval history:     No acute events overnight. Reports that he did not sleep well through out the nigh but that is usual for him. He continues to have back pain 8/10 this morning but reports that pain responds to Tylenol. Informed that [facility] will deliver his AFOs that was left, expressed he will not use them. Denies fever, chills, CP, SOB, N/V, abdominal pain, new pain or weakness/numbness/tingling.             Physical Exam:     Vitals:    12/24/22 2057 12/24/22 2346 12/24/22 2349 12/25/22 0811   BP: 113/78 99/70 107/69 128/73   BP Location: Right arm Right arm Right arm Right arm   Patient Position:       Pulse: 58 99  91   Resp: 16 16  16   Temp: 97.6  F (36.4  C) 97.4  F (36.3  C)  (!) 96.2  F (35.7  C)   TempSrc: Axillary Oral  Oral   SpO2: 100% 100%  99%   Weight:       Height:         Gen: NAD, resting in bed  Heart: RRR  Lungs: clear breath sounds b/l  Abd: soft and non-tender  Ext: wwp, moderate edema in BLE, no tenderness in calves  MSK/neuro:  alert and oriented. speech fluent. moves BUE and BLE volitionally.          Data:   Scheduled meds    ferrous gluconate  324 mg Oral Daily with breakfast     lisinopril  40 mg Oral Daily     pantoprazole  40 mg Oral Daily     Vitamin D3  25 mcg Oral Daily     zinc sulfate  220 mg Oral Daily       PRN meds:  acetaminophen, hydrOXYzine **OR** hydrOXYzine, methocarbamol, naloxone **OR** naloxone **OR** naloxone **OR** naloxone, oxyCODONE, polyethylene glycol, senna-docusate    CBC RESULTS:   Recent Labs   Lab Test 12/25/22  0539   WBC 6.1   RBC 2.92*   HGB 8.3*   HCT 26.4*   MCV 90   MCH 28.4   MCHC 31.4*   RDW 17.2*   *       Last Comprehensive Metabolic Panel:  Sodium   Date Value Ref Range Status   12/25/2022 132 (L) 133 - 144 mmol/L Final   04/08/2013 141 133 - 144 mmol/L Final     Potassium   Date Value Ref Range Status   12/25/2022 3.9 3.4 - 5.3 mmol/L Final   04/08/2013 4.3 3.4 - 5.3 mmol/L Final     Chloride   Date Value Ref Range Status   12/25/2022 100 94 - 109 mmol/L Final   04/08/2013 101 94 - 109 mmol/L Final     Carbon Dioxide   Date Value Ref Range Status   04/08/2013 25 20 - 32 mmol/L Final     Carbon Dioxide (CO2)   Date Value Ref Range Status   12/25/2022 28 20 - 32 mmol/L Final     Anion Gap   Date Value Ref Range Status   12/25/2022 4 3 - 14 mmol/L Final   04/08/2013 14 6 - 17 mmol/L Final     Glucose   Date Value Ref Range Status   12/25/2022 115 (H) 70 - 99 mg/dL Final   04/08/2013 93 60 - 99 mg/dL Final     Urea Nitrogen   Date Value Ref Range Status   12/25/2022 13 7 - 30 mg/dL Final   04/08/2013 26 7 - 30 mg/dL Final     Creatinine   Date Value Ref Range Status   12/25/2022 0.61 (L) 0.66 - 1.25 mg/dL Final   04/08/2013 1.27 (H) 0.66 - 1.25 mg/dL Final     GFR Estimate   Date Value Ref Range Status   12/25/2022 >90 >60 mL/min/1.73m2 Final     Comment:     Effective December 21, 2021 eGFRcr in adults is calculated using the 2021 CKD-EPI creatinine equation which includes age and gender  (Mayte cortez al., [facility], DOI: 10.1056/ELCDyl3915076)   04/08/2013 59 (L) >60 mL/min/1.7m2 Final     Calcium   Date Value Ref Range Status   12/25/2022 8.2 (L) 8.5 - 10.1 mg/dL Final   04/08/2013 9.5 8.5 - 10.4 mg/dL Final       Luzma Shell  PGY 4  Physical Medicine and Rehabilitation  Pager: [phone]    Patient discussed with PM&R staff  Dr. Karen Irby          I, Karen Irby, saw this patient with my resident Dr. Shell and agree with her findings and plan of care as documented in her note.     I personally reviewed the chart (vitals signs, medications, and labs).     My key findings and crowell manegement decisions made by me:   Was doing well. Told him that AFOs are being delivered from [facility]; he refused again.   Stable Na and Hgb.   Will start his rehab course tomorrow.     I spent a total of 25 minutes face-to-face and managing the care of the patient. Over 50% of my time on the unit was spent counseling the patient and coordinating care. See note for details.

## 2022-12-25 NOTE — PHARMACY-MEDICATION REGIMEN REVIEW
Pharmacy Medication Regimen Review  Philipp Bland is a 67 year old male who is currently in the Acute Rehab Unit.    Assessment: All medications have an appropriate indications, durations and no unnecessary use was found    Plan:   Continue current medications and plan   Attending provider will be sent this note for review.  If there are any emergent issues noted above, pharmacist will contact provider directly by phone.      Pharmacy will periodically review the resident's medication regimen for any PRN medications not administered in > 72 hours and discontinue them. The pharmacist will discuss gradual dose reductions of psychopharmacologic medications with interdisciplinary team on a regular basis.    Please contact pharmacy if the above does not answer specific medication questions/concerns.    Background:  A pharmacist has reviewed all medications and pertinent medical history today.  Medications were reviewed for appropriate use and any irregularities found are listed with recommendations.      Current Facility-Administered Medications:      acetaminophen (TYLENOL) tablet 500-1,000 mg, 500-1,000 mg, Oral, Q6H PRN, Shima Rick PA, 1,000 mg at 12/24/22 1930     ferrous gluconate (FERGON) tablet 324 mg, 324 mg, Oral, Daily with breakfast, Shima Rick PA, 324 mg at 12/25/22 0809     hydrOXYzine (ATARAX) tablet 25 mg, 25 mg, Oral, Q6H PRN **OR** hydrOXYzine (ATARAX) tablet 50 mg, 50 mg, Oral, Q6H PRN, Shima Rick PA     lisinopril (ZESTRIL) tablet 40 mg, 40 mg, Oral, Daily, Shima Rick PA, 40 mg at 12/25/22 0809     methocarbamol (ROBAXIN) tablet 750 mg, 750 mg, Oral, Q6H PRN, Shima Rick PA, 750 mg at 12/24/22 2023     naloxone (NARCAN) injection 0.2 mg, 0.2 mg, Intravenous, Q2 Min PRN **OR** naloxone (NARCAN) injection 0.4 mg, 0.4 mg, Intravenous, Q2 Min PRN **OR** naloxone (NARCAN) injection 0.2 mg, 0.2 mg, Intramuscular, Q2 Min PRN **OR** naloxone (NARCAN) injection 0.4  mg, 0.4 mg, Intramuscular, Q2 Min PRN, Shyam Olmstead DO     oxyCODONE (ROXICODONE) tablet 5-10 mg, 5-10 mg, Oral, Q4H PRN, Shima Rick PA, 10 mg at 12/25/22 0907     pantoprazole (PROTONIX) EC tablet 40 mg, 40 mg, Oral, Daily, Shima Rick PA, 40 mg at 12/25/22 0809     polyethylene glycol (MIRALAX) Packet 17 g, 17 g, Oral, Daily PRN, Luzma Shell MD     senna-docusate (SENOKOT-S/PERICOLACE) 8.6-50 MG per tablet 1 tablet, 1 tablet, Oral, BID PRN, Shima Rick PA     Vitamin D3 (CHOLECALCIFEROL) tablet 25 mcg, 25 mcg, Oral, Daily, Shima Rick PA, 25 mcg at 12/25/22 0809     zinc sulfate (ZINCATE) capsule 220 mg, 220 mg, Oral, Daily, Shima Rick PA, 220 mg at 12/25/22 0809  No current outpatient prescriptions on file.

## 2022-12-25 NOTE — PLAN OF CARE
FOCUS/GOAL  Pain management, Psychosocial needs, Safety management, and Prevention of secondary complications    ASSESSMENT, INTERVENTIONS AND CONTINUING PLAN FOR GOAL:    Goal Outcome Evaluation:        Pt Aox4 this shift. Pleasant and cooperative. No concerns for nurse at this time. Calls appropriately. Needs in reach and safety measures in place. Cont POC  Mobility: A2 4WW  Bowel/Bladder: cont. LBM today  Skin: back incision. Dressing changed this shift. Skin redness and irritation at sites of adhesive, skin prep applied and adhesive contact limited, bandage positioned in slightly different area to give irritated skin relief.  O2: RA  Diet: R/T/W  Psychosocial: appropriate to situation  Pain: receiving PRN Oxy  Tubes/Lines/Drains: none  Misc:

## 2022-12-25 NOTE — CONSULTS
"Social Work: Initial Assessment with Discharge Plan    Patient Name: Philipp Bland  : 1955  Age: 67 year old  MRN: [id number]  Completed assessment with: Chart review and interview with patient   Admitted to ARU: 22    Presenting Information   Date of SW assessment: 2022  Health Care Directive: Provided Copy and Provided education  Primary Health Care Agent: Patient/self   Secondary Health Care Agent: NOK is Patrice Nicole, gregorio  Living Situation: Lives with son, Idris [phone],  in a house with 5 steps to enter  Previous Functional Status: Patient was modified independent with all ADLs/IADLs, transfers, mobility and gait.  Had a back injury in 2020 and ambulated with a four-wheel walker since then.  Ambulation is usually limited to distances 25 yards before he needs to rest.  DME available: See therapy evaluation for more information,per chart review, pt has a walker, rolling, shower chair, grab bar, tub/shower, grab bar, toilet  Patient and family understanding of hospitalization: pleasant and appropriate  Cultural/Language/Spiritual Considerations: pt is a 67 year old single male who is Sabianism and English speaking    Physical Health  Reason for admission:   Per H&P, \"Philipp Bland is a 67 year old right hand dominant male with PMH lumbar spondylolisthesis, hypertension, iron deficiency anemia, Paget's disease, possible MGUS who was electively admitted to neurosurgery service at [facility] 12/15/2022 for T10-S1 spinal fusion and L2-S1 bilateral decompression.  Estimated blood loss of 1200 mL.  On postop day 1 patient reported that his back pain improved but he had some weakness in his left foot.  He also developed tachycardia and decreased blood pressure which required hospitalist consultation.  He received some fluid bolus. Left foot weakness was stable and did not progress.During acute hospitalization, patient was seen and evaluated by PT and OT, who collectively " "recommended that patient would benefit from ongoing therapies in the acute inpatient rehabilitation setting.\"    Justification for Acute Inpatient Rehabilitation  The patient is a 67-year-old male with back and lower extremity symptoms with severe degenerative deformity and spondylolisthesis. 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 Post op he was noted to have tachycardia and soft BP. He has bilateral foot drop requiring orthotist consult for application of B AFOs. Patient requires an intensive inpatient rehab program to address the following acute impairments:impaired activity tolerance, impaired balance, impaired ROM, impaired strength, impaired tone, impaired weight shifting and pain.    Provider Information   Primary Care Physician:Fallon Mauricio will schedule PCP apt at discharge.   : none    Mental Health/Chemical Dependency:   Diagnosis: none reported  Alcohol/Tobacco/Narcotis: no history  Support/Services in Place: not applicable  Services Needed/Recommended: [facility] and Health Psychology support while on ARU available.   Sexuality/Intimacy: Not discussed     Support System  Marital Status: single  Family support: 4 adults sons and their families  Other support available: none reported    Community Resources  Current in home services: none  Previous services: none    Financial/Employment/Education  Employment Status: employed part-time  Income Source: employment  Education: not assessed  Financial Concerns:  None reported  Insurance: [facility]-Medicare    Discharge Plan   Patient and family discharge goal: TBD, pending progress  Provided Education on discharge plan: Evaluations and discharge recommendations pending.   Patient agreeable to discharge plan:  Pending further discussion. Evaluations and discharge recommendations pending.   Provided " "education and attained signature for Medicare IM and IRF Patient Rights and Privacy Information provided to patient : yes  Provided patient with [place] [facility] Resources: NA  Barriers to discharge: none     Discharge Recommendations   Disposition: See above   Transportation Needs: Patient, family/friends, paid transport, insurance transport (if applicable)     Additional comments   Discharge MAURILIO CIFUENTES 7 days per H&P    SW will remain available and continue to follow as needs arise.       -------------------------------------------------------------------------------------------------------------  YONATHAN Pain Assessment    Pain Effect on Sleep  Over the past 5 days, how much of the time has pain made it hard for you to sleep at night?\"    2. Occasionally  0 - Does not apply - I have not had any pain or hurting in the past 5 days  1 - Rarely or nor at all  2 - Occasionally  3 - Frequently   4 - Almost Constantly  8 - Unable to Answer    Pain Interference with Therapy Activities  \"Over the past 5 days, how often have you limited your participation in rehabilitation therapy sessions due to pain?\"  2. Occasionally  0 - Does not apply - I have not received rehabilitation therapy in the past 5 days  1 - Rarely or nor at all  2 - Occasionally  3 - Frequently   4 - Almost Constantly  8 - Unable to Answer    Pain Interference with Day-to-Day Activities  \"Over the past 5 days, how often have you limited your day-to-day activities (excluding rehabilitation therapy sessions) because of pain?\"  2. Occasionally  1 - Rarely or nor at all  2 - Occasionally  3 - Frequently   4 - Almost Constantly  8 - Unable to Answer  -------------------------------------------------------------------------------------------------------------    Treasure Nieto, [facility], Acute Inpatient Rehab Unit               "

## 2022-12-26 ENCOUNTER — APPOINTMENT (OUTPATIENT)
Dept: PHYSICAL THERAPY | Facility: CLINIC | Age: 67
DRG: 949 | End: 2022-12-26
Attending: PHYSICAL MEDICINE & REHABILITATION
Payer: COMMERCIAL

## 2022-12-26 ENCOUNTER — APPOINTMENT (OUTPATIENT)
Dept: OCCUPATIONAL THERAPY | Facility: CLINIC | Age: 67
DRG: 949 | End: 2022-12-26
Attending: PHYSICAL MEDICINE & REHABILITATION
Payer: COMMERCIAL

## 2022-12-26 LAB
ANION GAP SERPL CALCULATED.3IONS-SCNC: 5 MMOL/L (ref 3–14)
BUN SERPL-MCNC: 16 MG/DL (ref 7–30)
CALCIUM SERPL-MCNC: 7.9 MG/DL (ref 8.5–10.1)
CHLORIDE BLD-SCNC: 99 MMOL/L (ref 94–109)
CO2 SERPL-SCNC: 28 MMOL/L (ref 20–32)
CREAT SERPL-MCNC: 0.65 MG/DL (ref 0.66–1.25)
ERYTHROCYTE [DISTWIDTH] IN BLOOD BY AUTOMATED COUNT: 17.2 % (ref 10–15)
GFR SERPL CREATININE-BSD FRML MDRD: >90 ML/MIN/1.73M2
GLUCOSE BLD-MCNC: 109 MG/DL (ref 70–99)
HCT VFR BLD AUTO: 25.6 % (ref 40–53)
HGB BLD-MCNC: 8.3 G/DL (ref 13.3–17.7)
MCH RBC QN AUTO: 28.9 PG (ref 26.5–33)
MCHC RBC AUTO-ENTMCNC: 32.4 G/DL (ref 31.5–36.5)
MCV RBC AUTO: 89 FL (ref 78–100)
PLATELET # BLD AUTO: 576 10E3/UL (ref 150–450)
POTASSIUM BLD-SCNC: 3.9 MMOL/L (ref 3.4–5.3)
RBC # BLD AUTO: 2.87 10E6/UL (ref 4.4–5.9)
SODIUM SERPL-SCNC: 132 MMOL/L (ref 133–144)
WBC # BLD AUTO: 6.8 10E3/UL (ref 4–11)

## 2022-12-26 PROCEDURE — 82310 ASSAY OF CALCIUM: CPT | Performed by: PHYSICIAN ASSISTANT

## 2022-12-26 PROCEDURE — 97166 OT EVAL MOD COMPLEX 45 MIN: CPT | Mod: GO

## 2022-12-26 PROCEDURE — G0463 HOSPITAL OUTPT CLINIC VISIT: HCPCS

## 2022-12-26 PROCEDURE — 250N000013 HC RX MED GY IP 250 OP 250 PS 637: Performed by: PHYSICIAN ASSISTANT

## 2022-12-26 PROCEDURE — 128N000003 HC R&B REHAB

## 2022-12-26 PROCEDURE — 97535 SELF CARE MNGMENT TRAINING: CPT | Mod: GO

## 2022-12-26 PROCEDURE — 97530 THERAPEUTIC ACTIVITIES: CPT | Mod: GP | Performed by: PHYSICAL THERAPIST

## 2022-12-26 PROCEDURE — 97162 PT EVAL MOD COMPLEX 30 MIN: CPT | Mod: GP | Performed by: PHYSICAL THERAPIST

## 2022-12-26 PROCEDURE — 97110 THERAPEUTIC EXERCISES: CPT | Mod: GP | Performed by: PHYSICAL THERAPIST

## 2022-12-26 PROCEDURE — 99232 SBSQ HOSP IP/OBS MODERATE 35: CPT | Mod: GC | Performed by: PHYSICAL MEDICINE & REHABILITATION

## 2022-12-26 PROCEDURE — 85027 COMPLETE CBC AUTOMATED: CPT | Performed by: PHYSICIAN ASSISTANT

## 2022-12-26 PROCEDURE — 36415 COLL VENOUS BLD VENIPUNCTURE: CPT | Performed by: PHYSICIAN ASSISTANT

## 2022-12-26 PROCEDURE — 99232 SBSQ HOSP IP/OBS MODERATE 35: CPT | Performed by: INTERNAL MEDICINE

## 2022-12-26 RX ORDER — ACETAMINOPHEN 500 MG
1000 TABLET ORAL 3 TIMES DAILY
Status: DISCONTINUED | OUTPATIENT
Start: 2022-12-27 | End: 2023-01-06 | Stop reason: HOSPADM

## 2022-12-26 RX ORDER — LANOLIN ALCOHOL/MO/W.PET/CERES
3 CREAM (GRAM) TOPICAL
Status: DISCONTINUED | OUTPATIENT
Start: 2022-12-26 | End: 2023-01-06 | Stop reason: HOSPADM

## 2022-12-26 RX ADMIN — ZINC SULFATE 220 MG (50 MG) CAPSULE 220 MG: CAPSULE at 08:29

## 2022-12-26 RX ADMIN — OXYCODONE HYDROCHLORIDE 10 MG: 5 TABLET ORAL at 10:31

## 2022-12-26 RX ADMIN — OXYCODONE HYDROCHLORIDE 10 MG: 5 TABLET ORAL at 15:46

## 2022-12-26 RX ADMIN — OXYCODONE HYDROCHLORIDE 10 MG: 5 TABLET ORAL at 06:39

## 2022-12-26 RX ADMIN — METHOCARBAMOL TABLETS 750 MG: 750 TABLET, COATED ORAL at 01:08

## 2022-12-26 RX ADMIN — OXYCODONE HYDROCHLORIDE 10 MG: 5 TABLET ORAL at 19:55

## 2022-12-26 RX ADMIN — METHOCARBAMOL TABLETS 750 MG: 750 TABLET, COATED ORAL at 16:09

## 2022-12-26 RX ADMIN — Medication 25 MCG: at 08:30

## 2022-12-26 RX ADMIN — ACETAMINOPHEN 1000 MG: 500 TABLET ORAL at 14:04

## 2022-12-26 RX ADMIN — PANTOPRAZOLE SODIUM 40 MG: 40 TABLET, DELAYED RELEASE ORAL at 08:29

## 2022-12-26 RX ADMIN — FERROUS GLUCONATE 324 MG: 324 TABLET ORAL at 08:29

## 2022-12-26 RX ADMIN — OXYCODONE HYDROCHLORIDE 10 MG: 5 TABLET ORAL at 02:45

## 2022-12-26 ASSESSMENT — ACTIVITIES OF DAILY LIVING (ADL)
ADLS_ACUITY_SCORE: 23
ADLS_ACUITY_SCORE: 21
ADLS_ACUITY_SCORE: 21
ADLS_ACUITY_SCORE: 23
ADLS_ACUITY_SCORE: 23
BADLS,_PREVIOUS_FUNCTIONAL_LEVEL: USES DEVICE OR EQUIPMENT
ADLS_ACUITY_SCORE: 23
PREVIOUS_RESPONSIBILITIES: MEDICATION MANAGEMENT;FINANCES;DRIVING
ADLS_ACUITY_SCORE: 21
IADLS,_PREVIOUS_FUNCTIONAL_LEVEL: DEPENDENT
ADLS_ACUITY_SCORE: 21
IADLS,_PREVIOUS_FUNCTIONAL_LEVEL: USES DEVICE OR EQUIPMENT;PARTIAL ASSISTANCE
ADLS_ACUITY_SCORE: 23
BADLS,_PREVIOUS_FUNCTIONAL_LEVEL: INDEPENDENT;USES DEVICE OR EQUIPMENT
ADLS_ACUITY_SCORE: 21

## 2022-12-26 NOTE — PLAN OF CARE
Individualized Overall Plan Of Care (IOPOC)      Rehab diagnosis/Impairment Group Code: Neurologic conditions 03.9 other neurologic, lumbar stenosis and spondylolisthesis with radiculopathy  S/p spinal fusion       Expected functional outcome: improvement to mod I level with mobility and ADLs    Clinical Impression Comments:     Mobility: PT: Pt resents to PT with generalized weakness, deconditioning, bilat foot drop, and spinal precautions s/p T10-S1 fusion and L2-S1 bilat decompression on 12/15/22. As a result, he is requiring increased assist and at increased risk for falls from baseline. He should benefit well from skilled PT, with goals for mod I mobility and ADLs at discharge. ELOS 10 days.    ADL: 67 year old male who presented with T10-S1 fusion with L2-S1 decompression. Pt presents w/ pain decreased balance, decreased safety and IND w/ ADLs, tsfers and functional mobility. Pt is below baseline and will benefit from skilled OT to address deficits w/ recommendations for DME and eventual home care OT.      Intensity of therapy:   PT 90 minutes, Daily, for 10 days  OT 90 minutes, Daily (60-90mins), for 10 days    Orthotics AFO and  - refused so maybe soft lace-up brace  Education wound care and skin care  Neuropsychology Testing: No    Medical Prognosis: good medical prognosis       Physician summary statement: has been medically stable but some active issues including skin/wound care, pain management and hyponatremia.     Discharge destination: prior home and family  Discharge rehabilitation needs: home care, RN, PT and OT      Estimated length of stay: 10 days       Rehabilitation Physician Karen Irby MD

## 2022-12-26 NOTE — PLAN OF CARE
"Blood pressure 118/84, pulse 95, temperature (!) 96  F (35.6  C), temperature source Oral, resp. rate 16, height 1.727 m (5' 8\"), weight 102 kg (224 lb 13.9 oz), SpO2 99 %.    Pt is AOX4 on room air. Regular diet, thin liquids, takes pills whole.   A2 with a walker. Has BLE weakness, RT foot is weaker.   Cont of B&B, uses urinal.     Pt has been complaining of back pain. Pain managed this shift with 10 mg Oxycodone, Robaxin, and Tylenol. Pt stated pain was only in back. Denied numbness, tingling and CP.     WOC nurse consulted today for back sutures. WOC put in orders stating that surgical incision was healing and could remain open to air.     Continue POC.                         "

## 2022-12-26 NOTE — PROGRESS NOTES
"  [facility]   Acute Rehabilitation Unit  Daily progress note    INTERVAL HISTORY  Philipp Bland was seen and examined at bedside.   No acute events overnight.  Continues to use oxycodone for pain.  Denied any new symptoms or complaints. Denies fever, chills, CP, SOB, N/V, abdominal pain, new pain or weakness/numbness/tingling.   He was participating in therapies today and was able to stand up with assistance.    Functionally, min assist supine to sit with bedrail, contact-guard assist with transfers, ambulated 25 feet with four-wheel walker.        MEDICATIONS    ferrous gluconate  324 mg Oral Daily with breakfast     lisinopril  40 mg Oral Daily     pantoprazole  40 mg Oral Daily     Vitamin D3  25 mcg Oral Daily     zinc sulfate  220 mg Oral Daily        acetaminophen, hydrOXYzine **OR** hydrOXYzine, melatonin, methocarbamol, naloxone **OR** naloxone **OR** naloxone **OR** naloxone, oxyCODONE, polyethylene glycol, senna-docusate     PHYSICAL EXAM  /84 (BP Location: Left arm, Patient Position: Sitting)   Pulse 95   Temp (!) 96  F (35.6  C) (Oral)   Resp 16   Ht 1.727 m (5' 8\")   Wt 102 kg (224 lb 13.9 oz)   SpO2 99%   BMI 34.19 kg/m    Gen: NAD, sitting comfortably, on room air  HEENT: MMM, AT/NC  Cardio: audible S1/S2, no murmurs  Pulm: equal bilateral air entry, no crackles or wheezes  Abd: soft non-tender, audible bowel sounds  Ext: no pitting edema   Neuro/MSK: answers questions appropriately, moves limbs spontaneously   Skin: Spine surgical incision examined with some skin excoriation and scabbing likely due to dressing, skin incision C/D/I with sutures in place    LABS  CBC RESULTS:   Recent Labs   Lab Test 12/26/22  0602   WBC 6.8   RBC 2.87*   HGB 8.3*   HCT 25.6*   MCV 89   MCH 28.9   MCHC 32.4   RDW 17.2*   *      Last Comprehensive Metabolic Panel:  Sodium   Date Value Ref Range Status   12/26/2022 132 (L) 133 - 144 mmol/L Final   04/08/2013 141 " 133 - 144 mmol/L Final     Potassium   Date Value Ref Range Status   12/26/2022 3.9 3.4 - 5.3 mmol/L Final   04/08/2013 4.3 3.4 - 5.3 mmol/L Final     Chloride   Date Value Ref Range Status   12/26/2022 99 94 - 109 mmol/L Final   04/08/2013 101 94 - 109 mmol/L Final     Carbon Dioxide   Date Value Ref Range Status   04/08/2013 25 20 - 32 mmol/L Final     Carbon Dioxide (CO2)   Date Value Ref Range Status   12/26/2022 28 20 - 32 mmol/L Final     Anion Gap   Date Value Ref Range Status   12/26/2022 5 3 - 14 mmol/L Final   04/08/2013 14 6 - 17 mmol/L Final     Glucose   Date Value Ref Range Status   12/26/2022 109 (H) 70 - 99 mg/dL Final   04/08/2013 93 60 - 99 mg/dL Final     Urea Nitrogen   Date Value Ref Range Status   12/26/2022 16 7 - 30 mg/dL Final   04/08/2013 26 7 - 30 mg/dL Final     Creatinine   Date Value Ref Range Status   12/26/2022 0.65 (L) 0.66 - 1.25 mg/dL Final   04/08/2013 1.27 (H) 0.66 - 1.25 mg/dL Final     GFR Estimate   Date Value Ref Range Status   12/26/2022 >90 >60 mL/min/1.73m2 Final     Comment:     Effective December 21, 2021 eGFRcr in adults is calculated using the 2021 CKD-EPI creatinine equation which includes age and gender (Mayte et al., NEJ, DOI: 10.1056/NVSXej0156138)   04/08/2013 59 (L) >60 mL/min/1.7m2 Final     Calcium   Date Value Ref Range Status   12/26/2022 7.9 (L) 8.5 - 10.1 mg/dL Final   04/08/2013 9.5 8.5 - 10.4 mg/dL Final     Bilirubin Total   Date Value Ref Range Status   12/16/2022 0.9 0.2 - 1.3 mg/dL Final   04/08/2013 0.8 0.2 - 1.3 mg/dL Final     Alkaline Phosphatase   Date Value Ref Range Status   12/16/2022 71 40 - 150 U/L Final   04/08/2013 117 40 - 150 U/L Final     ALT   Date Value Ref Range Status   12/16/2022 34 0 - 70 U/L Final   04/08/2013 20 0 - 70 U/L Final     AST   Date Value Ref Range Status   12/16/2022 32 0 - 45 U/L Final   04/08/2013 26 0 - 45 U/L Final             ASSESSMENT/PLAN   Philipp Bland is a 67 year old right hand dominant male PMH  significant for lumbar spinal stenosis and radiculopathy, HTN, Paget's disease, iron deficiency anemia, possible MGUS who underwent an elective T10-S1 spinal fusion and L2-S1 bilateral decompression 12/15/2022 with post-operative complication resulting in right foot weakness.      Admission to acute inpatient rehab: December 24, 2022    Impairment group code: Neurologic Conditions 03.9 Other Neurologic, Lumbar stenosis and spondylolisthesis with radiculopathy          Rehabilitation - continue comprehensive acute inpatient rehabilitation program with multidisciplinary approach including therapies, rehab nursing, and physiatry following. See interval history for updates.      Medical conditions   #Spinal stenosis L2-5 and polyradiculopathy   s/p T10-S1 spinal fusion and L2-S1 bilateral decompression  Complicated by left foot weakness likely secondary to polyradiculopathy  - Had previous EMGs 6/29/2022 by Dr. Babar Issa, showed severe left lumbosacral polyradiculopathy. Exam noted at the time 5/5 BLE except right KE 4+/5, left KE KF 4/5.    - If weakness worsens or progresses, will consult neurosurgery and consider appropriate spine imaging  - PT/OT for evaluation and treatment  - tried AFOs in [facility] but did not like them, planning to use high top basketball shoes  - As needed oxycodone and Tylenol for pain   -Per neurosurgery team remove sutures 2 weeks after surgery, approximately 12/29    # Skin wound  -Patient developed excoriation and superficial skin wounds likely due to dressing and adhesive removal.  -We will consult WOCN and keep wound open to air as healing appropriately with no signs of infection such as erythema or drainage    #Hyponatremia  - Na levels after surgery were 127-132  - Cause unclear, Chlorthalidone was held and sodium level improved  - Medicine consulted for co-management   - BMP QMonday     #Nomocytic Anemia   #Iron deficiency anemia  #Thrombocytosis - reactive   - Likely due to acute  blood loss with previous CHANTALE. Pre-op Hgb 14.5, post-op 9.2 EBL 1200  - PTA ferrous gluconate 324mg   - CBC QMonday     #HTN  - PTA: chlorthalidone 25mg daily, lisinopril 40mg daily  - Continue lisinopril but chlorthalidone on hold due to hyponatremia, blood pressures are controlled and SBP below 120      #Paget's disease  - Continue with vitamin D supplementation 1000U, received IV Zoledronic infusion prior to surgery 9/26/2022  - outpatient follow up with endo team Dr. Aguilar for maintenance and prevention of complications     #Elevated PSA  - 10/7/2022 was 15   - 10/19 seen by urology and was recommended to undergo prostate MRI for biopsy but did not follow up    - Monitor symptoms while inpatient such as urine retention, hematuria, pelvic pain, worsening back pain     #MGUS   - Per chart review suspected diagnosis, however not evaluated by hematology  - outpatient follow up for further work up and diagnosis        1. Adjustment to disability:  Clinical psychology to eval and treat as indicated  2. FEN: regular diet, thin liquids  3. Bowel: continent, bowel medications as needed   4. Bladder: continent, PVR x3  5. DVT Prophylaxis: Anti-embolism stocking  6. GI Prophylaxis: continue PPIs for total of 2 weeks  7. Code: Full   8. Disposition: Home with son   9. ELOS:  7-10 days  10. Rehab prognosis:  Good  11. Follow up Appointments on Discharge:  Neurosurgery: Follow-up with CHERELLE on 1/27/2023  Heme/Onc: follow up MGUS workup   Endocrinology, saw Dr. Katarina Aguilar for outpatient follow up   Urology: follow up high PSA work up        Seen and discussed with Dr. Karen Irby , PM&R staff physician      Luzma Shell   PGY 4  Physical Medicine and Rehabilitation  Pager [phone]        I, Karen Irby, saw this patient with my resident Dr. Shell and agree with her findings and plan of care as documented in her note.     I personally reviewed the chart (vitals signs, medications, and labs).     My key findings and  key manegement decisions made by me:   He was doing well. Reviewed the plan as above; Na in better range and BP controlled off chlorthalidone. Hospitalist team signed off.   Eval completed and ELOS is 10 days.      I spent a total of 30 minutes face-to-face and managing the care of the patient. Over 50% of my time on the unit was spent counseling the patient and coordinating care. See note for details.

## 2022-12-26 NOTE — PROGRESS NOTES
[facility]    Medicine Progress Note - Hospitalist Service    Date of Admission:  12/24/2022    Assessment & Plan     A: Patient is a 66 y/o man who has hypertension, bilateral foot drop and possible MGUS. Patient was hospitalized at [facility] from 15-Dec-2022 to 24-Dec-2022 and was discharged to acute rehabilitation unit on 24-Dec-2022.      Patient underwent Thoracic 10 to Sacral 1 posterior segmental instrumentation, Pelvic instrumentation, Screw cement augmentation L10 to L11. Lumbar 2 to Sacral 1 bilateral decompression and transforaminal interbody fusion, Posterior arthrodesis Thoracic 10-Sacral 1, for lumbar stenosis and spondylolisthesis with radiculopathy on 15-Dec-2022. During post operative course, patient was found to have acute kidney injury, acute blood loss anemia and hyponatremia. Patient apparently did not require blood transfusion during hospital stay. Acute kidney injury had resolved prior to discharge.      Patient is being seen for hyponatremia. Hyponatremia could be secondary to chlorthalidone. Sodium appears to have stabilized.     P:  1.) Mild hyponatremia, sodium appears to have stabilized: Chlorthalidone should not be resumed.   2.) Hypertension: Chlorthalidone stopped in light of hyponatremia. Continuing lisinopril. Blood pressure controlled on lisinopril alone.  3.) Lumbar stenosis and spondylolisthesis with radiculopathy s/p surgical intervention: Patient to receive PT/OT.  4.) Possible Paget's disease, possible osteoporosis - patient had received reclast on 26-Sep-2022: Patient to f/u with Endocrinology as scheduled.  5.) Possible MGUS: Patient to f/u with Hematology as scheduled.  6.) Acute blood loss anemia; patient has iron deficiency anemia at baseline: Patient on iron supplementation.  7.) Acute kidney injury, resolved: No active intervention indicated.  8.) Will sign off. Call with questions.       Diet: Room Service  Regular  "Diet Adult    Acosta Catheter: Not present  Central Lines: None  Cardiac Monitoring: None  Code Status: Full Code        Randall Cotton MD  Hospitalist Service  [facility]  Securely message with the Vocera Web Console (learn more here)  Text page via [employer] Paging/Directory         Clinically Significant Risk Factors         # Hyponatremia: Lowest Na = 132 mmol/L in last 2 days, will monitor as appropriate                # Obesity: Estimated body mass index is 34.19 kg/m  as calculated from the following:    Height as of this encounter: 1.727 m (5' 8\").    Weight as of this encounter: 102 kg (224 lb 13.9 oz)., PRESENT ON ADMISSION         ______________________________________________________________________    Interval History     Patient noted feeling well, noted pain continuing to improve, noted tolerating therapy, noted no new problems.    Physical Exam   Vital Signs: Temp: (!) 96  F (35.6  C) Temp src: Oral BP: 118/84 Pulse: 95   Resp: 16 SpO2: 99 % O2 Device: None (Room air)    Weight: 224 lbs 13.91 oz    General: Patient comfortable, NAD.  Heart: RRR, S1 S2 w/o murmurs.  Lungs: Breath sounds present. No crackles/wheezes heard.  Abdomen: Soft, nontender.    Data   Labs noted.  Sodium 132; Potassium 3.9; Creatinine 0.65  WBC 6.8; Hb 8.3; Platelets 576  "

## 2022-12-26 NOTE — PLAN OF CARE
Discharge Planner Post-Acute Rehab OT:     Discharge Plan: Home w/ son and home OT.    Precautions: Spinal, fall, needs increased height to 25+ inches for ease w/ transfers    Current Status:  ADLs:    Mobility: STS from elevated bed height min-mod A. Ambulated w/ personal 4ww CGA w/ heavy A of UE. Ax2 w/ nsg.    Grooming: Set up seated.    Dressing: Dep A LB.    Bathing: TBD. Recommend a rolling shower chair w/ footrest support.     Toileting: Ax2 on and off 19 inch toilet in his room. Declining use of elevated commode overlay on toilet due to discomfort.   IADLs: Son was previously assisting pt. Pt was doing his own finances and medication. Pt was also previously driving.  Vision/Cognition: WFL.     Assessment:   67 year old male who presented with T10-S1 fusion with L2-S1 decompression. Pt presents w/ pain decreased balance, decreased safety and IND w/ ADLs, tsfers and functional mobility. Pt is below baseline and will benefit from skilled OT to address deficits w/ recommendations for DME and eventual home care OT. ELOS: 7-10 days.    Other Barriers to Discharge (DME, Family Training, etc):   Pt has tub/shower and uses a utility stool/step for showering. No grab bars.   Family training TBD:  DME: Pt will likey need a tub bench, reacher, grab bars, toilet frame/riser  Son to report back on home measurements for bed height, toilet height and other functional chair surface heights pt uses for home. Recommend to look into ramp as pt reports stairs had been difficult at baseline-continue to educate as appropriate.

## 2022-12-26 NOTE — CONSULTS
[facility]  WO Nurse Inpatient Assessment     Consulted for: back incision    Patient History (according to provider note(s):      Patient is a 66 y/o man who has hypertension, bilateral foot drop and possible MGUS. Patient was hospitalized at [facility] from 15-Dec-2022 to 24-Dec-2022 and was discharged to acute rehabilitation unit on 24-Dec-2022.      Patient underwent Thoracic 10 to Sacral 1 posterior segmental instrumentation, Pelvic instrumentation, Screw cement augmentation L10 to L11. Lumbar 2 to Sacral 1 bilateral decompression and transforaminal interbody fusion, Posterior arthrodesis Thoracic 10-Sacral 1, for lumbar stenosis and spondylolisthesis with radiculopathy on 15-Dec-2022. During post operative course, patient was found to have acute kidney injury, acute blood loss anemia and hyponatremia. Patient apparently did not require blood transfusion during hospital stay. Acute kidney injury had resolved prior to discharge.     Areas Assessed:      Areas visualized during today's visit: Focused:    Skin Injury Location: skin around back incision      Last photo: 12/26  Skin injury due to: Medical adhesive related skin injury (MARSI)  Skin history and plan of care:   Skin around incision circumferentially red, irritated and superficially open in areas, although appears to be improving. Per Dr Irby concern for skin at top of incision of concern where transfer belt is positioned. This is rarely an area of pressure unless pt laying on belt. Most likly irritated due to friction in addition to MARSI. Per Dr Irby she states she wants it left open to air, which will solve skin concerns.   Affected area:      Skin assessment: Denudement     Measurements (length x width x depth, in cm) not measured     Color: normal and consistent with surrounding tissue     Temperature  normal      Drainage: none .      Color: none      Odor: none  Pain: denies , none  Pain  interventions prior to dressing change: patient tolerated well  Treatment goal: Heal   STATUS: initial assessment  Supplies ordered: discussed with RN and discussed with patient          Treatment Plan:     Skin surrounding back incision  Daily    OK to leave open to air.   If incision is covered please use no sting barrier under adhesive and adjust dressing to avoid irritated areas.      Orders: Written    RECOMMEND PRIMARY TEAM ORDER: None, at this time  Education provided: plan of care  Discussed plan of care with: Patient, Nurse and Physician  WOC nurse follow-up plan: signing off  Notify WOC if wound(s) deteriorate.  Nursing to notify the Provider(s) and re-consult the WOC Nurse if new skin concern.    DATA:     Current support surface: Standard  Standard gel/foam mattress (IsoFlex, Atmos air, etc)    BMI: Body mass index is 34.19 kg/m .   Active diet order: Orders Placed This Encounter      Regular Diet Adult     Output: I/O last 3 completed shifts:  In: -   Out: 350 [Urine:350]     Labs: Recent Labs   Lab 12/26/22  0602   HGB 8.3*   WBC 6.8     Pressure injury risk assessment:   Sensory Perception: 4-->no impairment  Moisture: 4-->rarely moist  Activity: 3-->walks occasionally  Mobility: 3-->slightly limited  Nutrition: 3-->adequate  Friction and Shear: 3-->no apparent problem  Duc Score: 20    Consuelo Staples RN CWOCN  Dept. Pager: [phone]  Dept. Office Number: *[phone]

## 2022-12-26 NOTE — PLAN OF CARE
"Discharge Planner Post-Acute Rehab PT:     Discharge Plan: Son's home for IADL assist, 3 DONI with L rail. Home care PT.    Precautions: Falls, Spinal  Edema: Size F Spandigrip to bilat LE on during day, off at night.    Current Status:  Bed Mobility: Min A supine<>sit with bedrail  Transfer: Variable CGA<>min A STS from elevated surfaces with 4WW. Ax2 standard height.  Gait: up to 25 ft with 4WW, WC follow  Stairs: 3x2\" step ups in // bars with CGA  Balance: Fair static/dynamic standing balance. Heavy reliance on Ue's for standing    Assessment:  Pt resents to PT with generalized weakness, deconditioning, bilat foot drop, and spinal precautions s/p T10-S1 fusion and L2-S1 bilat decompression on 12/15/22. As a result, he is requiring increased assist and at increased risk for falls from baseline. He should benefit well from skilled PT, with goals for mod I mobility and ADLs at discharge. ELOS 10 days.    Other Barriers to Discharge (DME, Family Training, etc):   Pt has personal 4WW here. Recommend possible K3 WC+ramp for home, along with bilat foot up devices.  Family training to be scheduled.  "

## 2022-12-26 NOTE — PROGRESS NOTES
12/26/22 1000   Appointment Info   Signing Clinician's Name / Credentials (PT) Jarett Conte, PT       Present no   Language English   Living Environment   People in Home child(fortino), adult   Current Living Arrangements house   Home Accessibility stairs to enter home   Number of Stairs, Main Entrance 3   Stair Railings, Main Entrance railing on left side (ascending)   Transportation Anticipated family or friend will provide   Living Environment Comments PT: Pt will be discharging to son's home in [place], [facility] with L rail ascending. Once inside, all needs met on the main level. PTA, pt was IND with all functional mobility and ADLs with the use of a 4WW. Had been substitute teaching from time to time as well.   Self-Care   Usual Activity Tolerance moderate   Current Activity Tolerance fair   Regular Exercise No   Equipment Currently Used at Home grab bar, toilet;grab bar, tub/shower;walker, rolling;shower chair   Fall history within last six months yes   Number of times patient has fallen within last six months 2   Post-Acute Assessment Only   Post-Acute Functional Assessment See below   Previous Level of Function/Home Environm   Bathing/Grooming, Premorbid Functional Level uses device or equipment   Dressing, Premorbid Functional Level independent   Eating/Feeding, Premorbid Functional Level independent   Toileting, Premorbid Functional Level uses device or equipment   BADLs, Premorbid Functional Level uses device or equipment   IADLs, Premorbid Functional Level uses device or equipment;partial assistance   Bed Mobility, Premorbid Functional Level independent   Transfers, Premorbid Functional Level uses device or equipment   Household Ambulation, Premorbid Functional Level uses device or equipment   Stairs, Premorbid Functional Level uses device or equipment   Community Ambulation, Premorbid Functional Level uses device or equipment   General Information   Onset of Illness/Injury or Date  "of Surgery 12/15/22   Referring Physician Shyam Olmstead, DO   Patient/Family Therapy Goals Statement (PT) \"To eventually be able to walk without a walker.\"   Pertinent History of Current Problem (include personal factors and/or comorbidities that impact the POC) PER EMR: \"Philipp Bland is a 67 year old right hand dominant male PMH significant for lumbar spinal stenosis and radiculopathy, HTN, Paget's disease, iron deficiency anemia, possible MGUS who underwent an elective T10-S1 spinal fusion and L2-S1 bilateral decompression 12/15/2022 with post-operative complication resulting in right foot weakness.\"   Existing Precautions/Restrictions fall;spinal   Weight-Bearing Status - LUE full weight-bearing   Weight-Bearing Status - RUE full weight-bearing   Weight-Bearing Status - LLE full weight-bearing   Weight-Bearing Status - RLE full weight-bearing   Heart Disease Risk Factors High blood pressure;Age;Gender   Cognition   Affect/Mental Status (Cognition) WFL   Orientation Status (Cognition) oriented x 4   Follows Commands (Cognition) follows multi-step commands;over 90% accuracy   Behavioral Issues   (None immediately noted)   Safety Deficit (Cognition) judgment;problem-solving;safety precautions awareness;safety precautions follow-through/compliance;minimal deficit;moderate deficit   Pain Assessment   Patient Currently in Pain   (Reports 6.5/10 pain in low back.)   Integumentary/Edema   Integumentary/Edema Comments Bilat LE edema noted   Strength (Manual Muscle Testing)   Strength Comments Grossly 3+/5 bilat hips and knees, ankle PF. Eversion/Inversion 3-/5. DF 0/5.   Balance   Balance Comments Fair static/dynamic seated balance. Requires heavy UE support through 4WW for standing/ambulation.   Sensory Examination   Sensory Perception Comments Intact light touch bilat LE/feet. Proprioception 5/5 at bilat great toe.   Clinical Impression   Criteria for Skilled Therapeutic Intervention Yes, treatment indicated   PT " Diagnosis (PT) impaired force production   Influenced by the following impairments generalized weakness, bilat foot drop, impaired sitting/standing balance, pain, post surgical precautions   Functional limitations due to impairments bed mobility, transfers, ambulation, stairs   Clinical Presentation (PT Evaluation Complexity) Evolving/Changing   Clinical Presentation Rationale PLOF with decline, current level of assist/functioning, DONI home, will be alone during the day   Clinical Decision Making (Complexity) moderate complexity   Planned Therapy Interventions (PT) balance training;bed mobility training;gait training;groups;home exercise program;neuromuscular re-education;orthotic fitting/training;patient/family education;stair training;strengthening;stretching;transfer training;wheelchair management/propulsion training;progressive activity/exercise;home program guidelines   Anticipated Equipment Needs at Discharge (PT)   (TBD pending progress. Has personal 4WW here. May require WC/cushion and ramp to enter.)   Risk & Benefits of therapy have been explained evaluation/treatment results reviewed;care plan/treatment goals reviewed;risks/benefits reviewed;current/potential barriers reviewed;participants voiced agreement with care plan;participants included;patient   Clinical Impression Comments PT: Pt resents to PT with generalized weakness, deconditioning, bilat foot drop, and spinal precautions s/p T10-S1 fusion and L2-S1 bilat decompression on 12/15/22. As a result, he is requiring increased assist and at increased risk for falls from baseline. He should benefit well from skilled PT, with goals for mod I mobility and ADLs at discharge. ELOS 10 days.   PT Total Evaluation Time   PT Eval, Moderate Complexity Minutes (90051) 15   Physical Therapy Goals   PT Frequency Daily   PT Predicted Duration/Target Date for Goal Attainment 01/03/23   PT: Bed Mobility Independent;Supine to/from sit;Rolling;Bridging;Within  precautions   PT: Transfers Modified independent;Sit to/from stand;Bed to/from chair;Assistive device   PT: Gait Modified independent;Rolling walker;50 feet   PT: Stairs Supervision/stand-by assist;3 stairs;Rail on left   PT: Wheelchair Mobility 50 feet;Caregiver SBA;manual wheelchair   PT: Goal 1 Pt will be able to perform car transfer with 4WW and SBA in order to access home and medical appts.   PT: Goal 2 Pt will be IND with HEP for core/glute stabilization and general LE strength/balance to reduce pain and risk for future falls.   Therapeutic Activity   Therapeutic Activities: dynamic activities to improve functional performance Minutes (70653) 45   Treatment Detail/Skilled Intervention PT: See below GG completion and vitals flow sheet. WC, bedside chair, and adquate cushions obtained for use in room and on unit. Discussed orthotics options to address bilat foot drop and increased risk for future falls via toe catching.   PT Discharge Planning   PT Plan PT: Spandigrip for bilat LEs and trial foot up device. TUG.   Total Session Time   Timed Code Treatment Minutes 45   Total Session Time (sum of timed and untimed services) 60   Post Acute Settings Only   What unit is patient on? Acute Rehab   PT - Acute Rehab Center Time   Individual Time (minutes) - enter zero if not applicable - PT 60   Group Time (minutes) - enter zero if not applicable  - PT 0   Concurrent Time (minutes) - enter zero if not applicable  - PT 0   Co-Treatment Time (minutes) - enter zero if not applicable  - PT 0   ARC Total Session Time (minutes) - PT 60   Chair/bed-to-chair Transfer   Describe performance Dependent. Ax2 for STS. From elevated surface, min A for STS, CGA with FWW once standing.   Roll Left and Right   Assistance Needed Adaptive equipment;Supervision   Physical Assistance Level No physical assistance   Comment SBA with bilat rails   Roll Left to Right CARE Score 4   Sit to Lying   Physical Assistance Level Less than 25%  "  Comment Min A through LEs   Sit to Lying CARE Score 3   Lying to Sitting on Side of Bed   Assistance Needed Adaptive equipment   Physical Assistance Level Less than 25%   Comment Terrance throug trunk with bedrail   Lying to Sitting CARE Score 3   Sit to Stand   Physical Assistance Level Total assistance   Comment requires Ax2 from standard height surfaces. Variable CGA<>min A from significantly elevated surfaces.   Sitting to Standing CARE Score 1   Locomotion   Locomotion Comment Pt ambulated x15 ft with 4WW and CGA, x25 ft with 4WW and close WC follow.   Walk 10 Feet   Physical Assistance Level Contact guard assist   Walk 10 Ft. CARE Score 4   Walk 50 Feet with Two Turns   Comment limited by fatigue   Reason if not Attempted Safety concerns   Walk 50 Ft. CARE Score 88   Walk 150 Feet   Reason if not Attempted Safety concerns   Walk 150 Ft. CARE Score 88   Walking 10 Feet on Uneven Surfaces   Reason if not Attempted Safety concerns   Walking 10 Feet on Uneven Surfaces CARE Score 88   Wheel 50 Feet with Two Turns   Reason if not Attempted Activity not applicable   Wheel 50 Feet with Two Turns CARE Score 9   Wheel 150 Feet   Reason if not Attempted Activity not applicable   Wheel 150 Feet CARE Score 9   Stairs   Stairs Comment Pt performed 3x2\" step ups in // bars with CGA.   1 Step (Curb)   Reason if not Attempted Safety concerns   1 Step CARE Score 88   4 Steps   Reason if not Attempted Safety concerns   4 Steps CARE Score 88   12 Steps   Reason if not Attempted Safety concerns   12 Steps CARE Score 88   Picking Up Object   Reason if not Attempted Safety concerns    CARE Score 88   Car Transfer   Reason if not Attempted Safety concerns   Car Transfer CARE Score 88     "

## 2022-12-26 NOTE — PLAN OF CARE
FOCUS/GOAL  Bowel management, Bladder management, Mobility, and Skin integrity    ASSESSMENT, INTERVENTIONS AND CONTINUING PLAN FOR GOAL:    Pt A&Ox4. Not OOB this evening. C/o chronic lower back pain, prn oxycodone, robaxin and tylenol given x1. Continent of B&B, bm during day shift per report. Uses urinal at bedside, output slightly dark, fluids encouraged and pt has been compliant. Back incision dressing changed during day shift and has stayed CDI. VSS, no new concerns at this time, will continue w/ poc.

## 2022-12-26 NOTE — PLAN OF CARE
FOCUS/GOAL  Medical management    ASSESSMENT, INTERVENTIONS AND CONTINUING PLAN FOR GOAL:  Pt is requested to pm nurse that he want to be waken up for prn pain meds. Checked pt on those times but he was sleeping. Use call light for needs. C/o back pain, prn oxycodone 10 mg given twice this shift. Prn robaxin given earlier. No other concern noted. Cont w/ POC.  Goal Outcome Evaluation:      Plan of Care Reviewed With: patient    Overall Patient Progress: no changeOverall Patient Progress: no change    Outcome Evaluation: Continue to promote sleep at night

## 2022-12-27 ENCOUNTER — APPOINTMENT (OUTPATIENT)
Dept: OCCUPATIONAL THERAPY | Facility: CLINIC | Age: 67
DRG: 949 | End: 2022-12-27
Attending: PHYSICAL MEDICINE & REHABILITATION
Payer: COMMERCIAL

## 2022-12-27 ENCOUNTER — APPOINTMENT (OUTPATIENT)
Dept: PHYSICAL THERAPY | Facility: CLINIC | Age: 67
DRG: 949 | End: 2022-12-27
Attending: PHYSICAL MEDICINE & REHABILITATION
Payer: COMMERCIAL

## 2022-12-27 PROCEDURE — 250N000013 HC RX MED GY IP 250 OP 250 PS 637: Performed by: STUDENT IN AN ORGANIZED HEALTH CARE EDUCATION/TRAINING PROGRAM

## 2022-12-27 PROCEDURE — 97535 SELF CARE MNGMENT TRAINING: CPT | Mod: GO

## 2022-12-27 PROCEDURE — 97110 THERAPEUTIC EXERCISES: CPT | Mod: GP | Performed by: PHYSICAL THERAPIST

## 2022-12-27 PROCEDURE — 97530 THERAPEUTIC ACTIVITIES: CPT | Mod: GP | Performed by: PHYSICAL THERAPIST

## 2022-12-27 PROCEDURE — 250N000013 HC RX MED GY IP 250 OP 250 PS 637: Performed by: PHYSICIAN ASSISTANT

## 2022-12-27 PROCEDURE — 128N000003 HC R&B REHAB

## 2022-12-27 RX ORDER — ACETAMINOPHEN 325 MG/1
325 TABLET ORAL EVERY 4 HOURS PRN
Status: DISCONTINUED | OUTPATIENT
Start: 2022-12-27 | End: 2023-01-06 | Stop reason: HOSPADM

## 2022-12-27 RX ADMIN — Medication 25 MCG: at 08:09

## 2022-12-27 RX ADMIN — FERROUS GLUCONATE 324 MG: 324 TABLET ORAL at 08:09

## 2022-12-27 RX ADMIN — OXYCODONE HYDROCHLORIDE 10 MG: 5 TABLET ORAL at 06:26

## 2022-12-27 RX ADMIN — LISINOPRIL 40 MG: 40 TABLET ORAL at 08:09

## 2022-12-27 RX ADMIN — OXYCODONE HYDROCHLORIDE 10 MG: 5 TABLET ORAL at 16:02

## 2022-12-27 RX ADMIN — ACETAMINOPHEN 1000 MG: 500 TABLET ORAL at 13:36

## 2022-12-27 RX ADMIN — ACETAMINOPHEN 1000 MG: 500 TABLET ORAL at 19:34

## 2022-12-27 RX ADMIN — METHOCARBAMOL TABLETS 750 MG: 750 TABLET, COATED ORAL at 00:04

## 2022-12-27 RX ADMIN — OXYCODONE HYDROCHLORIDE 10 MG: 5 TABLET ORAL at 20:20

## 2022-12-27 RX ADMIN — MELATONIN TAB 3 MG 3 MG: 3 TAB at 00:04

## 2022-12-27 RX ADMIN — ZINC SULFATE 220 MG (50 MG) CAPSULE 220 MG: CAPSULE at 08:09

## 2022-12-27 RX ADMIN — OXYCODONE HYDROCHLORIDE 10 MG: 5 TABLET ORAL at 10:54

## 2022-12-27 RX ADMIN — METHOCARBAMOL TABLETS 750 MG: 750 TABLET, COATED ORAL at 20:20

## 2022-12-27 RX ADMIN — PANTOPRAZOLE SODIUM 40 MG: 40 TABLET, DELAYED RELEASE ORAL at 08:09

## 2022-12-27 RX ADMIN — ACETAMINOPHEN 1000 MG: 500 TABLET ORAL at 08:09

## 2022-12-27 RX ADMIN — METHOCARBAMOL TABLETS 750 MG: 750 TABLET, COATED ORAL at 06:25

## 2022-12-27 RX ADMIN — OXYCODONE HYDROCHLORIDE 10 MG: 5 TABLET ORAL at 00:04

## 2022-12-27 ASSESSMENT — ACTIVITIES OF DAILY LIVING (ADL)
ADLS_ACUITY_SCORE: 25
ADLS_ACUITY_SCORE: 23
ADLS_ACUITY_SCORE: 23
ADLS_ACUITY_SCORE: 25
ADLS_ACUITY_SCORE: 23
ADLS_ACUITY_SCORE: 25
ADLS_ACUITY_SCORE: 23

## 2022-12-27 NOTE — PLAN OF CARE
Alert and orientedx4, A2 walker. Makes needs known.  Pt reported pain in back, PRN oxy/robaxin given x2 during night.  Continent of bowel and bladder, LBM 12/25.  Quiet night, continue with POC.

## 2022-12-27 NOTE — PLAN OF CARE
"Discharge Planner Post-Acute Rehab PT:      Discharge Plan: Son's home for IADL assist, 3 DONI with L rail. Home care PT.     Precautions: Falls, Spinal  Edema: Size F Spandigrip to bilat LE on during day, off at night.     Current Status:  Bed Mobility: SBA with bed rails rolling and supine>sit, Min A through LE for sit>supine  Transfer: Variable CGA<>min A STS from elevated surfaces with 4WW.   Gait: up to 25 ft with 4WW, CGA  Stairs: 3x6\" step ups in // bars with CGA  Balance: Heavy reliance on Ue's for standing  TUG on 12/27: 41.5 sec with 4WW     Assessment:  Able to progress to 6\" step ups in // bars with CGA, although with heavy reliance on Ue's for support. Trialed use of foot up, which pt finds mildly beneficial, but will still decide whether he would like to have for home.      Other Barriers to Discharge (DME, Family Training, etc):   Pt has personal 4WW here. Recommend possible bilat foot up devices. Declines manual WC despite strength/endurance/balance impairments.  Family training to be scheduled.  "

## 2022-12-27 NOTE — PLAN OF CARE
----- Message from Kera Prasad sent at 5/10/2022  8:05 AM CDT -----  Regarding: Documents needed for CPAP  Raquel Anaya has Medicare as her primary insurance. When she got her last CPAP she had a commercial plan. Medicare requires that a patient's diagnostic sleep study be scored on 4% oxygen desaturation. Raquel's study does not state this. Therefore, if Medicare is to pay for the replacement study she will need to have a new baseline study done. This could be a home sleep study or inlab diagnostic or split night.   Once the sleep study is done a new replacement order will be needed. Medicare requires the order to have a primary diagnosis of MORRO G47.33.   I will need to cancel the current order until the above has been completed. A letter of explanation will be mailed to patient.  Thank you,  Kera Prasad  [facility]     Discharge Planner Post-Acute Rehab OT:     Discharge Plan: Home w/ son and home OT.    Precautions: Spinal, fall, needs increased height to 25+ inches for ease w/ transfers    Current Status:  ADLs:    Mobility: STS from elevated bed height CGA. Ambulated w/ personal 4ww CGA w/ heavy A of UE. Ax2 w/ nsg.    Grooming: Set up seated.    Dressing: CGA w/reacher donning shorts from EOB elevated for STS, dependent footwear .    Bathing: SBA w/use of rolling shower chair with foot rest on 12/27, plan to progress to shower bench    Toileting: Ax1 on and off 19 inch toilet in his room with bilateral rails. Declining use of elevated commode overlay on toilet due to discomfort.   IADLs: Son was previously assisting pt. Pt was doing his own finances and medication. Pt was also previously driving.  Vision/Cognition: WFL.     Assessment: Shower completed, functional status updated. Pt progressing with toilet tsf and dressing. Discussed pt has tub/shower combo at home and currently using a step ladder with utility seat in the tub. Need to trial extended tub bench tsf for further recommendation.      Other Barriers to Discharge (DME, Family Training, etc):   Pt has tub/shower and uses a utility stool/step for showering. No grab bars.   Family training TBD:  DME: Pt will likey need a tub bench, reacher, grab bars, toilet frame/riser  Son to report back on home measurements for bed height, toilet height and other functional chair surface heights pt uses for home. Recommend to look into ramp as pt reports stairs had been difficult at baseline-continue to educate as appropriate.

## 2022-12-27 NOTE — PLAN OF CARE
Goal Outcome Evaluation:    Pt is alert and oriented x4, able to make his needs known. Pt on a regular diet thin fluids, A2 with walker. Pt has BLE wakness, continent of B/B. Uses urinal at bedside. PRN oxycodone given for back pain rating 7/10 with good effect. Safety rounds completed, Staff will continue with POC.       Patient's most recent vital signs are:     Vital signs:  BP: 116/69  Temp: 96.4  HR: 85  RR: 16  SpO2: 99 %     Patient does not have new respiratory symptoms.  Patient does not have new sore throat.  Patient does not have a fever greater than 99.5.

## 2022-12-27 NOTE — PLAN OF CARE
FOCUS/GOAL  Medication management, Pain management, and Reinforcement of self-care/ADL    ASSESSMENT, INTERVENTIONS AND CONTINUING PLAN FOR GOAL:  Patient is alert/oriented x4, is able to use call light appropriately for care needs.  Patient does report pain in the back, is able to use call light appropriately and has taken Oxy x1 on this shift.  Patient eating well for meals, per PT is able to do transfers Ao1 with the walker, needs some extra time getting up from low seating.  No additional care concerns, continue with POC.

## 2022-12-27 NOTE — PROGRESS NOTES
"  [facility]   Acute Rehabilitation Unit    INTERVAL HISTORY  Pt had no acute events overnight. They report that they slept okay, but always sleep poorly in the hospital. They report pain in their lumbar spine (unchanged overall and tolerable with Tylenol scheduled). They do not want to try topical medications at this time as they feel they have tried this before and it has not helped. They report therapies are going well and their RLE in moving more.  Denies chest pain, abdominal pain, or calf pain. Last BM 12/25.    Functionally,    With PT: Bed Mobility: SBA with bed rails rolling and supine>sit, Min A through LE for sit>supine  Transfer: Variable CGA<>min A STS from elevated surfaces with 4WW.   Gait: up to 25 ft with 4WW, CGA  Stairs: 3x6\" step ups in // bars with CGA  Balance: Heavy reliance on Ue's for standing  TUG on 12/27: 41.5 sec with 4WW    With OT: ADLs:    Mobility: STS from elevated bed height min-mod A. Ambulated w/ personal 4ww CGA w/ heavy A of UE. Ax2 w/ nsg.    Grooming: Set up seated.    Dressing: Dep A LB.    Bathing: TBD. Recommend a rolling shower chair w/ footrest support.     Toileting: Ax2 on and off 19 inch toilet in his room. Declining use of elevated commode overlay on toilet due to discomfort.   IADLs: Son was previously assisting pt. Pt was doing his own finances and medication. Pt was also previously driving.  Vision/Cognition: WFL.         MEDICATIONS  Scheduled meds    acetaminophen  1,000 mg Oral TID     ferrous gluconate  324 mg Oral Daily with breakfast     lisinopril  40 mg Oral Daily     pantoprazole  40 mg Oral Daily     Vitamin D3  25 mcg Oral Daily     zinc sulfate  220 mg Oral Daily       PRN meds:  acetaminophen, melatonin, methocarbamol, naloxone **OR** naloxone **OR** naloxone **OR** naloxone, oxyCODONE, polyethylene glycol, senna-docusate      PHYSICAL EXAM  /72 (BP Location: Right arm, Patient Position: Supine)   Pulse 94  " " Temp (!) 96.2  F (35.7  C) (Oral)   Resp 18   Ht 1.727 m (5' 8\")   Wt 102 kg (224 lb 13.9 oz)   SpO2 100%   BMI 34.19 kg/m    General: in no acute distress, conversational and alert, resting comfortably in bed  HEENT: atraumatic, nares clear, conjunctiva white  Pulmonary: on room air, lungs clear to auscultation bilaterally  Cardiovascular: RRR, no murmur auscultated, well-perfused peripherally  Abdominal: soft, non-tender to palpation, non-distended  Extremities: warm peripherally, no edema in BLEs  Skin: warm, dry, without erythema, ecchymosis, or rash noted  MSK: no BLE edema noted, calves non-tender to palpation  Neuro: conjugate gaze, speech non-dysarthric, R knee extension 4/5, ankle dorsiflexion 4/5, ankle plantar flexion 4/5, 1st toe dorsiflexion 3/4, L knee extension 3/5, ankle dorsiflexion 1/5, ankle plantar flexion 3/5, 1st toe dorsiflexion 1/4      LABS  No results found for this or any previous visit (from the past 24 hour(s)).    ASSESSMENT AND PLAN  Philipp Bland is a 67 year old right hand dominant male PMH significant for lumbar spinal stenosis and radiculopathy, HTN, Paget's disease, iron deficiency anemia, possible MGUS who underwent an elective T10-S1 spinal fusion and L2-S1 bilateral decompression 12/15/2022 with post-operative complication resulting in right foot weakness.      Changes:  - Scheduled 1g Tylenol TID 12/27 with stable pain control overall (continuing to require oxycodone q4h), monitor closely and continue to offer topical medications and/or lumbar brace and TENs unit for continued back pain    ---------------------------------------------------------------------  Admission to acute inpatient rehab: December 24, 2022    Impairment group code: Neurologic Conditions 03.9 Other Neurologic, Lumbar stenosis and spondylolisthesis with radiculopathy        1. PT, OT  90 minutes of each on a daily basis, in addition to rehab nursing and close management of physiatrist.       2. " Impairment of ADL's: OT for 90 minutes daily to work on ADLs such as grooming, self cares and bathing.       3. Impairment of mobility:  PT for 90 minutes daily focusing on strength, balance, coordination, and endurance.        4. Rehab RN: for med administration, bowel regimen, wound care and patient education.          Medical Conditions  #Spinal stenosis L2-5 and polyradiculopathy   s/p T10-S1 spinal fusion and L2-S1 bilateral decompression  Complicated by left foot weakness likely secondary to polyradiculopathy  - Had previous EMGs 6/29/2022 by Dr. Babar Issa, showed severe left lumbosacral polyradiculopathy. Exam noted at the time 5/5 BLE except right KE 4+/5, left KE KF 4/5.    - If weakness worsens or progresses, will consult neurosurgery and consider appropriate spine imaging  - PT/OT for evaluation and treatment  - tried AFOs in [facility] but did not like them, planning to use high top basketball shoes  - As needed oxycodone and Tylenol for pain   -Per neurosurgery team remove sutures 2 weeks after surgery, approximately 12/29     # Skin wound  -Patient developed excoriation and superficial skin wounds likely due to dressing and adhesive removal.  -We will consult WOCN and keep wound open to air as healing appropriately with no signs of infection such as erythema or drainage     #Hyponatremia  - Na levels after surgery were 127-132  - Cause unclear, Chlorthalidone was held and sodium level improved  - Medicine consulted for co-management   - BMP QMonday     #Nomocytic Anemia   #Iron deficiency anemia  #Thrombocytosis - reactive   - Likely due to acute blood loss with previous CHANTALE. Pre-op Hgb 14.5, post-op 9.2 EBL 1200  - PTA ferrous gluconate 324mg   - CBC QMonday     #HTN  - PTA: chlorthalidone 25mg daily, lisinopril 40mg daily  - Continue lisinopril but chlorthalidone on hold due to hyponatremia, blood pressures are controlled and SBP below 120      #Paget's disease  - Continue with vitamin D  supplementation 1000U, received IV Zoledronic infusion prior to surgery 9/26/2022  - outpatient follow up with endo team Dr. Aguilar for maintenance and prevention of complications     #Elevated PSA  - 10/7/2022 was 15   - 10/19 seen by urology and was recommended to undergo prostate MRI for biopsy but did not follow up    - Monitor symptoms while inpatient such as urine retention, hematuria, pelvic pain, worsening back pain     #MGUS   - Per chart review suspected diagnosis, however not evaluated by hematology  - outpatient follow up for further work up and diagnosis    #Pain  - Scheduled 1g Tylenol TID with stable pain control overall (continuing to require oxycodone q4h), monitor closely and continue to offer topical medications        1. Adjustment to disability:  Clinical psychology to eval and treat as indicated  2. FEN: regular diet, thin liquids  3. Bowel: continent, bowel medications as needed   4. Bladder: continent, PVR x3  5. DVT Prophylaxis: Anti-embolism stocking  6. GI Prophylaxis: continue PPIs for total of 2 weeks  7. Code: Full   8. Disposition: Home with son   9. ELOS:  1/3  10. Rehab prognosis:  Good  11. Follow up Appointments on Discharge:  Neurosurgery: Follow-up with CHERELLE on 1/27/2023  Heme/Onc: follow up MGUS workup   Endocrinology, saw Dr. Katarina Aguilar for outpatient follow up   Urology: follow up high PSA work up    Seen and discussed with Dr. Olmstead, PM&R staff physician     Maureen Carmona  PGY 2  Physical Medicine and Rehabilitation  [phone]

## 2022-12-28 ENCOUNTER — APPOINTMENT (OUTPATIENT)
Dept: PHYSICAL THERAPY | Facility: CLINIC | Age: 67
DRG: 949 | End: 2022-12-28
Attending: PHYSICAL MEDICINE & REHABILITATION
Payer: COMMERCIAL

## 2022-12-28 ENCOUNTER — APPOINTMENT (OUTPATIENT)
Dept: OCCUPATIONAL THERAPY | Facility: CLINIC | Age: 67
DRG: 949 | End: 2022-12-28
Attending: PHYSICAL MEDICINE & REHABILITATION
Payer: COMMERCIAL

## 2022-12-28 PROCEDURE — 250N000013 HC RX MED GY IP 250 OP 250 PS 637: Performed by: PHYSICIAN ASSISTANT

## 2022-12-28 PROCEDURE — 128N000003 HC R&B REHAB

## 2022-12-28 PROCEDURE — 250N000013 HC RX MED GY IP 250 OP 250 PS 637: Performed by: STUDENT IN AN ORGANIZED HEALTH CARE EDUCATION/TRAINING PROGRAM

## 2022-12-28 PROCEDURE — 97530 THERAPEUTIC ACTIVITIES: CPT | Mod: GP | Performed by: PHYSICAL THERAPIST

## 2022-12-28 PROCEDURE — 250N000013 HC RX MED GY IP 250 OP 250 PS 637: Performed by: CLINIC/CENTER

## 2022-12-28 PROCEDURE — 97110 THERAPEUTIC EXERCISES: CPT | Mod: GP | Performed by: PHYSICAL THERAPIST

## 2022-12-28 PROCEDURE — 97535 SELF CARE MNGMENT TRAINING: CPT | Mod: GO

## 2022-12-28 PROCEDURE — 99232 SBSQ HOSP IP/OBS MODERATE 35: CPT | Mod: GC | Performed by: PHYSICAL MEDICINE & REHABILITATION

## 2022-12-28 RX ORDER — LIDOCAINE 4 G/G
1 PATCH TOPICAL
Status: DISCONTINUED | OUTPATIENT
Start: 2022-12-28 | End: 2023-01-06 | Stop reason: HOSPADM

## 2022-12-28 RX ADMIN — OXYCODONE HYDROCHLORIDE 10 MG: 5 TABLET ORAL at 10:55

## 2022-12-28 RX ADMIN — ACETAMINOPHEN 1000 MG: 500 TABLET ORAL at 19:17

## 2022-12-28 RX ADMIN — ACETAMINOPHEN 1000 MG: 500 TABLET ORAL at 07:47

## 2022-12-28 RX ADMIN — Medication 25 MCG: at 07:47

## 2022-12-28 RX ADMIN — OXYCODONE HYDROCHLORIDE 10 MG: 5 TABLET ORAL at 15:10

## 2022-12-28 RX ADMIN — LIDOCAINE PATCH 4% 1 PATCH: 40 PATCH TOPICAL at 19:17

## 2022-12-28 RX ADMIN — ZINC SULFATE 220 MG (50 MG) CAPSULE 220 MG: CAPSULE at 07:47

## 2022-12-28 RX ADMIN — LISINOPRIL 40 MG: 40 TABLET ORAL at 07:47

## 2022-12-28 RX ADMIN — ACETAMINOPHEN 325 MG: 325 TABLET, FILM COATED ORAL at 06:23

## 2022-12-28 RX ADMIN — OXYCODONE HYDROCHLORIDE 10 MG: 5 TABLET ORAL at 01:16

## 2022-12-28 RX ADMIN — ACETAMINOPHEN 325 MG: 325 TABLET, FILM COATED ORAL at 00:19

## 2022-12-28 RX ADMIN — ACETAMINOPHEN 1000 MG: 500 TABLET ORAL at 14:12

## 2022-12-28 RX ADMIN — PANTOPRAZOLE SODIUM 40 MG: 40 TABLET, DELAYED RELEASE ORAL at 07:47

## 2022-12-28 RX ADMIN — OXYCODONE HYDROCHLORIDE 10 MG: 5 TABLET ORAL at 19:17

## 2022-12-28 RX ADMIN — FERROUS GLUCONATE 324 MG: 324 TABLET ORAL at 07:47

## 2022-12-28 RX ADMIN — OXYCODONE HYDROCHLORIDE 10 MG: 5 TABLET ORAL at 06:23

## 2022-12-28 ASSESSMENT — ACTIVITIES OF DAILY LIVING (ADL)
ADLS_ACUITY_SCORE: 25
ADLS_ACUITY_SCORE: 23
ADLS_ACUITY_SCORE: 25

## 2022-12-28 NOTE — PLAN OF CARE
"Discharge Planner Post-Acute Rehab PT:      Discharge Plan: Son's home for IADL assist, 3 DONI with L rail. Home care PT.     Precautions: Falls, Spinal  Edema: Size F Spandigrip to bilat LE on during day, off at night.     Current Status:  Bed Mobility: SBA rolling and supine<>sit wit bedrails  Transfer: Variable SBA<>min A STS from elevated surfaces with 4WW.   Gait: up to 25 ft with 4WW, CGA/SBA  Stairs: 3x6\" lateral step ups in // bars with CGA to mimic home set-up  Balance: Heavy reliance on Ue's for standing  TUG on 12/27: 41.5 sec with 4WW     Assessment:  Continues to be making progress towards goals, demonstrating improved sit>supine with SBA with correct logroll technique, as well as more consistent CGA/SBA for STS transfers. Able to perform 6\" lateral step ups in // bars, but with attempt to trial in stairwell, pt reporting feeling like his \"right leg is stuck.\"    PM session cancelled d/t significant pain in low back from AM; may refer to OT note.     Other Barriers to Discharge (DME, Family Training, etc):   Pt has personal 4WW here. Recommend possible bilat foot up devices. Declines manual WC despite strength/endurance/balance impairments.  Family training to be scheduled.  "

## 2022-12-28 NOTE — PLAN OF CARE
Goal Outcome Evaluation:      Plan of Care Reviewed With: patient    Overall Patient Progress: no change       Orientation:A/O x4, VSS on RA.    Bowel: continent, LBM 12/26.    Bladder: continent, uses urinal and staff empties.    Pain:c/o pain to back, prn oxy given x2 this shift and robaxin x1.    Ambulation/Transfers: up with assist one with gait belt and walker.    Diet/ Liquids:Regular thin pills whole.    Skin: back incision has sutures, dressing changed, minimal drainage. Cleansed with wound cleanser and primapore placed. BLE edmea +1-2.  Tubigrips on during the day and removed at HS.    Bed alarm on for safety, call light within reach. Continue with POC.

## 2022-12-28 NOTE — PLAN OF CARE
Goal Outcome Evaluation:      FOCUS/GOAL: Skin, pain and safety management      ASSESSMENT, INTERVENTIONS AND CONTINUING PLAN FOR GOAL:  Pt A&Ox4,  Able to make needs known. Ax1 with walker. C/O  back pain  PRN oxy and tylenol given x2 during shift.  Continent of bowel and bladder, LBM 12/28. x1 assist to the bathroom, Nursing will continue with POC.

## 2022-12-28 NOTE — PLAN OF CARE
Discharge Planner Post-Acute Rehab OT:     Discharge Plan: Home w/ son and home OT.    Precautions: Spinal, fall, needs increased height to 25+ inches for ease w/ transfers    Current Status:  ADLs:    Mobility: STS from elevated bed height CGA. Ambulated w/ personal 4ww CGA w/ heavy A of UE. Ax2 w/ nsg.    Grooming: Set up seated.    Dressing: CGA w/reacher donning shorts from EOB elevated for STS, dependent footwear .    Bathing: SBA w/use of rolling shower chair with foot rest on 12/27, plan to progress to shower bench    Toileting: Ax1 on and off 19 inch toilet in his room with bilateral rails. Declining use of elevated commode overlay on toilet due to discomfort.   IADLs: Son was previously assisting pt. Pt was doing his own finances and medication. Pt was also previously driving.  Vision/Cognition: WFL.     Assessment: AM session: Pt seen for extended tub bench transfer training simulated to home setup. Educated pt on sequencing and setup of transfer with demo provided. Pt completed sit > stand and instantly an audible cracking noise was heard and pt reported 9/10 pain in lower back. Pt able to amb w/ 4ww 3 steps to transfer bench to perform stand > sit with CGA. Pt completed transfer into/out of tub with pain not subsiding. Returned to w/c with CGA and returned pt to room. MD/RN notified and arrived to assess.    PM session: Pt supine and reports 5/10 back pain from previous incident earlier today, has been resting in supine since then. Pt agreeable to attempt OOB activity. Educated pt on slow, log roll transition. Once pt began to initiate, pain instantly increased to 9/10. Returned to supine, applied ice pack to lower back, and notified RN.    Other Barriers to Discharge (DME, Family Training, etc):   Pt has tub/shower and uses a utility stool/step for showering. No grab bars.   Family training TBD:  DME: Pt will likey need a tub bench, reacher, grab bars, toilet frame/riser  Son to report back on home  measurements for bed height, toilet height and other functional chair surface heights pt uses for home. Recommend to look into ramp as pt reports stairs had been difficult at baseline-continue to educate as appropriate.

## 2022-12-28 NOTE — PLAN OF CARE
FOCUS/GOAL  Pain management, Medical management, and Skin integrity    ASSESSMENT, INTERVENTIONS AND CONTINUING PLAN FOR GOAL:  Tachycardic at base line ( 101 this morning), patient's vitals are stable. Reported back pain of 6/10  1 hour after taking PRN oxy. Scheduled Tylenol given with good effect. Oxycodone given twice this shift after patient reported  Sudden movement that impacted his back during therapy.  Provider aware of the situation.  No issue with spine wound. Dressing is cdi.  Continent of bladder using urinal . No BM to report this shift. Will continue with POC.  Goal Outcome Evaluation:      Plan of Care Reviewed With: patient    Overall Patient Progress: no changeOverall Patient Progress: no change    Outcome Evaluation: No change this shift.       Returning Prince Valencia) the ma phone call.

## 2022-12-28 NOTE — PROGRESS NOTES
"  [facility]   Acute Rehabilitation Unit    INTERVAL HISTORY  Pt had no acute events overnight. They report that they slept okay, but reported a popping sound in their back 3X today with turning. With the popping, they noted increased pain over their lumbar R paraspinal muscles, slightly relieved by forward flexion and increased by movement and plantar flexion at ankles. They deny changes in sensation, band-like sensations, pain down their LEs, changes in strength or lack of continence. They have not had this type of popping or pain before. They report pain is 7/10 this afternoon when lying down. They hope that resting this afternoon will help and are open to trying topical medications for pain. Encouraged to utilize ice and robaxin as needed. Denies chest pain, abdominal pain, or calf pain. Last BM 12/27.    Functionally,    With PT: Bed Mobility: SBA rolling and supine<>sit wit bedrails  Transfer: Variable SBA<>min A STS from elevated surfaces with 4WW.   Gait: up to 25 ft with 4WW, CGA/SBA  Stairs: 3x6\" lateral step ups in // bars with CGA to mimic home set-up  Balance: Heavy reliance on Ue's for standing  TUG on 12/27: 41.5 sec with 4WW    With OT: ADLs:    Mobility: STS from elevated bed height CGA. Ambulated w/ personal 4ww CGA w/ heavy A of UE. Ax2 w/ nsg.    Grooming: Set up seated.    Dressing: CGA w/reacher donning shorts from EOB elevated for STS, dependent footwear .    Bathing: SBA w/use of rolling shower chair with foot rest on 12/27, plan to progress to shower bench    Toileting: Ax1 on and off 19 inch toilet in his room with bilateral rails. Declining use of elevated commode overlay on toilet due to discomfort.   IADLs: Son was previously assisting pt. Pt was doing his own finances and medication. Pt was also previously driving.  Vision/Cognition: WFL.           MEDICATIONS  Scheduled meds    acetaminophen  1,000 mg Oral TID     ferrous gluconate  324 mg Oral " "Daily with breakfast     lisinopril  40 mg Oral Daily     pantoprazole  40 mg Oral Daily     Vitamin D3  25 mcg Oral Daily     zinc sulfate  220 mg Oral Daily       PRN meds:  acetaminophen, melatonin, methocarbamol, naloxone **OR** naloxone **OR** naloxone **OR** naloxone, oxyCODONE, polyethylene glycol, senna-docusate      PHYSICAL EXAM  /67 (BP Location: Right arm, Patient Position: Supine, Cuff Size: Adult Large)   Pulse 101   Temp (!) 96.4  F (35.8  C) (Oral)   Resp 18   Ht 1.727 m (5' 8\")   Wt 102 kg (224 lb 13.9 oz)   SpO2 100%   BMI 34.19 kg/m    General: in no acute distress, conversational and alert, resting comfortably in bed  HEENT: atraumatic, nares clear, conjunctiva white  Pulmonary: on room air, lungs clear to auscultation bilaterally  Cardiovascular: RRR, no murmur auscultated, well-perfused peripherally  Abdominal: soft, non-tender to palpation, non-distended  Extremities: warm peripherally, no edema in BLEs  Skin: warm, dry, without erythema, ecchymosis, or rash noted  MSK: no BLE edema noted, calves non-tender to palpation, tenderness to palpation over R paraspinal muscles approximately at L5, Non-TTP over b/l SI joints, lumbar spinous process, or transverse processes, popping heard with R rotation of spine  Neuro: conjugate gaze, speech non-dysarthric, R ankle dorsiflexion 4/5, ankle plantar flexion 4/5, 1st toe dorsiflexion 3/4, L knee extension 3/5, ankle dorsiflexion 1/5, ankle plantar flexion 3/5, 1st toe dorsiflexion 1/4      LABS  No results found for this or any previous visit (from the past 24 hour(s)).    ASSESSMENT AND PLAN  Philipp Bland is a 67 year old right hand dominant male PMH significant for lumbar spinal stenosis and radiculopathy, HTN, Paget's disease, iron deficiency anemia, possible MGUS who underwent an elective T10-S1 spinal fusion and L2-S1 bilateral decompression 12/15/2022 with post-operative complication resulting in right foot weakness.      Changes:  - " Added voltaren gel and lidocaine patches 12/28 for increased pain  - Close monitoring of neurologic status with low threshold to image spine if changes to assess stability of hardware  - Encouraged use of robaxin, ice and topical medications for pain management  ---------------------------------------------------------------------  Admission to acute inpatient rehab: December 24, 2022    Impairment group code: Neurologic Conditions 03.9 Other Neurologic, Lumbar stenosis and spondylolisthesis with radiculopathy        1. PT, OT  90 minutes of each on a daily basis, in addition to rehab nursing and close management of physiatrist.       2. Impairment of ADL's: OT for 90 minutes daily to work on ADLs such as grooming, self cares and bathing.       3. Impairment of mobility:  PT for 90 minutes daily focusing on strength, balance, coordination, and endurance.        4. Rehab RN: for med administration, bowel regimen, wound care and patient education.          Medical Conditions  #Spinal stenosis L2-5 and polyradiculopathy   s/p T10-S1 spinal fusion and L2-S1 bilateral decompression  Complicated by left foot weakness likely secondary to polyradiculopathy  - Had previous EMGs 6/29/2022 by Dr. Babar Issa, showed severe left lumbosacral polyradiculopathy. Exam noted at the time 5/5 BLE except right KE 4+/5, left KE KF 4/5.    - If weakness worsens or progresses, will consult neurosurgery and consider appropriate spine imaging  - PT/OT for evaluation and treatment  - tried AFOs in [facility] but did not like them, planning to use high top basketball shoes  - As needed oxycodone and Tylenol for pain   -Per neurosurgery team remove sutures 2 weeks after surgery, approximately 12/29     #Acute R paraspinal muscle spasm (12/28)  #Pain  - Scheduled 1g Tylenol TID with stable pain control overall (continuing to require oxycodone q4h), monitor closely and continue to offer topical medications  - Added voltaren gel and lidocaine  patches 12/28 for increased pain  - Encouraged use of robaxin, ice and topical medications for pain management 12/28  - Close monitoring of neurologic status with low threshold to image spine if changes to assess stability of hardware    # Skin wound  -Patient developed excoriation and superficial skin wounds likely due to dressing and adhesive removal.  -We will consult WOCN and keep wound open to air as healing appropriately with no signs of infection such as erythema or drainage     #Hyponatremia  - Na levels after surgery were 127-132  - Cause unclear, Chlorthalidone was held and sodium level improved  - Medicine consulted for co-management   - BMP QMonday     #Nomocytic Anemia   #Iron deficiency anemia  #Thrombocytosis - reactive   - Likely due to acute blood loss with previous CHANTALE. Pre-op Hgb 14.5, post-op 9.2 EBL 1200  - PTA ferrous gluconate 324mg   - CBC QMonday     #HTN  - PTA: chlorthalidone 25mg daily, lisinopril 40mg daily  - Continue lisinopril but chlorthalidone on hold due to hyponatremia, blood pressures are controlled and SBP below 120      #Paget's disease  - Continue with vitamin D supplementation 1000U, received IV Zoledronic infusion prior to surgery 9/26/2022  - outpatient follow up with endo team Dr. Aguilar for maintenance and prevention of complications     #Elevated PSA  - 10/7/2022 was 15   - 10/19 seen by urology and was recommended to undergo prostate MRI for biopsy but did not follow up    - Monitor symptoms while inpatient such as urine retention, hematuria, pelvic pain, worsening back pain     #MGUS   - Per chart review suspected diagnosis, however not evaluated by hematology  - outpatient follow up for further work up and diagnosis          1. Adjustment to disability:  Clinical psychology to eval and treat as indicated  2. FEN: regular diet, thin liquids  3. Bowel: continent, bowel medications as needed   4. Bladder: continent, PVR x3  5. DVT Prophylaxis: Anti-embolism  stocking  6. GI Prophylaxis: continue PPIs for total of 2 weeks  7. Code: Full   8. Disposition: Home with son   9. ELOS:  1/3  10. Rehab prognosis:  Good  11. Follow up Appointments on Discharge:  Neurosurgery: Follow-up with CHERELLE on 1/27/2023  Heme/Onc: follow up MGUS workup   Endocrinology, saw Dr. Katarina Aguilar for outpatient follow up   Urology: follow up high PSA work up     Seen and discussed with Dr. Olmstead, PM&R staff physician      Maureen Carmona  PGY 2  Physical Medicine and Rehabilitation

## 2022-12-29 ENCOUNTER — APPOINTMENT (OUTPATIENT)
Dept: PHYSICAL THERAPY | Facility: CLINIC | Age: 67
DRG: 949 | End: 2022-12-29
Attending: PHYSICAL MEDICINE & REHABILITATION
Payer: COMMERCIAL

## 2022-12-29 ENCOUNTER — APPOINTMENT (OUTPATIENT)
Dept: OCCUPATIONAL THERAPY | Facility: CLINIC | Age: 67
DRG: 949 | End: 2022-12-29
Attending: PHYSICAL MEDICINE & REHABILITATION
Payer: COMMERCIAL

## 2022-12-29 ENCOUNTER — TELEPHONE (OUTPATIENT)
Dept: NEUROSURGERY | Facility: CLINIC | Age: 67
End: 2022-12-29

## 2022-12-29 LAB — LACTATE SERPL-SCNC: 1.1 MMOL/L (ref 0.7–2)

## 2022-12-29 PROCEDURE — 83605 ASSAY OF LACTIC ACID: CPT | Performed by: PHYSICAL MEDICINE & REHABILITATION

## 2022-12-29 PROCEDURE — 99233 SBSQ HOSP IP/OBS HIGH 50: CPT | Mod: GC | Performed by: PHYSICAL MEDICINE & REHABILITATION

## 2022-12-29 PROCEDURE — 36415 COLL VENOUS BLD VENIPUNCTURE: CPT | Performed by: PHYSICAL MEDICINE & REHABILITATION

## 2022-12-29 PROCEDURE — 97530 THERAPEUTIC ACTIVITIES: CPT | Mod: GP | Performed by: REHABILITATION PRACTITIONER

## 2022-12-29 PROCEDURE — 250N000013 HC RX MED GY IP 250 OP 250 PS 637: Performed by: CLINIC/CENTER

## 2022-12-29 PROCEDURE — 97110 THERAPEUTIC EXERCISES: CPT | Mod: GO

## 2022-12-29 PROCEDURE — 250N000013 HC RX MED GY IP 250 OP 250 PS 637: Performed by: PHYSICIAN ASSISTANT

## 2022-12-29 PROCEDURE — 128N000003 HC R&B REHAB

## 2022-12-29 PROCEDURE — 999N000125 HC STATISTIC PATIENT MED CONFERENCE < 30 MIN

## 2022-12-29 PROCEDURE — 97535 SELF CARE MNGMENT TRAINING: CPT | Mod: GO

## 2022-12-29 PROCEDURE — 999N000150 HC STATISTIC PT MED CONFERENCE < 30 MIN: Performed by: PHYSICAL THERAPIST

## 2022-12-29 PROCEDURE — 250N000013 HC RX MED GY IP 250 OP 250 PS 637: Performed by: STUDENT IN AN ORGANIZED HEALTH CARE EDUCATION/TRAINING PROGRAM

## 2022-12-29 PROCEDURE — 97110 THERAPEUTIC EXERCISES: CPT | Mod: GP | Performed by: REHABILITATION PRACTITIONER

## 2022-12-29 PROCEDURE — 97110 THERAPEUTIC EXERCISES: CPT | Mod: GP | Performed by: PHYSICAL THERAPIST

## 2022-12-29 RX ORDER — METHOCARBAMOL 750 MG/1
750 TABLET, FILM COATED ORAL 2 TIMES DAILY PRN
Status: DISCONTINUED | OUTPATIENT
Start: 2022-12-29 | End: 2023-01-06 | Stop reason: HOSPADM

## 2022-12-29 RX ORDER — METHOCARBAMOL 750 MG/1
750 TABLET, FILM COATED ORAL 2 TIMES DAILY
Status: DISCONTINUED | OUTPATIENT
Start: 2022-12-29 | End: 2023-01-06 | Stop reason: HOSPADM

## 2022-12-29 RX ORDER — PANTOPRAZOLE SODIUM 40 MG/1
40 TABLET, DELAYED RELEASE ORAL DAILY
Status: COMPLETED | OUTPATIENT
Start: 2022-12-30 | End: 2022-12-30

## 2022-12-29 RX ORDER — METHOCARBAMOL 750 MG/1
750 TABLET, FILM COATED ORAL ONCE
Status: COMPLETED | OUTPATIENT
Start: 2022-12-29 | End: 2022-12-29

## 2022-12-29 RX ADMIN — OXYCODONE HYDROCHLORIDE 10 MG: 5 TABLET ORAL at 16:55

## 2022-12-29 RX ADMIN — ACETAMINOPHEN 325 MG: 325 TABLET, FILM COATED ORAL at 03:59

## 2022-12-29 RX ADMIN — METHOCARBAMOL 750 MG: 750 TABLET, FILM COATED ORAL at 21:33

## 2022-12-29 RX ADMIN — OXYCODONE HYDROCHLORIDE 10 MG: 5 TABLET ORAL at 21:32

## 2022-12-29 RX ADMIN — OXYCODONE HYDROCHLORIDE 10 MG: 5 TABLET ORAL at 11:56

## 2022-12-29 RX ADMIN — OXYCODONE HYDROCHLORIDE 10 MG: 5 TABLET ORAL at 03:58

## 2022-12-29 RX ADMIN — ACETAMINOPHEN 1000 MG: 500 TABLET ORAL at 07:57

## 2022-12-29 RX ADMIN — METHOCARBAMOL 750 MG: 750 TABLET, FILM COATED ORAL at 14:26

## 2022-12-29 RX ADMIN — OXYCODONE HYDROCHLORIDE 10 MG: 5 TABLET ORAL at 07:57

## 2022-12-29 RX ADMIN — ZINC SULFATE 220 MG (50 MG) CAPSULE 220 MG: CAPSULE at 07:57

## 2022-12-29 RX ADMIN — FERROUS GLUCONATE 324 MG: 324 TABLET ORAL at 07:57

## 2022-12-29 RX ADMIN — OXYCODONE HYDROCHLORIDE 10 MG: 5 TABLET ORAL at 00:00

## 2022-12-29 RX ADMIN — ACETAMINOPHEN 1000 MG: 500 TABLET ORAL at 21:32

## 2022-12-29 RX ADMIN — LIDOCAINE PATCH 4% 1 PATCH: 40 PATCH TOPICAL at 21:28

## 2022-12-29 RX ADMIN — METHOCARBAMOL TABLETS 750 MG: 750 TABLET, COATED ORAL at 10:53

## 2022-12-29 RX ADMIN — LISINOPRIL 40 MG: 40 TABLET ORAL at 07:57

## 2022-12-29 RX ADMIN — ACETAMINOPHEN 1000 MG: 500 TABLET ORAL at 14:21

## 2022-12-29 RX ADMIN — Medication 25 MCG: at 07:57

## 2022-12-29 RX ADMIN — PANTOPRAZOLE SODIUM 40 MG: 40 TABLET, DELAYED RELEASE ORAL at 06:45

## 2022-12-29 ASSESSMENT — ACTIVITIES OF DAILY LIVING (ADL)
ADLS_ACUITY_SCORE: 24
ADLS_ACUITY_SCORE: 25
ADLS_ACUITY_SCORE: 25
ADLS_ACUITY_SCORE: 24
ADLS_ACUITY_SCORE: 25
ADLS_ACUITY_SCORE: 24
ADLS_ACUITY_SCORE: 25
ADLS_ACUITY_SCORE: 24

## 2022-12-29 NOTE — PLAN OF CARE
Pt is alert and oriented x4, denies fever, chills, chest pain, SOB, N/V, abdominal pain, or new weakness/numbness/tingling. Back pain reported and received prn oxycodone and tylenol with partial relief.  continent of bowel and bladder, transferring with assist of 1 and ww, sleeping off and on throughout the night, no tubes, lines or drains, vs stable, dressing intact, no further care concerns at this time continue with POC.

## 2022-12-29 NOTE — PLAN OF CARE
Discharge Planner Post-Acute Rehab OT:     Discharge Plan: Home w/ son and home OT.    Precautions: Spinal, fall, needs increased height to 25+ inches for ease w/ transfers    Current Status:  ADLs:    Mobility: STS from elevated bed height CGA. Ambulated w/ personal 4ww CGA w/ heavy A of UE. Ax2 w/ nsg.    Grooming: Set up seated.    Dressing: CGA w/reacher donning shorts from EOB elevated for STS, dependent footwear .    Bathing: SBA w/use of rolling shower chair with foot rest on 12/27, plan to progress to shower bench    Toileting: Ax1 on and off 19 inch toilet in his room with bilateral rails. Declining use of elevated commode overlay on toilet due to discomfort.   IADLs: Son was previously assisting pt. Pt was doing his own finances and medication. Pt was also previously driving.  Vision/Cognition: WFL.     Assessment:.ot focus on l/b dressing bed mobility and equipment needs for toileiting and working on  techniques to decrease elevated height to more functional at home. Pt reported no back pain as he had pm of 12-28 with adls pm session tolerated shoulder ex when sitting eob pt c/o pain and returned to supine Dr and nursing Noitied    Other Barriers to Discharge (DME, Family Training, etc):   Pt has tub/shower and uses a utility stool/step for showering. No grab bars.   Family training TBD:  DME: Pt will likey need a tub bench, reacher, grab bars, toilet frame/riser  Son to report back on home measurements for bed height, toilet height and other functional chair surface heights pt uses for home. Recommend to look into ramp as pt reports stairs had been difficult at baseline-continue to educate as appropriate.

## 2022-12-29 NOTE — PLAN OF CARE
Goal Outcome Evaluation:      Plan of Care Reviewed With: patient    Overall Patient Progress: no changeOverall Patient Progress: no change     Pt A&Ox4. A1 walker. Cont of B&B. Pt c/o back pain. PRN oxy given x2 and gabapentin given x1 with relief. Pt c/o increased back pain while working with therapy, PRN robaxin and scheduled tylenol given with mild relief.  Back incision present, orders to remove sutures, will pass on to oncoming nurse. Call light in reach, alarms on, calls appropriately, participating in therapies.

## 2022-12-29 NOTE — CARE CONFERENCE
Acute Rehab Care Conference/Team Rounds      Type: Team Rounds    Present: Dr. Shyam Casper, Maureen Carmona Resident, Nadiya Ortega RN, Jarett Conte PT, Dulce Coleman OT, Shoshana SANTIAGO, Clarissa Gavin , Naima Hernandez Dietician and Philipp Bland Patient      Discharge Barriers/Treatment/Education    Rehab Diagnosis: post spinal fusion     Active Medical Co-morbidities/Prognosis:     Patient Active Problem List   Diagnosis Code     Onychomycosis B35.1     CARDIOVASCULAR SCREENING; LDL GOAL LESS THAN 130 Z13.6     HTN, goal below 140/90 I10     Advanced directives, counseling/discussion Z71.89     Spondylolisthesis of lumbar region M43.16     Spinal stenosis of lumbar region with radiculopathy M48.061, M54.16     Other spondylosis, lumbar region M47.896     Lumbar spine pain M54.50     Other osteoporosis without current pathological fracture M81.8     Other secondary scoliosis, lumbar region M41.56     History of thoracic spinal fusion Z98.1         Safety: Pt is alert and oriented, A of 1-4 ww, no alarms on at this time.      Pain: Pt frequently reporting pain in back and receiving prn oxycodone, robaxin, Voltaren.     Medications, Skin, Tubes/Lines: Pt is taking medications whole with water, incision on spine covered by CDI dressing, no tubes/lines in place.     Swallowing/Nutrition:  Orders Placed This Encounter      Regular Diet Adult    Bowel/Bladder: Pt is continent of bowel and bladder. LBM 12/28    Psychosocial: Lives with adult son. Single. 4 adult sons and their families are supportive. Works PT. No mental health, chem dep or financial concerns currently.     ADLs/IADLs:ADLs:    Mobility: STS from elevated bed height CGA. Ambulated w/ personal 4ww CGA w/ heavy A of UE. Ax2 w/ nsg.    Grooming: Set up seated.    Dressing: CGA w/reacher donning shorts from EOB elevated for STS, dependent footwear .    Bathing: SBA w/use of rolling shower chair with foot rest on 12/27, plan to progress to  "shower bench    Toileting: Ax1 on and off 19 inch toilet in his room with bilateral rails. Declining use of elevated commode overlay on toilet due to discomfort.   IADLs: Son was previously assisting pt. Pt was doing his own finances and medication. Pt was also previously driving.  Vision/Cognition: WFL.     Mobility:   Bed Mobility: SBA rolling and supine<>sit wit bedrails  Transfer: Variable SBA<>min A STS from elevated surfaces with 4WW.   Gait: up to 25 ft with 4WW, CGA/SBA  Stairs: 3x6\" lateral step ups in // bars with CGA to mimic home set-up  Balance: Heavy reliance on Ue's for standing  TUG on 12/27: 41.5 sec with 4WW  Continues to be making progress towards goals, demonstrating improving IND with bed mobility, transfers, and ambulation tolerance. Will need to continue to progress safety and IND with 3 DONI with L rail in order to access home. Discussed likelihood to set-up family training Monday afternoon with son. Pt has personal 4WW here. Recommend home care PT post ARU.    Cognition/Language: intact     Community Re-Entry: Plan for ongoing home care PT to progress community mobility tolerance. At this date, pt declines a manual WC despite strength/balance/endurance impairments.    Transportation: Son to provide. Will further assess/practice car transfer to ensure safety.    Decision maker: self    Plan of Care and goals reviewed and updated.    Discharge Plan/Recommendations    Fall Precautions: continue    Patient/Family input to goals: yes     Estimated length of stay: 16 days     Overall plan for the patient: reach a level of mod I       Utilization Review and Continued Stay Justification    Medical Necessity Criteria:    For any criteria that is not met, please document reason and plan for discharge, transfer, or modification of plan of care to address.    Requires intensive rehabilitation program to treat functional deficits?: Yes    Requires 3x per week or greater involvement of rehabilitation " physician to oversee rehabilitation program?: Yes    Requires rehabilitation nursing interventions?: Yes    Patient is making functional progress?: Yes    There is a potential for additional functional progress? Yes    Patient is participating in therapy 3 hours per day a minimum of 5 days per week or 15 hours per week in 7 day period?: Medical exception day 5 with significant back pain limiting therapy participation, otherwise participating as scheduled 15+ hours/week    Has discharge needs that require coordinated discharge planning approach?:Yes      Barriers/Concerns related to meeting medical necessity criteria:  none    Team Plan to Address Concern:  As needed       Final Physician Sign off    Statement of Approval:  Shyam Olmstead, DO      Patient Goals      Social Work Goals: Confirm discharge recommendations with therapy, coordinate safe discharge plan and remain available to support and assist as needed.        OT Predicted Duration/Target Date for Goal Attainment: 01/03/23  Therapy Frequency (OT): Daily (60-90mins)  OT: Hygiene/Grooming: supervision/stand-by assist  OT: Upper Body Dressing: Modified independent  OT: Lower Body Dressing: Supervision/stand-by assist  OT: Upper Body Bathing: Supervision/stand-by assist  OT: Lower Body Bathing: Minimal assist  OT: Bed Mobility: Supervision/stand-by assist  OT: Transfer: Modified independent  OT: Toilet Transfer/Toileting: Modified independent                                            PT Predicted Duration/Target Date for Goal Attainment: 01/03/23  PT Frequency: Daily  PT: Bed Mobility: Independent, Supine to/from sit, Rolling, Bridging, Within precautions  PT: Transfers: Modified independent, Sit to/from stand, Bed to/from chair, Assistive device  PT: Gait: Modified independent, Rolling walker, 50 feet  PT: Stairs: Supervision/stand-by assist, 3 stairs, Rail on left  PT: Wheelchair Mobility: 50 feet, Caregiver SBA, manual wheelchair  PT: Goal 1: Pt will  be able to perform car transfer with 4WW and SBA in order to access home and medical appts.  PT: Goal 2: Pt will be IND with HEP for core/glute stabilization and general LE strength/balance to reduce pain and risk for future falls.        Nursing Patient Goal:  Pain Management: Pt will request pain meds as appropriate and be able to identify pain relieving techniques while on unit  Nursing Goal: Skin Integrity: Pt nino remain free from pressure injuries while on unit via repositioning techniques and skin hygiene  Nursing Goal: Safety Management: Pt will utilize call light to make needs known and will remain free from falls while on unit

## 2022-12-29 NOTE — PLAN OF CARE
Discharge Planner Post-Acute Rehab PT:      Discharge Plan: Son's home for IADL assist, 3 DONI with L rail. Home care PT.     Precautions: Falls, Spinal  Edema: Size F Spandigrip to bilat LE on during day, off at night.     Current Status:  Bed Mobility: SBA rolling and supine<>sit wit bedrails  Transfer: Variable SBA<>min A STS from elevated surfaces with 4WW.   Gait: pt only demo STS in // bars today, due to pain, pt able to demo stand pivot with 4WW today with mod A   Stairs: no step up today due to pt wanting to take it slow today for pain yesterday.   Balance: Heavy reliance on Ue's for standing  TUG on 12/27: 41.5 sec with 4WW     Assessment: pt demo STS in // bars needing heavy use of U/E. Pt demo x 4 today for up to 90 sec each with wt shifting and forward/ backward stepping. Pt cont to be limited by pain weakness and fatigue.          Other Barriers to Discharge (DME, Family Training, etc):   Pt has personal 4WW here. Recommend possible bilat foot up devices. Declines manual WC despite strength/endurance/balance impairments.  Family training to be scheduled.

## 2022-12-29 NOTE — PROGRESS NOTES
"  [facility]   Acute Rehabilitation Unit    INTERVAL HISTORY  Pt had no acute events overnight, but do have that continued low back pain which was exacerbated yesterday by \"popping\" in their back. They report otherwise their LBP is approximately at a 7/10 and is partially relieved by topical and oral medications. They continue to deny changes in bowel/bladder, sensation, or weakness. Denies chest pain, abdominal pain, or calf pain. Last BM 12/28.    Functionally,    With PT: Bed Mobility: SBA rolling and supine<>sit wit bedrails  Transfer: Variable SBA<>min A STS from elevated surfaces with 4WW.   Gait: pt only demo STS in // bars today, due to pain, pt able to demo stand pivot with 4WW today with mod A   Stairs: no step up today due to pt wanting to take it slow today for pain yesterday.   Balance: Heavy reliance on Ue's for standing  TUG on 12/27: 41.5 sec with 4WW    With OT: Mobility: STS from elevated bed height CGA. Ambulated w/ personal 4ww CGA w/ heavy A of UE. Ax2 w/ nsg.    Grooming: Set up seated.    Dressing: CGA w/reacher donning shorts from EOB elevated for STS, dependent footwear .    Bathing: SBA w/use of rolling shower chair with foot rest on 12/27, plan to progress to shower bench    Toileting: Ax1 on and off 19 inch toilet in his room with bilateral rails. Declining use of elevated commode overlay on toilet due to discomfort.   IADLs: Son was previously assisting pt. Pt was doing his own finances and medication. Pt was also previously driving.  Vision/Cognition: WFL.           MEDICATIONS  Scheduled meds    acetaminophen  1,000 mg Oral TID     ferrous gluconate  324 mg Oral Daily with breakfast     lidocaine  1 patch Transdermal Q24h    And     lidocaine   Transdermal Q8H ARIANA     lisinopril  40 mg Oral Daily     pantoprazole  40 mg Oral Daily     Vitamin D3  25 mcg Oral Daily     zinc sulfate  220 mg Oral Daily       PRN meds:  acetaminophen, diclofenac, " "melatonin, methocarbamol, naloxone **OR** naloxone **OR** naloxone **OR** naloxone, oxyCODONE, polyethylene glycol, senna-docusate      PHYSICAL EXAM  /73 (BP Location: Right arm, Patient Position: Supine, Cuff Size: Adult Large)   Pulse 100   Temp 97  F (36.1  C) (Oral)   Resp 18   Ht 1.727 m (5' 8\")   Wt 102 kg (224 lb 13.9 oz)   SpO2 100%   BMI 34.19 kg/m    General: in no acute distress, conversational and alert, resting comfortably in chair and bed  HEENT: atraumatic, nares clear, conjunctiva white  Pulmonary: on room air, lungs clear to auscultation bilaterally  Cardiovascular: RRR, no murmur auscultated, well-perfused peripherally  Abdominal: soft, non-tender to palpation, non-distended  Extremities: warm peripherally, 1+ edema over b/l feet  Skin: warm, dry, without erythema, ecchymosis, or rash noted  MSK: calves non-tender to palpation  Neuro: conjugate gaze, speech non-dysarthric, R ankle dorsiflexion 4/5, ankle plantar flexion 4/5, 1st toe dorsiflexion 3/4, L knee extension 3/5, ankle dorsiflexion 1/5, ankle plantar flexion 3/5, 1st toe dorsiflexion 1/4    LABS  No results found for this or any previous visit (from the past 24 hour(s)).    ASSESSMENT AND PLAN  Philipp Bland is a 67 year old right hand dominant male PMH significant for lumbar spinal stenosis and radiculopathy, HTN, Paget's disease, iron deficiency anemia, possible MGUS who underwent an elective T10-S1 spinal fusion and L2-S1 bilateral decompression 12/15/2022 with post-operative complication resulting in right foot weakness.      Changes:  - Robaxin changed to BID scheduled on 12/29 for increased LBP, continue to monitor if improves pain and continue to adjust as needed  - Plan to remove sutures today  - Pantoprazole completed 2 wks on 12/30  - Wt increased over last week with b/l foot edema noted, BMP ordered for 12/30, pending to determine if candidate for hydrochlorothiazide restart  - Team rounds today with all questions " answered  ---------------------------------------------------------------------  Admission to acute inpatient rehab: December 24, 2022    Impairment group code: Neurologic Conditions 03.9 Other Neurologic, Lumbar stenosis and spondylolisthesis with radiculopathy        1. PT, OT  90 minutes of each on a daily basis, in addition to rehab nursing and close management of physiatrist.       2. Impairment of ADL's: OT for 90 minutes daily to work on ADLs such as grooming, self cares and bathing.       3. Impairment of mobility:  PT for 90 minutes daily focusing on strength, balance, coordination, and endurance.        4. Rehab RN: for med administration, bowel regimen, wound care and patient education.          Medical Conditions  #Spinal stenosis L2-5 and polyradiculopathy   s/p T10-S1 spinal fusion and L2-S1 bilateral decompression  Complicated by left foot weakness likely secondary to polyradiculopathy  - Had previous EMGs 6/29/2022 by Dr. Babar Issa, showed severe left lumbosacral polyradiculopathy. Exam noted at the time 5/5 BLE except right KE 4+/5, left KE KF 4/5.    - If weakness worsens or progresses, will consult neurosurgery and consider appropriate spine imaging  - PT/OT for evaluation and treatment  - tried AFOs in [facility] but did not like them, planning to use high top basketball shoes  - As needed oxycodone and Tylenol for pain   -Per neurosurgery team remove sutures 2 weeks after surgery (removed 12/29  - Spinal Precautions: for the first 6-8 weeks, no lifting > 10 pounds, no bending, twisting, or overhead reaching.     #Acute R paraspinal muscle spasm (12/28)  #Pain  - Scheduled 1g Tylenol TID with stable pain control overall (continuing to require oxycodone q4h), monitor closely and continue to offer topical medications  - Added voltaren gel and lidocaine patches 12/28 for increased pain  - Encouraged use of robaxin, ice and topical medications for pain management 12/28  - Robaxin changed to BID  scheduled on 12/29 for increased LBP, continue to monitor if improves pain and continue to adjust as needed     # Skin wound  -Patient developed excoriation and superficial skin wounds likely due to dressing and adhesive removal.  -We will consult WOCN and keep wound open to air as healing appropriately with no signs of infection such as erythema or drainage  - Removed surgical sutures 12/29     #Hyponatremia  - Na levels after surgery were 127-132  - Cause unclear, Chlorthalidone was held and sodium level improved  - Medicine consulted for co-management   - BMP QMonday     #Nomocytic Anemia   #Iron deficiency anemia  #Thrombocytosis - reactive   - Likely due to acute blood loss with previous CHANTALE. Pre-op Hgb 14.5, post-op 9.2 EBL 1200  - PTA ferrous gluconate 324mg   - CBC QMonday     #HTN  #Peripheral edema  #Wt gain  - PTA: chlorthalidone 25mg daily, lisinopril 40mg daily  - Continue lisinopril but chlorthalidone on hold due to hyponatremia, blood pressures are controlled and SBP below 120   - Wt increased over last week with b/l foot edema noted, BMP ordered for 12/30, pending to determine if candidate for hydrochlorothiazide restart     #Paget's disease  - Continue with vitamin D supplementation 1000U, received IV Zoledronic infusion prior to surgery 9/26/2022  - outpatient follow up with endo team Dr. Aguilar for maintenance and prevention of complications     #Elevated PSA  - 10/7/2022 was 15   - 10/19 seen by urology and was recommended to undergo prostate MRI for biopsy but did not follow up    - Monitor symptoms while inpatient such as urine retention, hematuria, pelvic pain, worsening back pain     #MGUS   - Per chart review suspected diagnosis, however not evaluated by hematology  - outpatient follow up for further work up and diagnosis        1. Adjustment to disability:  Clinical psychology to eval and treat as indicated  2. FEN: regular diet, thin liquids  3. Bowel: continent, bowel medications as  needed   4. Bladder: continent, PVR x3  5. DVT Prophylaxis: Anti-embolism stocking  6. GI Prophylaxis: continue PPIs d/c'ed 12/30  7. Code: Full   8. Disposition: Home with son  with HC  9. ELOS:  1/3  10. Rehab prognosis:  Good  11. Follow up Appointments on Discharge:  Neurosurgery: Follow-up with CHERELLE on 1/27/2023  Heme/Onc: follow up MGUS workup   Endocrinology, saw Dr. Katarina Aguilar for outpatient follow up   Urology: follow up high PSA work up     Seen and discussed with Dr. Olmstead, PM&R staff physician      Maureen Carmona  PGY 2  Physical Medicine and Rehabilitation

## 2022-12-29 NOTE — PLAN OF CARE
FOCUS/GOAL  Medical management    ASSESSMENT, INTERVENTIONS AND CONTINUING PLAN FOR GOAL:  Vitals stable. Pain reported back pain during the shift. Received oxycodone twice and ice pack. Dressing change done. Suture on.  Wound approximated, scant serosanguineous drainage note on the old dressing.  Good appetite. Voided using urinal. Started on Lidocaine patch, applied at bedtime. He declined Melatonin. Will continue with POC.  Goal Outcome Evaluation:      Plan of Care Reviewed With: patient, other (see comments)    Overall Patient Progress: no changeOverall Patient Progress: no change    Outcome Evaluation: No change this shift.

## 2022-12-30 ENCOUNTER — APPOINTMENT (OUTPATIENT)
Dept: OCCUPATIONAL THERAPY | Facility: CLINIC | Age: 67
DRG: 949 | End: 2022-12-30
Attending: PHYSICAL MEDICINE & REHABILITATION
Payer: COMMERCIAL

## 2022-12-30 ENCOUNTER — APPOINTMENT (OUTPATIENT)
Dept: PHYSICAL THERAPY | Facility: CLINIC | Age: 67
DRG: 949 | End: 2022-12-30
Attending: PHYSICAL MEDICINE & REHABILITATION
Payer: COMMERCIAL

## 2022-12-30 LAB
ANION GAP SERPL CALCULATED.3IONS-SCNC: 9 MMOL/L (ref 3–14)
BUN SERPL-MCNC: 11 MG/DL (ref 7–30)
CALCIUM SERPL-MCNC: 8.1 MG/DL (ref 8.5–10.1)
CHLORIDE BLD-SCNC: 100 MMOL/L (ref 94–109)
CO2 SERPL-SCNC: 23 MMOL/L (ref 20–32)
CREAT SERPL-MCNC: 0.59 MG/DL (ref 0.66–1.25)
ERYTHROCYTE [DISTWIDTH] IN BLOOD BY AUTOMATED COUNT: 16.7 % (ref 10–15)
GFR SERPL CREATININE-BSD FRML MDRD: >90 ML/MIN/1.73M2
GLUCOSE BLD-MCNC: 102 MG/DL (ref 70–99)
HCT VFR BLD AUTO: 27.7 % (ref 40–53)
HGB BLD-MCNC: 8.8 G/DL (ref 13.3–17.7)
HOLD SPECIMEN: NORMAL
MCH RBC QN AUTO: 28.5 PG (ref 26.5–33)
MCHC RBC AUTO-ENTMCNC: 31.8 G/DL (ref 31.5–36.5)
MCV RBC AUTO: 90 FL (ref 78–100)
PLATELET # BLD AUTO: 658 10E3/UL (ref 150–450)
POTASSIUM BLD-SCNC: 4.2 MMOL/L (ref 3.4–5.3)
RBC # BLD AUTO: 3.09 10E6/UL (ref 4.4–5.9)
SODIUM SERPL-SCNC: 132 MMOL/L (ref 133–144)
WBC # BLD AUTO: 6.8 10E3/UL (ref 4–11)

## 2022-12-30 PROCEDURE — 97530 THERAPEUTIC ACTIVITIES: CPT | Mod: GO

## 2022-12-30 PROCEDURE — 36415 COLL VENOUS BLD VENIPUNCTURE: CPT | Performed by: CLINIC/CENTER

## 2022-12-30 PROCEDURE — 97535 SELF CARE MNGMENT TRAINING: CPT | Mod: GO

## 2022-12-30 PROCEDURE — 128N000003 HC R&B REHAB

## 2022-12-30 PROCEDURE — 250N000013 HC RX MED GY IP 250 OP 250 PS 637: Performed by: STUDENT IN AN ORGANIZED HEALTH CARE EDUCATION/TRAINING PROGRAM

## 2022-12-30 PROCEDURE — 99232 SBSQ HOSP IP/OBS MODERATE 35: CPT | Mod: GC | Performed by: PHYSICAL MEDICINE & REHABILITATION

## 2022-12-30 PROCEDURE — 250N000013 HC RX MED GY IP 250 OP 250 PS 637: Performed by: CLINIC/CENTER

## 2022-12-30 PROCEDURE — 97110 THERAPEUTIC EXERCISES: CPT | Mod: GP | Performed by: PHYSICAL THERAPIST

## 2022-12-30 PROCEDURE — 82310 ASSAY OF CALCIUM: CPT | Performed by: CLINIC/CENTER

## 2022-12-30 PROCEDURE — 85027 COMPLETE CBC AUTOMATED: CPT | Performed by: CLINIC/CENTER

## 2022-12-30 PROCEDURE — 97530 THERAPEUTIC ACTIVITIES: CPT | Mod: GP | Performed by: PHYSICAL THERAPIST

## 2022-12-30 PROCEDURE — 250N000013 HC RX MED GY IP 250 OP 250 PS 637: Performed by: PHYSICIAN ASSISTANT

## 2022-12-30 RX ORDER — FERROUS SULFATE 325(65) MG
325 TABLET ORAL EVERY OTHER DAY
Status: DISCONTINUED | OUTPATIENT
Start: 2022-12-31 | End: 2022-12-30

## 2022-12-30 RX ORDER — ASCORBIC ACID 500 MG
500 TABLET ORAL DAILY
Status: DISCONTINUED | OUTPATIENT
Start: 2022-12-31 | End: 2023-01-06 | Stop reason: HOSPADM

## 2022-12-30 RX ADMIN — OXYCODONE HYDROCHLORIDE 10 MG: 5 TABLET ORAL at 05:45

## 2022-12-30 RX ADMIN — PANTOPRAZOLE SODIUM 40 MG: 40 TABLET, DELAYED RELEASE ORAL at 05:45

## 2022-12-30 RX ADMIN — Medication 25 MCG: at 08:06

## 2022-12-30 RX ADMIN — ACETAMINOPHEN 1000 MG: 500 TABLET ORAL at 08:06

## 2022-12-30 RX ADMIN — OXYCODONE HYDROCHLORIDE 10 MG: 5 TABLET ORAL at 17:42

## 2022-12-30 RX ADMIN — OXYCODONE HYDROCHLORIDE 10 MG: 5 TABLET ORAL at 13:23

## 2022-12-30 RX ADMIN — METHOCARBAMOL 750 MG: 750 TABLET, FILM COATED ORAL at 20:08

## 2022-12-30 RX ADMIN — OXYCODONE HYDROCHLORIDE 10 MG: 5 TABLET ORAL at 09:34

## 2022-12-30 RX ADMIN — METHOCARBAMOL 750 MG: 750 TABLET, FILM COATED ORAL at 08:06

## 2022-12-30 RX ADMIN — FERROUS GLUCONATE 324 MG: 324 TABLET ORAL at 08:06

## 2022-12-30 RX ADMIN — ACETAMINOPHEN 325 MG: 325 TABLET, FILM COATED ORAL at 11:35

## 2022-12-30 RX ADMIN — ZINC SULFATE 220 MG (50 MG) CAPSULE 220 MG: CAPSULE at 08:06

## 2022-12-30 RX ADMIN — LISINOPRIL 40 MG: 40 TABLET ORAL at 08:06

## 2022-12-30 RX ADMIN — OXYCODONE HYDROCHLORIDE 10 MG: 5 TABLET ORAL at 21:56

## 2022-12-30 RX ADMIN — ACETAMINOPHEN 1000 MG: 500 TABLET ORAL at 20:08

## 2022-12-30 RX ADMIN — OXYCODONE HYDROCHLORIDE 10 MG: 5 TABLET ORAL at 01:34

## 2022-12-30 ASSESSMENT — ACTIVITIES OF DAILY LIVING (ADL)
ADLS_ACUITY_SCORE: 24
ADLS_ACUITY_SCORE: 24
ADLS_ACUITY_SCORE: 25
ADLS_ACUITY_SCORE: 24
ADLS_ACUITY_SCORE: 25
ADLS_ACUITY_SCORE: 24
ADLS_ACUITY_SCORE: 24
ADLS_ACUITY_SCORE: 25

## 2022-12-30 NOTE — PROGRESS NOTES
SW sent referral to    Declined:   [facility].       SW called son/Beau to find out what son's address is to get correct branch of [facility].      BERNARDO Collazo   [facility], Transitional Care Unit   Social Work   [address] [place] [address], [address] [place] [address]  () [phone]      I

## 2022-12-30 NOTE — PLAN OF CARE
Discharge Planner Post-Acute Rehab OT:     Discharge Plan: Home w/ son and home  OT. discharge changed to 1-6 if needed after family training 1-2    Precautions: Spinal, fall, needs increased height to 25+ inches for ease w/ transfers    Current Status:  ADLs:    Mobility: STS from elevated bed height CGA. Ambulated w/ personal 4ww CGA w/ heavy A of UE. Ax2 w/ nsg.    Grooming: Set up seated.    Dressing:U/B pt able to dress after clothes retrieval    L/B sba w/reacher and without donning shorts from EOB elevated for STS,     footwear sba crossing leg over other.    Bathing: SBA w/use of rolling shower chair with foot rest on 12/27, plan to progress to shower bench    Toileting: Ax1 on and off 19 inch toilet in his room with bilateral rails. Declining use of elevated commode overlay on toilet due to discomfort.   IADLs: Son was previously assisting pt. Pt was doing his own finances and medication. Pt was also previously driving.  Vision/Cognition: WFL.     Assessment:pt tolerated sitting in chair 25 inches in room which is closer to home environment 20 min then hospital bed.trail of foot up on ties shoe with pt stating unable to use foot up that goes around foot in shoe due to swelling pt reported increase control with l foot with use of fww chair in room with 2 cushions to bed.   Other Barriers to Discharge (DME, Family Training, etc):   Pt has tub/shower and uses a utility stool/step for showering. No grab bars.   Family training Monday 1-2  DME: pt planing on getting bed frame mattress and box spring on floor, bed wedge, bed rail, raiser for 17 inch height toilet    Recommend to look into ramp as pt reports stairs had been difficult at baseline-continue to educate as appropriate.

## 2022-12-30 NOTE — PLAN OF CARE
"Discharge Planner Post-Acute Rehab PT:      Discharge Plan: Son's home for IADL assist, 3 DONI with L rail. Home care PT.     Precautions: Falls, Spinal  Edema: Size F Spandigrip to bilat LE on during day, off at night.     Current Status:  Bed Mobility: SBA rolling and supine>sit with bedrails. Min A through LE for sit>supine.  Transfer: Variable SBA<>min A STS from elevated surfaces with 4WW.   Gait: up to 25 ft with 4WW, CGA/SBA  Stairs: 3x4\" step ups in // bars with CGA  Balance: Heavy reliance on Ue's for standing  TUG on 12/27: 41.5 sec with 4WW     Assessment: Since Wed's set-back, pt gradually gaining back greater IND and tolerance for mobility. Stairs remain a large barrier for access to home, with pt only able to safely complete 4\" step ups in // bars (will need to complete ~8\" lateral step ups with single rail). Considering current function, pt may require extended discharge date. Family training tentatively scheduled for Monday AM.    *PM update: Pt possibly agreeable to K3 WC for home, having son and 2nd assist bump up/down for several DONI home.     Other Barriers to Discharge (DME, Family Training, etc):   Pt has personal 4WW here. Recommend possible bilat foot up devices. Declines manual WC despite strength/endurance/balance impairments.  Family training tentatively scheduled for Mon 1/2 10:30-12.  "

## 2022-12-30 NOTE — PROGRESS NOTES
[facility]   Acute Rehabilitation Unit    INTERVAL HISTORY  Pt had no acute events overnight. They report that they slept well. They report continued back pain this morning, but improved compared to yesterday and only had 1 episode of popping yesterday afternoon with therapies when lying on site. They report the methocarbamol is helping with pain, but do not note a great difference with creams and don't want to use. Denies chest pain, abdominal pain, or calf pain. Last BM 12/27.    Functionally,    With PT: Bed Mobility: SBA rolling and supine<>sit wit bedrails  Transfer: Variable SBA<>min A STS from elevated surfaces with 4WW.   Gait: pt only demo STS in // bars today, due to pain, pt able to demo stand pivot with 4WW today with mod A   Stairs: no step up today due to pt wanting to take it slow today for pain yesterday.   Balance: Heavy reliance on Ue's for standing  TUG on 12/27: 41.5 sec with 4WW     With OT: Mobility: STS from elevated bed height CGA. Ambulated w/ personal 4ww CGA w/ heavy A of UE. Ax2 w/ nsg.    Grooming: Set up seated.    Dressing: CGA w/reacher donning shorts from EOB elevated for STS, dependent footwear .    Bathing: SBA w/use of rolling shower chair with foot rest on 12/27, plan to progress to shower bench    Toileting: Ax1 on and off 19 inch toilet in his room with bilateral rails. Declining use of elevated commode overlay on toilet due to discomfort.   IADLs: Son was previously assisting pt. Pt was doing his own finances and medication. Pt was also previously driving.  Vision/Cognition: WFL.         MEDICATIONS  Scheduled meds    acetaminophen  1,000 mg Oral TID     ferrous gluconate  324 mg Oral Daily with breakfast     lidocaine  1 patch Transdermal Q24h    And     lidocaine   Transdermal Q8H ARIANA     lisinopril  40 mg Oral Daily     methocarbamol  750 mg Oral BID     Vitamin D3  25 mcg Oral Daily     zinc sulfate  220 mg Oral Daily       PRN  "meds:  acetaminophen, diclofenac, melatonin, methocarbamol, naloxone **OR** naloxone **OR** naloxone **OR** naloxone, oxyCODONE, polyethylene glycol, senna-docusate      PHYSICAL EXAM  BP (!) 146/86 (BP Location: Right arm, Patient Position: Semi-Marie's, Cuff Size: Adult Regular)   Pulse 96   Temp (!) 96.6  F (35.9  C) (Oral)   Resp 16   Ht 1.727 m (5' 8\")   Wt 102 kg (224 lb 13.9 oz)   SpO2 100%   BMI 34.19 kg/m    General: in no acute distress, conversational and alert, resting comfortably in bed  HEENT: atraumatic, nares clear, conjunctiva white  Pulmonary: on room air, lungs clear to auscultation bilaterally  Cardiovascular: RRR, no murmur auscultated, well-perfused peripherally  Abdominal: soft, non-tender to palpation, non-distended  Extremities: warm peripherally, no edema in BLEs  Skin: warm, dry, without erythema, ecchymosis, or rash noted  MSK: no BLE edema noted, calves non-tender to palpation  Neuro: conjugate gaze, speech non-dysarthric, R ankle dorsiflexion 3+/5, ankle plantar flexion 4/5, 1st toe dorsiflexion 3/4, L knee extension 4/5, ankle dorsiflexion 3/5, ankle plantar flexion 3/5, 1st toe dorsiflexion 3/4    LABS  Results for orders placed or performed during the hospital encounter of 12/24/22 (from the past 24 hour(s))   Lactic Acid STAT   Result Value Ref Range    Lactic Acid 1.1 0.7 - 2.0 mmol/L   Extra Tube    Narrative    The following orders were created for panel order Extra Tube.  Procedure                               Abnormality         Status                     ---------                               -----------         ------                     Extra Purple Top Tube[[id number]]                                                         Please view results for these tests on the individual orders.       ASSESSMENT AND PLAN  Philipp Bland is a 67 year old right hand dominant male PMH significant for lumbar spinal stenosis and radiculopathy, HTN, Paget's disease, iron deficiency " anemia, possible MGUS who underwent an elective T10-S1 spinal fusion and L2-S1 bilateral decompression 12/15/2022 with post-operative complication resulting in right foot weakness.      Changes:  - Plan to remove sutures today per pt preference  - Thrombocytosis increasing 12/30, peripheral blood smear ordered for 1/2, pending  - Started Vit C supplementation 12/30 for increased promotion of iron absorption  - Wt increased over last week with b/l foot edema noted, could consider hydrochlorothiazide starting week of 1/1/23 if blood pressures stay stable  ---------------------------------------------------------------------  Admission to acute inpatient rehab: December 24, 2022    Impairment group code: Neurologic Conditions 03.9 Other Neurologic, Lumbar stenosis and spondylolisthesis with radiculopathy        1. PT, OT  90 minutes of each on a daily basis, in addition to rehab nursing and close management of physiatrist.       2. Impairment of ADL's: OT for 90 minutes daily to work on ADLs such as grooming, self cares and bathing.       3. Impairment of mobility:  PT for 90 minutes daily focusing on strength, balance, coordination, and endurance.        4. Rehab RN: for med administration, bowel regimen, wound care and patient education.          Medical Conditions  #Spinal stenosis L2-5 and polyradiculopathy   s/p T10-S1 spinal fusion and L2-S1 bilateral decompression  Complicated by left foot weakness likely secondary to polyradiculopathy  - Had previous EMGs 6/29/2022 by Dr. Babar Issa, showed severe left lumbosacral polyradiculopathy. Exam noted at the time 5/5 BLE except right KE 4+/5, left KE KF 4/5.    - If weakness worsens or progresses, will consult neurosurgery and consider appropriate spine imaging  - PT/OT for evaluation and treatment  - tried AFOs in [facility] but did not like them, planning to use high top basketball shoes  - As needed oxycodone and Tylenol for pain   -Per neurosurgery team remove  sutures 2 weeks after surgery (removed 12/30)  - Spinal Precautions: for the first 6-8 weeks, no lifting > 10 pounds, no bending, twisting, or overhead reaching.     #Acute R paraspinal muscle spasm (12/28)  #Pain  - Scheduled 1g Tylenol TID with stable pain control overall (continuing to require oxycodone q4h), monitor closely and continue to offer topical medications  - Added voltaren gel and lidocaine patches 12/28 for increased pain  - Encouraged use of robaxin, ice and topical medications for pain management 12/28  - Robaxin changed to BID scheduled on 12/29 for increased LBP, continue to monitor if improves pain and continue to adjust as needed     # Skin wound  -Patient developed excoriation and superficial skin wounds likely due to dressing and adhesive removal.  -We will consult WOCN and keep wound open to air as healing appropriately with no signs of infection such as erythema or drainage  - Removed surgical sutures 12/29     #Hyponatremia  - Na levels after surgery were 127-132  - Cause unclear, Chlorthalidone was held and sodium level improved  - Medicine consulted for co-management   - BMP QMonday     #Nomocytic Anemia   #Iron deficiency anemia  #Thrombocytosis - reactive   - Likely due to acute blood loss with previous CHANTALE. Pre-op Hgb 14.5, post-op 9.2 EBL 1200  - PTA ferrous gluconate 324mg   - CBC QMonday  - Thrombocytosis increasing 12/30, peripheral blood smear ordered for 1/2, pending  - Started Vit C supplementation 12/30 for increased promotion of iron absorption     #HTN  #Peripheral edema  #Wt gain  - PTA: chlorthalidone 25mg daily, lisinopril 40mg daily  - Continue lisinopril but chlorthalidone on hold due to hyponatremia, blood pressures are controlled and SBP below 120   - Wt increased over last week with b/l foot edema noted, could consider hydrochlorothiazide starting week of 1/1/23 if blood pressures stay stable     #Paget's disease  - Continue with vitamin D supplementation 1000U,  received IV Zoledronic infusion prior to surgery 9/26/2022  - outpatient follow up with endo team Dr. Aguilar for maintenance and prevention of complications     #Elevated PSA  - 10/7/2022 was 15   - 10/19 seen by urology and was recommended to undergo prostate MRI for biopsy but did not follow up    - Monitor symptoms while inpatient such as urine retention, hematuria, pelvic pain, worsening back pain     #MGUS   - Per chart review suspected diagnosis, however not evaluated by hematology  - outpatient follow up for further work up and diagnosis        1. Adjustment to disability:  Clinical psychology to eval and treat as indicated  2. FEN: regular diet, thin liquids  3. Bowel: continent, bowel medications as needed   4. Bladder: continent, PVR x3  5. DVT Prophylaxis: Anti-embolism stocking  6. GI Prophylaxis: continue PPIs d/c'ed 12/30  7. Code: Full   8. Disposition: Home with son  with HC  9. ELOS:  1/3  10. Rehab prognosis:  Good  11. Follow up Appointments on Discharge:  Neurosurgery: Follow-up with CHERELLE on 1/27/2023  Heme/Onc: follow up MGUS workup   Endocrinology, saw Dr. Katarina Aguilar for outpatient follow up   Urology: follow up high PSA work up     Seen and discussed with Dr. Olmstead, PM&R staff physician      Maureen Carmona  PGY 2  Physical Medicine and Rehabilitation  [phone]

## 2022-12-30 NOTE — PLAN OF CARE
Goal Outcome Evaluation:      Plan of Care Reviewed With: patient    Overall Patient Progress: no changeOverall Patient Progress: no change    Outcome Evaluation: Pt reports back pain, controlled with PRN tylenol and oxycodone. No new skin issues noted, sutures removed this shift. Some drainage noted, covered with gauze and tape. Using call light appropriately to make needs known.

## 2022-12-30 NOTE — PLAN OF CARE
Goal Outcome Evaluation:      Plan of Care Reviewed With: patient    Outcome Evaluation:   Pt A&Ox4. Able to make needs known, uses call light appropriately, AX1 with walker. Cont of B/B. LBM 12/27/2022. C/O back pain PRN oxy given x2 this shift with good effect.  Back incision present, pt wants sutures to be removed tomorrow 12/30/2022, will pass on to oncoming nurse. Safety precaution in place,  Call light within reach, No other concerns reported/observed during shift, Nursing will continue with POC.

## 2022-12-30 NOTE — PLAN OF CARE
Goal Outcome Evaluation:    Overall Patient Progress: no change    Outcome Evaluation: No change in Pt progress this shift.    Pt is alert and oriented. Can take pills whole. Continent of B&B. LBM 12/27. Ax1 with walker. C/o pain; given PRN pain medication with some relief. Call light within reach and bed alarms on. Pt slept throughout the shift. Will continue with POC.

## 2022-12-31 ENCOUNTER — APPOINTMENT (OUTPATIENT)
Dept: OCCUPATIONAL THERAPY | Facility: CLINIC | Age: 67
DRG: 949 | End: 2022-12-31
Attending: PHYSICAL MEDICINE & REHABILITATION
Payer: COMMERCIAL

## 2022-12-31 ENCOUNTER — APPOINTMENT (OUTPATIENT)
Dept: PHYSICAL THERAPY | Facility: CLINIC | Age: 67
DRG: 949 | End: 2022-12-31
Attending: PHYSICAL MEDICINE & REHABILITATION
Payer: COMMERCIAL

## 2022-12-31 PROCEDURE — 250N000013 HC RX MED GY IP 250 OP 250 PS 637: Performed by: STUDENT IN AN ORGANIZED HEALTH CARE EDUCATION/TRAINING PROGRAM

## 2022-12-31 PROCEDURE — 97535 SELF CARE MNGMENT TRAINING: CPT | Mod: GO | Performed by: OCCUPATIONAL THERAPIST

## 2022-12-31 PROCEDURE — 128N000003 HC R&B REHAB

## 2022-12-31 PROCEDURE — 250N000013 HC RX MED GY IP 250 OP 250 PS 637: Performed by: CLINIC/CENTER

## 2022-12-31 PROCEDURE — 250N000013 HC RX MED GY IP 250 OP 250 PS 637: Performed by: PHYSICIAN ASSISTANT

## 2022-12-31 PROCEDURE — 97150 GROUP THERAPEUTIC PROCEDURES: CPT | Mod: GP

## 2022-12-31 PROCEDURE — 250N000009 HC RX 250: Performed by: CLINIC/CENTER

## 2022-12-31 RX ORDER — CEPHALEXIN 500 MG/1
500 CAPSULE ORAL 3 TIMES DAILY
Status: DISCONTINUED | OUTPATIENT
Start: 2022-12-31 | End: 2022-12-31

## 2022-12-31 RX ORDER — ACETAMINOPHEN 650 MG
TABLET, EXTENDED RELEASE ORAL ONCE
Status: COMPLETED | OUTPATIENT
Start: 2022-12-31 | End: 2022-12-31

## 2022-12-31 RX ORDER — CEPHALEXIN 500 MG/1
500 CAPSULE ORAL EVERY 6 HOURS SCHEDULED
Status: DISCONTINUED | OUTPATIENT
Start: 2022-12-31 | End: 2023-01-06 | Stop reason: HOSPADM

## 2022-12-31 RX ADMIN — METHOCARBAMOL 750 MG: 750 TABLET, FILM COATED ORAL at 20:01

## 2022-12-31 RX ADMIN — OXYCODONE HYDROCHLORIDE 10 MG: 5 TABLET ORAL at 20:03

## 2022-12-31 RX ADMIN — POVIDONE-IODINE: 10 SOLUTION TOPICAL at 13:02

## 2022-12-31 RX ADMIN — OXYCODONE HYDROCHLORIDE 10 MG: 5 TABLET ORAL at 06:01

## 2022-12-31 RX ADMIN — METHOCARBAMOL 750 MG: 750 TABLET, FILM COATED ORAL at 07:44

## 2022-12-31 RX ADMIN — ZINC SULFATE 220 MG (50 MG) CAPSULE 220 MG: CAPSULE at 07:43

## 2022-12-31 RX ADMIN — OXYCODONE HYDROCHLORIDE 10 MG: 5 TABLET ORAL at 10:03

## 2022-12-31 RX ADMIN — OXYCODONE HYDROCHLORIDE 10 MG: 5 TABLET ORAL at 15:51

## 2022-12-31 RX ADMIN — FERROUS GLUCONATE 324 MG: 324 TABLET ORAL at 07:43

## 2022-12-31 RX ADMIN — ACETAMINOPHEN 1000 MG: 500 TABLET ORAL at 07:43

## 2022-12-31 RX ADMIN — OXYCODONE HYDROCHLORIDE AND ACETAMINOPHEN 500 MG: 500 TABLET ORAL at 07:43

## 2022-12-31 RX ADMIN — Medication 25 MCG: at 07:43

## 2022-12-31 RX ADMIN — CEPHALEXIN 500 MG: 500 CAPSULE ORAL at 13:02

## 2022-12-31 RX ADMIN — ACETAMINOPHEN 1000 MG: 500 TABLET ORAL at 13:02

## 2022-12-31 RX ADMIN — CEPHALEXIN 500 MG: 500 CAPSULE ORAL at 17:20

## 2022-12-31 RX ADMIN — ACETAMINOPHEN 1000 MG: 500 TABLET ORAL at 20:02

## 2022-12-31 RX ADMIN — OXYCODONE HYDROCHLORIDE 10 MG: 5 TABLET ORAL at 02:01

## 2022-12-31 RX ADMIN — LISINOPRIL 40 MG: 40 TABLET ORAL at 07:43

## 2022-12-31 ASSESSMENT — ACTIVITIES OF DAILY LIVING (ADL)
ADLS_ACUITY_SCORE: 25
ADLS_ACUITY_SCORE: 23
ADLS_ACUITY_SCORE: 25
ADLS_ACUITY_SCORE: 26
ADLS_ACUITY_SCORE: 25
ADLS_ACUITY_SCORE: 23
ADLS_ACUITY_SCORE: 25
ADLS_ACUITY_SCORE: 26
ADLS_ACUITY_SCORE: 25
ADLS_ACUITY_SCORE: 25

## 2022-12-31 NOTE — CARE PLAN
Pt attended Falls Prevention class today with group of 6 patients. Pt selected for class due to documented gait deficit and falls risk. Class includes education in falls risks, how to decrease that risk through behavior and home modifications and energy conservation; and instruction in available equipment designed to increase home safety. Pt was able to verbalize understanding of materials and participated appropriately in the discussion and problem-solving segments of the class.   Pt was instructed they will be quizzed later in order to demonstrate comprehension of material.

## 2022-12-31 NOTE — PLAN OF CARE
Goal Outcome Evaluation:    Overall Patient Progress: no change    Outcome Evaluation: No change in Pt progress this shift.    Pt is alert and oriented. Can take pills whole. Continent of B&B. LBM 12/29. Ax1 with walker. C/o pain; given PRN pain medication with some relief. Denied SOB, CP, and n/t. Call light within reach - Pt is able to make needs known. Will continue with POC.

## 2022-12-31 NOTE — PLAN OF CARE
"Discharge Planner Post-Acute Rehab OT:      Discharge Plan: Home w/ son and home  OT. discharge changed to 1-6 if needed after family training 1-2     Precautions: Spinal, fall, needs increased height to 25+ inches for ease w/ transfers     Current Status:  ADLs:    Mobility: STS from elevated bed height CGA.     Grooming: Set up seated. CGA standing    Dressing:U/B pt able to dress after clothes retrieval, LB supine I    L/B sba w/reacher and without donning shorts from EOB elevated for STS,     footwear sba crossing leg over other, provided elastic laces    Bathing: SBA w/use of rolling shower chair with foot rest on 12/27, plan to progress to shower bench    Toileting: Ax1 on and off 19 inch toilet in his room with bilateral rails. Declining use of elevated commode overlay on toilet due to discomfort.   IADLs: Son was previously assisting pt. Pt was doing his own finances and medication. Pt was also previously driving.  Vision/Cognition: WFL.      Assessment: Pt making progress toward greater independence using DME to maintain precautions and gain independence.  Making progress on STS from more standard height surfaces, but continues to require 25\" or greater for ease and independence. Plan to continue with strengthening and endurance building to achieve consistent safe and independent completion of ADL    Other Barriers to Discharge (DME, Family Training, etc):   Pt has tub/shower and uses a utility stool/step for showering. No grab bars.   Family training Monday 1-2  DME: pt planing on getting bed frame mattress and box spring on floor, bed wedge, bed rail, raiser for 17 inch height toilet    Recommend to look into ramp as pt reports stairs had been difficult at baseline-continue to educate as appropriate.                         "

## 2022-12-31 NOTE — PROGRESS NOTES
[facility], [place]   Physical Medicine and Rehabilitation Note  Weekend/Holiday coverage           Assessment and Plan of Care:   Philipp Bland is a 67 year old right hand dominant male PMH significant for lumbar spinal stenosis and radiculopathy, HTN, Paget's disease, iron deficiency anemia, possible MGUS who underwent an elective T10-S1 spinal fusion and L2-S1 bilateral decompression 12/15/2022 with post-operative complication resulting in right foot weakness.     --Vitals stable. No lab today.  -- Surgical wound site noted to have erythema and purulence over superior portion of wound today (sutures removed 12/30) (photo in Epic)  -- Remains without fevers or leukocytosis  -- Reordered WOCN consult 12/31 who defer to neurosurgery for plan, appreciate recs and can reconsult in future if needed  -- Neurosurgery recs given, appreciate: Started Keflex 500 mg QID x7 days, daily betadine to incision, continue daily dressing change  -- Increased to QID per pharmacy recs, appreciate recs  -- CBC and CRP for 1/1  -- Plan to reach out to Dr. Aggarwal week of 1/3 for further recs  --Continue ongoing medical management.  --Continue therapies and plan of care.             Interval history:     Denies CP, SOB, N/V, abdominal pain, new pain or weakness/numbness/tingling. They report they slept well last night and their low back pain is improved slightly today. Report therapies continue to go well.            Physical Exam:     Vitals:    12/29/22 1824 12/30/22 0700 12/30/22 1640 12/31/22 0738   BP: 112/73 (!) 146/86 119/76 128/78   BP Location: Right arm Right arm Right arm Right arm   Patient Position:  Semi-Marie's Semi-Marie's Semi-Maire's   Cuff Size:  Adult Regular Adult Regular Adult Regular   Pulse: 98 96 100 84   Resp: 18 16 16 16   Temp: 98.6  F (37  C) (!) 96.6  F (35.9  C) 98.4  F (36.9  C) (!) 96.4  F (35.8  C)   TempSrc: Oral Oral Oral Oral   SpO2: 99% 100% 100%    Weight:       Height:          Gen: NAD, resting in bed  Heart: RRR, no murmurs auscultated  Lungs: clear breath sounds b/l  Abd: soft and non-tender, bowel sounds auscultated  Ext: wwp, no edema in BLE, no tenderness in calves  MSK/neuro: alert and oriented. speech fluent. moves BUE volitionally. B/l knee extension 5/5, L hallux dorsiflexion 3/5,  L ankle dorsiflexion 3/5, b/l plantar flexion 4+/5           Data:   Scheduled meds    acetaminophen  1,000 mg Oral TID     ferrous gluconate  324 mg Oral Daily with breakfast     lidocaine  1 patch Transdermal Q24h    And     lidocaine   Transdermal Q8H ARIANA     lisinopril  40 mg Oral Daily     methocarbamol  750 mg Oral BID     vitamin C  500 mg Oral Daily     Vitamin D3  25 mcg Oral Daily     zinc sulfate  220 mg Oral Daily       PRN meds:  acetaminophen, diclofenac, melatonin, methocarbamol, naloxone **OR** naloxone **OR** naloxone **OR** naloxone, oxyCODONE, polyethylene glycol, senna-docusate    CBC RESULTS:   Recent Labs   Lab Test 12/30/22  1145   WBC 6.8   RBC 3.09*   HGB 8.8*   HCT 27.7*   MCV 90   MCH 28.5   MCHC 31.8   RDW 16.7*   *         Last Comprehensive Metabolic Panel:  Sodium   Date Value Ref Range Status   12/30/2022 132 (L) 133 - 144 mmol/L Final   04/08/2013 141 133 - 144 mmol/L Final     Potassium   Date Value Ref Range Status   12/30/2022 4.2 3.4 - 5.3 mmol/L Final   04/08/2013 4.3 3.4 - 5.3 mmol/L Final     Chloride   Date Value Ref Range Status   12/30/2022 100 94 - 109 mmol/L Final   04/08/2013 101 94 - 109 mmol/L Final     Carbon Dioxide   Date Value Ref Range Status   04/08/2013 25 20 - 32 mmol/L Final     Carbon Dioxide (CO2)   Date Value Ref Range Status   12/30/2022 23 20 - 32 mmol/L Final     Anion Gap   Date Value Ref Range Status   12/30/2022 9 3 - 14 mmol/L Final   04/08/2013 14 6 - 17 mmol/L Final     Glucose   Date Value Ref Range Status   12/30/2022 102 (H) 70 - 99 mg/dL Final   04/08/2013 93 60 - 99 mg/dL Final     Urea Nitrogen   Date Value Ref  Range Status   12/30/2022 11 7 - 30 mg/dL Final   04/08/2013 26 7 - 30 mg/dL Final     Creatinine   Date Value Ref Range Status   12/30/2022 0.59 (L) 0.66 - 1.25 mg/dL Final   04/08/2013 1.27 (H) 0.66 - 1.25 mg/dL Final     GFR Estimate   Date Value Ref Range Status   12/30/2022 >90 >60 mL/min/1.73m2 Final     Comment:     Effective December 21, 2021 eGFRcr in adults is calculated using the 2021 CKD-EPI creatinine equation which includes age and gender (Mayte et al., NEJM, DOI: 10.1056/KNVRus3518333)   04/08/2013 59 (L) >60 mL/min/1.7m2 Final     Calcium   Date Value Ref Range Status   12/30/2022 8.1 (L) 8.5 - 10.1 mg/dL Final   04/08/2013 9.5 8.5 - 10.4 mg/dL Final       Maureen Carmona  PGY 2  Physical Medicine and Rehabilitation  Pager: [phone]    Patient discussed with PM&R physician  Dr. Hoover

## 2022-12-31 NOTE — PLAN OF CARE
Goal Outcome Evaluation:      Plan of Care Reviewed With: patient    Overall Patient Progress: no changeOverall Patient Progress: no change    Outcome Evaluation: Pt continues to report 6-7/10 back pain controlled with scheduled tylenol and oxycodone Q4. Back incision noted to be not approximated, provider notified and WOC consult placed. New wound care orders placed and competed this shift. Using call light frequently to make needs known.

## 2022-12-31 NOTE — PROGRESS NOTES
Neurosurgery Brief Note    Paged regarding concern for wound breakdown for this patient, who underwent [id number] fusion by Dr. Aggarwal on 12/15/22.  The patient's sutures were recently removed yesterday, and there is concern for small areas of breakdown in the middle and inferior portions of the wound (see photo).  White areas of concern appear to be solid purulent material versus granulation tissue.  The patient is otherwise afebrile and vitally stable, with no evidence of leukocytosis on recent labs.    Recommendations:  Keflex 500 mg TID x7 days  Daily betadine to incision  Daily dressing change  Plan to reach out to Dr. Aggarwal this coming week to discuss further (currently out of town).    Discussed with Neurosurgery chief Dr. Goldsmith.    Stefan Mcclendon M.D.  Neurosurgery Resident, PGY-4    Please contact neurosurgery resident on call with questions.    Dial * * [phone], enter [phone] when prompted.

## 2022-12-31 NOTE — PLAN OF CARE
Goal Outcome Evaluation:      Plan of Care Reviewed With: patient    Overall Patient Progress: no change    Outcome Evaluation:  Pt A&Ox4, able to makes needs known.Pt continues to be continent of both B/B, urinal at bedside, LBM 12/29/22. Steri strips applied to back incision. On a regular diet, thin liquids and takes pills whole. Ax1 with walker. C/o  back pain rated 7/10, PRN given with good effect per pt.  Safety precaution in place, call light within reach, resting in bed at this time, no new concerns, will continue with POC.

## 2023-01-01 LAB
CRP SERPL-MCNC: 44 MG/L (ref 0–8)
ERYTHROCYTE [DISTWIDTH] IN BLOOD BY AUTOMATED COUNT: 16.6 % (ref 10–15)
HCT VFR BLD AUTO: 27.8 % (ref 40–53)
HGB BLD-MCNC: 8.7 G/DL (ref 13.3–17.7)
MCH RBC QN AUTO: 28.2 PG (ref 26.5–33)
MCHC RBC AUTO-ENTMCNC: 31.3 G/DL (ref 31.5–36.5)
MCV RBC AUTO: 90 FL (ref 78–100)
PLATELET # BLD AUTO: 561 10E3/UL (ref 150–450)
RBC # BLD AUTO: 3.09 10E6/UL (ref 4.4–5.9)
WBC # BLD AUTO: 4.1 10E3/UL (ref 4–11)

## 2023-01-01 PROCEDURE — 250N000013 HC RX MED GY IP 250 OP 250 PS 637: Performed by: STUDENT IN AN ORGANIZED HEALTH CARE EDUCATION/TRAINING PROGRAM

## 2023-01-01 PROCEDURE — 36415 COLL VENOUS BLD VENIPUNCTURE: CPT | Performed by: CLINIC/CENTER

## 2023-01-01 PROCEDURE — 250N000013 HC RX MED GY IP 250 OP 250 PS 637: Performed by: CLINIC/CENTER

## 2023-01-01 PROCEDURE — 85027 COMPLETE CBC AUTOMATED: CPT | Performed by: CLINIC/CENTER

## 2023-01-01 PROCEDURE — 86140 C-REACTIVE PROTEIN: CPT | Performed by: CLINIC/CENTER

## 2023-01-01 PROCEDURE — 128N000003 HC R&B REHAB

## 2023-01-01 PROCEDURE — 250N000013 HC RX MED GY IP 250 OP 250 PS 637: Performed by: PHYSICIAN ASSISTANT

## 2023-01-01 RX ADMIN — OXYCODONE HYDROCHLORIDE 10 MG: 5 TABLET ORAL at 21:42

## 2023-01-01 RX ADMIN — LISINOPRIL 40 MG: 40 TABLET ORAL at 08:49

## 2023-01-01 RX ADMIN — OXYCODONE HYDROCHLORIDE 10 MG: 5 TABLET ORAL at 00:00

## 2023-01-01 RX ADMIN — FERROUS GLUCONATE 324 MG: 324 TABLET ORAL at 08:49

## 2023-01-01 RX ADMIN — CEPHALEXIN 500 MG: 500 CAPSULE ORAL at 23:43

## 2023-01-01 RX ADMIN — CEPHALEXIN 500 MG: 500 CAPSULE ORAL at 12:28

## 2023-01-01 RX ADMIN — ACETAMINOPHEN 1000 MG: 500 TABLET ORAL at 20:17

## 2023-01-01 RX ADMIN — ACETAMINOPHEN 1000 MG: 500 TABLET ORAL at 08:48

## 2023-01-01 RX ADMIN — OXYCODONE HYDROCHLORIDE 10 MG: 5 TABLET ORAL at 12:28

## 2023-01-01 RX ADMIN — OXYCODONE HYDROCHLORIDE AND ACETAMINOPHEN 500 MG: 500 TABLET ORAL at 08:48

## 2023-01-01 RX ADMIN — CEPHALEXIN 500 MG: 500 CAPSULE ORAL at 17:12

## 2023-01-01 RX ADMIN — ACETAMINOPHEN 1000 MG: 500 TABLET ORAL at 14:10

## 2023-01-01 RX ADMIN — Medication 25 MCG: at 08:49

## 2023-01-01 RX ADMIN — ZINC SULFATE 220 MG (50 MG) CAPSULE 220 MG: CAPSULE at 08:49

## 2023-01-01 RX ADMIN — CEPHALEXIN 500 MG: 500 CAPSULE ORAL at 06:05

## 2023-01-01 RX ADMIN — CEPHALEXIN 500 MG: 500 CAPSULE ORAL at 00:00

## 2023-01-01 RX ADMIN — OXYCODONE HYDROCHLORIDE 10 MG: 5 TABLET ORAL at 17:11

## 2023-01-01 RX ADMIN — OXYCODONE HYDROCHLORIDE 10 MG: 5 TABLET ORAL at 04:05

## 2023-01-01 RX ADMIN — METHOCARBAMOL 750 MG: 750 TABLET, FILM COATED ORAL at 20:17

## 2023-01-01 RX ADMIN — OXYCODONE HYDROCHLORIDE 10 MG: 5 TABLET ORAL at 08:30

## 2023-01-01 RX ADMIN — METHOCARBAMOL 750 MG: 750 TABLET, FILM COATED ORAL at 08:49

## 2023-01-01 ASSESSMENT — ACTIVITIES OF DAILY LIVING (ADL)
ADLS_ACUITY_SCORE: 24
ADLS_ACUITY_SCORE: 25
ADLS_ACUITY_SCORE: 24

## 2023-01-01 NOTE — PLAN OF CARE
"....Blood pressure (!) 146/83, pulse 90, temperature 97  F (36.1  C), temperature source Oral, resp. rate 16, height 1.727 m (5' 8\"), weight 102 kg (224 lb 13.9 oz), SpO2 96 %.    Pt is AOX4.. On RA. Regular diet thin liquids.  A1 with walker and gait belt. Cont of B&B.Uses bedside urinal.   Pt sat upright in chair for 30 min. Ambulated to bathroom 2X this shift.   Back pain managed with scheduled Tylenol and PRN Oxycodone 10 mg.    Denies CP, SOB, dizziness, numbness, tingling.     Continue POC.                         "

## 2023-01-01 NOTE — PLAN OF CARE
Goal Outcome Evaluation:    Overall Patient Progress: no change    Outcome Evaluation: No change in Pt progress this shift.    Pt is alert and oriented. Can take pills whole. Continent of B&B. LBM 12/29. Ax1 with walker. C/o pain; given PRN pain medication with some relief. Call light within reach and bed alarms on. Pt is able to make needs known. Will continue with POC.

## 2023-01-01 NOTE — PLAN OF CARE
Goal Outcome Evaluation:      Plan of Care Reviewed With: patient    Overall Patient Progress: no change     FOCUS/GOAL: Pain, Safety Management and Skin integrity.      ASSESSMENT, INTERVENTIONS AND CONTINUING PLAN FOR GOAL:   Orientation: A&OX4,   Ambulation/Transfer: AX1 with walker  Bladder:Continent, urinal at bedside  Bowel: Continent, uses bathroom. LB- 12/29/2022  Pain: back rated 7/10, taking scheduled and PRN med- effective per pt.   Diet: Regular, thin liquids  Meds: Pills whole with water  Tubes/Lines/Drains: None  Skin: Incision on his back, dressing intact.   Pt able to make needs known, on ABX, declined wound care, safety measures in place, call light within reach,  in bed at this time watching TV. Nursing will continue with POC.

## 2023-01-02 ENCOUNTER — APPOINTMENT (OUTPATIENT)
Dept: OCCUPATIONAL THERAPY | Facility: CLINIC | Age: 68
DRG: 949 | End: 2023-01-02
Attending: PHYSICAL MEDICINE & REHABILITATION
Payer: COMMERCIAL

## 2023-01-02 ENCOUNTER — APPOINTMENT (OUTPATIENT)
Dept: PHYSICAL THERAPY | Facility: CLINIC | Age: 68
DRG: 949 | End: 2023-01-02
Attending: PHYSICAL MEDICINE & REHABILITATION
Payer: COMMERCIAL

## 2023-01-02 LAB
ANION GAP SERPL CALCULATED.3IONS-SCNC: 5 MMOL/L (ref 3–14)
BASOPHILS # BLD AUTO: 0.1 10E3/UL (ref 0–0.2)
BASOPHILS NFR BLD AUTO: 2 %
BUN SERPL-MCNC: 13 MG/DL (ref 7–30)
CALCIUM SERPL-MCNC: 8.4 MG/DL (ref 8.5–10.1)
CHLORIDE BLD-SCNC: 103 MMOL/L (ref 94–109)
CO2 SERPL-SCNC: 25 MMOL/L (ref 20–32)
CREAT SERPL-MCNC: 0.62 MG/DL (ref 0.66–1.25)
CRP SERPL-MCNC: 24 MG/L (ref 0–8)
EOSINOPHIL # BLD AUTO: 0.3 10E3/UL (ref 0–0.7)
EOSINOPHIL NFR BLD AUTO: 9 %
ERYTHROCYTE [DISTWIDTH] IN BLOOD BY AUTOMATED COUNT: 16.5 % (ref 10–15)
GFR SERPL CREATININE-BSD FRML MDRD: >90 ML/MIN/1.73M2
GLUCOSE BLD-MCNC: 107 MG/DL (ref 70–99)
HCT VFR BLD AUTO: 27.9 % (ref 40–53)
HGB BLD-MCNC: 8.7 G/DL (ref 13.3–17.7)
IMM GRANULOCYTES # BLD: 0 10E3/UL
IMM GRANULOCYTES NFR BLD: 1 %
LACTATE SERPL-SCNC: 0.6 MMOL/L (ref 0.7–2)
LYMPHOCYTES # BLD AUTO: 0.6 10E3/UL (ref 0.8–5.3)
LYMPHOCYTES NFR BLD AUTO: 18 %
MCH RBC QN AUTO: 27.9 PG (ref 26.5–33)
MCHC RBC AUTO-ENTMCNC: 31.2 G/DL (ref 31.5–36.5)
MCV RBC AUTO: 89 FL (ref 78–100)
MONOCYTES # BLD AUTO: 0.4 10E3/UL (ref 0–1.3)
MONOCYTES NFR BLD AUTO: 13 %
NEUTROPHILS # BLD AUTO: 2 10E3/UL (ref 1.6–8.3)
NEUTROPHILS NFR BLD AUTO: 57 %
PATH REPORT.COMMENTS IMP SPEC: NORMAL
PATH REPORT.COMMENTS IMP SPEC: NORMAL
PATH REPORT.FINAL DX SPEC: NORMAL
PATH REPORT.MICROSCOPIC SPEC OTHER STN: NORMAL
PATH REPORT.MICROSCOPIC SPEC OTHER STN: NORMAL
PATH REPORT.RELEVANT HX SPEC: NORMAL
PLATELET # BLD AUTO: 491 10E3/UL (ref 150–450)
POTASSIUM BLD-SCNC: 4.5 MMOL/L (ref 3.4–5.3)
RBC # BLD AUTO: 3.12 10E6/UL (ref 4.4–5.9)
RETICS # AUTO: 0.07 10E6/UL (ref 0.03–0.1)
RETICS/RBC NFR AUTO: 2.3 % (ref 0.5–2)
SODIUM SERPL-SCNC: 133 MMOL/L (ref 133–144)
WBC # BLD AUTO: ABNORMAL 10*3/UL

## 2023-01-02 PROCEDURE — 99231 SBSQ HOSP IP/OBS SF/LOW 25: CPT | Performed by: PHYSICAL MEDICINE & REHABILITATION

## 2023-01-02 PROCEDURE — 85045 AUTOMATED RETICULOCYTE COUNT: CPT | Performed by: PHYSICAL MEDICINE & REHABILITATION

## 2023-01-02 PROCEDURE — 86140 C-REACTIVE PROTEIN: CPT | Performed by: CLINIC/CENTER

## 2023-01-02 PROCEDURE — 83605 ASSAY OF LACTIC ACID: CPT | Performed by: PHYSICAL MEDICINE & REHABILITATION

## 2023-01-02 PROCEDURE — 97530 THERAPEUTIC ACTIVITIES: CPT | Mod: GP | Performed by: PHYSICAL THERAPIST

## 2023-01-02 PROCEDURE — 36415 COLL VENOUS BLD VENIPUNCTURE: CPT | Performed by: PHYSICAL MEDICINE & REHABILITATION

## 2023-01-02 PROCEDURE — 97110 THERAPEUTIC EXERCISES: CPT | Mod: GP | Performed by: PHYSICAL THERAPIST

## 2023-01-02 PROCEDURE — 80048 BASIC METABOLIC PNL TOTAL CA: CPT | Performed by: PHYSICIAN ASSISTANT

## 2023-01-02 PROCEDURE — 97110 THERAPEUTIC EXERCISES: CPT | Mod: GO | Performed by: OCCUPATIONAL THERAPIST

## 2023-01-02 PROCEDURE — 85060 BLOOD SMEAR INTERPRETATION: CPT | Performed by: PATHOLOGY

## 2023-01-02 PROCEDURE — 128N000003 HC R&B REHAB

## 2023-01-02 PROCEDURE — 97110 THERAPEUTIC EXERCISES: CPT | Mod: GO | Performed by: STUDENT IN AN ORGANIZED HEALTH CARE EDUCATION/TRAINING PROGRAM

## 2023-01-02 PROCEDURE — 250N000013 HC RX MED GY IP 250 OP 250 PS 637: Performed by: PHYSICIAN ASSISTANT

## 2023-01-02 PROCEDURE — 97530 THERAPEUTIC ACTIVITIES: CPT | Mod: GO | Performed by: OCCUPATIONAL THERAPIST

## 2023-01-02 PROCEDURE — 97535 SELF CARE MNGMENT TRAINING: CPT | Mod: GO | Performed by: STUDENT IN AN ORGANIZED HEALTH CARE EDUCATION/TRAINING PROGRAM

## 2023-01-02 PROCEDURE — 250N000013 HC RX MED GY IP 250 OP 250 PS 637: Performed by: STUDENT IN AN ORGANIZED HEALTH CARE EDUCATION/TRAINING PROGRAM

## 2023-01-02 PROCEDURE — 85004 AUTOMATED DIFF WBC COUNT: CPT | Performed by: PHYSICAL MEDICINE & REHABILITATION

## 2023-01-02 PROCEDURE — 250N000013 HC RX MED GY IP 250 OP 250 PS 637: Performed by: CLINIC/CENTER

## 2023-01-02 RX ADMIN — METHOCARBAMOL 750 MG: 750 TABLET, FILM COATED ORAL at 08:27

## 2023-01-02 RX ADMIN — OXYCODONE HYDROCHLORIDE AND ACETAMINOPHEN 500 MG: 500 TABLET ORAL at 08:27

## 2023-01-02 RX ADMIN — OXYCODONE HYDROCHLORIDE 10 MG: 5 TABLET ORAL at 05:54

## 2023-01-02 RX ADMIN — LISINOPRIL 40 MG: 40 TABLET ORAL at 08:27

## 2023-01-02 RX ADMIN — CEPHALEXIN 500 MG: 500 CAPSULE ORAL at 13:10

## 2023-01-02 RX ADMIN — ACETAMINOPHEN 1000 MG: 500 TABLET ORAL at 08:28

## 2023-01-02 RX ADMIN — ZINC SULFATE 220 MG (50 MG) CAPSULE 220 MG: CAPSULE at 08:27

## 2023-01-02 RX ADMIN — ACETAMINOPHEN 1000 MG: 500 TABLET ORAL at 13:10

## 2023-01-02 RX ADMIN — CEPHALEXIN 500 MG: 500 CAPSULE ORAL at 18:40

## 2023-01-02 RX ADMIN — METHOCARBAMOL 750 MG: 750 TABLET, FILM COATED ORAL at 20:08

## 2023-01-02 RX ADMIN — CEPHALEXIN 500 MG: 500 CAPSULE ORAL at 05:54

## 2023-01-02 RX ADMIN — OXYCODONE HYDROCHLORIDE 10 MG: 5 TABLET ORAL at 22:49

## 2023-01-02 RX ADMIN — OXYCODONE HYDROCHLORIDE 10 MG: 5 TABLET ORAL at 18:40

## 2023-01-02 RX ADMIN — ACETAMINOPHEN 1000 MG: 500 TABLET ORAL at 20:07

## 2023-01-02 RX ADMIN — FERROUS GLUCONATE 324 MG: 324 TABLET ORAL at 08:27

## 2023-01-02 RX ADMIN — OXYCODONE HYDROCHLORIDE 10 MG: 5 TABLET ORAL at 01:47

## 2023-01-02 RX ADMIN — Medication 25 MCG: at 08:27

## 2023-01-02 RX ADMIN — OXYCODONE HYDROCHLORIDE 10 MG: 5 TABLET ORAL at 14:53

## 2023-01-02 RX ADMIN — OXYCODONE HYDROCHLORIDE 10 MG: 5 TABLET ORAL at 10:29

## 2023-01-02 ASSESSMENT — ACTIVITIES OF DAILY LIVING (ADL)
ADLS_ACUITY_SCORE: 24

## 2023-01-02 NOTE — PROGRESS NOTES
SW received a call from (Porter?) Nordby/son.  Stating pt was NOT going to live with him. IF pt was going to live with anyone it would be Idris/son.  SW can call him back and get more information.  [phone]    BERNARDO Collazo   [facility], Transitional Care Unit   Social Work   [facility] [place] [address], [address] [place] [address]  (ph) [phone]

## 2023-01-02 NOTE — PROGRESS NOTES
Met with pt at bedside. Pt stated that his son was unable to come for caregiver training this morning and rescheduled for Thurs 01/05 with plan to discharge Fri 01/06. Pt stated that he would discharge to his son Idris's home (address: [address] [place], [place] [id number]). Idris's phone number obtained and added to face sheet as emergency contact, with pt permission. SWer discussed HC recommendations, insurance coverage, services, SOC, and insurance coverage. Pt denied preference for HC and gave SW permission to contact HC on his behalf. List of options near pt son obtained. Due to the holiday, most agencies closed. Will contact and send referrals tomorrow and continue to follow.     SANDY Ochoa   [facility]   Direct Phone: [phone]  I   Pager: [phone]  I  Fax: [phone]     difficulty breathing

## 2023-01-02 NOTE — PLAN OF CARE
Goal Outcome Evaluation:      Plan of Care Reviewed With: patient    Overall Patient Progress: no changeOverall Patient Progress: no change    Outcome Evaluation: Pt continues to report back pain, given PRN oxycodone with good relief. Back incision wound care completed this shift, appears to be improving. Uses call light to make needs known and waits for assistance with transfers. Triggered sepsis this morning, lactic came back normal and pt was asymptomatic.

## 2023-01-02 NOTE — PROGRESS NOTES
"SPIRITUAL HEALTH SERVICES Progress Note  [facility] ([facility]) Acute Rehab     visit with pt, per length of stay - pt had not requested a  visit during this admission, but he welcomed visit today. Pt shared regarding how his yfn has helped him get through difficult circumstances - \"I'm a born again Zoroastrian\". He particularly shared how he has experienced healing in the past with God's help. Pt expects to discharge later this week. No further needs indicated at this time.     Rhett Garcia M.Div. (Bill), [facility]  Staff   Pager [phone]      * [facility] remains available 24/7 for emergent requests/referrals, either by having the switchboard page the on-call  or by entering an ASAP/STAT consult in Epic (this will also page the on-call ). Routine Epic consults receive an initial response within 24 hours.*        "

## 2023-01-02 NOTE — PLAN OF CARE
Goal Outcome Evaluation:      Plan of Care Reviewed With: patient    Overall Patient Progress: no change       FOCUS/GOAL: Skin integrity, Safety and Pain Management    ASSESSMENT, INTERVENTIONS AND CONTINUING PLAN FOR GOAL:  Orientation: A&OX4,   Ambulation/Transfer: AX1 with walker  Bladder:Continent, urinal at bedside  Bowel: Continent, uses bathroom. LBM- 12/29/2022  Pain: back incision pain rated 7/10, pt on scheduled and PRN meds x2 oxy during shift- effective per pt.   Diet: Regular, thin liquids  Meds: Pills whole with water  Tubes/Lines/Drains: None  Skin: Incision on back, cleansed and dressing changed per order.   Pt able to make needs known, on ABX, safety measures in place, call light within reach,  pt spends time in bed watching TV. No new concerns reported/observed. Nursing will continue with POC.

## 2023-01-02 NOTE — PLAN OF CARE
"Discharge Planner Post-Acute Rehab PT:      Discharge Plan: Son's home for IADL assist, 3 DONI with L rail (planning to have ramp installed). Home care PT.     Precautions: Falls, Spinal  Edema: Size F Spandigrip to bilat LE on during day, off at night.     Current Status:  Bed Mobility: Primarily SBA rolling and supine<>sit EOM. Occasional min A through LE for sit>supine.  Transfer: Variable SBA<>min A STS from elevated surfaces with 4WW.   Gait: up to 30 ft with 4WW, CGA/SBA  Stairs: 3x4\" step ups in // bars with CGA  Balance: Heavy reliance on Ue's for standing  TUG on 12/27: 41.5 sec with 4WW              On 1/2: 30.8 sec with 4WW     Assessment: Participation for upright/standing activity remains variable with pain and fatigue, but demonstrating greater ease with STS and improved gait speed from last week per above timed up and go scoring. Agreeable to [employer] rental manual WC, with plan for ramp to enter home.     Other Barriers to Discharge (DME, Family Training, etc):   Pt has personal 4WW here. Recommend possible bilat foot up devices.  K3 manual WC paperwork initiated.  Family training to be scheduled (likely Thurs 1/5 AM).  "

## 2023-01-02 NOTE — PLAN OF CARE
"Rehabilitation Medicine Wheelchair Face to Face     Diagnosis: Neurologic conditions, Lumbar stenosis and spondylolisthesis with radiculopathy, s/p fusion.   Currently has limited mobility due to pain, bilat drop foot, weakness, unsteadiness on feet.  Current transfer status: SBA with 4WW.  Distance patient currently able to walk: up to 25 ft, SBA with 4WW. Decreased stride length and gait speed (TU.8 sec with 4WW, indicating increased risk for falls).  Therapy team has ruled out the following less costly options:   Walker/Cane due to inability to navigate ramp to enter/exit home with 4WW alone.  Patient will use wheelchair to complete Mobility Related ADL's in the home including toileting, dressing, self cares, meal prep.  Without a wheelchair patient will be unable to safely move about their home.  This equipment will allow them to be out of bed, participate in home activities with their family, and it will be part of a fall prevention plan for safe mobility.   Patient's home will accommodate use of wheelchair.  Patient has a family member willing and able to assist as necessary with wheelchair.     Arm and leg strength: Grossly 3+/5 t/o bilat LE except bilat DF: 0/5.    Gait/balance/coordination: TU.8 sec with 4WW. Requires at least single UE support for static standing, heavy use of bilat arms for dynamic ambulation    Length of need: 6 months    Current height: 5'8\"  Current weight: 224 lb  : 57    Shyam Olmstead DO NPI #[id number]    Signature:   Date:        "

## 2023-01-02 NOTE — PLAN OF CARE
Discharge Planner Post-Acute Rehab OT:      Discharge Plan: Home w/ son and home  OT. discharge changed to 1-6 if needed after family training 1-2     Precautions: Spinal, fall, needs increased height to 25+ inches for ease w/ transfers     Current Status:  ADLs:    Mobility: STS from elevated bed height CGA.     Grooming: Set up seated. CGA standing    Dressing:U/B pt able to dress after clothes retrieval, LB supine I    L/B sba w/reacher and without donning shorts from EOB elevated for STS,     footwear sba crossing leg over other, provided elastic laces    Bathing: SBA w/use of rolling shower chair with foot rest on 12/27, plan to progress to shower bench    Toileting: Ax1 on and off 19 inch toilet in his room with bilateral rails. Declining use of elevated commode overlay on toilet due to discomfort.   IADLs: Son was previously assisting pt. Pt was doing his own finances and medication. Pt was also previously driving.  Vision/Cognition: WFL.      Assessment: Facilitated functional transfer training and UE CVC exercises to promote activity tolerance and endurance related to ADL IND/safety with functional mobility. Pt completed STS from w/c and chair with CGA using 4WW, increased fatigue noted following PT this date. Plan to continue with strengthening and endurance building to achieve consistent safe and independent completion of ADL.    Other Barriers to Discharge (DME, Family Training, etc):   Pt has tub/shower and uses a utility stool/step for showering. No grab bars.   Family training Monday 1-2  DME: pt planing on getting bed frame mattress and box spring on floor, bed wedge, bed rail, raiser for 17 inch height toilet    Recommend to look into ramp as pt reports stairs had been difficult at baseline-continue to educate as appropriate.

## 2023-01-02 NOTE — PROGRESS NOTES
[facility], [place]   Physical Medicine and Rehabilitation Daily Note           Assessment and Plan of Care:   Philipp Bland is a 67 year old right hand dominant male PMH significant for lumbar spinal stenosis and radiculopathy, HTN, Paget's disease, iron deficiency anemia, possible MGUS who underwent an elective T10-S1 spinal fusion and L2-S1 bilateral decompression 12/15/2022 with post-operative complication resulting in right foot weakness.     --Vitals stable.  --Labs: labs obtained today - please refer to chart.   --Patient is on Keflex x7 days for surgical wound infection.  --Continue ongoing medical management.  --Continue therapies and plan of care.           Interval history:   Patient seen and examined at bedside. Feeling well today. Denies fever, chills, CP, SOB, N/V, abdominal pain, new pain or weakness/numbness/tingling.             Physical Exam:   VS:   Vitals:    01/01/23 1719 01/02/23 0828 01/02/23 0848 01/02/23 0914   BP: 104/63 131/70 137/79 130/76   BP Location: Right arm Right arm Right arm Right arm   Patient Position:  Semi-Marie's Semi-Marie's Semi-Marie's   Cuff Size:  Adult Regular Adult Regular Adult Regular   Pulse: 68 99 89 89   Resp: 16 16 16 16   Temp: (!) 96.6  F (35.9  C) (!) 96.6  F (35.9  C) 97.7  F (36.5  C) 97.8  F (36.6  C)   TempSrc: Oral Oral Oral Oral   SpO2: 100% 95% 98% 100%   Weight:       Height:         Gen: NAD, resting comfortably in bed  Heart: RRR, no murmurs  Lungs: breathing unlabored on room air  Abd: soft and non-tender  Skin:   Ext: Mildly edematous feet b/l but no edema proximal to the ankles, no calf tenderness  MSK/neuro: Alert and oriented, speech fluent, moves all four extremities volitionally.          Data:   Scheduled meds    acetaminophen  1,000 mg Oral TID     cephALEXin  500 mg Oral Q6H ARIANA     ferrous gluconate  324 mg Oral Daily with breakfast     lidocaine  1 patch Transdermal Q24h    And     lidocaine    Transdermal Q8H ARIANA     lisinopril  40 mg Oral Daily     methocarbamol  750 mg Oral BID     vitamin C  500 mg Oral Daily     Vitamin D3  25 mcg Oral Daily     zinc sulfate  220 mg Oral Daily       PRN meds:  acetaminophen, diclofenac, melatonin, methocarbamol, naloxone **OR** naloxone **OR** naloxone **OR** naloxone, oxyCODONE, polyethylene glycol, senna-docusate      Dina Hoover MD  Physical Medicine and Rehabilitation     I spent a total of 15 minutes face-to-face and managing the care of Philipp Bland. Over 50% of my time on the unit was spent counseling the patient and coordinating care. Please see note for details.

## 2023-01-02 NOTE — PLAN OF CARE
Goal Outcome Evaluation:    Overall Patient Progress: no change    Outcome Evaluation: No change in Pt progress this shift.    Pt is alert and oriented. Can take pills whole. Continent of B&B. LBM 12/29. Ax1 with walker. C/o pain; given PRN pain medication with some relief. Call light within reach - Pt is able to make needs known. Will continue with POC.

## 2023-01-03 ENCOUNTER — APPOINTMENT (OUTPATIENT)
Dept: OCCUPATIONAL THERAPY | Facility: CLINIC | Age: 68
DRG: 949 | End: 2023-01-03
Attending: PHYSICAL MEDICINE & REHABILITATION
Payer: COMMERCIAL

## 2023-01-03 ENCOUNTER — APPOINTMENT (OUTPATIENT)
Dept: PHYSICAL THERAPY | Facility: CLINIC | Age: 68
DRG: 949 | End: 2023-01-03
Attending: PHYSICAL MEDICINE & REHABILITATION
Payer: COMMERCIAL

## 2023-01-03 PROCEDURE — 97110 THERAPEUTIC EXERCISES: CPT | Mod: GO

## 2023-01-03 PROCEDURE — 250N000013 HC RX MED GY IP 250 OP 250 PS 637: Performed by: STUDENT IN AN ORGANIZED HEALTH CARE EDUCATION/TRAINING PROGRAM

## 2023-01-03 PROCEDURE — 250N000013 HC RX MED GY IP 250 OP 250 PS 637: Performed by: PHYSICIAN ASSISTANT

## 2023-01-03 PROCEDURE — 99232 SBSQ HOSP IP/OBS MODERATE 35: CPT | Mod: GC | Performed by: PHYSICAL MEDICINE & REHABILITATION

## 2023-01-03 PROCEDURE — 97110 THERAPEUTIC EXERCISES: CPT | Mod: GP | Performed by: PHYSICAL THERAPIST

## 2023-01-03 PROCEDURE — 128N000003 HC R&B REHAB

## 2023-01-03 PROCEDURE — 97535 SELF CARE MNGMENT TRAINING: CPT | Mod: GO

## 2023-01-03 PROCEDURE — 250N000013 HC RX MED GY IP 250 OP 250 PS 637: Performed by: CLINIC/CENTER

## 2023-01-03 PROCEDURE — 97530 THERAPEUTIC ACTIVITIES: CPT | Mod: GP | Performed by: PHYSICAL THERAPIST

## 2023-01-03 RX ADMIN — METHOCARBAMOL 750 MG: 750 TABLET, FILM COATED ORAL at 20:31

## 2023-01-03 RX ADMIN — ACETAMINOPHEN 1000 MG: 500 TABLET ORAL at 13:13

## 2023-01-03 RX ADMIN — ZINC SULFATE 220 MG (50 MG) CAPSULE 220 MG: CAPSULE at 08:29

## 2023-01-03 RX ADMIN — OXYCODONE HYDROCHLORIDE 10 MG: 5 TABLET ORAL at 23:55

## 2023-01-03 RX ADMIN — Medication 25 MCG: at 08:29

## 2023-01-03 RX ADMIN — CEPHALEXIN 500 MG: 500 CAPSULE ORAL at 23:55

## 2023-01-03 RX ADMIN — METHOCARBAMOL 750 MG: 750 TABLET, FILM COATED ORAL at 08:28

## 2023-01-03 RX ADMIN — OXYCODONE HYDROCHLORIDE 10 MG: 5 TABLET ORAL at 06:31

## 2023-01-03 RX ADMIN — FERROUS GLUCONATE 324 MG: 324 TABLET ORAL at 08:29

## 2023-01-03 RX ADMIN — OXYCODONE HYDROCHLORIDE AND ACETAMINOPHEN 500 MG: 500 TABLET ORAL at 08:29

## 2023-01-03 RX ADMIN — CEPHALEXIN 500 MG: 500 CAPSULE ORAL at 11:37

## 2023-01-03 RX ADMIN — OXYCODONE HYDROCHLORIDE 10 MG: 5 TABLET ORAL at 10:59

## 2023-01-03 RX ADMIN — CEPHALEXIN 500 MG: 500 CAPSULE ORAL at 00:15

## 2023-01-03 RX ADMIN — OXYCODONE HYDROCHLORIDE 10 MG: 5 TABLET ORAL at 19:14

## 2023-01-03 RX ADMIN — LISINOPRIL 40 MG: 40 TABLET ORAL at 08:28

## 2023-01-03 RX ADMIN — CEPHALEXIN 500 MG: 500 CAPSULE ORAL at 06:31

## 2023-01-03 RX ADMIN — CEPHALEXIN 500 MG: 500 CAPSULE ORAL at 18:22

## 2023-01-03 RX ADMIN — ACETAMINOPHEN 1000 MG: 500 TABLET ORAL at 20:31

## 2023-01-03 RX ADMIN — ACETAMINOPHEN 1000 MG: 500 TABLET ORAL at 08:28

## 2023-01-03 RX ADMIN — OXYCODONE HYDROCHLORIDE 10 MG: 5 TABLET ORAL at 15:10

## 2023-01-03 RX ADMIN — OXYCODONE HYDROCHLORIDE 10 MG: 5 TABLET ORAL at 02:49

## 2023-01-03 ASSESSMENT — ACTIVITIES OF DAILY LIVING (ADL)
ADLS_ACUITY_SCORE: 25
ADLS_ACUITY_SCORE: 24
ADLS_ACUITY_SCORE: 23
ADLS_ACUITY_SCORE: 24
ADLS_ACUITY_SCORE: 25
ADLS_ACUITY_SCORE: 25
ADLS_ACUITY_SCORE: 24
ADLS_ACUITY_SCORE: 25
ADLS_ACUITY_SCORE: 25

## 2023-01-03 NOTE — PROGRESS NOTES
"  [facility]   Acute Rehabilitation Unit    INTERVAL HISTORY  Pt had no acute events overnight. They report that they actually slept well. They report pain 5/10 this morning, overall improving. They report they are eating well and therapies are going well, they feel slightly stronger.  Denies chest pain, abdominal pain, or calf pain. Last BM 1/2.    Functionally,    With PT: Bed Mobility: Primarily SBA rolling and supine<>sit EOM. Occasional min A through LE for sit>supine.  Transfer: Variable SBA<>min A STS from elevated surfaces with 4WW.   Gait: up to 30 ft with 4WW, CGA/SBA  Stairs: 3x4\" step ups in // bars with CGA  Balance: Heavy reliance on Ue's for standing  TUG on 12/27: 41.5 sec with 4WW              On 1/2: 30.8 sec with 4WW    With OT: ADLs:    Mobility: STS from elevated bed height CGA-SBA.    Grooming: Set up seated. CGA standing    Dressing:U/B pt able to dress after clothes retrieval, LB supine I    L/B sba w/reacher and without donning shorts from EOB elevated for STS,     footwear sba crossing leg over other, provided elastic laces    Bathing: CGA/SBA w/ tub bench tsfer to simulate home w/o use of grab bars.    Toileting: Ax1 on and off 19 inch toilet in his room with bilateral rails.  IADLs: Son was previously assisting pt. Pt was doing his own finances and medication. Pt was also previously driving.  Vision/Cognition: WFL.           MEDICATIONS  Scheduled meds    acetaminophen  1,000 mg Oral TID     cephALEXin  500 mg Oral Q6H ARIANA     ferrous gluconate  324 mg Oral Daily with breakfast     lidocaine  1 patch Transdermal Q24h    And     lidocaine   Transdermal Q8H ARIANA     lisinopril  40 mg Oral Daily     methocarbamol  750 mg Oral BID     vitamin C  500 mg Oral Daily     Vitamin D3  25 mcg Oral Daily     zinc sulfate  220 mg Oral Daily       PRN meds:  acetaminophen, diclofenac, melatonin, methocarbamol, naloxone **OR** naloxone **OR** naloxone **OR** naloxone, " "oxyCODONE, polyethylene glycol, senna-docusate      PHYSICAL EXAM  /76 (BP Location: Right arm, Patient Position: Supine)   Pulse 87   Temp 97.4  F (36.3  C) (Oral)   Resp 16   Ht 1.727 m (5' 8\")   Wt 99.8 kg (220 lb 1.6 oz)   SpO2 100%   BMI 33.47 kg/m    General: in no acute distress, conversational and alert, resting comfortably in bed  HEENT: atraumatic, nares clear, conjunctiva white  Pulmonary: on room air, lungs clear to auscultation bilaterally  Cardiovascular: RRR, no murmur auscultated, well-perfused peripherally  Abdominal: soft, non-tender to palpation, non-distended  Extremities: warm peripherally, no edema in BLEs  Skin: warm, dry, without erythema, ecchymosis, or rash noted  MSK: no BLE edema noted, calves non-tender to palpation  Neuro: conjugate gaze, speech non-dysarthric, R ankle dorsiflexion 3+/5, ankle plantar flexion 4/5, 1st toe dorsiflexion 3/4, L knee extension 4+/5, ankle dorsiflexion 3/5, ankle plantar flexion 3/5, 1st toe dorsiflexion 3/4      LABS  No results found for this or any previous visit (from the past 24 hour(s)).    ASSESSMENT AND PLAN  Philipp Bland is a 67 year old right hand dominant male PMH significant for lumbar spinal stenosis and radiculopathy, HTN, Paget's disease, iron deficiency anemia, possible MGUS who underwent an elective T10-S1 spinal fusion and L2-S1 bilateral decompression 12/15/2022 with post-operative complication resulting in right foot weakness.      Changes:  - Purulence from surgical wound 12/31-> neurosurgery saw and started on cephalexin, continue to monitor  - CRP 44 on 1/1 and downtrended 1/2   - Pics in chart from 12/31 and 1/2  - NO leukocytosis or fever, ctn monitor  [ ] blood morphology still pending 1/2 evening  - triggered sepsis protocol with hypothermia, lactate WNLs  ---------------------------------------------------------------------  Admission to acute inpatient rehab: December 24, 2022    Impairment group code: Neurologic " Conditions 03.9 Other Neurologic, Lumbar stenosis and spondylolisthesis with radiculopathy        1. PT, OT  90 minutes of each on a daily basis, in addition to rehab nursing and close management of physiatrist.       2. Impairment of ADL's: OT for 90 minutes daily to work on ADLs such as grooming, self cares and bathing.       3. Impairment of mobility:  PT for 90 minutes daily focusing on strength, balance, coordination, and endurance.        4. Rehab RN: for med administration, bowel regimen, wound care and patient education.          Medical Conditions  #Spinal stenosis L2-5 and polyradiculopathy   s/p T10-S1 spinal fusion and L2-S1 bilateral decompression  Complicated by left foot weakness likely secondary to polyradiculopathy  - Had previous EMGs 6/29/2022 by Dr. Babar Issa, showed severe left lumbosacral polyradiculopathy. Exam noted at the time 5/5 BLE except right KE 4+/5, left KE KF 4/5.    - If weakness worsens or progresses, will consult neurosurgery and consider appropriate spine imaging  - PT/OT for evaluation and treatment  - tried AFOs in [facility] but did not like them, planning to use high top basketball shoes  - As needed oxycodone and Tylenol for pain   -Per neurosurgery team remove sutures 2 weeks after surgery (removed 12/30)  - Spinal Precautions: for the first 6-8 weeks, no lifting > 10 pounds, no bending, twisting, or overhead reaching.    #Superficial purulence from surgical site  - Removed surgical sutures 12/29  - Purulence from surgical wound 12/31-> neurosurgery saw and started on cephalexin X7 days (last dose 1/7), continue to monitor  - Betadine application and daily dressing changes  - CRP 44 on 1/1 and downtrended 1/2   - Pics in chart from 12/31 and 1/2  - NO leukocytosis or fever, ctn monitor  - Outpt wound check with neurosurgery [facility]    # Skin sensitivity  -Patient developed excoriation and superficial skin wounds likely due to dressing and adhesive  removal.  -Consulted WOCN 12/26 and keep wound open to air as healing appropriately with no signs of infection such as erythema or drainage    #Acute R paraspinal muscle spasm (12/28)  #Pain  - Scheduled 1g Tylenol TID with stable pain control overall (continuing to require oxycodone q4h), monitor closely and continue to offer topical medications  - Added voltaren gel and lidocaine patches 12/28 for increased pain  - Encouraged use of robaxin, ice and topical medications for pain management 12/28  - Robaxin changed to BID scheduled on 12/29 for increased LBP, continue to monitor if improves pain and continue to adjust as needed     #Hyponatremia  - Na levels after surgery were 127-132  - Cause unclear, Chlorthalidone was held and sodium level improved  - Medicine consulted for co-management   - BMP QMonday     #Nomocytic Anemia   #Iron deficiency anemia  #Thrombocytosis - reactive   - Likely due to acute blood loss with previous CHANTALE. Pre-op Hgb 14.5, post-op 9.2 EBL 1200  - PTA ferrous gluconate 324mg   - CBC QMonday  - Thrombocytosis increasing 12/30, peripheral blood smear ordered for 1/2, WNLs showing normocytic normochromic anemia- thrombocytosis likely reactive  - Started Vit C supplementation 12/30 for increased promotion of iron absorption     #HTN  #Peripheral edema  #Wt gain  - PTA: chlorthalidone 25mg daily, lisinopril 40mg daily  - Continue lisinopril but chlorthalidone on hold due to hyponatremia, blood pressures are controlled and SBP below 120   - Wt increased over last week with b/l foot edema noted, could consider hydrochlorothiazide starting week of 1/1/23 if blood pressures stay stable     #Paget's disease  - Continue with vitamin D supplementation 1000U, received IV Zoledronic infusion prior to surgery 9/26/2022  - outpatient follow up with endo team Dr. Aguilar for maintenance and prevention of complications     #Elevated PSA  - 10/7/2022 was 15   - 10/19 seen by urology and was recommended to  undergo prostate MRI for biopsy but did not follow up    - Monitor symptoms while inpatient such as urine retention, hematuria, pelvic pain, worsening back pain     #MGUS   - Per chart review suspected diagnosis, however not evaluated by hematology  - outpatient follow up for further work up and diagnosis        1. Adjustment to disability:  Clinical psychology to eval and treat as indicated  2. FEN: regular diet, thin liquids  3. Bowel: continent, bowel medications as needed   4. Bladder: continent, PVR x3  5. DVT Prophylaxis: Anti-embolism stocking  6. GI Prophylaxis: continue PPIs d/c'ed 12/30  7. Code: Full   8. Disposition: Home with son  with HC  9. ELOS:  1/3  10. Rehab prognosis:  Good  11. Follow up Appointments on Discharge:  Neurosurgery: Follow-up with [facility] on 1/27/2023  Heme/Onc: follow up MGUS workup   Endocrinology, saw Dr. Katarina Aguilar for outpatient follow up   Urology: follow up high PSA work up     Seen and discussed with Dr. Olmstead, PM&R staff physician      Maureen Carmona  PGY 2  Physical Medicine and Rehabilitation

## 2023-01-03 NOTE — PLAN OF CARE
FOCUS/GOAL  Bowel management, Pain management, Wound care management, and Mobility    ASSESSMENT, INTERVENTIONS AND CONTINUING PLAN FOR GOAL:    7am-3pm  Orientation: alert and oriented x4  VS: stable  Pain: lower back pin, prn oxycodone 10 mg given per request  Ambulation/ Transfers: CGA with 4WW  Bowel: continent, LBM 1/3  Bladder: continent, pt used urinal independently  Diet/ Liquids: regular/ thin  Skin: back incision, daily dressing change  No alarms, call light within reach. Continue with POC.     3pm-11pm  Back dressing changed, moderate amount of serosanguinous drainage noted.   Prn oxycodone 10mg given q 4hrs per request.

## 2023-01-03 NOTE — PLAN OF CARE
"Discharge Planner Post-Acute Rehab PT:      Discharge Plan: Son's home for IADL assist, 3 DONI with L rail (planning to have ramp installed). Home care PT.     Precautions: Falls, Spinal  Edema: Size F Spandigrip to bilat LE on during day, off at night.     Current Status:  Bed Mobility: Primarily SBA rolling and supine<>sit EOM. Occasional min A through LE for sit>supine.  Transfer: SBA from elevated surfaces with 4WW.   Gait: up to 30 ft with 4WW, SBA  Stairs: 3x4\" step ups in // bars with CGA  Balance: Heavy reliance on Ue's for standing  TUG on 12/27: 41.5 sec with 4WW              On 1/2: 30.8 sec with 4WW     Assessment: Pt working to obtain ramp for home with resources sheet provided. SBA for EOB/toilet/WC transfers today, along with management of shorts and rear pericares for toileting. Despite limited standing endurance/activity tolerance, progressing well for mixed mod I mobility for home.     Other Barriers to Discharge (DME, Family Training, etc):   Pt has personal 4WW here. Recommend possible bilat foot up devices.  K3 manual WC paperwork initiated.  Family training Thurs 1/5 from 3-4:30.  "

## 2023-01-03 NOTE — PLAN OF CARE
Goal Outcome Evaluation:      Plan of Care Reviewed With: patient    Overall Patient Progress: no changeOverall Patient Progress: no change    Outcome Evaluation: Pt continues to use pain meds d/t back pain incision, assist of one with walker, urinal at bedside.    FOCUS/GOAL: Skin integrity, Pain and Safety Management.    ASSESSMENT, INTERVENTIONS AND CONTINUING PLAN FOR GOAL:  Orientation: A&OX4,   Ambulation/Transfer: AX1 with walker  Bladder:Continent, urinal at bedside  Bowel: Continent, uses bathroom. [facility]- 12/31/2022  Pain: back incision pain rated 7/10,  x2 oxy during shift- effective per pt.   Diet: Regular, thin liquids  Meds: Pills whole with water  Tubes/Lines/Drains: None  Skin: Incision on back- Pt declined woundcare, completed in AM per pt

## 2023-01-03 NOTE — PLAN OF CARE
Goal Outcome Evaluation:  Alert and oriented X 4. Able to make needs known. Call light in reach. Back pain managed with Oxycodone 10 mg Q 4 hours. Back dressing CDI. Not out of bed this shift. Able to reposition self, shift weight. Using the urinal at bedside. Appears to be sleeping during rounds. Continue with plan of care.

## 2023-01-03 NOTE — PROGRESS NOTES
HC referral sent to:     [facility]  ([place] [facility]) PH: [phone], F: [phone]  &   [facility]  PH: [phone], F: [phone]    Both referrals pending. SOC and staffing avail pending as well. Waiting for confirmation and return call. Will update pt once more information determined. Spoke with pt yesterday, discharge address in note from yesterday as well.     SANDY Ochoa   [facility]   Direct Phone: [phone]  I   Pager: [phone]  I  Fax: [phone]     Refill: prochlorperazine 5 mg tabs

## 2023-01-03 NOTE — PROGRESS NOTES
"CLINICAL NUTRITION SERVICES - ASSESSMENT NOTE     Nutrition Prescription    RECOMMENDATIONS FOR MDs/PROVIDERS TO ORDER:  None today     Malnutrition Status:    Patient does not meet two of the established criteria necessary for diagnosing malnutrition    Recommendations already ordered by Registered Dietitian (RD):  Re-ordered weekly weight order and requested nursing obtain updated weight - was completed by this afternoon    Future/Additional Recommendations:  No planned follow up as pt discharging at the end of the week      REASON FOR ASSESSMENT  Philipp Bland is a/an 67 year old male assessed by the dietitian for LOS    NUTRITION/MEDICAL HISTORY  Per chart review: Pt admits to ARU for rehabilitation in the setting of s/p T10-S1 spinal fusion and L2-S1 bilateral decompression on 12/15/2022 resulting in right foot weakness. Other past medical history noted for lumbar spondylolisthesis, hypertension, iron deficiency anemia, Paget's disease, possible MGUS.     Per pt visit: RD visited pt at bedside, pt notes good appetite, confirms taking protein at meals, reviewed weight trends, pt unsure of recent usual weight, but notes no weight losses (clothes fit normally). RD reviewed the importance of continued good nutritional intakes for healing, encouraging good source of protein at all meals, pt verbalizes understanding and denies needing any additional interventions at this time, is planned to discharge at the end of this week.     CURRENT NUTRITION ORDERS  Diet: Regular  Intake/Tolerance: % per flow sheets     LABS  Labs reviewed    MEDICATIONS  Medications reviewed    ANTHROPOMETRICS  Height: 172.7 cm (5' 8\")  01/03/23 1332 99.8 kg (220 lb 1.6 oz) Standing scale   12/24/22 1300 102 kg (224 lb 13.9 oz) Bed scale   IBW: 70 kg  BMI: Obesity Grade I BMI 30-34.9  Weight History:   Wt Readings from Last 20 Encounters:   12/15/22 90.7 kg (200 lb)   12/02/22 90.7 kg (200 lb)   10/19/22 90.7 kg (200 lb)   09/26/22 90.7 " kg (200 lb)   08/29/22 90.7 kg (200 lb)   02/07/22 98.9 kg (218 lb)   11/02/21 98.9 kg (218 lb)     Weight assessment: Possible 20 lb wt gain in 1 month and 3 months, 2 lb wt gain in 1 year.     Dosing Weight: 75.4 kg - adjusted BW using weight from 12/15 as unsure of accuracy of gains     ASSESSED NUTRITION NEEDS  Estimated Energy Needs: 4498-0213 kcals/day (25 - 30 kcals/kg)  Justification: Maintenance  Estimated Protein Needs: 75-90 grams protein/day (1 - 1.2 grams of pro/kg)  Justification: Maintenance vs post op   Estimated Fluid Needs: 1 mL/kcal  Justification: Maintenance    MALNUTRITION  % Intake: Decreased intake does not meet criteria  % Weight Loss: None noted  Subcutaneous Fat Loss: None observed  Muscle Loss: None observed  Fluid Accumulation/Edema: Mild to trace per flow sheets   Malnutrition Diagnosis: Patient does not meet two of the established criteria necessary for diagnosing malnutrition    NUTRITION DIAGNOSIS  No nutrition diagnosis at this time    INTERVENTIONS  Implementation  Nutrition Education: RD reviewed the importance of adequate nutritional intakes (protein) for continued healing     Goals  Patient to consume % of nutritionally adequate meal trays TID, or the equivalent with supplements/snacks.     Monitoring/Evaluation  No planned follow up as pt discharging at the end of the week.     Naima Hernandez RD, CNSC, LD  ARU RD pager: [phone]

## 2023-01-03 NOTE — PLAN OF CARE
Discharge Planner Post-Acute Rehab OT:      Discharge Plan: Home w/ son and home  OT. discharge changed to 1-6 if needed after family training 1-2     Precautions: Spinal, fall, needs increased height to 25+ inches for ease w/ transfers     Current Status:  ADLs:    Mobility: STS from elevated bed height CGA-SBA.    Grooming: Set up seated. CGA standing    Dressing:U/B pt able to dress after clothes retrieval, LB supine I    L/B sba w/reacher and without donning shorts from EOB elevated for STS,     footwear sba crossing leg over other, provided elastic laces    Bathing: CGA/SBA w/ tub bench tsfer to simulate home w/o use of grab bars.    Toileting: Ax1 on and off 19 inch toilet in his room with bilateral rails.  IADLs: Son was previously assisting pt. Pt was doing his own finances and medication. Pt was also previously driving.  Vision/Cognition: WFL.      Assessment: AM session: Facilitated functional transfer training and UE activity to promote activity tolerance and endurance related to ADL IND/safety with functional mobility. Pt completed tub tsfer and seated sink g/h tasks sitting in 4ww w/ w/c cushion for elevated height w/ pt demonstrating improved transfer tech and safety. Pt is on track to discharge home pending family training w/ son anticipated on Thursday. Reviewed DME needs and pt receptive to education.    Other Barriers to Discharge (DME, Family Training, etc):   Pt has tub/shower and uses a utility stool/step for showering. No grab bars.   Family training re-scheduled to 1/5, Thursday w/ son-time TBD.  DME: pt planing on getting bed frame mattress and box spring on floor, bed wedge, bed rail, raiser for 17 inch height toilet and tub bench. Pt plans for a ramp and will issue w/c from ARU prior to discharge.

## 2023-01-03 NOTE — PROGRESS NOTES
Neurosurgery brief note:    Recommendations:  - Continue Keflex for 7 days  - Daily betadine application  - Daily dressing changes  - Appreciate wound recommendations  - Will be seen in clinic at [facility] to follow wound      Discussed with Dr. Jasen Sheridan MD  Neurosurgery Resident, PGY-3

## 2023-01-04 ENCOUNTER — APPOINTMENT (OUTPATIENT)
Dept: OCCUPATIONAL THERAPY | Facility: CLINIC | Age: 68
DRG: 949 | End: 2023-01-04
Attending: PHYSICAL MEDICINE & REHABILITATION
Payer: COMMERCIAL

## 2023-01-04 ENCOUNTER — APPOINTMENT (OUTPATIENT)
Dept: PHYSICAL THERAPY | Facility: CLINIC | Age: 68
DRG: 949 | End: 2023-01-04
Attending: PHYSICAL MEDICINE & REHABILITATION
Payer: COMMERCIAL

## 2023-01-04 PROCEDURE — 250N000013 HC RX MED GY IP 250 OP 250 PS 637: Performed by: STUDENT IN AN ORGANIZED HEALTH CARE EDUCATION/TRAINING PROGRAM

## 2023-01-04 PROCEDURE — 97110 THERAPEUTIC EXERCISES: CPT | Mod: GP | Performed by: PHYSICAL THERAPIST

## 2023-01-04 PROCEDURE — 128N000003 HC R&B REHAB

## 2023-01-04 PROCEDURE — 250N000013 HC RX MED GY IP 250 OP 250 PS 637: Performed by: CLINIC/CENTER

## 2023-01-04 PROCEDURE — 97535 SELF CARE MNGMENT TRAINING: CPT | Mod: GO

## 2023-01-04 PROCEDURE — 99232 SBSQ HOSP IP/OBS MODERATE 35: CPT | Mod: GC | Performed by: PHYSICAL MEDICINE & REHABILITATION

## 2023-01-04 PROCEDURE — 250N000013 HC RX MED GY IP 250 OP 250 PS 637: Performed by: PHYSICIAN ASSISTANT

## 2023-01-04 PROCEDURE — 97530 THERAPEUTIC ACTIVITIES: CPT | Mod: GO

## 2023-01-04 RX ADMIN — ACETAMINOPHEN 1000 MG: 500 TABLET ORAL at 08:33

## 2023-01-04 RX ADMIN — CEPHALEXIN 500 MG: 500 CAPSULE ORAL at 18:14

## 2023-01-04 RX ADMIN — FERROUS GLUCONATE 324 MG: 324 TABLET ORAL at 08:33

## 2023-01-04 RX ADMIN — ACETAMINOPHEN 325 MG: 325 TABLET, FILM COATED ORAL at 22:45

## 2023-01-04 RX ADMIN — OXYCODONE HYDROCHLORIDE 10 MG: 5 TABLET ORAL at 08:33

## 2023-01-04 RX ADMIN — OXYCODONE HYDROCHLORIDE 10 MG: 5 TABLET ORAL at 04:11

## 2023-01-04 RX ADMIN — LISINOPRIL 40 MG: 40 TABLET ORAL at 08:33

## 2023-01-04 RX ADMIN — ACETAMINOPHEN 1000 MG: 500 TABLET ORAL at 20:06

## 2023-01-04 RX ADMIN — METHOCARBAMOL 750 MG: 750 TABLET, FILM COATED ORAL at 20:07

## 2023-01-04 RX ADMIN — METHOCARBAMOL 750 MG: 750 TABLET, FILM COATED ORAL at 08:33

## 2023-01-04 RX ADMIN — OXYCODONE HYDROCHLORIDE AND ACETAMINOPHEN 500 MG: 500 TABLET ORAL at 08:34

## 2023-01-04 RX ADMIN — OXYCODONE HYDROCHLORIDE 10 MG: 5 TABLET ORAL at 15:43

## 2023-01-04 RX ADMIN — CEPHALEXIN 500 MG: 500 CAPSULE ORAL at 12:50

## 2023-01-04 RX ADMIN — OXYCODONE HYDROCHLORIDE 10 MG: 5 TABLET ORAL at 20:07

## 2023-01-04 RX ADMIN — Medication 25 MCG: at 08:34

## 2023-01-04 RX ADMIN — CEPHALEXIN 500 MG: 500 CAPSULE ORAL at 06:02

## 2023-01-04 RX ADMIN — ACETAMINOPHEN 1000 MG: 500 TABLET ORAL at 13:51

## 2023-01-04 RX ADMIN — ZINC SULFATE 220 MG (50 MG) CAPSULE 220 MG: CAPSULE at 08:33

## 2023-01-04 ASSESSMENT — ACTIVITIES OF DAILY LIVING (ADL)
ADLS_ACUITY_SCORE: 25

## 2023-01-04 NOTE — PLAN OF CARE
Discharge Planner Post-Acute Rehab OT:      Discharge Plan: Home w/ son and home  OT. discharge changed to 1-6 if needed after family training 1-2     Precautions: Spinal, fall, needs increased height to 25+ inches for ease w/ transfers     Current Status:  ADLs:    Mobility: STS from elevated bed height CGA-SBA.    Grooming: Set up seated. CGA standing    Dressing:U/B pt able to dress after clothes retrieval, LB supine I    L/B sba w/reacher and without donning shorts from EOB elevated for STS,     footwear sba crossing leg over other, provided elastic laces    Bathing: CGA/SBA w/ tub bench tsfer to simulate home w/o use of grab bars.    Toileting: Ax1 on and off 19 inch toilet in his room with bilateral rails.  IADLs: Son was previously assisting pt. Pt was doing his own finances and medication. Pt was also previously driving.  Vision/Cognition: WFL.      Assessment:increase bed mobility  demonstrated ability to get up and down using bed wedge. Pt also increase sit to stand from 22- 23 inch surfaces. Pt demonstrates mild lack of understanding of deficits with pt eating in bed and feeling lower 17 inch toilet will be functional. And using urinal supine    Pt is on track to discharge home pending family training w/ son anticipated on Thursday. Reviewed DME needs and pt receptive to education.    Other Barriers to Discharge (DME, Family Training, etc):   Pt has tub/shower and uses a utility stool/step for showering. No grab bars.   Family training re-scheduled to 1/5, Thursday w/ son-time TBD.  DME: pt planing on getting bed frame mattress and box spring on floor, bed wedge, bed rail, raiser for 17 inch height toilet and tub bench. Pt plans for a ramp and will issue w/c from ARU prior to discharge.

## 2023-01-04 NOTE — PLAN OF CARE
"Discharge Planner Post-Acute Rehab PT:      Discharge Plan: Son's home for IADL assist, 3 DONI with L rail (planning to have ramp installed). Home care PT.     Precautions: Falls, Spinal  Edema: Size F Spandigrip to bilat LE on during day, off at night.     Current Status:  Bed Mobility: Primarily SBA rolling and supine<>sit EOM. Occasional min A through LE for sit>supine.  Transfer: SBA from elevated surfaces with 4WW.   Gait: up to 30 ft with 4WW, SBA  Stairs: 3x4\" step ups in // bars with CGA  Balance: Heavy reliance on Ue's for standing  TUG on 12/27: 41.5 sec with 4WW              On 1/2: 30.8 sec with 4WW     Assessment: Pt with good participation, limited standing tolerance or ambulation due to LE weakness, uses UEs heavily.  Pt participated in step ups, and core and LE strengthening in supine.  Cont toward goals and family training for 1/5.            Other Barriers to Discharge (DME, Family Training, etc):   Pt has personal 4WW here. Recommend possible bilat foot up devices.  K3 manual WC paperwork initiated.  Family training Thurs 1/5 from 3-4:30.  "

## 2023-01-04 NOTE — PROGRESS NOTES
"Met with pt. Updated him on HC acceptance, services, SOC, and contact information. Pt denied questions or concerns and in agreement with plan. IRF and IMM completed. SW will fax orders to HC agency on day of discharge and continue to follow.     Home Health Care:   [facility])   PH: [phone], F: [phone]  RN, PT, and OT    [facility]ATH[facility] Pain Assessment  Pain Effect on Sleep  \"Over the past 5 days, how much of the time has pain made it hard for you to sleep at night?\"    0. Does not apply - I have not had any pain or hurting in the past 5 days     Pain Interference with Therapy Activities  \"Over the past 5 days, how often have you limited your participation in rehabilitation therapy sessions due to pain?\"   0. Does not apply - I have not had any pain or hurting in the past 5 days    [facility] Sullivan [facility]   [facility]   Direct Phone: [phone]  I   Pager: [phone]  I  Fax: [phone]      "

## 2023-01-04 NOTE — PROGRESS NOTES
HC referrals/updates below. Discharge location, address: [address] [place] [id number]. Will remain available, continue to follow, and update pt as needed.     [facility] ([facility])   PH: [phone], F: [phone]  01/03: Referral faxed  01/04: Left vm for Rochelle with intake   ADDENDUM: [facility]  accepted for RN, PT and OT. No HA available. Contact information added to pt AVS. Will update pt as able and will plan to fax orders at time of discharge.     [facility]    PH: [phone] opt 2&1, F: [phone]  01/03: Referral faxed  01/04: Left vm for intake     [facility]   Direct Phone: [phone]  I   Pager: [phone]  I  Fax: [phone]

## 2023-01-04 NOTE — PROGRESS NOTES
"  [facility]   Acute Rehabilitation Unit    INTERVAL HISTORY  Pt had no acute events overnight. They report that they slept well. They report pain 6/10 this afternoon in their back and unchanged sensation/strength compared to yesterday.  Denies chest pain, abdominal pain, or calf pain. They continue to eat well and therapies are going well. Last BM 1/3.    Functionally,    With PT: Bed Mobility: Primarily SBA rolling and supine<>sit EOM. Occasional min A through LE for sit>supine.  Transfer: Variable SBA<>min A STS from elevated surfaces with 4WW.   Gait: up to 30 ft with 4WW, CGA/SBA  Stairs: 3x4\" step ups in // bars with CGA  Balance: Heavy reliance on Ue's for standing  TUG on 12/27: 41.5 sec with 4WW              On 1/2: 30.8 sec with 4WW    With OT: Mobility: STS from elevated bed height CGA-SBA.    Grooming: Set up seated. CGA standing    Dressing:U/B pt able to dress after clothes retrieval, LB supine I    L/B sba w/reacher and without donning shorts from EOB elevated for STS,     footwear sba crossing leg over other, provided elastic laces    Bathing: CGA/SBA w/ tub bench tsfer to simulate home w/o use of grab bars.    Toileting: Ax1 on and off 19 inch toilet in his room with bilateral rails.  IADLs: Son was previously assisting pt. Pt was doing his own finances and medication. Pt was also previously driving.  Vision/Cognition: WFL.           MEDICATIONS  Scheduled meds    acetaminophen  1,000 mg Oral TID     cephALEXin  500 mg Oral Q6H ARIANA     ferrous gluconate  324 mg Oral Daily with breakfast     lidocaine  1 patch Transdermal Q24h    And     lidocaine   Transdermal Q8H ARIANA     lisinopril  40 mg Oral Daily     methocarbamol  750 mg Oral BID     vitamin C  500 mg Oral Daily     Vitamin D3  25 mcg Oral Daily     zinc sulfate  220 mg Oral Daily       PRN meds:  acetaminophen, diclofenac, melatonin, methocarbamol, naloxone **OR** naloxone **OR** naloxone **OR** naloxone, " "oxyCODONE, polyethylene glycol, senna-docusate      PHYSICAL EXAM  /77 (BP Location: Right arm, Patient Position: Dangled)   Pulse 89   Temp 97.4  F (36.3  C) (Oral)   Resp 16   Ht 1.727 m (5' 8\")   Wt 99.8 kg (220 lb 1.6 oz)   SpO2 95%   BMI 33.47 kg/m    General: in no acute distress, conversational and alert, resting comfortably in bed  HEENT: atraumatic, nares clear, conjunctiva white  Pulmonary: on room air, lungs clear to auscultation bilaterally  Cardiovascular: RRR, no murmur auscultated, well-perfused peripherally  Abdominal: soft, non-tender to palpation, non-distended  Extremities: warm peripherally, no edema in BLEs  Skin: warm, dry, without erythema, ecchymosis, or rash noted  MSK: no BLE edema noted, calves non-tender to palpation  Neuro: conjugate gaze, speech non-dysarthric, R ankle dorsiflexion 3+/5, ankle plantar flexion 4/5, 1st toe dorsiflexion 2+/4, L knee extension 4+/5, ankle dorsiflexion 2+/5, ankle plantar flexion 3/5, 1st toe dorsiflexion 3/4      LABS  No results found for this or any previous visit (from the past 24 hour(s)).    ASSESSMENT AND PLAN  Philipp Bland is a 67 year old right hand dominant male PMH significant for lumbar spinal stenosis and radiculopathy, HTN, Paget's disease, iron deficiency anemia, possible MGUS who underwent an elective T10-S1 spinal fusion and L2-S1 bilateral decompression 12/15/2022 with post-operative complication resulting in right foot weakness.      Changes:  - Continue with medical management as detailed below, home OT/PT (without nursing)  ---------------------------------------------------------------------  Admission to acute inpatient rehab: December 24, 2022    Impairment group code: Neurologic Conditions 03.9 Other Neurologic, Lumbar stenosis and spondylolisthesis with radiculopathy        1. PT, OT  90 minutes of each on a daily basis, in addition to rehab nursing and close management of physiatrist.       2. Impairment of ADL's: OT " for 90 minutes daily to work on ADLs such as grooming, self cares and bathing.       3. Impairment of mobility:  PT for 90 minutes daily focusing on strength, balance, coordination, and endurance.        4. Rehab RN: for med administration, bowel regimen, wound care and patient education.          Medical Conditions  #Spinal stenosis L2-5 and polyradiculopathy   s/p T10-S1 spinal fusion and L2-S1 bilateral decompression  Complicated by left foot weakness likely secondary to polyradiculopathy  - Had previous EMGs 6/29/2022 by Dr. Babar Issa, showed severe left lumbosacral polyradiculopathy. Exam noted at the time 5/5 BLE except right KE 4+/5, left KE KF 4/5.    - If weakness worsens or progresses, will consult neurosurgery and consider appropriate spine imaging  - PT/OT for evaluation and treatment  - tried AFOs in [facility] but did not like them, planning to use high top basketball shoes  - As needed oxycodone and Tylenol for pain   -Per neurosurgery team remove sutures 2 weeks after surgery (removed 12/30)  - Spinal Precautions: for the first 6-8 weeks, no lifting > 10 pounds, no bending, twisting, or overhead reaching.     #Superficial purulence from surgical site  - Removed surgical sutures 12/29  - Purulence from surgical wound 12/31-> neurosurgery saw and started on cephalexin X7 days (last dose 1/7), continue to monitor  - Betadine application and daily dressing changes  - CRP 44 on 1/1 and downtrended 1/2   - Pics in chart from 12/31 and 1/2  - NO leukocytosis or fever, ctn monitor  - Outpt wound check with neurosurgery [facility]     # Skin sensitivity  -Patient developed excoriation and superficial skin wounds likely due to dressing and adhesive removal.  -Consulted WOCN 12/26 and keep wound open to air as healing appropriately with no signs of infection such as erythema or drainage     #Acute R paraspinal muscle spasm (12/28)  #Pain  - Scheduled 1g Tylenol TID with stable pain control overall (continuing to  require oxycodone q4h), monitor closely and continue to offer topical medications  - Added voltaren gel and lidocaine patches 12/28 for increased pain  - Encouraged use of robaxin, ice and topical medications for pain management 12/28  - Robaxin changed to BID scheduled on 12/29 for increased LBP, continue to monitor if improves pain and continue to adjust as needed     #Hyponatremia  - Na levels after surgery were 127-132  - Cause unclear, Chlorthalidone was held and sodium level improved  - Medicine consulted for co-management   - BMP QMonday     #Nomocytic Anemia   #Iron deficiency anemia  #Thrombocytosis - reactive   - Likely due to acute blood loss with previous CHANTALE. Pre-op Hgb 14.5, post-op 9.2 EBL 1200  - PTA ferrous gluconate 324mg   - CBC QMonday  - Thrombocytosis increasing 12/30, peripheral blood smear ordered for 1/2, WNLs showing normocytic normochromic anemia- thrombocytosis likely reactive  - Started Vit C supplementation 12/30 for increased promotion of iron absorption     #HTN  #Peripheral edema  #Wt gain  - PTA: chlorthalidone 25mg daily, lisinopril 40mg daily  - Continue lisinopril but chlorthalidone on hold due to hyponatremia, blood pressures are controlled and SBP below 120   - Wt increased (9kg over 12/15-12/24, may have been lower than standard weight upon admission as weight has been stable for last week), mild foot edema: with BP WNLs overall (did not continue hydrochlorothiazide at this hospitalization, but can consider restarting as outpt)     #Paget's disease  - Continue with vitamin D supplementation 1000U, received IV Zoledronic infusion prior to surgery 9/26/2022  - outpatient follow up with endo team Dr. Aguilar for maintenance and prevention of complications     #Elevated PSA  - 10/7/2022 was 15   - 10/19 seen by urology and was recommended to undergo prostate MRI for biopsy but did not follow up    - Monitor symptoms while inpatient such as urine retention, hematuria, pelvic pain,  worsening back pain     #MGUS   - Per chart review suspected diagnosis, however not evaluated by hematology  - outpatient follow up for further work up and diagnosis        1. Adjustment to disability:  Clinical psychology to eval and treat as indicated  2. FEN: regular diet, thin liquids  3. Bowel: continent, bowel medications as needed   4. Bladder: continent, PVR x3  5. DVT Prophylaxis: Anti-embolism stocking  6. GI Prophylaxis: continue PPIs d/c'ed 12/30  7. Code: Full   8. Disposition: Home with son  with HC (home OT/PT (without nursing))  9. ELOS: 1/6  10. Rehab prognosis:  Good  11. Follow up Appointments on Discharge:  - Neurosurgery: Follow-up with CHERELLE on 1/27/2023  - Heme/Onc: follow up MGUS workup   - Endocrinology, saw Dr. Katarina Aguilar for ou tpatient follow up   - Urology: follow up high PSA work up     Seen and discussed with Dr. Olmstead, PM&R staff physician      Maureen Carmona  PGY 2  Physical Medicine and Rehabilitation

## 2023-01-04 NOTE — PLAN OF CARE
FOCUS/GOAL  Medical management    ASSESSMENT, INTERVENTIONS AND CONTINUING PLAN FOR GOAL:  Patient alert and oriented, able to communicate needs, calls appropriately  Slept most of the night, awaken in between urinal used and request for pain medication  Pain to incision site managed by scheduled and PRN medications, utilized PRN's  x2, otherwise patient denied chest pain, headache, N&V, no SOB  Continent of Bladder, utilized urinal independently at night, NO BM this shift  Safety rounding checked completed, 3 side rails UP, bed alarm ON, call light in reach  Continue with POC.   Goal Outcome Evaluation:

## 2023-01-05 ENCOUNTER — APPOINTMENT (OUTPATIENT)
Dept: OCCUPATIONAL THERAPY | Facility: CLINIC | Age: 68
DRG: 949 | End: 2023-01-05
Attending: PHYSICAL MEDICINE & REHABILITATION
Payer: COMMERCIAL

## 2023-01-05 ENCOUNTER — APPOINTMENT (OUTPATIENT)
Dept: PHYSICAL THERAPY | Facility: CLINIC | Age: 68
DRG: 949 | End: 2023-01-05
Attending: PHYSICAL MEDICINE & REHABILITATION
Payer: COMMERCIAL

## 2023-01-05 LAB — CRP SERPL-MCNC: 12 MG/L (ref 0–8)

## 2023-01-05 PROCEDURE — 99233 SBSQ HOSP IP/OBS HIGH 50: CPT | Mod: GC | Performed by: PHYSICAL MEDICINE & REHABILITATION

## 2023-01-05 PROCEDURE — 250N000013 HC RX MED GY IP 250 OP 250 PS 637: Performed by: STUDENT IN AN ORGANIZED HEALTH CARE EDUCATION/TRAINING PROGRAM

## 2023-01-05 PROCEDURE — 86140 C-REACTIVE PROTEIN: CPT | Performed by: CLINIC/CENTER

## 2023-01-05 PROCEDURE — 999N000125 HC STATISTIC PATIENT MED CONFERENCE < 30 MIN

## 2023-01-05 PROCEDURE — 128N000003 HC R&B REHAB

## 2023-01-05 PROCEDURE — 97530 THERAPEUTIC ACTIVITIES: CPT | Mod: GP | Performed by: PHYSICAL THERAPIST

## 2023-01-05 PROCEDURE — 97535 SELF CARE MNGMENT TRAINING: CPT | Mod: GO

## 2023-01-05 PROCEDURE — 36415 COLL VENOUS BLD VENIPUNCTURE: CPT | Performed by: CLINIC/CENTER

## 2023-01-05 PROCEDURE — 250N000013 HC RX MED GY IP 250 OP 250 PS 637: Performed by: PHYSICIAN ASSISTANT

## 2023-01-05 PROCEDURE — 999N000150 HC STATISTIC PT MED CONFERENCE < 30 MIN: Performed by: PHYSICAL THERAPIST

## 2023-01-05 PROCEDURE — 97110 THERAPEUTIC EXERCISES: CPT | Mod: GP | Performed by: PHYSICAL THERAPIST

## 2023-01-05 PROCEDURE — 250N000013 HC RX MED GY IP 250 OP 250 PS 637: Performed by: CLINIC/CENTER

## 2023-01-05 RX ORDER — CALCIUM CARBONATE 500 MG/1
500 TABLET, CHEWABLE ORAL 3 TIMES DAILY PRN
Status: DISCONTINUED | OUTPATIENT
Start: 2023-01-05 | End: 2023-01-06 | Stop reason: HOSPADM

## 2023-01-05 RX ORDER — CEPHALEXIN 500 MG/1
500 CAPSULE ORAL EVERY 6 HOURS
Qty: 24 CAPSULE | Refills: 0 | Status: SHIPPED | OUTPATIENT
Start: 2023-01-05 | End: 2023-01-11

## 2023-01-05 RX ORDER — FERROUS GLUCONATE 324(38)MG
324 TABLET ORAL
Qty: 30 TABLET | Refills: 0 | Status: SHIPPED | OUTPATIENT
Start: 2023-01-06 | End: 2023-05-10

## 2023-01-05 RX ORDER — ZINC SULFATE 50(220)MG
220 CAPSULE ORAL DAILY
Qty: 30 CAPSULE | Refills: 0 | Status: SHIPPED | OUTPATIENT
Start: 2023-01-06

## 2023-01-05 RX ORDER — VITAMIN B COMPLEX
25 TABLET ORAL DAILY
Qty: 30 TABLET | Refills: 0 | Status: SHIPPED | OUTPATIENT
Start: 2023-01-06

## 2023-01-05 RX ORDER — OXYCODONE HYDROCHLORIDE 5 MG/1
5-10 TABLET ORAL EVERY 6 HOURS PRN
Status: DISCONTINUED | OUTPATIENT
Start: 2023-01-05 | End: 2023-01-06 | Stop reason: HOSPADM

## 2023-01-05 RX ORDER — OXYCODONE HYDROCHLORIDE 5 MG/1
5-10 TABLET ORAL EVERY 6 HOURS PRN
Qty: 28 TABLET | Refills: 0 | Status: SHIPPED | OUTPATIENT
Start: 2023-01-05 | End: 2023-05-10

## 2023-01-05 RX ORDER — ACETAMINOPHEN 500 MG
1000 TABLET ORAL 3 TIMES DAILY
Qty: 180 TABLET | Refills: 0 | Status: SHIPPED | OUTPATIENT
Start: 2023-01-05 | End: 2023-07-05

## 2023-01-05 RX ORDER — ASCORBIC ACID 500 MG
500 TABLET ORAL DAILY
Qty: 30 TABLET | Refills: 0 | Status: SHIPPED | OUTPATIENT
Start: 2023-01-06 | End: 2023-07-05

## 2023-01-05 RX ORDER — LISINOPRIL 40 MG/1
40 TABLET ORAL DAILY
Qty: 30 TABLET | Refills: 0 | Status: SHIPPED | OUTPATIENT
Start: 2023-01-06 | End: 2023-04-11

## 2023-01-05 RX ORDER — METHOCARBAMOL 750 MG/1
750 TABLET, FILM COATED ORAL 2 TIMES DAILY
Qty: 60 TABLET | Refills: 0 | Status: SHIPPED | OUTPATIENT
Start: 2023-01-05 | End: 2023-05-10

## 2023-01-05 RX ADMIN — OXYCODONE HYDROCHLORIDE 10 MG: 5 TABLET ORAL at 08:17

## 2023-01-05 RX ADMIN — LISINOPRIL 40 MG: 40 TABLET ORAL at 07:38

## 2023-01-05 RX ADMIN — OXYCODONE HYDROCHLORIDE 10 MG: 5 TABLET ORAL at 04:14

## 2023-01-05 RX ADMIN — Medication 25 MCG: at 07:38

## 2023-01-05 RX ADMIN — OXYCODONE HYDROCHLORIDE AND ACETAMINOPHEN 500 MG: 500 TABLET ORAL at 07:37

## 2023-01-05 RX ADMIN — CEPHALEXIN 500 MG: 500 CAPSULE ORAL at 00:08

## 2023-01-05 RX ADMIN — ACETAMINOPHEN 1000 MG: 500 TABLET ORAL at 13:44

## 2023-01-05 RX ADMIN — CALCIUM CARBONATE (ANTACID) CHEW TAB 500 MG 500 MG: 500 CHEW TAB at 22:13

## 2023-01-05 RX ADMIN — METHOCARBAMOL 750 MG: 750 TABLET, FILM COATED ORAL at 08:17

## 2023-01-05 RX ADMIN — ACETAMINOPHEN 1000 MG: 500 TABLET ORAL at 08:17

## 2023-01-05 RX ADMIN — FERROUS GLUCONATE 324 MG: 324 TABLET ORAL at 07:37

## 2023-01-05 RX ADMIN — ZINC SULFATE 220 MG (50 MG) CAPSULE 220 MG: CAPSULE at 07:38

## 2023-01-05 RX ADMIN — OXYCODONE HYDROCHLORIDE 10 MG: 5 TABLET ORAL at 00:08

## 2023-01-05 RX ADMIN — METHOCARBAMOL 750 MG: 750 TABLET, FILM COATED ORAL at 21:32

## 2023-01-05 RX ADMIN — ACETAMINOPHEN 1000 MG: 500 TABLET ORAL at 21:31

## 2023-01-05 RX ADMIN — OXYCODONE HYDROCHLORIDE 10 MG: 5 TABLET ORAL at 17:07

## 2023-01-05 RX ADMIN — CEPHALEXIN 500 MG: 500 CAPSULE ORAL at 13:01

## 2023-01-05 RX ADMIN — CEPHALEXIN 500 MG: 500 CAPSULE ORAL at 05:59

## 2023-01-05 RX ADMIN — CEPHALEXIN 500 MG: 500 CAPSULE ORAL at 17:07

## 2023-01-05 RX ADMIN — OXYCODONE HYDROCHLORIDE 10 MG: 5 TABLET ORAL at 13:01

## 2023-01-05 ASSESSMENT — ACTIVITIES OF DAILY LIVING (ADL)
ADLS_ACUITY_SCORE: 26
ADLS_ACUITY_SCORE: 26
ADLS_ACUITY_SCORE: 27
ADLS_ACUITY_SCORE: 26
ADLS_ACUITY_SCORE: 27
ADLS_ACUITY_SCORE: 26
ADLS_ACUITY_SCORE: 27
ADLS_ACUITY_SCORE: 26

## 2023-01-05 NOTE — DISCHARGE SUMMARY
[facility]   Acute Rehabilitation Unit  Discharge Summary     Date of Admission: 12/24/2022  Date of Discharge: 1/6/2023  Disposition: Home  Primary Care Physician: Fallon Mauricio  Attending physician: Shyam Olmstead DO  Other significant physician provider(s): Dr. Babar Issa, neurology; Dr. Turcios ([facility]) neurosurgery      Discharge Diagnoses  Spinal stenosis L2-5 and polyradiculopathy   s/p T10-S1 spinal fusion and L2-S1 bilateral decompression  Complicated by left foot weakness likely secondary to polyradiculopathy  Superficial purulence from surgical site  Iron deficiency anemia  Thrombocytosis - reactive       Medical Course  Philipp Bland is a 67 year old right hand dominant male PMH significant for lumbar spinal stenosis and radiculopathy, HTN, Paget's disease, iron deficiency anemia, possible MGUS who underwent an elective T10-S1 spinal fusion and L2-S1 bilateral decompression 12/15/2022 with post-operative complication resulting in right foot weakness.     Throughout their hospital stay, they worked daily with PT, OT, and SLP and were seen daily by PMR physician. They remained vitally stable throughout their stay with episode of increased R paraspinal back spasms following therapies on 12/28, resolved with rest and prn medications. They also developed superficial purulence from surgical wound on 12/31, evaluated by [facility] neurosurgical team and started on cephelexin. They were continued on their PTA medications and started on Voltaren gel and lidocaine patches (which he did not feel helped his back pain significantly. They did find relief in pain from scheduled tylenol and robaxin throughout their stay. He was also started on Vit C supplementation as continued to have thrombocytosis, likely due to iron deficiency anemia. Chlorthalidone was discontinued as bp was well controlled and pt had hyponatremia throughout their stay. They also did not restart  "hydrochlorothiazide as their BP was well-controlled and continued well controlled throughout stay only on lisinopril.    Rehabilitation Course    PT: At admission, they were utilizing mod A for sit to supine, sit to stand, transfers and ambulation of 15' with FWW and AFOs.  On discharge, they are SBA for rolling to supine, min A for sit to supine, and SBA-min A for STS with 4WW. They walk up to 30' with 4WW and ambulate up 3X4\" steps with CGA. TUG was 30.8 sec with 4WW.     OT: At admission, they were independent for feeding and mod A for LB dressing and toileting. They were dependent for toilet transfers.  On discharge, they are CGA-SBA for mobility and grooming. They are CGA/SBA for bathing and AX1 for toileting.       DISCHARGE MEDICATIONS  Current Discharge Medication List      START taking these medications    Details   cephALEXin (KEFLEX) 500 MG capsule Take 1 capsule (500 mg) by mouth every 6 hours for 6 days  Qty: 24 capsule, Refills: 0    Associated Diagnoses: Surgical site infection      vitamin C (ASCORBIC ACID) 500 MG tablet Take 1 tablet (500 mg) by mouth daily  Qty: 30 tablet, Refills: 0    Associated Diagnoses: Iron deficiency      Vitamin D3 (CHOLECALCIFEROL) 25 mcg (1000 units) tablet Take 1 tablet (25 mcg) by mouth daily  Qty: 30 tablet, Refills: 0    Associated Diagnoses: Vitamin D deficiency      zinc sulfate (ZINCATE) 220 (50 Zn) MG capsule Take 1 capsule (220 mg) by mouth daily  Qty: 30 capsule, Refills: 0    Associated Diagnoses: Zinc deficiency         CONTINUE these medications which have CHANGED    Details   acetaminophen (TYLENOL) 500 MG tablet Take 2 tablets (1,000 mg) by mouth 3 times daily  Qty: 180 tablet, Refills: 0    Associated Diagnoses: Lumbar spine pain      ferrous gluconate (FERGON) 324 (38 Fe) MG tablet Take 1 tablet (324 mg) by mouth daily (with breakfast)  Qty: 30 tablet, Refills: 0    Associated Diagnoses: Thrombocytosis      lisinopril (ZESTRIL) 40 MG tablet Take 1 tablet " (40 mg) by mouth daily  Qty: 30 tablet, Refills: 0    Associated Diagnoses: Benign essential hypertension      methocarbamol (ROBAXIN) 750 MG tablet Take 1 tablet (750 mg) by mouth 2 times daily  Qty: 60 tablet, Refills: 0    Associated Diagnoses: Lumbar spine pain      oxyCODONE (ROXICODONE) 5 MG tablet Take 1-2 tablets (5-10 mg) by mouth every 6 hours as needed for moderate pain (4-6)  Qty: 28 tablet, Refills: 0    Associated Diagnoses: Lumbar spine pain         STOP taking these medications       chlorthalidone (HYGROTON) 25 MG tablet Comments:   Reason for Stopping:         omeprazole (PRILOSEC) 40 MG DR capsule Comments:   Reason for Stopping:         senna-docusate (SENOKOT-S/PERICOLACE) 8.6-50 MG tablet Comments:   Reason for Stopping:         Vitamin D, Cholecalciferol, 25 MCG (1000 UT) CAPS Comments:   Reason for Stopping:         Vitamin K 100 MCG TABS Comments:   Reason for Stopping:         zinc gluconate 50 MG tablet Comments:   Reason for Stopping:                 DISCHARGE INSTRUCTIONS AND FOLLOW UP  Discharge Procedure Orders   Home Care Referral   Referral Priority: Routine: Next available opening Referral Type: Home Health Therapies & Aides   Number of Visits Requested: 1     Home Care Referral   Referral Priority: Routine: Next available opening Referral Type: Home Health Therapies & Aides   Number of Visits Requested: 1     Reason for your hospital stay   Order Comments: Neurologic Conditions 03.9 Other Neurologic, Lumbar stenosis and spondylolisthesis with radiculopathy     Reason for your hospital stay   Order Comments: Neurologic Conditions 03.9 Other Neurologic, Lumbar stenosis and spondylolisthesis with radiculopathy     Activity   Order Comments: Your activity upon discharge: Spinal Precautions: for the first 6-8 weeks, no lifting > 10 pounds, no bending, twisting, or overhead reaching.     Order Specific Question Answer Comments   Is discharge order? Yes      Reason for your hospital stay    Order Comments: Neurologic Conditions 03.9 Other Neurologic, Lumbar stenosis and spondylolisthesis with radiculopathy     Activity   Order Comments: Your activity upon discharge: Continue with spinal Precautions: for the first 6-8 weeks (around 1/27-2/10/23) no lifting > 10 pounds, no bending, twisting, or overhead reaching. Do NOT drive until cleared by PT/OT. Do not use EtOH while using oxycodone and/or methocarbamol. Do NOT use EtOH while unsteady on feet as could make you more prone to falls.     Order Specific Question Answer Comments   Is discharge order? Yes      Adult [facility] Follow-up and recommended labs and tests   Order Comments: - Neurosurgery: Follow-up with CHERELLE on 1/27/2023  - Heme/Onc: follow up MGUS workup   - Endocrinology, saw Dr. Katarina Aguilar for ou tpatient follow up   - Urology: follow up high PSA work up    Appointments on [facility] and/or [facility] (with [facility] or [facility] provider or service). Call [phone] if you haven't heard regarding these appointments within 7 days of discharge.     Monitor and record   Order Comments: BP 2X per day. Drainage from your surgical site daily.     Wound care and dressings   Order Comments: Instructions to care for your wound at home: Daily betadine to incision and continue daily dressing change until instructed otherwise by neurosurgery     Diet   Order Comments: Follow this diet upon discharge: Orders Placed This Encounter      Room Service      Regular Diet Adult     Order Specific Question Answer Comments   Is discharge order? Yes      Diet   Order Comments: Follow this diet upon discharge:       Regular Diet Adult      Diet     Order Specific Question Answer Comments   Is discharge order? Yes           Physical Exam    Most recent Vital Signs:   Vitals:    01/04/23 1500 01/05/23 0728 01/05/23 1544 01/06/23 0812   BP: 132/77 124/81 116/67 122/74   BP Location: Right arm Right arm Right arm Right arm   Patient Position: Semi-Marie's    Semi-Marie's   Cuff Size: Adult Regular   Adult Large   Pulse: 90 84 82 100   Resp: 16 16 16 16   Temp: 97.1  F (36.2  C) 97.8  F (36.6  C) 98  F (36.7  C) 97.7  F (36.5  C)   TempSrc: Oral Oral Oral Oral   SpO2: 100% 100% 95% 98%   Weight:       Height:           General: in no acute distress, conversational and alert, resting comfortably in bed  HEENT: atraumatic, nares clear, conjunctiva white  Pulmonary: on room air, lungs clear to auscultation bilaterally  Cardiovascular: RRR, no murmur auscultated, well-perfused peripherally  Abdominal: soft, non-tender to palpation, non-distended  Extremities: warm peripherally, no edema in BLEs  Skin: warm, dry, without erythema, ecchymosis, or rash noted  MSK/neuro:   Mental Status:  alert and oriented x3   Cranial Nerves:   1. 2nd CN: Pupils equal, round, reactive to light and accomodation. Visual fields intact to confrontation.   2. 3rd,4th,6th CN:  EOMI, appropriate pupillary responses  3. 5th CN: facial sensation intact   4. 7th CN: face symmetrical   5. 8th CN: functional hearing bilaterally  6. 9th, 10th CN: palate elevates symmetrically   7. 11th CN: sternocleidomastoids and trapezii strong   8. 12th CN: tongue midline and without fasciculations     Sensory: Normal to light touch in bilateral upper and lower extremities   Strength:    EF  EE   I  HF  KE  DF  EHL  PF   L  5/5 5/5 5/5 5/5 4/5 4/5 2+/5 2+/5 4+/5  R  5/5 5/5 5/5 5/5 4/5 4/5 3/5 3/5 4/5   Reflexes: Present and symmetrical 2/4   Tone per modified Jr Scale 0/4 noted in b/l ankle dorsiflexion and elbow flexion/extension   Abnormal movements: No tremor noted   Coordination: No dysmetria on finger to nose.    Speech: no dysarthria, speech fluent      Assessment &Plan  Philipp Bland is a 67 year old right hand dominant male PMH significant for lumbar spinal stenosis and radiculopathy, HTN, Paget's disease, iron deficiency anemia, possible MGUS who underwent an elective T10-S1 spinal fusion and L2-S1  bilateral decompression 12/15/2022 with post-operative complication resulting in right foot weakness.     ---------------------------------------------------------------------  Admission to acute inpatient rehab: December 24, 2022    Impairment group code: Neurologic Conditions 03.9 Other Neurologic, Lumbar stenosis and spondylolisthesis with radiculopathy        1. PT, OT  90 minutes of each on a daily basis, in addition to rehab nursing and close management of physiatrist.       2. Impairment of ADL's: OT for 90 minutes daily to work on ADLs such as grooming, self cares and bathing.       3. Impairment of mobility:  PT for 90 minutes daily focusing on strength, balance, coordination, and endurance.        4. Rehab RN: for med administration, bowel regimen, wound care and patient education.          Medical Conditions  #Spinal stenosis L2-5 and polyradiculopathy   #S/p T10-S1 spinal fusion and L2-S1 bilateral decompression  #Left foot weakness likely secondary to polyradiculopathy  - Had previous EMGs 6/29/2022 by Dr. Babar Issa, showed severe left lumbosacral polyradiculopathy. Exam noted at the time 5/5 BLE except right KE 4+/5, left KE KF 4/5.    - Weakness stable throughout time at ARU  - Tried AFOs in [facility] but did not like them, planning to use high top basketball shoes  - As needed oxycodone and Tylenol utilized throughout stay, scheduled tylenol and robaxin for improved pain relief  -Per neurosurgery team removed sutures 2 weeks after surgery (removed 12/30)  - Spinal Precautions: for the first 6-8 weeks (earliest 1/27), no lifting > 10 pounds, no bending, twisting, or overhead reaching    #Superficial purulence from surgical site  - Purulence from surgical wound 12/31-> neurosurgery saw and started on cephalexin (last dose after seen as outpt by [facility] Neurosurgery), continue to monitor  - Betadine application and daily dressing changes  - CRP 44 on 1/1 and downtrended 1/2   - Pics in chart from  12/31 and 1/2  - NO leukocytosis or fever throughout ARU stay  - Discussed case and recent superficial purulence from surgical sie 1/5 with [facility] Neurosurgery, appreciate recs for closer follow-up (1/9-1/10 with [facility] Neurosurgerical team) and to continue cephalexin until outpt     # Skin sensitivity  -Patient developed excoriation and superficial skin wounds likely due to dressing and adhesive removal.  -Consulted WOCN 12/26 and kept wound open to air as healing appropriately with no signs of infection such as erythema or drainage at that time (covered once drainage noted on 12/30)     #Acute R paraspinal muscle spasm (12/28)  #Pain  - Scheduled 1g Tylenol TID with stable pain control overall (continuing to require oxycodone q4h), monitor closely and continue to offer topical medications  - Added voltaren gel and lidocaine patches 12/28 for increased pain, mildly improving pain, but not significantly  - Robaxin changed to BID scheduled on 12/29 for increased LBP, improved moderately     #Hyponatremia  - Na levels after surgery were 127-132  - Cause unclear, Chlorthalidone was held and sodium level improved     #Nomocytic Anemia   #Iron deficiency anemia  #Thrombocytosis - reactive   - Likely due to acute blood loss with previous CHANTALE. Pre-op Hgb 14.5, post-op 9.2 EBL 1200  - PTA ferrous gluconate 324mg   - Thrombocytosis increasing 12/30  - Peripheral blood smear 1/2 WNLs showing normocytic normochromic anemia- thrombocytosis likely reactive  - Started Vit C supplementation 12/30 for increased promotion of iron absorption     #HTN  #Peripheral edema  #Wt gain  - Continued on PTA lisinopril 40mg daily  - Chlorthalidone on hold due to hyponatremia and hydrochlorothiazide held while inpt, blood pressures are controlled and SBP below 130 overall throughout rehab stay  - Wt increased (9kg over 12/15-12/24, may have been lower than standard weight upon admission as weight has been stable for last week), mild foot  edema: with BP WNLs overall (did not continue hydrochlorothiazide at this hospitalization, but can consider restarting as outpt depending on BP)     #Paget's disease  - Continue with vitamin D supplementation 1000U, received IV Zoledronic infusion prior to surgery 9/26/2022   - outpatient follow up with endo team Dr. Aguilar for maintenance and prevention of complications     #Elevated PSA  - 10/7/2022 was 15   - 10/19 seen by urology and was recommended to undergo prostate MRI for biopsy but did not follow up    - Monitor symptoms while inpatient such as urine retention, hematuria, pelvic pain, worsening back pain     #MGUS   - Per chart review suspected diagnosis, however not evaluated by hematology  - outpatient follow up for further work up and diagnosis    #Nutrition  - Patient does not meet two of the established criteria necessary for diagnosing malnutrition        1. Adjustment to disability:  Clinical psychology to evaluated and treated as needed  2. FEN: regular diet, thin liquids  3. Bowel: continent, bowel medications as needed, regular daily BMs  Bladder: continent and voids spontaneously throughout ARU stay    Follow Up Appointments  - Neurosurgery: Follow-up with CHERELLE closely on 1/9-10 with wound check and 1/27  - Heme/Onc: follow up MGUS workup   - Endocrinology, saw Dr. Katarina Aguilar for out patient follow up   - Urology: follow up high PSA work up    Patient was evaluated on day of discharge by attending physician, Shyam Olmstead DO, who agrees with plan of care.    Discharge summary was forwarded to Fallon Mauricio (PCP) at the time of discharge, so as to bridge from hospital to outpatient care.     It was our pleasure to care for Philipp Bland during this hospitalization. Please do not hesitate to contact me should there be questions regarding the hospital course or discharge plan.      Maureen Carmona  Physical Medicine and Rehabilitation  PGY-2          Physician Attestation   Shyam WILLIAM  DO Ishan, was present with the resident phsycian who participated in the service and in the documentation of the note.  I have verified the history and personally performed the physical exam and medical decision making.  I agree with the assessment and plan of care as documented in the note.      Key findings: stable for discharge home with support.     Please see A&P for additional details of medical decision making.      Shyam Olmstead DO  Date of Service (when I saw the patient): 01/06/23      I spent a total of 35 minutes face to face and coordinating care of Philipp Bland. Over 50% of my time on the unit was spent counseling the patient and /or coordinating care regarding post spinal surgery.

## 2023-01-05 NOTE — PLAN OF CARE
Goal Outcome Evaluation:      Plan of Care Reviewed With: patient    Overall Patient Progress: no change    Outcome Evaluation: No change in patient progress.    Orientation: A/Ox4  Bowel: No BM on this shift  Bladder: Continent of bladder using urinal at bedside with staff emptying  Pain: Complains of back pain. PRN Oxycodone given x2 with good effect per patient report  Ambulation/Transfers: SBA with 4WW for transfers and ambulation  Diet/ Liquids: Tolerating diet well with fair appetite  Skin: Dressing to spinal incision changed this shift  Bed alarm no longer indicated per patient whiteboard, call light within reach. Continue with POC.

## 2023-01-05 NOTE — PLAN OF CARE
"Physical Therapy Discharge Summary    Reason for therapy discharge:    Discharged to home with home therapy.    Progress towards therapy goal(s). See goals on Care Plan in [facility] electronic health record for goal details.  Goals partially met.  Barriers to achieving goals:   limited upright tolerance, spinal precautions, baseline deconditioning.    Bed Mobility: IND  Transfer: Mod I from elevated surfaces with 4WW.   Gait: up to 45 ft with 4WW, Mod I  Stairs: 3x4\" step ups in // bars with CGA  Balance: Heavy reliance on Ue's for standing  TUG on 12/27: 41.5 sec with 4WW              On 1/2: 30.8 sec with 4WW    Therapy recommendation(s):    Pt has been issued a HEP for supine core and LE strengthening. Pt has a 4WW already, with plans to be issued a K3 manual WC from [employer]. Due to instability and heaving use of Ue's for transfers and mobility, PT recommends FWW or at the very least new 4WW for home, along with bilat foot up devices in setting of bilat foot up devices, but pt declines.  Pt working to obtain ramp for several DONI son's home; practiced bumping up/down WC with son until ramp arrives. Plan to continue with home care PT to improve pain, household and community mobility tolerance and safety, reduce risk of future falls, and return to IADLs as able.   "

## 2023-01-05 NOTE — PLAN OF CARE
Occupational Therapy Discharge Summary    Reason for therapy discharge:    Discharged to home with home therapy.    Progress towards therapy goal(s). See goals on Care Plan in [facility] electronic health record for goal details.  Goals partially met.  Barriers to achieving goals:   discharge from facility.    Therapy recommendation(s):    Continued therapy is recommended.  Rationale/Recommendations:  to increase pt to highest functional level in home.

## 2023-01-05 NOTE — PROGRESS NOTES
"      [facility]   Acute Rehabilitation Unit    INTERVAL HISTORY  Pt had no acute events overnight. They report that they slept well with improved pain overall- 5/10 this morning over surgical site in low back. They deny additional pain. Report they are eating well. Report their strength is stable and therapies are going well. They are looking forward to discharge tomorrow.  Denies chest pain, abdominal pain, or calf pain. Last BM 1/4.    Functionally,    With PT: With PT: Bed Mobility: Primarily SBA rolling and supine<>sit EOM. Occasional min A through LE for sit>supine.  Transfer: Variable SBA<>min A STS from elevated surfaces with 4WW.   Gait: up to 30 ft with 4WW, CGA/SBA  Stairs: 3x4\" step ups in // bars with CGA  Balance: Heavy reliance on Ue's for standing  TUG on 12/27: 41.5 sec with 4WW              On 1/2: 30.8 sec with 4WW    With OT: ADLs:    Mobility: STS from elevated bed height CGA-SBA.    Grooming: Set up seated. CGA standing    Dressing:U/B pt able to dress after clothes retrieval, LB supine I    L/B sba w/reacher and without donning shorts from EOB elevated for STS,     footwear sba crossing leg over other, provided elastic laces    Bathing: CGA/SBA w/ tub bench tsfer to simulate home w/o use of grab bars.    Toileting: Ax1 on and off 19 inch toilet in his room with bilateral rails.  IADLs: Son was previously assisting pt. Pt was doing his own finances and medication. Pt was also previously driving.  Vision/Cognition: WFL.           MEDICATIONS  Scheduled meds    acetaminophen  1,000 mg Oral TID     cephALEXin  500 mg Oral Q6H ARIANA     ferrous gluconate  324 mg Oral Daily with breakfast     lidocaine  1 patch Transdermal Q24h    And     lidocaine   Transdermal Q8H ARIANA     lisinopril  40 mg Oral Daily     methocarbamol  750 mg Oral BID     vitamin C  500 mg Oral Daily     Vitamin D3  25 mcg Oral Daily     zinc sulfate  220 mg Oral Daily       PRN " "meds:  acetaminophen, diclofenac, melatonin, methocarbamol, naloxone **OR** naloxone **OR** naloxone **OR** naloxone, oxyCODONE, polyethylene glycol, senna-docusate      PHYSICAL EXAM  /81 (BP Location: Right arm)   Pulse 84   Temp 97.8  F (36.6  C) (Oral)   Resp 16   Ht 1.727 m (5' 8\")   Wt 99.8 kg (220 lb 1.6 oz)   SpO2 100%   BMI 33.47 kg/m    General: in no acute distress, conversational and alert, resting comfortably in bed  HEENT: atraumatic, nares clear, conjunctiva white  Pulmonary: on room air, lungs clear to auscultation bilaterally  Cardiovascular: RRR, no murmur auscultated, well-perfused peripherally  Abdominal: soft, non-tender to palpation, non-distended  Extremities: warm peripherally, no edema in BLEs  Skin: warm, dry, without erythema, ecchymosis, or rash noted  MSK: no BLE edema noted, calves non-tender to palpation  Neuro: conjugate gaze, speech non-dysarthric, R ankle dorsiflexion 3/5, ankle plantar flexion 4/5, 1st toe dorsiflexion 2+/4, L knee extension 4+/5, ankle dorsiflexion 2+/5, ankle plantar flexion r/5, 1st toe dorsiflexion 3/4      LABS  No results found for this or any previous visit (from the past 24 hour(s)).    ASSESSMENT AND PLAN  Philipp Bland is a 67 year old right hand dominant male PMH significant for lumbar spinal stenosis and radiculopathy, HTN, Paget's disease, iron deficiency anemia, possible MGUS who underwent an elective T10-S1 spinal fusion and L2-S1 bilateral decompression 12/15/2022 with post-operative complication resulting in right foot weakness.      Changes:  - CRP continues to downtrend 1/5  - Team rounds today with all questions answered  - Prefers to not discharge with lidocaine patches  - Discussed case and recent superficial purulence from surgical sie 1/5 with [facility], appreciate recs for closer follow-up (1/9-1/10 with [facility] team) and to continue cephalexin until " outpt  ---------------------------------------------------------------------  Admission to acute inpatient rehab: December 24, 2022    Impairment group code: Neurologic Conditions 03.9 Other Neurologic, Lumbar stenosis and spondylolisthesis with radiculopathy        1. PT, OT  90 minutes of each on a daily basis, in addition to rehab nursing and close management of physiatrist.       2. Impairment of ADL's: OT for 90 minutes daily to work on ADLs such as grooming, self cares and bathing.       3. Impairment of mobility:  PT for 90 minutes daily focusing on strength, balance, coordination, and endurance.        4. Rehab RN: for med administration, bowel regimen, wound care and patient education.          Medical Conditions  #Spinal stenosis L2-5 and polyradiculopathy   s/p T10-S1 spinal fusion and L2-S1 bilateral decompression  Complicated by left foot weakness likely secondary to polyradiculopathy  - Had previous EMGs 6/29/2022 by Dr. Babar Issa, showed severe left lumbosacral polyradiculopathy. Exam noted at the time 5/5 BLE except right KE 4+/5, left KE KF 4/5.    - If weakness worsens or progresses, will consult neurosurgery and consider appropriate spine imaging  - PT/OT for evaluation and treatment  - tried AFOs in [facility] but did not like them, planning to use high top basketball shoes  - As needed oxycodone and Tylenol for pain   -Per neurosurgery team remove sutures 2 weeks after surgery (removed 12/30)  - Spinal Precautions: for the first 6-8 weeks, no lifting > 10 pounds, no bending, twisting, or overhead reaching.     #Superficial purulence from surgical site  - Removed surgical sutures 12/29  - Purulence from surgical wound 12/31-> neurosurgery saw and started on cephalexin X7 days (last dose 1/7), continue to monitor  - Betadine application and daily dressing changes  - CRP 44 on 1/1 and downtrended 1/2   - Pics in chart from 12/31 and 1/2  - NO leukocytosis or fever, ctn monitor, CRP downtrending  1/5  - Outpt wound check with neurosurgery [facility]  - Discussed case and recent superficial purulence from surgical sie 1/5 with [facility], appreciate recs for closer follow-up (1/9-1/10 with [facility]sur[facility] team) and to continue cephalexin until outpt     # Skin sensitivity  -Patient developed excoriation and superficial skin wounds likely due to dressing and adhesive removal.  -Consulted WOCN 12/26 and keep wound open to air as healing appropriately with no signs of infection such as erythema or drainage     #Acute R paraspinal muscle spasm (12/28)  #Pain  - Scheduled 1g Tylenol TID with stable pain control overall (continuing to require oxycodone q4h), monitor closely and continue to offer topical medications  - Added voltaren gel and lidocaine patches 12/28 for increased pain  - Encouraged use of robaxin, ice and topical medications for pain management 12/28  - Robaxin changed to BID scheduled on 12/29 for increased LBP, continue to monitor if improves pain and continue to adjust as needed     #Hyponatremia  - Na levels after surgery were 127-132  - Cause unclear, Chlorthalidone was held and sodium level improved     #Nomocytic Anemia   #Iron deficiency anemia  #Thrombocytosis - reactive   - Likely due to acute blood loss with previous CHANTALE. Pre-op Hgb 14.5, post-op 9.2 EBL 1200  - PTA ferrous gluconate 324mg   - CBC QMonday  - Thrombocytosis increasing 12/30, peripheral blood smear ordered for 1/2, WNLs showing normocytic normochromic anemia- thrombocytosis likely reactive  - Started Vit C supplementation 12/30 for increased promotion of iron absorption     #HTN  #Peripheral edema  #Wt gain  - PTA: chlorthalidone 25mg daily, lisinopril 40mg daily  - Continue lisinopril but chlorthalidone on hold due to hyponatremia, blood pressures are controlled and SBP below 120   - Wt increased (9kg over 12/15-12/24, may have been lower than standard weight upon admission as weight has been stable for  last week), mild foot edema: with BP WNLs overall (did not continue hydrochlorothiazide at this hospitalization, but can consider restarting as outpt)     #Paget's disease  - Continue with vitamin D supplementation 1000U, received IV Zoledronic infusion prior to surgery 9/26/2022  - outpatient follow up with endo team Dr. Aguilar for maintenance and prevention of complications     #Elevated PSA  - 10/7/2022 was 15   - 10/19 seen by urology and was recommended to undergo prostate MRI for biopsy but did not follow up    - Monitor symptoms while inpatient such as urine retention, hematuria, pelvic pain, worsening back pain     #MGUS   - Per chart review suspected diagnosis, however not evaluated by hematology  - outpatient follow up for further work up and diagnosis    #Nutrition  - Patient does not meet two of the established criteria necessary for diagnosing malnutrition        1. Adjustment to disability:  Clinical psychology to eval and treat as indicated  2. FEN: regular diet, thin liquids  3. Bowel: continent, bowel medications as needed   4. Bladder: continent, PVR x3  5. DVT Prophylaxis: Anti-embolism stocking  6. GI Prophylaxis: continue PPIs d/c'ed 12/30  7. Code: Full   8. Disposition: Home with son  with HC (home OT/PT (without nursing))  9. ELOS: 1/6  10. Rehab prognosis:  Good  11. Follow up Appointments on Discharge:  - Neurosurgery: Follow-up with CHERELLE on 1/27/2023  - Heme/Onc: follow up MGUS workup   - Endocrinology, saw Dr. Katarina Aguilar for ou tpatient follow up   - Urology: follow up high PSA work up     Seen and discussed with Dr. Olmstead, PM&R staff physician      Maureen Carmona  PGY 2  Physical Medicine and Rehabilitation  [phone]

## 2023-01-05 NOTE — PROGRESS NOTES
SW received a call from Son/ Patrice.  He had discharge questions.  SW said 5th floor SW is in rounds.  SW answered his questions.  He wanted to know if staff did get a hold of brother?  PAMELA said SW will ask 5th floor SW.  PAMELA did ask 5th floor SW.  She didn't know. Shahrzad is in charge of that.  PAMELA will let Leandrolissy know that when he calls again.   Patrice wanted to make sure A had Idris's phone number.  [phone].     BERNARDO Collazo   [facility], Transitional Care Unit   Social Work   [address]th [place] [address]  (PH) [phone]

## 2023-01-05 NOTE — PLAN OF CARE
Discharge Planner Post-Acute Rehab OT:      Discharge Plan: Home w/ son and home  OT. discharge changed to 1-6 if needed after family training 1-2     Precautions: Spinal, fall, needs increased height to 25+ inches for ease w/ transfers     Current Status:  ADLs:    Mobility: STS from elevated bed height mod I.    Grooming: mod I    Dressing:U/B pt able to dress after clothes retrieval, L/B able to dress after clothes retrieval      footwear mod I crossing leg over other, provided elastic laces    Bathing: mod Iw/ sitting on extended tub bench     transfer: tub bench tsfer sba    Toileting: Ax1 on and off 19 inch toilet in his room with bilateral rails.  IADLs: Son was previously assisting pt. Pt was doing his own finances and medication. Pt was also previously driving.  Vision/Cognition: WFL.      Assessment:increased to mod I with g/h and bathing while sitting on extended tub bench family education with pt and son in pm ot family training with pt and son  with adls  toilet and shower transfer and given equipment information for purchase son able to verbalize understanding of equipment and transfers.mod I handicap height toilet  recommended raiser for 17 inch toilet of son to assist  Other Barriers to Discharge (DME, Family Training, etc):   Pt has tub/shower and uses a utility stool/step for showering. No grab bars.   Family training re-scheduled to 1/5, Thursday w/ son-time TBD.  DME: pt planing on getting bed frame mattress and box spring on floor, bed wedge, bed rail, raiser for 17 inch height toilet and tub bench. Pt plans for a ramp and will issue w/c from ARU prior to discharge.

## 2023-01-05 NOTE — PROGRESS NOTES
"See previous SW note. SWer received update from U Vicente (who was covering this writer earlier this week) and informed that pt son Patrice called and would like a call RE HC services and to ensure that family training is scheduled with son Idris (who is who pt is going home with). Before calling, SWer spoke with both PT and pt who were working together. Both confirmed that pt son Idris is aware of the family training later today. SWer informed pt that pt son Patrice is calling and wants an update on HC. Pt stated, \"he is the first born, let me just leave it at that\". Pt did not want SWer to call Patrice and stated that he would talk to both of his sons and provide update. Pt is alert, orient, and his own decision maker. HC has been discussed in detail with pt and contact information for HC agency in pt AVS. IRF and IMM completed with pt at bedside yesterday. No additional SW needs.     SANDY Ochoa   [facility]   Direct Phone: [phone]  I   Pager: [phone]  I  Fax: [phone]    "

## 2023-01-05 NOTE — PLAN OF CARE
Acute Rehab Care Conference/Team Rounds      Type: Team Rounds    Present: Dr. Shyam Olmstead, Maureen Carmona Resident MD, Jarett Conte, Dulce Coleman OT, Bryanna SAMSON, Naima Hernandez RD, Olga Palacio RN, and Philipp Baldo Patient.    Discharge Barriers/Treatment/Education    Rehab Diagnosis:  Post spinal fusion     Active Medical Co-morbidities/Prognosis:     Patient Active Problem List   Diagnosis Code     Onychomycosis B35.1     CARDIOVASCULAR SCREENING; LDL GOAL LESS THAN 130 Z13.6     HTN, goal below 140/90 I10     Advanced directives, counseling/discussion Z71.89     Spondylolisthesis of lumbar region M43.16     Spinal stenosis of lumbar region with radiculopathy M48.061, M54.16     Other spondylosis, lumbar region M47.896     Lumbar spine pain M54.50     Other osteoporosis without current pathological fracture M81.8     Other secondary scoliosis, lumbar region M41.56     History of thoracic spinal fusion Z98.1         Safety: Pt is alert and oriented. Ax1 with walker. No alarms on. Denied SOB, CP, and n/t. Call light within reach - Pt is able to make needs known and calls as needed.     Pain: C/o pain; given PRN Oxycodone q4hrs with some relief.     Medications, Skin, Tubes/Lines: Can take pills whole. Back incision is covered with dressing. No tubes, lines, or drains.     Swallowing/Nutrition: Orders Placed This Encounter      Regular Diet Adult      Diet        Bowel/Bladder: Continent of B&B. Uses urinal during NOC independently. LBM 1/3.     Psychosocial: Lives with adult son. Single. 4 adult sons and their families are supportive. Works PT. No mental health, chem dep or financial concerns currently.    ADLs/IADLs:ADLs:    Mobility: STS from elevated bed height mod I.    Grooming: mod I    Dressing:U/B pt able to dress after clothes retrieval, L/B able to dress after clothes retrieval      footwear mod I crossing leg over other, provided elastic laces    Bathing: mod Iw/ sitting on extended  "tub bench     transfer: tub bench clint sba    Toileting: Ax1 on and off 19 inch toilet in his room with bilateral rails.  IADLs: Son was previously assisting pt. Pt was doing his own finances and medication. Pt was also previously driving.  Vision/Cognition: WFL.        Mobility:   Bed Mobility: IND  Transfer: Mod I from elevated surfaces with 4WW.   Gait: up to 30 ft with 4WW, mod I  Stairs: 3x4\" step ups in // bars with CGA  Balance: Heavy reliance on Ue's for standing  TUG on 12/27: 41.5 sec with 4WW              On 1/2: 30.8 sec with 4WW  Pt nearing mod I status, limited by room set-up and spinal precautions. Pt has 4WW for home, with pt reporting ramp to be installed prior to discharge, with plan for K3 manual WC to be issued via [employer]. Son coming in this afternoon for family training. Recommend ongoing home care PT to address pain, strength, and balance impairments to improve safety with mobility and reduce risk of future falls.      Cognition/Language: intact     Community Re-Entry: Plan for ongoing therapies to progress community mobility tolerance, along with K3 manual WC.    Transportation: Son to provide. Car transfer not a barrier.    Decision maker: self    Plan of Care and goals reviewed and updated.    Discharge Plan/Recommendations    Fall Precautions: continue    Patient/Family input to goals: yes     Estimated length of stay: 16 days     Overall plan for the patient: reach a level of mod I       Utilization Review and Continued Stay Justification    Medical Necessity Criteria:    For any criteria that is not met, please document reason and plan for discharge, transfer, or modification of plan of care to address.    Requires intensive rehabilitation program to treat functional deficits?: Yes    Requires 3x per week or greater involvement of rehabilitation physician to oversee rehabilitation program?: Yes    Requires rehabilitation nursing interventions?: Yes    Patient is making functional " progress?: Yes    There is a potential for additional functional progress? Yes    Patient is participating in therapy 3 hours per day a minimum of 5 days per week or 15 hours per week in 7 day period?:Yes    Has discharge needs that require coordinated discharge planning approach?:Yes      Barriers/Concerns related to meeting medical necessity criteria:  none    Team Plan to Address Concern:  As needed       Final Physician Sign off    Statement of Approval:  Shyam Olmstead, DO      Patient Goals  Social Work Goals: Home tomorrow. No additional needs. See SW note from yesterday with additional discharge details.     OT Predicted Duration/Target Date for Goal Attainment: 01/03/23  Therapy Frequency (OT): Daily (60-90mins)  OT: Hygiene/Grooming: supervision/stand-by assist  OT: Upper Body Dressing: Modified independent  OT: Lower Body Dressing: Supervision/stand-by assist  OT: Upper Body Bathing: Supervision/stand-by assist  OT: Lower Body Bathing: Minimal assist  OT: Bed Mobility: Supervision/stand-by assist  OT: Transfer: Modified independent  OT: Toilet Transfer/Toileting: Modified independent                                            PT Predicted Duration/Target Date for Goal Attainment: 01/03/23  PT Frequency: Daily  PT: Bed Mobility: Independent, Supine to/from sit, Rolling, Bridging, Within precautions  PT: Transfers: Modified independent, Sit to/from stand, Bed to/from chair, Assistive device  PT: Gait: Modified independent, Rolling walker, 50 feet  PT: Stairs: Supervision/stand-by assist, 3 stairs, Rail on left  PT: Wheelchair Mobility: 50 feet, Caregiver SBA, manual wheelchair  PT: Goal 1: Pt will be able to perform car transfer with 4WW and SBA in order to access home and medical appts.  PT: Goal 2: Pt will be IND with HEP for core/glute stabilization and general LE strength/balance to reduce pain and risk for future falls.        RN Patient Goal:  Pain Management: Pt will request pain meds as  appropriate and be able to identify pain relieving techniques while on unit     RN Goal: Skin Integrity: Pt nino remain free from pressure injuries while on unit via repositioning techniques and skin hygiene     RN Goal: Safety Management: Pt will utilize call light to make needs known and will remain free from falls while on unit

## 2023-01-06 VITALS
TEMPERATURE: 97.7 F | SYSTOLIC BLOOD PRESSURE: 122 MMHG | DIASTOLIC BLOOD PRESSURE: 74 MMHG | RESPIRATION RATE: 16 BRPM | BODY MASS INDEX: 33.36 KG/M2 | OXYGEN SATURATION: 98 % | WEIGHT: 220.1 LBS | HEIGHT: 68 IN | HEART RATE: 100 BPM

## 2023-01-06 PROCEDURE — 250N000013 HC RX MED GY IP 250 OP 250 PS 637: Performed by: PHYSICIAN ASSISTANT

## 2023-01-06 PROCEDURE — 250N000013 HC RX MED GY IP 250 OP 250 PS 637: Performed by: STUDENT IN AN ORGANIZED HEALTH CARE EDUCATION/TRAINING PROGRAM

## 2023-01-06 PROCEDURE — 250N000013 HC RX MED GY IP 250 OP 250 PS 637: Performed by: CLINIC/CENTER

## 2023-01-06 PROCEDURE — 99239 HOSP IP/OBS DSCHRG MGMT >30: CPT | Mod: GC | Performed by: PHYSICAL MEDICINE & REHABILITATION

## 2023-01-06 RX ADMIN — ZINC SULFATE 220 MG (50 MG) CAPSULE 220 MG: CAPSULE at 08:15

## 2023-01-06 RX ADMIN — LISINOPRIL 40 MG: 40 TABLET ORAL at 08:15

## 2023-01-06 RX ADMIN — OXYCODONE HYDROCHLORIDE 5 MG: 5 TABLET ORAL at 06:52

## 2023-01-06 RX ADMIN — CEPHALEXIN 500 MG: 500 CAPSULE ORAL at 12:11

## 2023-01-06 RX ADMIN — METHOCARBAMOL 750 MG: 750 TABLET, FILM COATED ORAL at 05:40

## 2023-01-06 RX ADMIN — OXYCODONE HYDROCHLORIDE AND ACETAMINOPHEN 500 MG: 500 TABLET ORAL at 08:15

## 2023-01-06 RX ADMIN — CEPHALEXIN 500 MG: 500 CAPSULE ORAL at 00:03

## 2023-01-06 RX ADMIN — ACETAMINOPHEN 1000 MG: 500 TABLET ORAL at 08:14

## 2023-01-06 RX ADMIN — ACETAMINOPHEN 325 MG: 325 TABLET, FILM COATED ORAL at 05:40

## 2023-01-06 RX ADMIN — OXYCODONE HYDROCHLORIDE 5 MG: 5 TABLET ORAL at 00:03

## 2023-01-06 RX ADMIN — FERROUS GLUCONATE 324 MG: 324 TABLET ORAL at 08:15

## 2023-01-06 RX ADMIN — Medication 25 MCG: at 08:15

## 2023-01-06 RX ADMIN — CEPHALEXIN 500 MG: 500 CAPSULE ORAL at 05:40

## 2023-01-06 ASSESSMENT — ACTIVITIES OF DAILY LIVING (ADL)
ADLS_ACUITY_SCORE: 27

## 2023-01-06 NOTE — PLAN OF CARE
"Goal Outcome Evaluation:  FOCUS/GOAL  Bladder management, Pain management, and Reinforcement of self-care/ADL    ASSESSMENT, INTERVENTIONS AND CONTINUING PLAN FOR GOAL:  Patient awake at start of shift and requested PRN oxy 5mg, given at 0000 with scheduled keflex.  Patient awake later to void and reports it was effective for back pain, patient has been awake on and off but denying pain or other concerns stating, \"I just wake up to go to the bathroom.\"  Is able to use the urinal independently.  Noted no bm since 1/3, patient denies any discomfort but still no bm this shift, continue to f/up as needed.  No additional care concerns, continue with POC.                           "

## 2023-01-06 NOTE — DISCHARGE SUMMARY
All paperwork reviewed with patient. Wound care explained and wound dressings sent with patient. Medications that were brought on admission were returned from security and given to patient. Patient also received all medications from [facility]. All personal belongings sent. Son picked up patient. Left facility in patient rented wheelchair and personal walker. Thankful for care received.

## 2023-01-06 NOTE — PROGRESS NOTES
SW saw pt son Idris last night as he was leaving the unit and informed him briefly of HC set up. Idris denied questions or concerns and stated that he would update his other brother.     Discharge home today. Orders faxed to HC agency. Idris will transport home. No additional SW needs.     Home Health Care:   [facility] ([facility])   PH: [phone], F: [phone]  RN, PT, and OT    [facility]   Direct Phone: [phone]  I   Pager: [phone]  I  Fax: [phone]

## 2023-01-06 NOTE — PLAN OF CARE
FOCUS/GOAL  Bowel management, Bladder management, Mobility, and Skin integrity    ASSESSMENT, INTERVENTIONS AND CONTINUING PLAN FOR GOAL:    Pt A&Ox4, pleasant and looking forward to discharge tomorrow. C/o constant pain in lower back, oxycodone given but refused lidocaine patch. Also c/o heartburn, tums given. Not OOB this evening, but Ax1 w/ FWW. No new concerns at this time, will continue w/ poc.

## 2023-01-10 ENCOUNTER — OFFICE VISIT (OUTPATIENT)
Dept: NEUROSURGERY | Facility: CLINIC | Age: 68
End: 2023-01-10
Payer: COMMERCIAL

## 2023-01-10 ENCOUNTER — PATIENT OUTREACH (OUTPATIENT)
Dept: CARE COORDINATION | Facility: CLINIC | Age: 68
End: 2023-01-10

## 2023-01-10 VITALS — HEART RATE: 100 BPM | OXYGEN SATURATION: 100 % | SYSTOLIC BLOOD PRESSURE: 120 MMHG | DIASTOLIC BLOOD PRESSURE: 82 MMHG

## 2023-01-10 DIAGNOSIS — M54.16 SPINAL STENOSIS OF LUMBAR REGION WITH RADICULOPATHY: Primary | ICD-10-CM

## 2023-01-10 DIAGNOSIS — M48.061 SPINAL STENOSIS OF LUMBAR REGION WITH RADICULOPATHY: Primary | ICD-10-CM

## 2023-01-10 PROCEDURE — 99024 POSTOP FOLLOW-UP VISIT: CPT | Performed by: PHYSICIAN ASSISTANT

## 2023-01-10 ASSESSMENT — PAIN SCALES - GENERAL: PAINLEVEL: SEVERE PAIN (6)

## 2023-01-10 NOTE — Clinical Note
1/10/2023         RE: Philipp Bland  [address]  [facility] [place] [address]        Dear Colleague,    Thank you for referring your patient, Philipp Bland, to the [facility]. Please see a copy of my visit note below.    Inferior portion of incision      Posterior incision            Again, thank you for allowing me to participate in the care of your patient.        Sincerely,        Shaheen Weir PA-C

## 2023-01-10 NOTE — PROGRESS NOTES
[facility] Resource Center Contact  [facility]/Voicemail     Clinical Data: Transitional Care Management Outreach     Outreach attempted x 2.  Left message on patient's voicemail, providing [facility]'s 24/7 scheduling and nurse triage phone number [phone] ([phone]) for questions/concerns and/or to schedule an appt with an [facility] provider, if they do not have a PCP.      Plan:  [facility] will do no further outreaches at this time.       Lisset Lam  Community Health Worker  [facility], [facility]  [phone]      *Connected Care Resource Team does NOT follow patient ongoing. Referrals are identified based on internal discharge reports and the outreach is to ensure patient has an understanding of their discharge instructions.

## 2023-01-11 ENCOUNTER — MYC MEDICAL ADVICE (OUTPATIENT)
Dept: ONCOLOGY | Facility: CLINIC | Age: 68
End: 2023-01-11

## 2023-01-11 NOTE — TELEPHONE ENCOUNTER
Writer contacted Philipp to discuss [employer] message that was sent. Philipp is requesting that his upcoming appointment with Dr. Thompson on 01/17/23 be switched to a telephone visit. Philipp reports that he is chair bound and it is extremely stressful/challenging for him to be transported to appointments.     [facility] changed appointment to telephone and will send an update to Dr. Thompson.    Collette Reeves RN on 1/11/2023 at 2:23 PM

## 2023-01-16 NOTE — PROGRESS NOTES
Philipp is a 67 year old who is being evaluated via a billable telephone visit.      What phone number would you like to be contacted at? [phone]  How would you like to obtain your AVS? Marty ADKINS      Distant Location (provider location):  On site  Phone call duration: 18 minutes    [facility]    Hematology/Oncology New Patient Note      Today's Date: 1/17/23    Reason for Consultation: Monoclonal gammopathy  Referring Provider: Babar Issa MD      HISTORY OF PRESENT ILLNESS: Philipp Bland is a 67 year old male who is referred for monoclonal gammopathy. PMH is significant for lumbar stenosis and radiculopathy, HTN, Paget's disease on zometa, CHANTALE, MGUS.    Patient was hospitalized 12/15-12/24/22 for T10-S1 fusion and L2-S1 decompression. He was discharged from rehab on 1/6/23. Post-op was complicated by left foot weakness, polyradiculopathy, superficial purulence from surgical site, iron deficiency anemia, and thrombocytosis.     Record review shows chronic over 2022 with decrease into the 8's following surgery above. He has had stable Cr, no hypercalcemia. FOBT negative in 7/2022. He had evidence of iron deficiency in 9/2022 with ferritin 20, iron sat 11%.     Patient had monoclonal gammopathy testing performed by neurology for radiculopathy. SPEP returned with a 0.1 g/dL IgM kappa MGUS. Bone scan in 8/2022 returned with Paget's.     He lives at home with his son and requires a walker. He played basketball aggressively in his youth. 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 He has had worsening of back pain since then, which prompted the above.     Cancer Screening: PSA 15 in 10/2022 (4.74 in 2008).      REVIEW OF SYSTEMS:   A 14 point ROS was reviewed with pertinent positives and negatives in the HPI.        HOME MEDICATIONS:  Current Outpatient Medications   Medication Sig Dispense Refill      acetaminophen (TYLENOL) 500 MG tablet Take 2 tablets (1,000 mg) by mouth 3 times daily 180 tablet 0     ferrous gluconate (FERGON) 324 (38 Fe) MG tablet Take 1 tablet (324 mg) by mouth daily (with breakfast) 30 tablet 0     lisinopril (ZESTRIL) 40 MG tablet Take 1 tablet (40 mg) by mouth daily 30 tablet 0     methocarbamol (ROBAXIN) 750 MG tablet Take 1 tablet (750 mg) by mouth 2 times daily 60 tablet 0     oxyCODONE (ROXICODONE) 5 MG tablet Take 1-2 tablets (5-10 mg) by mouth every 6 hours as needed for moderate pain (4-6) 28 tablet 0     vitamin C (ASCORBIC ACID) 500 MG tablet Take 1 tablet (500 mg) by mouth daily 30 tablet 0     Vitamin D3 (CHOLECALCIFEROL) 25 mcg (1000 units) tablet Take 1 tablet (25 mcg) by mouth daily 30 tablet 0     zinc sulfate (ZINCATE) 220 (50 Zn) MG capsule Take 1 capsule (220 mg) by mouth daily 30 capsule 0         ALLERGIES:  Allergies   Allergen Reactions     No Known Drug Allergies          PAST MEDICAL HISTORY:  Past Medical History:   Diagnosis Date     Essential hypertension, benign 01/01/2001     Family history of alcoholism          PAST SURGICAL HISTORY:  Past Surgical History:   Procedure Laterality Date     OPTICAL TRACKING SYSTEM FUSION SPINE POSTERIOR LUMBAR THREE+ LEVELS N/A 12/15/2022    Procedure: Thoracic 10 to Sacral 1 posterior segmental instrumentation. Pelvic instrumentation. Screw cement augmentation L10 to L11. Lumbar 2 to Sacral 1 bilateral decompression and transforaminal interbody fusion. Posterior arthrodesis Thoracic 10-Sacral 1.;  Surgeon: Eliseo Aggarwal MD;  Location:  OR         SOCIAL HISTORY:  Social History     Socioeconomic History     Marital status: Single     Spouse name: Not on file     Number of children: Not on file     Years of education: Not on file     Highest education level: Not on file   Occupational History     Not on file   Tobacco Use     Smoking status: Never     Smokeless tobacco: Never   Vaping Use     Vaping Use: Never used    Substance and Sexual Activity     Alcohol use: Yes     Comment: 2-3 4x /week     Drug use: No     Sexual activity: Yes     Partners: Female   Other Topics Concern     Parent/sibling w/ CABG, MI or angioplasty before 65F 55M? No   Social History Narrative     Not on file     Social Determinants of Health     Financial Resource Strain: Low Risk      Difficulty of Paying Living Expenses: Not hard at all   Food Insecurity: No Food Insecurity     Worried About Running Out of Food in the Last Year: Never true     Ran Out of Food in the Last Year: Never true   Transportation Needs: No Transportation Needs     Lack of Transportation (Medical): No     Lack of Transportation (Non-Medical): No   Physical Activity: Insufficiently Active     Days of Exercise per Week: 4 days     Minutes of Exercise per Session: 20 min   Stress: No Stress Concern Present     Feeling of Stress : Not at all   Social Connections: Moderately Integrated     Frequency of Communication with Friends and Family: More than three times a week     Frequency of Social Gatherings with Friends and Family: More than three times a week     Attends Faith Services: More than 4 times per year     Active Member of Clubs or Organizations: Yes     Attends Club or Organization Meetings: Not on file     Marital Status:    Intimate Partner Violence: Not on file   Housing Stability: Unknown     Unable to Pay for Housing in the Last Year: No     Number of Places Lived in the Last Year: Not on file     Unstable Housing in the Last Year: No         FAMILY HISTORY:  Family History   Problem Relation Age of Onset     Hypertension Father      No history of malignancy.    PHYSICAL EXAM:  Telephone visit.      LABS:  CBC RESULTS:   Recent Labs   Lab Test 01/02/23  0701 01/01/23  0632   WBC  --  4.1   RBC 3.12* 3.09*   HGB 8.7* 8.7*   HCT 27.9* 27.8*   MCV 89 90   MCH 27.9 28.2   MCHC 31.2* 31.3*   RDW 16.5* 16.6*   * 561*      Latest Reference Range & Units  01/02/23 07:01   % Neutrophils % 57   % Lymphocytes % 18   % Monocytes % 13   % Eosinophils % 9   % Basophils % 2   Absolute Basophils 0.0 - 0.2 10e3/uL 0.1   Absolute Eosinophils 0.0 - 0.7 10e3/uL 0.3   Absolute Immature Granulocytes <=0.4 10e3/uL 0.0   Absolute Lymphocytes 0.8 - 5.3 10e3/uL 0.6 (L)   Absolute Monocytes 0.0 - 1.3 10e3/uL 0.4   % Immature Granulocytes % 1   Absolute Neutrophils 1.6 - 8.3 10e3/uL 2.0   % Retic 0.5 - 2.0 % 2.3 (H)   Absolute Retic 0.025 - 0.095 10e6/uL 0.070     Recent Labs   Lab Test 01/02/23  0701 12/30/22  1145    132*   POTASSIUM 4.5 4.2   CHLORIDE 103 100   CO2 25 23   ANIONGAP 5 9   * 102*   BUN 13 11   CR 0.62* 0.59*   SHELBY 8.4* 8.1*     Lab Results   Component Value Date    AST 32 12/16/2022    AST 26 04/08/2013     Lab Results   Component Value Date    ALT 34 12/16/2022    ALT 20 04/08/2013     No results found for: BILICONJ   Lab Results   Component Value Date    BILITOTAL 0.9 12/16/2022    BILITOTAL 0.8 04/08/2013     Lab Results   Component Value Date    ALBUMIN 3.0 12/16/2022    ALBUMIN 4.5 04/08/2013     Lab Results   Component Value Date    PROTTOTAL 5.6 12/16/2022    PROTTOTAL 7.6 04/08/2013      Lab Results   Component Value Date    ALKPHOS 71 12/16/2022    ALKPHOS 117 04/08/2013      Latest Reference Range & Units 09/12/22 11:25   Ferritin 26 - 388 ng/mL 20 (L)   Iron 35 - 180 ug/dL 35   Iron Binding Cap 240 - 430 ug/dL 332   Iron Saturation Index 15 - 46 % 11 (L)      Latest Reference Range & Units 12/27/08 08:28 10/07/22 15:23   PSA 0.00 - 4.00 ug/L 4.74 (H) 15.00 (H)       Serum M-protein (g/dL):  10/7/22: 0.1 (IgM kappa)    Urine M-peak (%):  12/4/22: Trace, no monoclonal protein seen    Total IgG (mg/dL), IgA (mg/dL), IgM (mg/dL):     Free kappa light chains (mg/dL), lambda (mg/dL), kappa/lambda ratio:      PATHOLOGY:  Final Diagnosis 1/2/23:   Peripheral Blood Smear:  -Normocytic normochromic anemia.  -Adequate neutrophils with slight toxic  changes.  -Slight thrombocytosis with unremarkable platelet morphology         IMAGING:  NM BONE SCAN WHOLE BODY 8/2/22:                                                          IMPRESSION:  Findings typical of Paget's disease involving the left iliac bone and right proximal femur.      CT L-Spine 10/12/22:  Impression:  1. Multilevel degenerative changes as described in great detail above.  Most significant findings are as follows;  - Grade 2 anterolisthesis with posterior disc osteophyte complex and  chronic spondylolysis at L5-S1. Resultant bilateral severe neural  foraminal stenosis.  - Bilateral moderate/severe neural foraminal stenosis at L2-3.  - Partially lumbarized sacral S1.  - Multilevel retrolisthesis involving L2-3 and L3-4.  2. Multifocal presumed Paget's disease involving left iliac wing and  T11.   3. Cholelithiasis without cholecystitis.  4. Diverticulosis without the radiculitis.  5. Prostatomegaly with diffuse bladder wall thickening.        ASSESSMENT/PLAN:  Philipp Bland is a 67 year old male who is referred for monoclonal gammopathy. PMH is significant for lumbar stenosis and radiculopathy, HTN, Paget's disease on zometa, CHANTALE, MGUS.    1) IgM kappa monoclonal gammopathy, 0.1 g/dL  -I reviewed the patient's medical history and clinical findings.   -He had SPEP done for workup of radiculopathy, which resulted in the above. Complicating his clinical picture is history of Paget's disease with imaging on bone scan in 8/2022. He has anemia that has been increasing over the last year, but is most pronounced following surgery with evidence of iron deficiency. He does not have renal dysfunction or hypercalcemia.   -Additionally, he has significantly elevated PSA with evidence of prostatomegaly and diffuse bladder wall thickening on imaging (see plan below).  -I reviewed with patient the spectrum of plasma cell disorders and the possibility of an underlying lymphoproliferative neoplasm, as well as the  high probability of underlying prostate cancer.   -I discussed the need for updated labs and imaging. Will send for SPIEP, UPIEP, iron studies, B12, folate, TSH, peripheral smear, free light chains, and total immunoglobulins. Patient should have PET done and we discussed the need for high likelihood of need for bone marrow biopsy (site dependent on imaging as patient is likely to also have underlying prostate cancer).     2) Elevated PSA with prostatomegaly and diffuse bladder wall thickening  -He will establish care with urology on 1/31/23.   -PSA was 15 in 10/2022 (last was 4.74 in 2008).  -PET will be helpful.  -He has increased nocturia. No hematuria.  -See plan above.     3) Radiculopathy with severe foraminal stenosis  -s/p T10-S1 fusion and L2-S1 decompression in 12/2022.    4) History of Paget's disease  -Receiving zometa intermittently.  -Followed by Endocrinology.     5) -Obtain labs and imaging at patient's convenience prior to our next visit.  -Follow up in clinic in 1 month to review the above, urology evaluation/workup, and to discuss bone marrow biopsy.         Deena Thompson DO  Hematology/Oncology  [facility]

## 2023-01-17 ENCOUNTER — TELEPHONE (OUTPATIENT)
Dept: FAMILY MEDICINE | Facility: CLINIC | Age: 68
End: 2023-01-17

## 2023-01-17 ENCOUNTER — VIRTUAL VISIT (OUTPATIENT)
Dept: ONCOLOGY | Facility: CLINIC | Age: 68
End: 2023-01-17
Attending: NURSE PRACTITIONER
Payer: COMMERCIAL

## 2023-01-17 DIAGNOSIS — D47.2 MGUS (MONOCLONAL GAMMOPATHY OF UNKNOWN SIGNIFICANCE): ICD-10-CM

## 2023-01-17 DIAGNOSIS — D64.89 ANEMIA DUE TO OTHER CAUSE, NOT CLASSIFIED: ICD-10-CM

## 2023-01-17 DIAGNOSIS — D50.9 IRON DEFICIENCY ANEMIA, UNSPECIFIED IRON DEFICIENCY ANEMIA TYPE: Primary | ICD-10-CM

## 2023-01-17 DIAGNOSIS — R77.9 ABNORMAL PLASMA PROTEIN TEST: ICD-10-CM

## 2023-01-17 PROCEDURE — G0463 HOSPITAL OUTPT CLINIC VISIT: HCPCS | Mod: TEL,PN | Performed by: INTERNAL MEDICINE

## 2023-01-17 PROCEDURE — 99214 OFFICE O/P EST MOD 30 MIN: CPT | Mod: 95 | Performed by: INTERNAL MEDICINE

## 2023-01-17 NOTE — TELEPHONE ENCOUNTER
Patient Returning Call    Reason for call:  Would like to speak with RN regarding the January 19th appointment.      Could we send this information to you in [employer] or would you prefer to receive a phone call?:   Patient would prefer a phone call   Okay to leave a detailed message?: Yes at Home number on file [phone] (home)      Macey Quintero CMA

## 2023-01-17 NOTE — NURSING NOTE
Patient declined individual allergy and medication review by support staff because they were just reviewed 1/10 and no changes. PO

## 2023-01-17 NOTE — LETTER
1/17/2023         RE: Philipp Bland  [address]  [facility] [facility] [address]        Dear Colleague,    Thank you for referring your patient, Philipp Bland, to the [facility] [place]. Please see a copy of my visit note below.    Philipp is a 67 year old who is being evaluated via a billable telephone visit.      What phone number would you like to be contacted at? [phone]  How would you like to obtain your AVS? Marty ADKINS      Distant Location (provider location):  On site  Phone call duration: 18 minutes    [facility]    Hematology/Oncology New Patient Note      Today's Date: 1/17/23    Reason for Consultation: Monoclonal gammopathy  Referring Provider: Babar Issa MD      HISTORY OF PRESENT ILLNESS: Philipp Bland is a 67 year old male who is referred for monoclonal gammopathy. PMH is significant for lumbar stenosis and radiculopathy, HTN, Paget's disease on zometa, CHANTALE, MGUS.    Patient was hospitalized 12/15-12/24/22 for T10-S1 fusion and L2-S1 decompression. He was discharged from rehab on 1/6/23. Post-op was complicated by left foot weakness, polyradiculopathy, superficial purulence from surgical site, iron deficiency anemia, and thrombocytosis.     Record review shows chronic over 2022 with decrease into the 8's following surgery above. He has had stable Cr, no hypercalcemia. FOBT negative in 7/2022. He had evidence of iron deficiency in 9/2022 with ferritin 20, iron sat 11%.     Patient had monoclonal gammopathy testing performed by neurology for radiculopathy. SPEP returned with a 0.1 g/dL IgM kappa MGUS. Bone scan in 8/2022 returned with Paget's.     He lives at home with his son and requires a walker. He played basketball aggressively in his youth. 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 He has had worsening of back pain since then, which  prompted the above.     Cancer Screening: PSA 15 in 10/2022 (4.74 in 2008).      REVIEW OF SYSTEMS:   A 14 point ROS was reviewed with pertinent positives and negatives in the HPI.        HOME MEDICATIONS:  Current Outpatient Medications   Medication Sig Dispense Refill     acetaminophen (TYLENOL) 500 MG tablet Take 2 tablets (1,000 mg) by mouth 3 times daily 180 tablet 0     ferrous gluconate (FERGON) 324 (38 Fe) MG tablet Take 1 tablet (324 mg) by mouth daily (with breakfast) 30 tablet 0     lisinopril (ZESTRIL) 40 MG tablet Take 1 tablet (40 mg) by mouth daily 30 tablet 0     methocarbamol (ROBAXIN) 750 MG tablet Take 1 tablet (750 mg) by mouth 2 times daily 60 tablet 0     oxyCODONE (ROXICODONE) 5 MG tablet Take 1-2 tablets (5-10 mg) by mouth every 6 hours as needed for moderate pain (4-6) 28 tablet 0     vitamin C (ASCORBIC ACID) 500 MG tablet Take 1 tablet (500 mg) by mouth daily 30 tablet 0     Vitamin D3 (CHOLECALCIFEROL) 25 mcg (1000 units) tablet Take 1 tablet (25 mcg) by mouth daily 30 tablet 0     zinc sulfate (ZINCATE) 220 (50 Zn) MG capsule Take 1 capsule (220 mg) by mouth daily 30 capsule 0         ALLERGIES:  Allergies   Allergen Reactions     No Known Drug Allergies          PAST MEDICAL HISTORY:  Past Medical History:   Diagnosis Date     Essential hypertension, benign 01/01/2001     Family history of alcoholism          PAST SURGICAL HISTORY:  Past Surgical History:   Procedure Laterality Date     OPTICAL TRACKING SYSTEM FUSION SPINE POSTERIOR LUMBAR THREE+ LEVELS N/A 12/15/2022    Procedure: Thoracic 10 to Sacral 1 posterior segmental instrumentation. Pelvic instrumentation. Screw cement augmentation L10 to L11. Lumbar 2 to Sacral 1 bilateral decompression and transforaminal interbody fusion. Posterior arthrodesis Thoracic 10-Sacral 1.;  Surgeon: Eliseo Aggarwal MD;  Location:  OR         SOCIAL HISTORY:  Social History     Socioeconomic History     Marital status: Single     Spouse name:  Not on file     Number of children: Not on file     Years of education: Not on file     Highest education level: Not on file   Occupational History     Not on file   Tobacco Use     Smoking status: Never     Smokeless tobacco: Never   Vaping Use     Vaping Use: Never used   Substance and Sexual Activity     Alcohol use: Yes     Comment: 2-3 4x /week     Drug use: No     Sexual activity: Yes     Partners: Female   Other Topics Concern     Parent/sibling w/ CABG, MI or angioplasty before 65F 55M? No   Social History Narrative     Not on file     Social Determinants of Health     Financial Resource Strain: Low Risk      Difficulty of Paying Living Expenses: Not hard at all   Food Insecurity: No Food Insecurity     Worried About Running Out of Food in the Last Year: Never true     Ran Out of Food in the Last Year: Never true   Transportation Needs: No Transportation Needs     Lack of Transportation (Medical): No     Lack of Transportation (Non-Medical): No   Physical Activity: Insufficiently Active     Days of Exercise per Week: 4 days     Minutes of Exercise per Session: 20 min   Stress: No Stress Concern Present     Feeling of Stress : Not at all   Social Connections: Moderately Integrated     Frequency of Communication with Friends and Family: More than three times a week     Frequency of Social Gatherings with Friends and Family: More than three times a week     Attends [employer] Services: More than 4 times per year     Active Member of Clubs or Organizations: Yes     Attends Club or Organization Meetings: Not on file     Marital Status:    Intimate Partner Violence: Not on file   Housing Stability: Unknown     Unable to Pay for Housing in the Last Year: No     Number of Places Lived in the Last Year: Not on file     Unstable Housing in the Last Year: No         FAMILY HISTORY:  Family History   Problem Relation Age of Onset     Hypertension Father      No history of malignancy.    PHYSICAL EXAM:  Telephone  visit.      LABS:  CBC RESULTS:   Recent Labs   Lab Test 01/02/23  0701 01/01/23  0632   WBC  --  4.1   RBC 3.12* 3.09*   HGB 8.7* 8.7*   HCT 27.9* 27.8*   MCV 89 90   MCH 27.9 28.2   MCHC 31.2* 31.3*   RDW 16.5* 16.6*   * 561*      Latest Reference Range & Units 01/02/23 07:01   % Neutrophils % 57   % Lymphocytes % 18   % Monocytes % 13   % Eosinophils % 9   % Basophils % 2   Absolute Basophils 0.0 - 0.2 10e3/uL 0.1   Absolute Eosinophils 0.0 - 0.7 10e3/uL 0.3   Absolute Immature Granulocytes <=0.4 10e3/uL 0.0   Absolute Lymphocytes 0.8 - 5.3 10e3/uL 0.6 (L)   Absolute Monocytes 0.0 - 1.3 10e3/uL 0.4   % Immature Granulocytes % 1   Absolute Neutrophils 1.6 - 8.3 10e3/uL 2.0   % Retic 0.5 - 2.0 % 2.3 (H)   Absolute Retic 0.025 - 0.095 10e6/uL 0.070     Recent Labs   Lab Test 01/02/23  0701 12/30/22  1145    132*   POTASSIUM 4.5 4.2   CHLORIDE 103 100   CO2 25 23   ANIONGAP 5 9   * 102*   BUN 13 11   CR 0.62* 0.59*   SHELBY 8.4* 8.1*     Lab Results   Component Value Date    AST 32 12/16/2022    AST 26 04/08/2013     Lab Results   Component Value Date    ALT 34 12/16/2022    ALT 20 04/08/2013     No results found for: BILICONJ   Lab Results   Component Value Date    BILITOTAL 0.9 12/16/2022    BILITOTAL 0.8 04/08/2013     Lab Results   Component Value Date    ALBUMIN 3.0 12/16/2022    ALBUMIN 4.5 04/08/2013     Lab Results   Component Value Date    PROTTOTAL 5.6 12/16/2022    PROTTOTAL 7.6 04/08/2013      Lab Results   Component Value Date    ALKPHOS 71 12/16/2022    ALKPHOS 117 04/08/2013      Latest Reference Range & Units 09/12/22 11:25   Ferritin 26 - 388 ng/mL 20 (L)   Iron 35 - 180 ug/dL 35   Iron Binding Cap 240 - 430 ug/dL 332   Iron Saturation Index 15 - 46 % 11 (L)      Latest Reference Range & Units 12/27/08 08:28 10/07/22 15:23   PSA 0.00 - 4.00 ug/L 4.74 (H) 15.00 (H)       Serum M-protein (g/dL):  10/7/22: 0.1 (IgM kappa)    Urine M-peak (%):  12/4/22: Trace, no monoclonal protein  seen    Total IgG (mg/dL), IgA (mg/dL), IgM (mg/dL):     Free kappa light chains (mg/dL), lambda (mg/dL), kappa/lambda ratio:      PATHOLOGY:  Final Diagnosis 1/2/23:   Peripheral Blood Smear:  -Normocytic normochromic anemia.  -Adequate neutrophils with slight toxic changes.  -Slight thrombocytosis with unremarkable platelet morphology         IMAGING:  NM BONE SCAN WHOLE BODY 8/2/22:                                                          IMPRESSION:  Findings typical of Paget's disease involving the left iliac bone and right proximal femur.      CT L-Spine 10/12/22:  Impression:  1. Multilevel degenerative changes as described in great detail above.  Most significant findings are as follows;  - Grade 2 anterolisthesis with posterior disc osteophyte complex and  chronic spondylolysis at L5-S1. Resultant bilateral severe neural  foraminal stenosis.  - Bilateral moderate/severe neural foraminal stenosis at L2-3.  - Partially lumbarized sacral S1.  - Multilevel retrolisthesis involving L2-3 and L3-4.  2. Multifocal presumed Paget's disease involving left iliac wing and  T11.   3. Cholelithiasis without cholecystitis.  4. Diverticulosis without the radiculitis.  5. Prostatomegaly with diffuse bladder wall thickening.        ASSESSMENT/PLAN:  Philipp Bland is a 67 year old male who is referred for monoclonal gammopathy. PMH is significant for lumbar stenosis and radiculopathy, HTN, Paget's disease on zometa, CHANTALE, MGUS.    1) IgM kappa monoclonal gammopathy, 0.1 g/dL  -I reviewed the patient's medical history and clinical findings.   -He had SPEP done for workup of radiculopathy, which resulted in the above. Complicating his clinical picture is history of Paget's disease with imaging on bone scan in 8/2022. He has anemia that has been increasing over the last year, but is most pronounced following surgery with evidence of iron deficiency. He does not have renal dysfunction or hypercalcemia.   -Additionally, he has  significantly elevated PSA with evidence of prostatomegaly and diffuse bladder wall thickening on imaging (see plan below).  -I reviewed with patient the spectrum of plasma cell disorders and the possibility of an underlying lymphoproliferative neoplasm, as well as the high probability of underlying prostate cancer.   -I discussed the need for updated labs and imaging. Will send for SPIEP, UPIEP, iron studies, B12, folate, TSH, peripheral smear, free light chains, and total immunoglobulins. Patient should have PET done and we discussed the need for high likelihood of need for bone marrow biopsy (site dependent on imaging as patient is likely to also have underlying prostate cancer).     2) Elevated PSA with prostatomegaly and diffuse bladder wall thickening  -He will establish care with urology on 1/31/23.   -PSA was 15 in 10/2022 (last was 4.74 in 2008).  -PET will be helpful.  -He has increased nocturia. No hematuria.  -See plan above.     3) Radiculopathy with severe foraminal stenosis  -s/p T10-S1 fusion and L2-S1 decompression in 12/2022.    4) History of Paget's disease  -Receiving zometa intermittently.  -Followed by Endocrinology.     5) -Obtain labs and imaging at patient's convenience prior to our next visit.  -Follow up in clinic in 1 month to review the above, urology evaluation/workup, and to discuss bone marrow biopsy.         Deena Thompson DO  Hematology/Oncology  [facility]        Again, thank you for allowing me to participate in the care of your patient.        Sincerely,        Deena Thompson DO

## 2023-01-17 NOTE — LETTER
1/17/2023         RE: Philipp Bland  [address]  [facility] [address]        Dear Colleague,    Thank you for referring your patient, Philipp Bland, to the [facility] [place]. Please see a copy of my visit note below.    Philipp is a 67 year old who is being evaluated via a billable telephone visit.      What phone number would you like to be contacted at? [phone]  How would you like to obtain your AVS? Marty ADKINS      Distant Location (provider location):  On site  Phone call duration: 18 minutes    [facility]    Hematology/Oncology New Patient Note      Today's Date: 1/17/23    Reason for Consultation: Monoclonal gammopathy  Referring Provider: Babar Issa MD      HISTORY OF PRESENT ILLNESS: Philipp Bland is a 67 year old male who is referred for monoclonal gammopathy. PMH is significant for lumbar stenosis and radiculopathy, HTN, Paget's disease on zometa, CHANTALE, MGUS.    Patient was hospitalized 12/15-12/24/22 for T10-S1 fusion and L2-S1 decompression. He was discharged from rehab on 1/6/23. Post-op was complicated by left foot weakness, polyradiculopathy, superficial purulence from surgical site, iron deficiency anemia, and thrombocytosis.     Record review shows chronic over 2022 with decrease into the 8's following surgery above. He has had stable Cr, no hypercalcemia. FOBT negative in 7/2022. He had evidence of iron deficiency in 9/2022 with ferritin 20, iron sat 11%.     Patient had monoclonal gammopathy testing performed by neurology for radiculopathy. SPEP returned with a 0.1 g/dL IgM kappa MGUS. Bone scan in 8/2022 returned with Paget's.     He lives at home with his son and requires a walker. He played basketball aggressively in his youth. 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 He has had worsening of back pain since then, which  prompted the above.     Cancer Screening: PSA 15 in 10/2022 (4.74 in 2008).      REVIEW OF SYSTEMS:   A 14 point ROS was reviewed with pertinent positives and negatives in the HPI.        HOME MEDICATIONS:  Current Outpatient Medications   Medication Sig Dispense Refill     acetaminophen (TYLENOL) 500 MG tablet Take 2 tablets (1,000 mg) by mouth 3 times daily 180 tablet 0     ferrous gluconate (FERGON) 324 (38 Fe) MG tablet Take 1 tablet (324 mg) by mouth daily (with breakfast) 30 tablet 0     lisinopril (ZESTRIL) 40 MG tablet Take 1 tablet (40 mg) by mouth daily 30 tablet 0     methocarbamol (ROBAXIN) 750 MG tablet Take 1 tablet (750 mg) by mouth 2 times daily 60 tablet 0     oxyCODONE (ROXICODONE) 5 MG tablet Take 1-2 tablets (5-10 mg) by mouth every 6 hours as needed for moderate pain (4-6) 28 tablet 0     vitamin C (ASCORBIC ACID) 500 MG tablet Take 1 tablet (500 mg) by mouth daily 30 tablet 0     Vitamin D3 (CHOLECALCIFEROL) 25 mcg (1000 units) tablet Take 1 tablet (25 mcg) by mouth daily 30 tablet 0     zinc sulfate (ZINCATE) 220 (50 Zn) MG capsule Take 1 capsule (220 mg) by mouth daily 30 capsule 0         ALLERGIES:  Allergies   Allergen Reactions     No Known Drug Allergies          PAST MEDICAL HISTORY:  Past Medical History:   Diagnosis Date     Essential hypertension, benign 01/01/2001     Family history of alcoholism          PAST SURGICAL HISTORY:  Past Surgical History:   Procedure Laterality Date     OPTICAL TRACKING SYSTEM FUSION SPINE POSTERIOR LUMBAR THREE+ LEVELS N/A 12/15/2022    Procedure: Thoracic 10 to Sacral 1 posterior segmental instrumentation. Pelvic instrumentation. Screw cement augmentation L10 to L11. Lumbar 2 to Sacral 1 bilateral decompression and transforaminal interbody fusion. Posterior arthrodesis Thoracic 10-Sacral 1.;  Surgeon: Eliseo Aggarwal MD;  Location:  OR         SOCIAL HISTORY:  Social History     Socioeconomic History     Marital status: Single     Spouse name:  Not on file     Number of children: Not on file     Years of education: Not on file     Highest education level: Not on file   Occupational History     Not on file   Tobacco Use     Smoking status: Never     Smokeless tobacco: Never   Vaping Use     Vaping Use: Never used   Substance and Sexual Activity     Alcohol use: Yes     Comment: 2-3 4x /week     Drug use: No     Sexual activity: Yes     Partners: Female   Other Topics Concern     Parent/sibling w/ CABG, MI or angioplasty before 65F 55M? No   Social History Narrative     Not on file     Social Determinants of Health     Financial Resource Strain: Low Risk      Difficulty of Paying Living Expenses: Not hard at all   Food Insecurity: No Food Insecurity     Worried About Running Out of Food in the Last Year: Never true     Ran Out of Food in the Last Year: Never true   Transportation Needs: No Transportation Needs     Lack of Transportation (Medical): No     Lack of Transportation (Non-Medical): No   Physical Activity: Insufficiently Active     Days of Exercise per Week: 4 days     Minutes of Exercise per Session: 20 min   Stress: No Stress Concern Present     Feeling of Stress : Not at all   Social Connections: Moderately Integrated     Frequency of Communication with Friends and Family: More than three times a week     Frequency of Social Gatherings with Friends and Family: More than three times a week     Attends Zoroastrianism Services: More than 4 times per year     Active Member of Clubs or Organizations: Yes     Attends Club or Organization Meetings: Not on file     Marital Status:    Intimate Partner Violence: Not on file   Housing Stability: Unknown     Unable to Pay for Housing in the Last Year: No     Number of Places Lived in the Last Year: Not on file     Unstable Housing in the Last Year: No         FAMILY HISTORY:  Family History   Problem Relation Age of Onset     Hypertension Father      No history of malignancy.    PHYSICAL EXAM:  Telephone  visit.      LABS:  CBC RESULTS:   Recent Labs   Lab Test 01/02/23  0701 01/01/23  0632   WBC  --  4.1   RBC 3.12* 3.09*   HGB 8.7* 8.7*   HCT 27.9* 27.8*   MCV 89 90   MCH 27.9 28.2   MCHC 31.2* 31.3*   RDW 16.5* 16.6*   * 561*      Latest Reference Range & Units 01/02/23 07:01   % Neutrophils % 57   % Lymphocytes % 18   % Monocytes % 13   % Eosinophils % 9   % Basophils % 2   Absolute Basophils 0.0 - 0.2 10e3/uL 0.1   Absolute Eosinophils 0.0 - 0.7 10e3/uL 0.3   Absolute Immature Granulocytes <=0.4 10e3/uL 0.0   Absolute Lymphocytes 0.8 - 5.3 10e3/uL 0.6 (L)   Absolute Monocytes 0.0 - 1.3 10e3/uL 0.4   % Immature Granulocytes % 1   Absolute Neutrophils 1.6 - 8.3 10e3/uL 2.0   % Retic 0.5 - 2.0 % 2.3 (H)   Absolute Retic 0.025 - 0.095 10e6/uL 0.070     Recent Labs   Lab Test 01/02/23  0701 12/30/22  1145    132*   POTASSIUM 4.5 4.2   CHLORIDE 103 100   CO2 25 23   ANIONGAP 5 9   * 102*   BUN 13 11   CR 0.62* 0.59*   SHELBY 8.4* 8.1*     Lab Results   Component Value Date    AST 32 12/16/2022    AST 26 04/08/2013     Lab Results   Component Value Date    ALT 34 12/16/2022    ALT 20 04/08/2013     No results found for: BILICONJ   Lab Results   Component Value Date    BILITOTAL 0.9 12/16/2022    BILITOTAL 0.8 04/08/2013     Lab Results   Component Value Date    ALBUMIN 3.0 12/16/2022    ALBUMIN 4.5 04/08/2013     Lab Results   Component Value Date    PROTTOTAL 5.6 12/16/2022    PROTTOTAL 7.6 04/08/2013      Lab Results   Component Value Date    ALKPHOS 71 12/16/2022    ALKPHOS 117 04/08/2013      Latest Reference Range & Units 09/12/22 11:25   Ferritin 26 - 388 ng/mL 20 (L)   Iron 35 - 180 ug/dL 35   Iron Binding Cap 240 - 430 ug/dL 332   Iron Saturation Index 15 - 46 % 11 (L)      Latest Reference Range & Units 12/27/08 08:28 10/07/22 15:23   PSA 0.00 - 4.00 ug/L 4.74 (H) 15.00 (H)       Serum M-protein (g/dL):  10/7/22: 0.1 (IgM kappa)    Urine M-peak (%):  12/4/22: Trace, no monoclonal protein  seen    Total IgG (mg/dL), IgA (mg/dL), IgM (mg/dL):     Free kappa light chains (mg/dL), lambda (mg/dL), kappa/lambda ratio:      PATHOLOGY:  Final Diagnosis 1/2/23:   Peripheral Blood Smear:  -Normocytic normochromic anemia.  -Adequate neutrophils with slight toxic changes.  -Slight thrombocytosis with unremarkable platelet morphology         IMAGING:  NM BONE SCAN WHOLE BODY 8/2/22:                                                          IMPRESSION:  Findings typical of Paget's disease involving the left iliac bone and right proximal femur.      CT L-Spine 10/12/22:  Impression:  1. Multilevel degenerative changes as described in great detail above.  Most significant findings are as follows;  - Grade 2 anterolisthesis with posterior disc osteophyte complex and  chronic spondylolysis at L5-S1. Resultant bilateral severe neural  foraminal stenosis.  - Bilateral moderate/severe neural foraminal stenosis at L2-3.  - Partially lumbarized sacral S1.  - Multilevel retrolisthesis involving L2-3 and L3-4.  2. Multifocal presumed Paget's disease involving left iliac wing and  T11.   3. Cholelithiasis without cholecystitis.  4. Diverticulosis without the radiculitis.  5. Prostatomegaly with diffuse bladder wall thickening.        ASSESSMENT/PLAN:  Philipp Bland is a 67 year old male who is referred for monoclonal gammopathy. PMH is significant for lumbar stenosis and radiculopathy, HTN, Paget's disease on zometa, CHANTALE, MGUS.    1) IgM kappa monoclonal gammopathy, 0.1 g/dL  -I reviewed the patient's medical history and clinical findings.   -He had SPEP done for workup of radiculopathy, which resulted in the above. Complicating his clinical picture is history of Paget's disease with imaging on bone scan in 8/2022. He has anemia that has been increasing over the last year, but is most pronounced following surgery with evidence of iron deficiency. He does not have renal dysfunction or hypercalcemia.   -Additionally, he has  significantly elevated PSA with evidence of prostatomegaly and diffuse bladder wall thickening on imaging (see plan below).  -I reviewed with patient the spectrum of plasma cell disorders and the possibility of an underlying lymphoproliferative neoplasm, as well as the high probability of underlying prostate cancer.   -I discussed the need for updated labs and imaging. Will send for SPIEP, UPIEP, iron studies, B12, folate, TSH, peripheral smear, free light chains, and total immunoglobulins. Patient should have PET done and we discussed the need for high likelihood of need for bone marrow biopsy (site dependent on imaging as patient is likely to also have underlying prostate cancer).     2) Elevated PSA with prostatomegaly and diffuse bladder wall thickening  -He will establish care with urology on 1/31/23.   -PSA was 15 in 10/2022 (last was 4.74 in 2008).  -PET will be helpful.  -He has increased nocturia. No hematuria.  -See plan above.     3) Radiculopathy with severe foraminal stenosis  -s/p T10-S1 fusion and L2-S1 decompression in 12/2022.    4) History of Paget's disease  -Receiving zometa intermittently.  -Followed by Endocrinology.     5) -Obtain labs and imaging at patient's convenience prior to our next visit.  -Follow up in clinic in 1 month to review the above, urology evaluation/workup, and to discuss bone marrow biopsy.         Deena Thompson DO  Hematology/Oncology  [facility]        Again, thank you for allowing me to participate in the care of your patient.        Sincerely,        Deena Thompson DO

## 2023-01-25 ENCOUNTER — PATIENT OUTREACH (OUTPATIENT)
Dept: ONCOLOGY | Facility: CLINIC | Age: 68
End: 2023-01-25
Payer: COMMERCIAL

## 2023-01-25 NOTE — PROGRESS NOTES
received a call from Teresa in scheduling who reports that she spoke to Philipp about setting up follow up appointments with Dr. Thompson. Philipp reports that at this time he is not interested in setting up lab, imaging, and a provider visit. He recently had back surgery and is not able to get around very easily. Philipp will contact clinic when he is ready to schedule.        will send an update to Dr. Thompson.    Collette Reeves RN on 1/25/2023 at 3:02 PM

## 2023-01-25 NOTE — PROGRESS NOTES
Deena Thompson, DO  You;  Cancer Clinic Rn Pool 1 minute ago (3:18 PM)     CIRA Murdock,     Thank you for the notification. Please just document. We will see Philipp when he is ready to return to clinic.     Thank you        Collette Reeves, RN on 1/25/2023 at 3:20 PM

## 2023-01-27 ENCOUNTER — TELEPHONE (OUTPATIENT)
Dept: ONCOLOGY | Facility: CLINIC | Age: 68
End: 2023-01-27
Payer: COMMERCIAL

## 2023-01-27 NOTE — TELEPHONE ENCOUNTER
Called Philipp to follow up on scheduled clinic visit on 1/31 with Dr Albright. Apt was scheduled by [facility] staff to follow up on elevated PSA. He was last seen on 10/19/22  at the  location and MRI prostate was ordered but not scheduled as patient wanted to complete spinal surgery first. He had spinal surgery in December and has now been discharged from TCU to home. Mobility continues to be difficult at this time. He does not want to schedule the MRI until he heals more fully. Inquired if he wanted to delay his follow up until after the MRI and he states that he would like to still have a conversation with Dr. Albright about plan and timing, concerns etc. Visit was changed to VV to help accommodate his limited mobility at this time/Carolina Breen RN

## 2023-01-31 ENCOUNTER — TELEPHONE (OUTPATIENT)
Dept: FAMILY MEDICINE | Facility: CLINIC | Age: 68
End: 2023-01-31
Payer: COMMERCIAL

## 2023-01-31 ENCOUNTER — VIRTUAL VISIT (OUTPATIENT)
Dept: ONCOLOGY | Facility: CLINIC | Age: 68
End: 2023-01-31
Attending: STUDENT IN AN ORGANIZED HEALTH CARE EDUCATION/TRAINING PROGRAM
Payer: COMMERCIAL

## 2023-01-31 DIAGNOSIS — R97.20 ELEVATED PROSTATE SPECIFIC ANTIGEN (PSA): Primary | ICD-10-CM

## 2023-01-31 PROCEDURE — 99213 OFFICE O/P EST LOW 20 MIN: CPT | Mod: 95 | Performed by: STUDENT IN AN ORGANIZED HEALTH CARE EDUCATION/TRAINING PROGRAM

## 2023-01-31 PROCEDURE — G0463 HOSPITAL OUTPT CLINIC VISIT: HCPCS | Mod: PN,GT | Performed by: STUDENT IN AN ORGANIZED HEALTH CARE EDUCATION/TRAINING PROGRAM

## 2023-01-31 NOTE — PROGRESS NOTES
Call Duration : 12 mins              Chief Complaint:    Elevated PSA (Prostate Specific Antigen)             History of Present Illness:   Philipp Bland is a 67 year old male being seen for elevated PSA.  Duration of problem: Few months  Previous treatments: Currently had some back surgery as well as undergoing treatment for MGUS      Reviewed previous notes from Dr. Thompson    Has not had the MRI done yet  I last saw him in October  Tells me that since the surgery on his back is still using a walker and had a fall last evening but otherwise is doing okay  No lower urinary tract symptoms             Past Medical History:     Past Medical History:   Diagnosis Date     Essential hypertension, benign 01/01/2001     Family history of alcoholism             Past Surgical History:     Past Surgical History:   Procedure Laterality Date     OPTICAL TRACKING SYSTEM FUSION SPINE POSTERIOR LUMBAR THREE+ LEVELS N/A 12/15/2022    Procedure: Thoracic 10 to Sacral 1 posterior segmental instrumentation. Pelvic instrumentation. Screw cement augmentation L10 to L11. Lumbar 2 to Sacral 1 bilateral decompression and transforaminal interbody fusion. Posterior arthrodesis Thoracic 10-Sacral 1.;  Surgeon: Eliseo Aggarwal MD;  Location:  OR            Medications     Current Outpatient Medications   Medication     acetaminophen (TYLENOL) 500 MG tablet     ferrous gluconate (FERGON) 324 (38 Fe) MG tablet     lisinopril (ZESTRIL) 40 MG tablet     vitamin C (ASCORBIC ACID) 500 MG tablet     Vitamin D3 (CHOLECALCIFEROL) 25 mcg (1000 units) tablet     zinc sulfate (ZINCATE) 220 (50 Zn) MG capsule     methocarbamol (ROBAXIN) 750 MG tablet     oxyCODONE (ROXICODONE) 5 MG tablet     No current facility-administered medications for this visit.            Family History:     Family History   Problem Relation Age of Onset     Hypertension Father             Social History:     Social History     Socioeconomic History     Marital status: Single      Spouse name: Not on file     Number of children: Not on file     Years of education: Not on file     Highest education level: Not on file   Occupational History     Not on file   Tobacco Use     Smoking status: Never     Smokeless tobacco: Never   Vaping Use     Vaping Use: Never used   Substance and Sexual Activity     Alcohol use: Yes     Comment: 2-3 4x /week     Drug use: No     Sexual activity: Yes     Partners: Female   Other Topics Concern     Parent/sibling w/ CABG, MI or angioplasty before 65F 55M? No   Social History Narrative     Not on file     Social Determinants of Health     Financial Resource Strain: Low Risk      Difficulty of Paying Living Expenses: Not hard at all   Food Insecurity: No Food Insecurity     Worried About Running Out of Food in the Last Year: Never true     Ran Out of Food in the Last Year: Never true   Transportation Needs: No Transportation Needs     Lack of Transportation (Medical): No     Lack of Transportation (Non-Medical): No   Physical Activity: Insufficiently Active     Days of Exercise per Week: 4 days     Minutes of Exercise per Session: 20 min   Stress: No Stress Concern Present     Feeling of Stress : Not at all   Social Connections: Moderately Integrated     Frequency of Communication with Friends and Family: More than three times a week     Frequency of Social Gatherings with Friends and Family: More than three times a week     Attends Denominational Services: More than 4 times per year     Active Member of Clubs or Organizations: Yes     Attends Club or Organization Meetings: Not on file     Marital Status:    Intimate Partner Violence: Not on file   Housing Stability: Unknown     Unable to Pay for Housing in the Last Year: No     Number of Places Lived in the Last Year: Not on file     Unstable Housing in the Last Year: No            Allergies:   No known drug allergies         Review of Systems:  From intake questionnaire     Skin: negative  Eyes:  negative  Ears/Nose/Throat: negative  Respiratory: No shortness of breath, dyspnea on exertion, cough, or hemoptysis  Cardiovascular: No chest pain or palpitations  Gastrointestinal: negative; no nausea/vomiting, constipation or diarrhea  Genitourinary: as per HPI  Musculoskeletal: negative  Neurologic: negative  Psychiatric: negative  Hematologic/Lymphatic/Immunologic: negative  Endocrine: negative         Physical Exam:   This is a virtual phone visit      Review of Imaging:  The following imaging exams were independently viewed and interpreted by me and discussed with patient:      Review of Labs:  The following labs were reviewed by me and discussed with the patient:      Assessment & Plan     Elevated prostate specific antigen (PSA)  We discussed about the role of the MRI and the necessity for it to be done  Considering his high PSA and rise over the past many years I recommended that he should get the MRI done as soon as possible  I will reach out to him once the MRI results and then schedule him for a prostate biopsy  He was agreeable with plan  - MR Prostate wo & w Contrast; Future      Rubio Albright MD  [facility]      ==========================    Additional Billing and Coding Information:

## 2023-01-31 NOTE — PROGRESS NOTES
Philipp is a 67 year old who is being evaluated via a billable video visit.      How would you like to obtain your AVS? MemSQLhart  If the video visit is dropped, the invitation should be resent by: [phone]  Will anyone else be joining your video visit? No      Video-Visit Details    Type of service:  Video Visit   Video Start Time:   Video End Time:    Originating Location (pt. Location): Home    Distant Location (provider location):  On-site  Platform used for Video Visit: Jose J Deleon LPN on 1/31/2023 at 3:33 PM

## 2023-01-31 NOTE — TELEPHONE ENCOUNTER
Rochelle does intake from [facility], [phone]. Pt was scheduled to start home care services on 1/7/23 but declined. Rochelle calling in regards to patient, after receiving a call from pt's sonPatrice stating pt unable to get to kitchen, bathroom or around the house, spends lots of time in chair and has progressively declined.     Pt is now agreeable to work with home health care. Had appointment scheduled with Fallon Mauricio on 1/19/23 but no showed.    Recommended Rochelle to have son and pt call to reschedule a face to face. Rochelle will relay to pt to schedule an appointment via [employer] or by calling the clinic @ [phone].    Katherin Woo RN on 1/31/2023 at 9:14 AM

## 2023-02-01 ENCOUNTER — PATIENT OUTREACH (OUTPATIENT)
Dept: ONCOLOGY | Facility: CLINIC | Age: 68
End: 2023-02-01
Payer: COMMERCIAL

## 2023-02-01 NOTE — PROGRESS NOTES
1203 p.m.Called Philipp to follow up, per the request of Dr. Albright, to help patient schedule for MRI. Contact information was provided and return phone call was requested. Of note, patient  Will need MR at [facility] location (([phone]) He will need to be scheduled for a biopsy in the future after the MRI is completed    2;00 p.m.Philipp return call to writer. He relayed that he fell this morning at home and was taken from his home in [place] to [facility] [place] ([place]) and is admitted. He will be having some imaging of his spine and leg later today he thinks. Philipp is not sure about how long he will be at [facility]. We plan to talk next week regarding his status as he will have a better idea at that time when a good time will be to schedule his Prostate MRI. He understands the importance of future follow up for elevated PSA but reassurance given that he needs to address the acute issue of his fall at this time. He has writer's contact inforamation for future reference. Will monitor./Carolina Breen RN

## 2023-02-07 ENCOUNTER — PATIENT OUTREACH (OUTPATIENT)
Dept: ONCOLOGY | Facility: CLINIC | Age: 68
End: 2023-02-07
Payer: COMMERCIAL

## 2023-02-07 NOTE — PROGRESS NOTES
Called Philipp to follow up, per our conversation on 2/1/23. Of note, he fell last week, after his VV with Dr. Albright, and is currently in patient at [facility].  He did not need further surgery after his fall He will likely discharge in the next couple days and is working with [facility] regarding TCU  placement for rehab with OT/PT from surgery/fall. He wants to delay Prostate MRI until he is stronger and discharges from living facility. Will follow up in a months to see how he is recovering  and if he wants to set a date for the future to schedule MRI. He states understanding and appreciation/Carolinatona Breen RN

## 2023-03-07 ENCOUNTER — PATIENT OUTREACH (OUTPATIENT)
Dept: ONCOLOGY | Facility: CLINIC | Age: 68
End: 2023-03-07
Payer: COMMERCIAL

## 2023-03-07 NOTE — PROGRESS NOTES
[id number] Called Philipp to check in, per our last phone discussion on 2/7/23. Of note, at that time Philipp had fallen and had anticipated going to TCU to regain strength and wanted to delay Prostate MRI until after his discharge. Requested return phone call for status update to see if he is ready/needs assistance with scheduling MRI. Contact information provided for return phone call    115 Philipp Called and returned home a week ago. He had c diff while at TCU and had set back and as admitted to [facility].  He would like to get back to his baseline before he gets his MRI. We discussed how his recovery has been slow but he is able to lay for MRI and he is also able to sit in a car to travel. He does have family to help with transportation. With elevated PSA, encouraged him to consider scheduling Prostate MRI at earliest convenience. He agreed to do so and was warm transferred to scheduling to arrange imaging, MRI scheduled on 4/1, Will continue to monitor/Carolina Breen RN

## 2023-03-09 DIAGNOSIS — Z98.1 S/P SPINAL FUSION: Primary | ICD-10-CM

## 2023-03-16 ENCOUNTER — MYC MEDICAL ADVICE (OUTPATIENT)
Dept: NEUROSURGERY | Facility: CLINIC | Age: 68
End: 2023-03-16
Payer: COMMERCIAL

## 2023-03-16 NOTE — TELEPHONE ENCOUNTER
Next Visit: 3/20/23    Name of Provider:  Dr Aggarwal    Assessment: patient s/p TLIF 12/15/22. Prior to surgery reported only L foot weakness. After surgery, patient reports R foot weakness in addition to the L foot weakness . Can press down both feet but cannot lift them. Reports no improvement in left foot strength since surgery, reports slight improvement in right foot strength.   Documented BL foot drop prior to discharge requiring BL AFOs. Neurosurgery team aware.  Went to ARU after surgery for about 2 weeks and participated in therapies. Has been doing recommended exercises daily since he's been home. Ambulates with walker.     Has his 12 week appt on Monday 3/20/23 with xrays prior.   Missed his 6 week d/t hospitalization for an unrelated matter.

## 2023-03-20 NOTE — CONFIDENTIAL NOTE
Dr. Aggarwal aware of patient's foot concerns. Patient scheduled for follow-up 3/20 but sent MyChart stating he needs to reschedule. Phone number given to patient to reschedule.

## 2023-03-21 ENCOUNTER — MYC MEDICAL ADVICE (OUTPATIENT)
Dept: NEUROSURGERY | Facility: CLINIC | Age: 68
End: 2023-03-21
Payer: COMMERCIAL

## 2023-03-22 NOTE — TELEPHONE ENCOUNTER
Patient reports he is starting therapy today and [facility]. He is scheduled to see Dr. Aggarwal on 3/27/23.

## 2023-03-27 ENCOUNTER — ANCILLARY PROCEDURE (OUTPATIENT)
Dept: GENERAL RADIOLOGY | Facility: CLINIC | Age: 68
End: 2023-03-27
Attending: NEUROLOGICAL SURGERY
Payer: COMMERCIAL

## 2023-03-27 ENCOUNTER — OFFICE VISIT (OUTPATIENT)
Dept: NEUROSURGERY | Facility: CLINIC | Age: 68
End: 2023-03-27
Attending: NEUROLOGICAL SURGERY
Payer: COMMERCIAL

## 2023-03-27 VITALS — DIASTOLIC BLOOD PRESSURE: 84 MMHG | HEART RATE: 103 BPM | OXYGEN SATURATION: 99 % | SYSTOLIC BLOOD PRESSURE: 159 MMHG

## 2023-03-27 DIAGNOSIS — Z98.1 S/P SPINAL FUSION: ICD-10-CM

## 2023-03-27 DIAGNOSIS — T84.498A LOOSENING OF HARDWARE IN SPINE (H): ICD-10-CM

## 2023-03-27 DIAGNOSIS — Z98.1 S/P SPINAL FUSION: Primary | ICD-10-CM

## 2023-03-27 PROCEDURE — 99213 OFFICE O/P EST LOW 20 MIN: CPT | Performed by: NEUROLOGICAL SURGERY

## 2023-03-27 PROCEDURE — 72080 X-RAY EXAM THORACOLMB 2/> VW: CPT | Mod: TC | Performed by: RADIOLOGY

## 2023-03-27 ASSESSMENT — PAIN SCALES - GENERAL: PAINLEVEL: MODERATE PAIN (5)

## 2023-03-27 NOTE — NURSING NOTE
"Philipp Bland is a 67 year old male who presents for:  Chief Complaint   Patient presents with     Surgical Followup     12 week follow-up          Vitals:    Vitals:    03/27/23 1046   BP: (!) 159/84   Pulse: 103   SpO2: 99%       BMI:  Estimated body mass index is 33.47 kg/m  as calculated from the following:    Height as of 12/24/22: 5' 8\" (1.727 m).    Weight as of 1/3/23: 220 lb 1.6 oz (99.8 kg).    Pain Score:  Moderate Pain (5)        Amendo Phorn      "

## 2023-03-27 NOTE — PROGRESS NOTES
"It was a pleasure to see Philipp Bland today in Neurosurgery Clinic. He is a 67 year old male who underwent:    Procedure Date: 12/15/2022     PREOPERATIVE DIAGNOSIS:  Lumbar stenosis and spondylolisthesis with radiculopathy.     POSTOPERATIVE DIAGNOSIS:  Lumbar stenosis and spondylolisthesis with radiculopathy.     PROCEDURE:  1.  T10 to S1 posterior segmental instrumentation.  2.  Pelvic instrumentation with S2 alar iliac screws.    3.  Screws cement augmentation T10 and T11.   4.  L2 to S1 bilateral decompression and transforaminal interbody fusion.  5.  Posterior arthrodesis, T10 to S1 using allograft cancellous chips.     SURGEON:  Eliseo Aggarwal MD.     ASSISTANT:  Jeff Lopez PA-C.     ANESTHESIA:  General endotracheal anesthesia.     ESTIMATED BLOOD LOSS:  1100 mL    Overall he feels like he is making progress with his pain and activity.    He does describe a fall about 6 weeks ago but did not have any worsening symptoms at that time.    He did have a new right foot drop after surgery which has not seemed to improve improved.  His chronic left foot drop really has not improved much either.    Vitals:    03/27/23 1046   BP: (!) 159/84   Pulse: 103   SpO2: 99%     Estimated body mass index is 33.47 kg/m  as calculated from the following:    Height as of 12/24/22: 1.727 m (5' 8\").    Weight as of 1/3/23: 99.8 kg (220 lb 1.6 oz).  Moderate Pain (5)    Well-healed incision  Bilateral lower extremity strength is 5 out of 5 in all muscle groups except for bilateral foot drops    Imaging: X-rays of the lumbar spine show his instrumentation which in general is in good position with good alignment.  However there does appear to have been either fracture of the clay at the left S2 AI screw or loosening of the setscrew.  The imaging was reviewed with the patient showed the patient clinic today.    Assessment: Status post thoracolumbar fusion    Plan: I would like to obtain a CT and MRI of the lumbar spine to " evaluate his persistent foot drops and his hardware failure.  We discussed that he will likely need to go back to the OR to revise the distal instrumentation.  We will discuss this by phone once his imaging studies are done.

## 2023-03-27 NOTE — PATIENT INSTRUCTIONS
Patient Next Steps:    Order placed for CT Lumbar spine without contrast and lumbar MRI with and without contrast. Central Scheduling will call you to make the appointment. If you do not hear from them within 1-2 business days you can call the numbers below.   [phone]     Dr. Aggarwal would like you to schedule a telephone visit with him after you complete your imaging to discuss results and next steps.       Please call us if you have any further questions or concerns.    [facility]   Phone: [phone]  Fax: [phone]            Patient

## 2023-03-27 NOTE — LETTER
"    3/27/2023         RE: Philipp Bland  [address] [address]        Dear Colleague,    Thank you for referring your patient, Philipp Bland, to the [facility] NEUROSURGERY [facility] [place]. Please see a copy of my visit note below.    It was a pleasure to see Philipp Bland today in Neurosurgery Clinic. He is a 67 year old male who underwent:    Procedure Date: 12/15/2022     PREOPERATIVE DIAGNOSIS:  Lumbar stenosis and spondylolisthesis with radiculopathy.     POSTOPERATIVE DIAGNOSIS:  Lumbar stenosis and spondylolisthesis with radiculopathy.     PROCEDURE:  1.  T10 to S1 posterior segmental instrumentation.  2.  Pelvic instrumentation with S2 alar iliac screws.    3.  Screws cement augmentation T10 and T11.   4.  L2 to S1 bilateral decompression and transforaminal interbody fusion.  5.  Posterior arthrodesis, T10 to S1 using allograft cancellous chips.     SURGEON:  Eliseo Aggarwal MD.     ASSISTANT:  Jeff Lopez PA-C.     ANESTHESIA:  General endotracheal anesthesia.     ESTIMATED BLOOD LOSS:  1100 mL    Overall he feels like he is making progress with his pain and activity.    He does describe a fall about 6 weeks ago but did not have any worsening symptoms at that time.    He did have a new right foot drop after surgery which has not seemed to improve improved.  His chronic left foot drop really has not improved much either.    Vitals:    03/27/23 1046   BP: (!) 159/84   Pulse: 103   SpO2: 99%     Estimated body mass index is 33.47 kg/m  as calculated from the following:    Height as of 12/24/22: 1.727 m (5' 8\").    Weight as of 1/3/23: 99.8 kg (220 lb 1.6 oz).  Moderate Pain (5)    Well-healed incision  Bilateral lower extremity strength is 5 out of 5 in all muscle groups except for bilateral foot drops    Imaging: X-rays of the lumbar spine show his instrumentation which in general is in good position with good alignment.  However there does appear to have been either " fracture of the clay at the left S2 AI screw or loosening of the setscrew.  The imaging was reviewed with the patient showed the patient clinic today.    Assessment: Status post thoracolumbar fusion    Plan: I would like to obtain a CT and MRI of the lumbar spine to evaluate his persistent foot drops and his hardware failure.  We discussed that he will likely need to go back to the OR to revise the distal instrumentation.  We will discuss this by phone once his imaging studies are done.         Again, thank you for allowing me to participate in the care of your patient.        Sincerely,        Elisoe Aggarwal MD

## 2023-03-28 ENCOUNTER — MYC MEDICAL ADVICE (OUTPATIENT)
Dept: NEUROSURGERY | Facility: CLINIC | Age: 68
End: 2023-03-28
Payer: COMMERCIAL

## 2023-03-29 ENCOUNTER — TRANSFERRED RECORDS (OUTPATIENT)
Dept: HEALTH INFORMATION MANAGEMENT | Facility: CLINIC | Age: 68
End: 2023-03-29
Payer: COMMERCIAL

## 2023-04-03 ENCOUNTER — TELEPHONE (OUTPATIENT)
Dept: FAMILY MEDICINE | Facility: CLINIC | Age: 68
End: 2023-04-03
Payer: COMMERCIAL

## 2023-04-03 NOTE — TELEPHONE ENCOUNTER
Patient Quality Outreach    Patient is due for the following:   Physical Annual Wellness Visit    Next Steps:   Patient was scheduled for wellness visit    Type of outreach:    Sent [employer] message.      Questions for provider review:    None     Deena Mujica, CMA

## 2023-04-05 ENCOUNTER — MYC MEDICAL ADVICE (OUTPATIENT)
Dept: NEUROSURGERY | Facility: CLINIC | Age: 68
End: 2023-04-05
Payer: COMMERCIAL

## 2023-04-16 ENCOUNTER — HEALTH MAINTENANCE LETTER (OUTPATIENT)
Age: 68
End: 2023-04-16

## 2023-04-17 ENCOUNTER — HOSPITAL ENCOUNTER (OUTPATIENT)
Dept: CT IMAGING | Facility: CLINIC | Age: 68
Discharge: HOME | End: 2023-04-17
Attending: NEUROLOGICAL SURGERY
Payer: COMMERCIAL

## 2023-04-17 ENCOUNTER — HOSPITAL ENCOUNTER (OUTPATIENT)
Dept: MRI IMAGING | Facility: CLINIC | Age: 68
Discharge: HOME | End: 2023-04-17
Attending: NEUROLOGICAL SURGERY
Payer: COMMERCIAL

## 2023-04-17 DIAGNOSIS — Z98.1 S/P SPINAL FUSION: ICD-10-CM

## 2023-04-17 DIAGNOSIS — T84.498A LOOSENING OF HARDWARE IN SPINE (H): ICD-10-CM

## 2023-04-17 PROCEDURE — 72158 MRI LUMBAR SPINE W/O & W/DYE: CPT

## 2023-04-17 PROCEDURE — 255N000002 HC RX 255 OP 636: Performed by: NEUROLOGICAL SURGERY

## 2023-04-17 PROCEDURE — A9585 GADOBUTROL INJECTION: HCPCS | Performed by: NEUROLOGICAL SURGERY

## 2023-04-17 PROCEDURE — 72131 CT LUMBAR SPINE W/O DYE: CPT

## 2023-04-17 RX ORDER — GADOBUTROL 604.72 MG/ML
11 INJECTION INTRAVENOUS ONCE
Status: COMPLETED | OUTPATIENT
Start: 2023-04-17 | End: 2023-04-17

## 2023-04-17 RX ADMIN — GADOBUTROL 11 ML: 604.72 INJECTION INTRAVENOUS at 19:21

## 2023-04-19 ENCOUNTER — MYC MEDICAL ADVICE (OUTPATIENT)
Dept: FAMILY MEDICINE | Facility: CLINIC | Age: 68
End: 2023-04-19
Payer: COMMERCIAL

## 2023-04-20 ENCOUNTER — TELEPHONE (OUTPATIENT)
Dept: FAMILY MEDICINE | Facility: CLINIC | Age: 68
End: 2023-04-20
Payer: COMMERCIAL

## 2023-04-20 NOTE — TELEPHONE ENCOUNTER
Reason for Call:  Appointment Request    Patient requesting this type of appt:  OV    Requested provider: Fallon Mauricio    Reason patient unable to be scheduled: Not within requested timeframe    When does patient want to be seen/preferred time: 1-2 weeks    Comments: Blood pressure medication check     Could we send this information to you in [facility] or would you prefer to receive a phone call?:   Patient would prefer a phone call   Okay to leave a detailed message?: Yes at Home number on file [phone] (home)    Call taken on 4/20/2023 at 9:56 AM by Daisha Neves

## 2023-04-21 ENCOUNTER — MYC MEDICAL ADVICE (OUTPATIENT)
Dept: NEUROSURGERY | Facility: CLINIC | Age: 68
End: 2023-04-21
Payer: COMMERCIAL

## 2023-04-21 NOTE — CONFIDENTIAL NOTE
Dr. Aggarwal requires to hold NSAIDS for 12 weeks after fusion surgery. Patient is past 12 weeks post-op. Advised patient ok to take NSAIDS at this time.

## 2023-04-24 ENCOUNTER — VIRTUAL VISIT (OUTPATIENT)
Dept: NEUROSURGERY | Facility: CLINIC | Age: 68
End: 2023-04-24
Attending: NEUROLOGICAL SURGERY
Payer: COMMERCIAL

## 2023-04-24 DIAGNOSIS — T84.498A LOOSENING OF HARDWARE IN SPINE (H): Primary | ICD-10-CM

## 2023-04-24 DIAGNOSIS — M48.061 SPINAL STENOSIS OF LUMBAR REGION WITH RADICULOPATHY: ICD-10-CM

## 2023-04-24 DIAGNOSIS — M54.16 SPINAL STENOSIS OF LUMBAR REGION WITH RADICULOPATHY: ICD-10-CM

## 2023-04-24 PROCEDURE — 99442 PR PHYSICIAN TELEPHONE EVALUATION 11-20 MIN: CPT | Mod: 95 | Performed by: NEUROLOGICAL SURGERY

## 2023-04-24 NOTE — LETTER
"    4/24/2023         RE: Philipp Bland  [address] [address]        Dear Colleague,    Thank you for referring your patient, Philipp Bland, to the [facility] NEUROSURGERY CLINIC [place]. Please see a copy of my visit note below.    It was a pleasure to see Philipp Bland today in Neurosurgery Clinic. He is a 67 year old male who is here via telephone visit for follow-up of his MRI and CT scan of the lumbar spine.    His spinal canal looks well decompressed but there is significant foraminal stenosis at L5-S1 likely contributing to the new right foot drop.  There appears to be loosening of the S1 screws along with clay that is  Disconnected from the left S2 AI screw.    There were no vitals filed for this visit.  Estimated body mass index is 33.47 kg/m  as calculated from the following:    Height as of 12/24/22: 1.727 m (5' 8\").    Weight as of 1/3/23: 99.8 kg (220 lb 1.6 oz).  Data Unavailable    Phone visit    Imaging: As above    Assessment: Hardware failure and pseudoarthrosis.  Lumbar stenosis with radiculopathy.    Plan: I discussed the need to revise his hardware including adding additional pelvic instrumentation and transitioning to a 4 clay construct.  This will also allow for redo decompression at L5-S1 to see if we can improve his foot drop.    We will work on scheduling this in the near future.    This phone visit took 12 minutes.  MD Location: [place], [place]  Patient Location: [place], [place]       Again, thank you for allowing me to participate in the care of your patient.        Sincerely,        Eliseo Aggarwal MD    "

## 2023-04-24 NOTE — NURSING NOTE
"Philipp Bland is a 67 year old male who presents for:  Chief Complaint   Patient presents with     Results        Vitals:    There were no vitals filed for this visit.    BMI:  Estimated body mass index is 33.47 kg/m  as calculated from the following:    Height as of 12/24/22: 5' 8\" (1.727 m).    Weight as of 1/3/23: 220 lb 1.6 oz (99.8 kg).          Amendo Phorn      "

## 2023-04-24 NOTE — PROGRESS NOTES
"It was a pleasure to see Philipp Bland today in Neurosurgery Clinic. He is a 67 year old male who is here via telephone visit for follow-up of his MRI and CT scan of the lumbar spine.    His spinal canal looks well decompressed but there is significant foraminal stenosis at L5-S1 likely contributing to the new right foot drop.  There appears to be loosening of the S1 screws along with clay that is  Disconnected from the left S2 AI screw.    There were no vitals filed for this visit.  Estimated body mass index is 33.47 kg/m  as calculated from the following:    Height as of 12/24/22: 1.727 m (5' 8\").    Weight as of 1/3/23: 99.8 kg (220 lb 1.6 oz).  Data Unavailable    Phone visit    Imaging: As above    Assessment: Hardware failure and pseudoarthrosis.  Lumbar stenosis with radiculopathy.    Plan: I discussed the need to revise his hardware including adding additional pelvic instrumentation and transitioning to a 4 clay construct.  This will also allow for redo decompression at L5-S1 to see if we can improve his foot drop.    We will work on scheduling this in the near future.    This phone visit took 12 minutes.  MD Location: [place], [place]  Patient Location: [place]   "

## 2023-04-26 ENCOUNTER — PREP FOR PROCEDURE (OUTPATIENT)
Dept: NEUROLOGY | Facility: CLINIC | Age: 68
End: 2023-04-26
Payer: COMMERCIAL

## 2023-04-26 DIAGNOSIS — M54.16 SPINAL STENOSIS OF LUMBAR REGION WITH RADICULOPATHY: ICD-10-CM

## 2023-04-26 DIAGNOSIS — M48.061 SPINAL STENOSIS OF LUMBAR REGION WITH RADICULOPATHY: ICD-10-CM

## 2023-04-26 DIAGNOSIS — T84.498A LOOSENING OF HARDWARE IN SPINE (H): Primary | ICD-10-CM

## 2023-05-01 ENCOUNTER — VIRTUAL VISIT (OUTPATIENT)
Dept: NEUROSURGERY | Facility: CLINIC | Age: 68
End: 2023-05-01
Attending: NEUROLOGICAL SURGERY
Payer: COMMERCIAL

## 2023-05-01 DIAGNOSIS — Z98.1 S/P SPINAL FUSION: ICD-10-CM

## 2023-05-01 DIAGNOSIS — T84.498A LOOSENING OF HARDWARE IN SPINE (H): Primary | ICD-10-CM

## 2023-05-01 PROCEDURE — 99441 PR PHYSICIAN TELEPHONE EVALUATION 5-10 MIN: CPT | Mod: 95 | Performed by: NEUROLOGICAL SURGERY

## 2023-05-01 NOTE — NURSING NOTE
"Philipp Bland is a 67 year old male who presents for:  Chief Complaint   Patient presents with     RECHECK        Vitals:    There were no vitals filed for this visit.    BMI:  Estimated body mass index is 33.47 kg/m  as calculated from the following:    Height as of 12/24/22: 5' 8\" (1.727 m).    Weight as of 1/3/23: 220 lb 1.6 oz (99.8 kg).      Amendo Phorn      "

## 2023-05-01 NOTE — LETTER
"    5/1/2023         RE: Philipp Bland  [address] [address]        Dear Colleague,    Thank you for referring your patient, Philipp Bland, to the [facility] NEUROSURGERY CLINIC [place]. Please see a copy of my visit note below.    It was a pleasure to see Philipp Bland today in Neurosurgery Clinic. He is a 67 year old male who is here to discuss revision surgery for his thoracolumbar instrumentation.    He and his son have concerns about undergoing repeat surgery.    There were no vitals filed for this visit.  Estimated body mass index is 33.47 kg/m  as calculated from the following:    Height as of 12/24/22: 1.727 m (5' 8\").    Weight as of 1/3/23: 99.8 kg (220 lb 1.6 oz).  Data Unavailable    Phone visit    Imaging: CT scan was reviewed and again shows some loosening of the sacral 1 screws and what appears to be a clay out of the head of the left S2 AI screw.    Assessment: Hardware failure, status post thoracolumbar fusion.    Plan: We discussed surgery, and the patient at this point is unwilling to proceed.  I would like to see him back in 6 months with repeat imaging to see how things are progressing and to discuss whether intervention is necessary at that time.  We discussed that the problems may worsen but I do think that there is not likely to be a significant difference over the next 6 months.    This phone visit took 8 minutes.  MD Location: [place], [place]  Patient Location: [place], [place]          Again, thank you for allowing me to participate in the care of your patient.        Sincerely,        Eliseo Aggarwal MD    "

## 2023-05-01 NOTE — PATIENT INSTRUCTIONS
Patient Next Steps:      Dr. Aggarwal would like to see you back in the clinic for follow up at the end of October with a lumbar CT and standing scoli XR prior. Please call the number below to schedule.         Please call us if you have any further questions or concerns.    [facility]   Phone: [phone]  Fax: [phone]

## 2023-05-01 NOTE — PROGRESS NOTES
"It was a pleasure to see Philipp Bland today in Neurosurgery Clinic. He is a 67 year old male who is here to discuss revision surgery for his thoracolumbar instrumentation.    He and his son have concerns about undergoing repeat surgery.    There were no vitals filed for this visit.  Estimated body mass index is 33.47 kg/m  as calculated from the following:    Height as of 12/24/22: 1.727 m (5' 8\").    Weight as of 1/3/23: 99.8 kg (220 lb 1.6 oz).  Data Unavailable    Phone visit    Imaging: CT scan was reviewed and again shows some loosening of the sacral 1 screws and what appears to be a clay out of the head of the left S2 AI screw.    Assessment: Hardware failure, status post thoracolumbar fusion.    Plan: We discussed surgery, and the patient at this point is unwilling to proceed.  I would like to see him back in 6 months with repeat imaging to see how things are progressing and to discuss whether intervention is necessary at that time.  We discussed that the problems may worsen but I do think that there is not likely to be a significant difference over the next 6 months.    This phone visit took 8 minutes.  MD Location: [place], [place]  Patient Location: [place], [place]      "

## 2023-05-04 ENCOUNTER — TELEPHONE (OUTPATIENT)
Dept: FAMILY MEDICINE | Facility: CLINIC | Age: 68
End: 2023-05-04

## 2023-05-04 NOTE — TELEPHONE ENCOUNTER
"Pt calling, informs he is scheduled with Aayush Moore PA-C for 5/10/23 for blood pressure meds. Pt states \"it is very hard for me to get there. I come all the way from [place]. Do I really need to come in for refills?\"    Reviewed pt's chart history with pt which details multiple no-show appointments and multiple hernan refills given. Advised pt he is overdue to be seen as it is not safe for provider to continue prescribing without seeing him.    Patient was given an opportunity to ask questions, verbalized understanding of plan, and is agreeable.    Alyson Nicholson RN    "

## 2023-05-10 ENCOUNTER — OFFICE VISIT (OUTPATIENT)
Dept: FAMILY MEDICINE | Facility: CLINIC | Age: 68
End: 2023-05-10
Payer: COMMERCIAL

## 2023-05-10 VITALS
BODY MASS INDEX: 31.07 KG/M2 | TEMPERATURE: 98.5 F | RESPIRATION RATE: 18 BRPM | SYSTOLIC BLOOD PRESSURE: 138 MMHG | DIASTOLIC BLOOD PRESSURE: 88 MMHG | WEIGHT: 205 LBS | HEIGHT: 68 IN | OXYGEN SATURATION: 100 % | HEART RATE: 105 BPM

## 2023-05-10 DIAGNOSIS — D64.9 ANEMIA, UNSPECIFIED TYPE: ICD-10-CM

## 2023-05-10 DIAGNOSIS — I10 BENIGN ESSENTIAL HYPERTENSION: ICD-10-CM

## 2023-05-10 PROCEDURE — 99214 OFFICE O/P EST MOD 30 MIN: CPT | Performed by: PHYSICIAN ASSISTANT

## 2023-05-10 RX ORDER — FERROUS GLUCONATE 324(38)MG
324 TABLET ORAL
Qty: 90 TABLET | Refills: 1 | Status: SHIPPED | OUTPATIENT
Start: 2023-05-10 | End: 2023-11-28

## 2023-05-10 RX ORDER — LISINOPRIL 40 MG/1
40 TABLET ORAL DAILY
Qty: 90 TABLET | Refills: 1 | Status: SHIPPED | OUTPATIENT
Start: 2023-05-10 | End: 2023-11-17

## 2023-05-10 NOTE — PROGRESS NOTES
Assessment & Plan     Benign essential hypertension    Well controlled. Refilled. Labs reviewed from [facility] 2 months ago and are stable. OK to follow-up with phone visit in 6 months as he does have the ability to check blood pressures at home. It is harder for him to get here since he is currently living in [place] and dealing with after affects of spinal surgeries.     - lisinopril (ZESTRIL) 40 MG tablet; Take 1 tablet (40 mg) by mouth daily      Anemia, unspecified type    Hemoglobin was still low at last check so recommend continuing iron supplement.    - ferrous gluconate (FERGON) 324 (38 Fe) MG tablet; Take 1 tablet (324 mg) by mouth daily (with breakfast)                   Aayush Moore PA-C  [facility] JAIRO Segal is a 67 year old, presenting for the following health issues:  Hypertension         View : No data to display.              History of Present Illness       Hypertension: He presents for follow up of hypertension.  He does not check blood pressure  regularly outside of the clinic. Outpatient blood pressures have not been over 140/90. He does not follow a low salt diet.     He eats 2-3 servings of fruits and vegetables daily.He consumes 1 sweetened beverage(s) daily.He exercises with enough effort to increase his heart rate 30 to 60 minutes per day.  He exercises with enough effort to increase his heart rate 7 days per week.   He is taking medications regularly.       Medication Followup of Lisnopril    Taking Medication as prescribed: yes    Side Effects:  None    Medication Helping Symptoms:  yes    Hypertension Follow-up      Do you check your blood pressure regularly outside of the clinic? No     Are you following a low salt diet? No    Are your blood pressures ever more than 140 on the top number (systolic) OR more   than 90 on the bottom number (diastolic), for example 140/90? No      Review of Systems   Constitutional, HEENT, cardiovascular,  "pulmonary, gi and gu systems are negative, except as otherwise noted.        Objective    /88 (BP Location: Right arm, Patient Position: Sitting, Cuff Size: Adult Large)   Pulse 105   Temp 98.5  F (36.9  C) (Oral)   Resp 18   Ht 1.727 m (5' 8\")   Wt 93 kg (205 lb)   SpO2 100%   BMI 31.17 kg/m    Body mass index is 31.17 kg/m .       Physical Exam   GENERAL: healthy, alert and no distress  EYES: Eyes grossly normal to inspection, PERRL and conjunctivae and sclerae normal  RESP: lungs clear to auscultation - no rales, rhonchi or wheezes  CV: regular rate and rhythm, normal S1 S2, no S3 or S4, no murmur, click or rub, no peripheral edema and peripheral pulses strong  MS: no gross musculoskeletal defects noted, no edema  SKIN: no suspicious lesions or rashes  NEURO: Normal strength and tone, mentation intact and speech normal  PSYCH: mentation appears normal, affect normal/bright                    "

## 2023-05-12 ENCOUNTER — PATIENT OUTREACH (OUTPATIENT)
Dept: ONCOLOGY | Facility: CLINIC | Age: 68
End: 2023-05-12
Payer: COMMERCIAL

## 2023-05-12 NOTE — PROGRESS NOTES
Called Philipp to follow up as prostate MRI that was scheduled on 5/4 was cancelled and writer does not see that it has been rescheduled. Philipp needed to cancel his apt due to transportation issues. He had planned to call and reschedule his imaging today. Scheduling line was provided and he will call to schedule a new apt. Writer will monitor and will follow up in the future regarding results and recommendations. He was appreciative of the call/Carolina Breen RN

## 2023-06-08 ENCOUNTER — ANCILLARY PROCEDURE (OUTPATIENT)
Dept: MRI IMAGING | Facility: CLINIC | Age: 68
End: 2023-06-08
Attending: STUDENT IN AN ORGANIZED HEALTH CARE EDUCATION/TRAINING PROGRAM
Payer: MEDICARE

## 2023-06-08 DIAGNOSIS — R97.20 ELEVATED PROSTATE SPECIFIC ANTIGEN (PSA): ICD-10-CM

## 2023-06-08 PROCEDURE — A9585 GADOBUTROL INJECTION: HCPCS | Performed by: RADIOLOGY

## 2023-06-08 PROCEDURE — 72197 MRI PELVIS W/O & W/DYE: CPT | Performed by: RADIOLOGY

## 2023-06-08 RX ORDER — GADOBUTROL 604.72 MG/ML
10 INJECTION INTRAVENOUS ONCE
Status: COMPLETED | OUTPATIENT
Start: 2023-06-08 | End: 2023-06-08

## 2023-06-08 RX ADMIN — GADOBUTROL 10 ML: 604.72 INJECTION INTRAVENOUS at 12:12

## 2023-06-13 ENCOUNTER — TELEPHONE (OUTPATIENT)
Dept: ENDOCRINOLOGY | Facility: CLINIC | Age: 68
End: 2023-06-13

## 2023-06-20 ENCOUNTER — MYC MEDICAL ADVICE (OUTPATIENT)
Dept: FAMILY MEDICINE | Facility: CLINIC | Age: 68
End: 2023-06-20

## 2023-07-05 ENCOUNTER — VIRTUAL VISIT (OUTPATIENT)
Dept: FAMILY MEDICINE | Facility: CLINIC | Age: 68
End: 2023-07-05
Payer: MEDICARE

## 2023-07-05 DIAGNOSIS — M43.16 SPONDYLOLISTHESIS OF LUMBAR REGION: ICD-10-CM

## 2023-07-05 DIAGNOSIS — M48.061 SPINAL STENOSIS OF LUMBAR REGION WITH RADICULOPATHY: ICD-10-CM

## 2023-07-05 DIAGNOSIS — G62.9 NEUROPATHY: ICD-10-CM

## 2023-07-05 DIAGNOSIS — M54.16 SPINAL STENOSIS OF LUMBAR REGION WITH RADICULOPATHY: ICD-10-CM

## 2023-07-05 DIAGNOSIS — Z98.1 S/P SPINAL FUSION: Primary | ICD-10-CM

## 2023-07-05 PROCEDURE — 99214 OFFICE O/P EST MOD 30 MIN: CPT | Mod: VID | Performed by: NURSE PRACTITIONER

## 2023-07-05 NOTE — PROGRESS NOTES
"Philipp is a 67 year old who is being evaluated via a billable video visit.      How would you like to obtain your AVS? MyChart  If the video visit is dropped, the invitation should be resent by: Text to cell phone: [phone]  Will anyone else be joining your video visit? No          Assessment & Plan     S/P spinal fusion  Spondylolisthesis of lumbar region  Spinal stenosis of lumbar region with radiculopathy  Neuropathy  S/p spinal fusion 12/2022. Continues to work rehab program.   Known loose hardware in back that Neurosurgery is considering replacing 10/2023. Patient does not desire this.   Discussed suspicion for for spinal history contributing to neuropathy and his recent increased activity.   Patient wishes to hold on medication at this time as not interfering with daily life or sleep.  Patient to follow-up with Hematology as planned.   Follow-up neurosurgery 10/23 as planned.   Follow-up sooner with any worsening symptoms.                     BMI:   Estimated body mass index is 31.17 kg/m  as calculated from the following:    Height as of 5/10/23: 1.727 m (5' 8\").    Weight as of 5/10/23: 93 kg (205 lb).           DAWIT Castrejon [facility]    Subjective   Philipp is a 67 year old, presenting for the following health issues:  NEUROPATHY (Both feet - mainly left foot)        7/5/2023     9:38 AM   Additional Questions   Roomed by Samantha ESPINO CMA     History of Present Illness       Reason for visit:  Neuropathy in both feet - mainly left foot  Symptom onset:  More than a month  Symptoms include:  Tingling and numbness  Symptom intensity:  Moderate  Symptom progression:  Staying the same  Had these symptoms before:  No    He eats 2-3 servings of fruits and vegetables daily.He consumes 0 sweetened beverage(s) daily.He exercises with enough effort to increase his heart rate 30 to 60 minutes per day.  He exercises with enough effort to increase his heart rate 7 days per week.   He " "is taking medications regularly.     S/p lumbar fusion 12/2022.   Has been having L>R neuropathy vs radiculopathy.   Started approximately 1.5 months ago.   Has been more active over past month since surgery.   Symptoms do not interfere with ADL's, sleeping, or walking. Continues walking with walker. Working with Physical Therapy.   Continues to follow with [facility] for anemia.       Review of Systems   Constitutional, HEENT, cardiovascular, pulmonary, gi and gu systems are negative, except as otherwise noted.      Objective    Vitals - Patient Reported  Weight (Patient Reported): 93 kg (205 lb)  Height (Patient Reported): 172.7 cm (5' 8\")  BMI (Based on Pt Reported Ht/Wt): 31.17        Physical Exam   GENERAL: Healthy, alert and no distress  EYES: Eyes grossly normal to inspection.  No discharge or erythema, or obvious scleral/conjunctival abnormalities.  RESP: No audible wheeze, cough, or visible cyanosis.  No visible retractions or increased work of breathing.    SKIN: Visible skin clear. No significant rash, abnormal pigmentation or lesions.  NEURO: Cranial nerves grossly intact.  Mentation and speech appropriate for age.  PSYCH: Mentation appears normal, affect normal/bright, judgement and insight intact, normal speech and appearance well-groomed.                Video-Visit Details    Type of service:  Video Visit     Originating Location (pt. Location): Home    Distant Location (provider location):  Off-site  Platform used for Video Visit: Jose J"

## 2023-07-07 ENCOUNTER — TRANSFERRED RECORDS (OUTPATIENT)
Dept: HEALTH INFORMATION MANAGEMENT | Facility: CLINIC | Age: 68
End: 2023-07-07

## 2023-07-21 ENCOUNTER — PATIENT OUTREACH (OUTPATIENT)
Dept: ONCOLOGY | Facility: CLINIC | Age: 68
End: 2023-07-21
Payer: MEDICARE

## 2023-07-21 DIAGNOSIS — R97.20 ELEVATED PROSTATE SPECIFIC ANTIGEN (PSA): Primary | ICD-10-CM

## 2023-07-21 NOTE — PROGRESS NOTES
7/21 Called Sheila to return phone call to writer.  7/25 Called Philipp to follow up about prostate biopsy preparation. Confirmed date of biopsy. He is able to drive himself but may have his son come with him. Reviewed his medications and he is taking 325 mg ASA daily but thi was initiated by him and not by a medical provider. He understands that he will need to hold his ASA for 10 days prior to biopsy. He further understands that he will need to start antibiotic twice daily starting the day prior to his biopsy, rationale explained for oral antibx and IM injection at clinic the day of biopsy. Confirmed his pharmacy that he would like to have antibiotic sent to. He will  a fleets enema and will administer an hour prior to coming to clinic. He has directions to our clinic. All questions were answered and he has writer's contact information if any questions or concerns arise prior to the day of his biopsy. He will be sent the biopsy prep below as we discussed on the phone visit/Carolina Breen RN    Ultrasound Guided Prostate Biopsy    What is a prostate biopsy? A prostate biopsy is a relatively painless procedure performed in the physician's office, outpatient facility or hospital.  A long, thin needle is inserted into the prostate to collect a sample of tissue from the prostate.  These samples are then examined by a pathologist for abnormal cells.    Why do I need a prostate biopsy? During a man's lifetime the prostate gradually increases in size.  The patient may or may not experience symptoms.  Symptoms of an enlarged prostate include:    Increased frequency of urination    Decreased force of urinary stream    Trouble with urination    Awakening at night to urinate    When the prostate is examined, the physician may feel a nodule (hard or firm growth) which would require a biopsy.    Another reason for having a prostate biopsy is an increase in the prostate specific androgen (PSA) in your blood.  A prostate  biopsy may be necessary to determine the cause of the increased PSA.    Your physician will also perform an ultrasound (an image created by sound waves).  The ultrasound is performed by placing a small probe in the rectum to image the prostate gland.    Preparation for the Prostate Biopsy    Blood thinning medications must be stopped prior to your biopsy.  Please stop the following medication the appropriate number of days before:  10 days-acetylsalicylic acid, vitamin E, fish oil, aspirin, baby aspirin  7 days- Plavix, Brilinta, ibuprofen (Advil, Motrin, Aleve)  5 days-Pradaxa  4 days-Coumadin/warfarin  3 days-Eliquis, Xarelto    2.  If you have any bleeding problems (thin blood), please tell your doctor.    3.  If you have been told by another doctor to take antibiotics before dental work or surgery, please tell the doctor.  This is common for patients that have had a joint replacement.    YOU HAVE BEEN PRESCRIBED AN ANTIBIOTIC.  You will start this medication the day before your biopsy. It is one pill, twice a day.  You will continue taking the medication until it is gone.    The day of the biopsy you will be asked to use an enema one hour before you leave for your appointment.    PLEASE EAT YOUR REGULAR MEALS ON THE DAY OF YOUR BIOPSY.    Unless you have been prescribed a sedative (Valium or a similar drug) you will be able to drive to and from your appointment.  If you have taken a sedative you must have a ride.    AFTER THE BIOPSY    DANGER SIGNS    Small amount of blood in the urine for 10-14 days Excessive blood in the urine, stool or ejaculate  Small amount of blood in the stool for 48 hours Fever over 100 or chills  Small amount of blood in the ejaculate for up to Frequent urination or burning when you urinate   4 weeks          CALL THE DOCTOR'S OFFICE -[phone] DURING OFFICE HOURS IF YOU HAVE ANY OF THESE SYMPTOMS.  IF YOU CANNOT CONTACT THE OFFICE, GO TO THE EMERGENCY ROOM.          Your biopsy is  scheduled on Tuesday, 8/15/23  at our [place] office.  Please be at the office at 1:15 p.m. A follow up visit has been scheduled for you on Tuesday. 8/29/2023 at 0945. This is a video visit.  Your doctor will discuss your results with you at this visit.

## 2023-07-25 RX ORDER — CIPROFLOXACIN 500 MG/1
500 TABLET, FILM COATED ORAL 2 TIMES DAILY
Qty: 6 TABLET | Refills: 0 | Status: SHIPPED | OUTPATIENT
Start: 2023-07-25 | End: 2023-07-28

## 2023-08-14 ENCOUNTER — PATIENT OUTREACH (OUTPATIENT)
Dept: ONCOLOGY | Facility: CLINIC | Age: 68
End: 2023-08-14
Payer: MEDICARE

## 2023-08-14 NOTE — PROGRESS NOTES
3323 Called Philipp to follow up prior to biopsy scheduled on 8/15. LM remindng him of his check in time and to start antibiotic today, 1 tablet twice daily. Left contact info if any questions/Carolina Breen RN    1020 Philipp called and CLAY for writer noting that he had missed a call.     1022 Called Philipp and CLAY with contact info and invited return call if any questions    Adrian Spoke with Philipp and he relayed that he sent a My Chart message today with questions. Writer is unable to see a My Chart message at this time. He had questions about his procedure and wonders if he needs to have this done as the MRI stated no evidence of a lesion. Read to him the result message from Dr. Albright for rational for procedure. He stated understanding. He will arrive at 115 and understands that he will be done around 315. He did start his antibx this morning and will take as directed. Also has info on the enema tomorrow prior to apt/Carolina Breen RN

## 2023-08-15 ENCOUNTER — OFFICE VISIT (OUTPATIENT)
Dept: ONCOLOGY | Facility: CLINIC | Age: 68
End: 2023-08-15
Attending: STUDENT IN AN ORGANIZED HEALTH CARE EDUCATION/TRAINING PROGRAM
Payer: MEDICARE

## 2023-08-15 VITALS
SYSTOLIC BLOOD PRESSURE: 161 MMHG | RESPIRATION RATE: 20 BRPM | HEART RATE: 110 BPM | WEIGHT: 205 LBS | OXYGEN SATURATION: 95 % | DIASTOLIC BLOOD PRESSURE: 89 MMHG | BODY MASS INDEX: 31.17 KG/M2 | TEMPERATURE: 97.9 F

## 2023-08-15 DIAGNOSIS — R97.20 ELEVATED PROSTATE SPECIFIC ANTIGEN (PSA): Primary | ICD-10-CM

## 2023-08-15 DIAGNOSIS — D29.1 BENIGN NEOPLASM OF PROSTATE: ICD-10-CM

## 2023-08-15 PROCEDURE — 76999 ECHO EXAMINATION PROCEDURE: CPT | Mod: 26 | Performed by: STUDENT IN AN ORGANIZED HEALTH CARE EDUCATION/TRAINING PROGRAM

## 2023-08-15 PROCEDURE — 55700 PR BIOPSY OF PROSTATE,NEEDLE/PUNCH: CPT | Performed by: STUDENT IN AN ORGANIZED HEALTH CARE EDUCATION/TRAINING PROGRAM

## 2023-08-15 PROCEDURE — 250N000011 HC RX IP 250 OP 636: Performed by: STUDENT IN AN ORGANIZED HEALTH CARE EDUCATION/TRAINING PROGRAM

## 2023-08-15 PROCEDURE — 64450 NJX AA&/STRD OTHER PN/BRANCH: CPT | Performed by: STUDENT IN AN ORGANIZED HEALTH CARE EDUCATION/TRAINING PROGRAM

## 2023-08-15 PROCEDURE — 88305 TISSUE EXAM BY PATHOLOGIST: CPT | Mod: TC | Performed by: STUDENT IN AN ORGANIZED HEALTH CARE EDUCATION/TRAINING PROGRAM

## 2023-08-15 PROCEDURE — 96372 THER/PROPH/DIAG INJ SC/IM: CPT | Performed by: STUDENT IN AN ORGANIZED HEALTH CARE EDUCATION/TRAINING PROGRAM

## 2023-08-15 PROCEDURE — 250N000009 HC RX 250: Performed by: STUDENT IN AN ORGANIZED HEALTH CARE EDUCATION/TRAINING PROGRAM

## 2023-08-15 PROCEDURE — 76872 US TRANSRECTAL: CPT | Mod: 26 | Performed by: STUDENT IN AN ORGANIZED HEALTH CARE EDUCATION/TRAINING PROGRAM

## 2023-08-15 RX ORDER — LIDOCAINE HYDROCHLORIDE 10 MG/ML
20 INJECTION, SOLUTION EPIDURAL; INFILTRATION; INTRACAUDAL; PERINEURAL ONCE
Status: DISCONTINUED | OUTPATIENT
Start: 2023-08-15 | End: 2023-08-15

## 2023-08-15 RX ORDER — GENTAMICIN 40 MG/ML
80 INJECTION, SOLUTION INTRAMUSCULAR; INTRAVENOUS ONCE
Status: COMPLETED | OUTPATIENT
Start: 2023-08-15 | End: 2023-08-15

## 2023-08-15 RX ORDER — GENTAMICIN 40 MG/ML
80 INJECTION, SOLUTION INTRAMUSCULAR; INTRAVENOUS ONCE
Status: DISCONTINUED | OUTPATIENT
Start: 2023-08-15 | End: 2023-08-15

## 2023-08-15 RX ORDER — LIDOCAINE HYDROCHLORIDE 10 MG/ML
20 INJECTION, SOLUTION INFILTRATION; PERINEURAL ONCE
Status: COMPLETED | OUTPATIENT
Start: 2023-08-15 | End: 2023-08-15

## 2023-08-15 RX ADMIN — GENTAMICIN SULFATE 80 MG: 40 INJECTION, SOLUTION INTRAMUSCULAR; INTRAVENOUS at 14:05

## 2023-08-15 RX ADMIN — LIDOCAINE HYDROCHLORIDE 20 ML: 10 INJECTION, SOLUTION INFILTRATION; PERINEURAL at 14:45

## 2023-08-15 ASSESSMENT — PAIN SCALES - GENERAL: PAINLEVEL: MODERATE PAIN (5)

## 2023-08-15 NOTE — PROGRESS NOTES
Procedure was explained to patient prior to performing Prostate TRUS Biopsy.  The patient signed the consent form and all questions were answered prior to the procedure. Any pre-procedural antibiotics were given according to the performing physicians recommendation. Patient's information was confirmed on samples and samples were sent for analysis. Paient reviewed information on labels sent with patient and confirmed the accuracy of all the labels.    Consent read and signed: Yes     Allergies   Allergen Reactions    No Known Drug Allergy      Performing Physician: Dr. Albright  Antibiotic taken?  Ciprofloxacin, started on 8/14/23  Aspirin or other blood thinning medications discontinued 7-10 days:   has not taken  Time of Fleet's enema:  Had  large bowel movement at 1000, did not administer enema today  Patient given Gentamicin intramuscular injection 30 minutes prior to procedure Yes    Two samples were taken from the right and left base, mid, and apex of the prostate respectively. One additional sample was taken from both the right and left transitional zone.  Post TRUS patient instructions were explained to the pt and sent to My Chart for his review./Carolina Breen RN

## 2023-08-15 NOTE — PROGRESS NOTES
PREPROCEDURE DIAGNOSIS: Mpsi    POSTPROCEDURE DIAGNOSIS: Elevated PSA.     PROCEDURE: Transrectal ultrasound sizing and transrectal ultrasound guided prostatic needle biopsy for Philipp Bland who is a 67 year old year old male.     SURGEON: Rubio Albright  ANESTHESIA: 10 mL of 1% periprostatic block bilaterally     DESCRIPTION OF PROCEDURE: The procedure, the outcome, the anesthesia, and the risks were discussed with the patient. Informed consent was obtained and signed and a timeout was completed prior to the procedure. Digital rectal examination was performed with the below findings noted. Anesthesia was administered as noted above and the transrectal ultrasound probe was inserted, sizing was performed, and the below findings were noted. 14 core biopsies were taken as described below. The probe was then withdrawn. Patient tolerated the procedure well.     FINDINGS: Digital rectal exam reveals enlarged normal prostate. Total volume is 45 mL. Hypoechoic lesions noted bilaterally. US images were obtained.  We then performed site-directed sextant biopsies.  12 cores were taken with 6 on each side, base, mid and apex. 2 Additional cores were taken from the transition zone    PLAN: Will follow-up next week to review results.  Discussed about hematuria blood in the stools and hematospermia.  He need to touch base with us if he has retention of urine or fever more than 101 which else    Rubio Albright MD  Urology  [facility]

## 2023-08-15 NOTE — LETTER
8/15/2023         RE: Philipp Bland  [address] [facility] [address]        Dear Colleague,    Thank you for referring your patient, Philipp Bland, to the [facility]. Please see a copy of my visit note below.      Procedure was explained to patient prior to performing Prostate TRUS Biopsy.  The patient signed the consent form and all questions were answered prior to the procedure. Any pre-procedural antibiotics were given according to the performing physicians recommendation. Patient's information was confirmed on samples and samples were sent for analysis. [facility] reviewed information on labels sent with patient and confirmed the accuracy of all the labels.    Consent read and signed: Yes     Allergies   Allergen Reactions     No Known Drug Allergy      Performing Physician: Dr. Albright  Antibiotic taken?  Ciprofloxacin, started on 8/14/23  Aspirin or other blood thinning medications discontinued 7-10 days:   has not taken  Time of Fleet's enema:  Had  large bowel movement at 1000, did not administer enema today  Patient given Gentamicin intramuscular injection 30 minutes prior to procedure Yes    Two samples were taken from the right and left base, mid, and apex of the prostate respectively. One additional sample was taken from both the right and left transitional zone.  Post TRUS patient instructions were explained to the pt and sent to My Chart for his review./Carolina Breen RN    PREPROCEDURE DIAGNOSIS: Mpsi    POSTPROCEDURE DIAGNOSIS: Elevated PSA.     PROCEDURE: Transrectal ultrasound sizing and transrectal ultrasound guided prostatic needle biopsy for Philipp Bland who is a 67 year old year old male.     SURGEON: Rubio Albright  ANESTHESIA: 10 mL of 1% periprostatic block bilaterally     DESCRIPTION OF PROCEDURE: The procedure, the outcome, the anesthesia, and the risks were discussed with the patient. Informed consent was obtained and signed and a timeout was completed  prior to the procedure. Digital rectal examination was performed with the below findings noted. Anesthesia was administered as noted above and the transrectal ultrasound probe was inserted, sizing was performed, and the below findings were noted. 14 core biopsies were taken as described below. The probe was then withdrawn. Patient tolerated the procedure well.     FINDINGS: Digital rectal exam reveals enlarged normal prostate. Total volume is 45 mL. Hypoechoic lesions noted bilaterally. US images were obtained.  We then performed site-directed sextant biopsies.  12 cores were taken with 6 on each side, base, mid and apex. 2 Additional cores were taken from the transition zone    PLAN: Will follow-up next week to review results.  Discussed about hematuria blood in the stools and hematospermia.  He need to touch base with us if he has retention of urine or fever more than 101 which else    Rubio Albright MD  Urology  [facility]        Again, thank you for allowing me to participate in the care of your patient.        Sincerely,        Rubio Albright MD

## 2023-08-15 NOTE — PATIENT INSTRUCTIONS
[facility] Urology  Transrectal Ultrasound  Post Operative Information    The physician who performed your Transrectal Ultrasound  Prostate Biopsy is Dr Albright (telephone number [phone], 8-5 Monday thru Friday, [phone] after hours).  Please contact provider if you have any problems or questions after your procedure. If  you are unable to reach your doctor, or the provider on call,  please go to the nearest Emergency Department.    There are four conditions that you should watch for after a prostate biopsy:    Excessive pain  Bleeding irregularities (passing numerous  dime sized  clots or if your urine looks like cranberry juice)  Fever of 100 degrees or more  If you are unable to urinate    If you run a fever above 100 degrees, call the Urology Clinic immediately.  If you are unable to reach your physician or the provider on call, go to the nearest emergency room.  Explain that you have had a transrectal biopsy of your prostate and what problems you are experiencing.      You should attempt to urinate following your biopsy before you leave the clinic.  If you are unable to urinate four (4) to six (6) hours after you leave the clinic, you will need to contact the Urology Clinic.  If you are unable to reach your physician or the provider on call, go to the nearest emergency room.    You may experience mild perineal (groin area) discomfort after the procedure.You may take acetaminophen (Tylenol) for pain.  If  you experience any discomfort  that becomes severe or uncontrolled by medication, contact the Urology Clinic. If unable to reach your physician or the provider on call , please go to the nearest emergency room.        DO NOT TAKE aspirin or ibuprofen products (Motrin, Advil, Nuprin, ect.) for ONE week after your biopsy,  unless specified differently by your physician.     If you stopped anticoagulant therapy prior to  your biopsy, ask your provider when you can restart.    Take one antibiotic the evening of  the procedure. Take one antibiotic the morning and evening on the day following your biopsy.    Drink at least 6-8 glasses of fluids for the first 48 hours.    Avoid heavy lifting and strenuous activity for 48 hours.    Avoid sexual intercourse for the first 24 hours. You make have blood in your semen for 4 weeks after the procedure, up to about a dozen ejaculations. The blood in your ejaculate may appear as brown streaks, blood tinged, and immediately following a biopsy, it may appear bright red.      Do not be alarmed if you see blood in your stool. You may notice a small amount of blood on the tissue after a bowel movement.    You may pass blood with clots in your urine following the procedure. The amount will decrease with time but may be visible for up to two weeks.

## 2023-08-18 LAB
PATH REPORT.COMMENTS IMP SPEC: NORMAL
PATH REPORT.FINAL DX SPEC: NORMAL
PATH REPORT.GROSS SPEC: NORMAL
PATH REPORT.MICROSCOPIC SPEC OTHER STN: NORMAL
PATH REPORT.RELEVANT HX SPEC: NORMAL
PHOTO IMAGE: NORMAL

## 2023-08-18 PROCEDURE — G0416 PROSTATE BIOPSY, ANY MTHD: HCPCS | Mod: 26 | Performed by: PATHOLOGY

## 2023-08-18 PROCEDURE — 88341 IMHCHEM/IMCYTCHM EA ADD ANTB: CPT | Mod: 26 | Performed by: PATHOLOGY

## 2023-08-18 PROCEDURE — 88342 IMHCHEM/IMCYTCHM 1ST ANTB: CPT | Mod: 26 | Performed by: PATHOLOGY

## 2023-08-29 ENCOUNTER — VIRTUAL VISIT (OUTPATIENT)
Dept: ONCOLOGY | Facility: CLINIC | Age: 68
End: 2023-08-29
Attending: STUDENT IN AN ORGANIZED HEALTH CARE EDUCATION/TRAINING PROGRAM
Payer: MEDICARE

## 2023-08-29 DIAGNOSIS — R97.20 ELEVATED PROSTATE SPECIFIC ANTIGEN (PSA): Primary | ICD-10-CM

## 2023-08-29 PROCEDURE — 99213 OFFICE O/P EST LOW 20 MIN: CPT | Mod: VID | Performed by: STUDENT IN AN ORGANIZED HEALTH CARE EDUCATION/TRAINING PROGRAM

## 2023-08-29 NOTE — NURSING NOTE
Is the patient currently in the state of [place]? YES    Visit mode:VIDEO    If the visit is dropped, the patient can be reconnected by: VIDEO VISIT: Text to cell phone:   Telephone Information:   Mobile [phone]       Will anyone else be joining the visit? NO  (If patient encounters technical issues they should call [phone])    How would you like to obtain your AVS? MyChart    Are changes needed to the allergy or medication list? No    Reason for visit: RECHECK (Follow up)    Savage MACE

## 2023-08-29 NOTE — PROGRESS NOTES
Virtual Visit Details    Type of service:  Video Visit   Video Start Time: 10:05 AM  Video End Time:10:16 AM    Originating Location (pt. Location): Home    Distant Location (provider location):  On-site  Platform used for Video Visit: [employer], video disconnected 3 mins into the call and had to complete on audio    HPI:  Philipp Bland is a 67 year old male being seen for discussion of prostate biopsy results.  Duration of problem: 2 weeks  Previous treatments: Prostate biopsy 2 weeks      Reviewed previous notes  Doing well no issues to report         Review of Systems:  From intake questionnaire     Skin: negative  Eyes: negative  Ears/Nose/Throat: negative  Respiratory: No shortness of breath, dyspnea on exertion, cough, or hemoptysis  Cardiovascular: No chest pain or palpitations  Gastrointestinal: negative; no nausea/vomiting, constipation or diarrhea  Genitourinary: as per HPI  Musculoskeletal: negative  Neurologic: negative  Psychiatric: negative  Hematologic/Lymphatic/Immunologic: negative  Endocrine: negative         Physical Exam:   This is a virtual visit    Alert, no acute distress, oriented, conversant    Ears/nose/mouth: mouth:normal, good dentition  Respiratory: no respiratory distress, or pursed lip breathing  Cardiovascular:no obvious jugular venous distension present  Skin: no suspicious lesions or rashes on Visible body parts on the Screen  Neuro: Alert, oriented, speech and mentation normal  Psych: affect and mood normal, alert and oriented to person, place and time  Review of Imaging:  The following imaging exams were independently viewed and interpreted by me and discussed with patient:  MRI Abd/Pelvis: MRI prostate Abnormal: PI-RADS 2, 45 g size, PSA density: more than 1.15    Review of Labs:  The following labs were reviewed by me and discussed with the patient:  Surgical pathology: Normal    Assessment & Plan     Elevated prostate specific antigen (PSA)  No evidence of malignancy on the  samples  Recommended PSA monitoring yearly  Discussed about diet and lifestyle measures which can have an impact on PSA values  We also discussed about drugs like finasteride which can reduce PSA by reducing prostate size  Medical treatment is to continue with healthy lifestyle measures follow-up with us in 1 year  I will consider biopsy of if PSA rises significantly to the current value and there is  evidence of lesion within the prostate on MRI        Rubio Albright MD  [facility]      ==========================    Additional Billing and Coding Information:  Review of external notes as documented above   Review of the result(s) of each unique test - MRI prostate, surgical pathology    Independent interpretation of a test performed by another physician/other qualified health care professional (not separately reported) -       Discussion of management or test interpretation with external physician/other qualified healthcare professional/appropriate source -       15 minutes spent by me on the date of the encounter doing chart review, review of test results, interpretation of tests, patient visit, and documentation

## 2023-10-30 ENCOUNTER — OFFICE VISIT (OUTPATIENT)
Dept: NEUROSURGERY | Facility: CLINIC | Age: 68
End: 2023-10-30
Attending: NEUROLOGICAL SURGERY
Payer: MEDICARE

## 2023-10-30 ENCOUNTER — ANCILLARY PROCEDURE (OUTPATIENT)
Dept: GENERAL RADIOLOGY | Facility: CLINIC | Age: 68
End: 2023-10-30
Attending: NEUROLOGICAL SURGERY
Payer: MEDICARE

## 2023-10-30 ENCOUNTER — HOSPITAL ENCOUNTER (OUTPATIENT)
Dept: CT IMAGING | Facility: CLINIC | Age: 68
Discharge: HOME | End: 2023-10-30
Attending: NEUROLOGICAL SURGERY
Payer: MEDICARE

## 2023-10-30 VITALS
OXYGEN SATURATION: 97 % | DIASTOLIC BLOOD PRESSURE: 91 MMHG | BODY MASS INDEX: 32.13 KG/M2 | WEIGHT: 212 LBS | HEIGHT: 68 IN | SYSTOLIC BLOOD PRESSURE: 147 MMHG | HEART RATE: 112 BPM

## 2023-10-30 DIAGNOSIS — T84.498A LOOSENING OF HARDWARE IN SPINE (H): ICD-10-CM

## 2023-10-30 DIAGNOSIS — Z98.1 S/P SPINAL FUSION: ICD-10-CM

## 2023-10-30 DIAGNOSIS — Z98.1 S/P SPINAL FUSION: Primary | ICD-10-CM

## 2023-10-30 PROCEDURE — G0463 HOSPITAL OUTPT CLINIC VISIT: HCPCS | Mod: 25 | Performed by: NEUROLOGICAL SURGERY

## 2023-10-30 PROCEDURE — 72131 CT LUMBAR SPINE W/O DYE: CPT | Mod: MF

## 2023-10-30 PROCEDURE — G0463 HOSPITAL OUTPT CLINIC VISIT: HCPCS | Performed by: NEUROLOGICAL SURGERY

## 2023-10-30 PROCEDURE — 99213 OFFICE O/P EST LOW 20 MIN: CPT | Performed by: NEUROLOGICAL SURGERY

## 2023-10-30 PROCEDURE — 72082 X-RAY EXAM ENTIRE SPI 2/3 VW: CPT | Mod: TC | Performed by: RADIOLOGY

## 2023-10-30 ASSESSMENT — PAIN SCALES - GENERAL: PAINLEVEL: MODERATE PAIN (4)

## 2023-10-30 NOTE — PROGRESS NOTES
"It was a pleasure to see Philipp Bland today in Neurosurgery Clinic. He is a 67 year old male who underwent:    Procedure Date: 12/15/2022     PREOPERATIVE DIAGNOSIS:  Lumbar stenosis and spondylolisthesis with radiculopathy.     POSTOPERATIVE DIAGNOSIS:  Lumbar stenosis and spondylolisthesis with radiculopathy.     PROCEDURE:  1.  T10 to S1 posterior segmental instrumentation.  2.  Pelvic instrumentation with S2 alar iliac screws.    3.  Screws cement augmentation T10 and T11.   4.  L2 to S1 bilateral decompression and transforaminal interbody fusion.  5.  Posterior arthrodesis, T10 to S1 using allograft cancellous chips.     SURGEON:  Eliseo Aggarwal MD.     ASSISTANT:  Jeff Lopez PA-C.     ANESTHESIA:  General endotracheal anesthesia.     ESTIMATED BLOOD LOSS:  1100 mL    Overall he has been doing reasonably well.  He has low back pain near the lumbosacral junction and his bilateral foot drops.  It sounds as if he is graduated from physical therapy.    Vitals:    10/30/23 1248   BP: (!) 147/91   Pulse: 112   SpO2: 97%   Weight: 96.2 kg (212 lb)   Height: 1.727 m (5' 8\")     Estimated body mass index is 32.23 kg/m  as calculated from the following:    Height as of this encounter: 1.727 m (5' 8\").    Weight as of this encounter: 96.2 kg (212 lb).  Moderate Pain (4)    Well-healed lumbar incision  Bilateral lower extremity strength 5 out of 5 except for bilateral foot drops.    Imaging: X-ray and CT of the lumbar spine show stable alignment of the hardware with progressing bony fusion.  It appears that there is bridging bone across the lumbosacral junction as well.  The imaging is reviewed with the patient shown to the patient clinic today.    Assessment: Status post thoracolumbar fusion.    Plan: Overall I think he is doing reasonably well.  I do not see that there is much advantage to further surgical intervention at this point.  I would like to see him back in the summer with further x-rays to make sure " there is not further hardware failure or other issues.

## 2023-10-30 NOTE — NURSING NOTE
"Philipp Bland is a 67 year old male who presents for:  Chief Complaint   Patient presents with    RECHECK     Imaging review        Initial Vitals:  BP (!) 147/91   Pulse 112   Ht 5' 8\" (1.727 m)   Wt 212 lb (96.2 kg)   SpO2 97%   BMI 32.23 kg/m   Estimated body mass index is 32.23 kg/m  as calculated from the following:    Height as of this encounter: 5' 8\" (1.727 m).    Weight as of this encounter: 212 lb (96.2 kg).. Body surface area is 2.15 meters squared. BP completed using cuff size: regular  Moderate Pain (4)        Tonia Lawrence   "

## 2023-10-30 NOTE — LETTER
"    10/30/2023         RE: Philipp Bland  [address] [address]        Dear Colleague,    Thank you for referring your patient, Philipp Bland, to the [facility] [place]. Please see a copy of my visit note below.    It was a pleasure to see Philipp Bland today in Neurosurgery Clinic. He is a 67 year old male who underwent:    Procedure Date: 12/15/2022     PREOPERATIVE DIAGNOSIS:  Lumbar stenosis and spondylolisthesis with radiculopathy.     POSTOPERATIVE DIAGNOSIS:  Lumbar stenosis and spondylolisthesis with radiculopathy.     PROCEDURE:  1.  T10 to S1 posterior segmental instrumentation.  2.  Pelvic instrumentation with S2 alar iliac screws.    3.  Screws cement augmentation T10 and T11.   4.  L2 to S1 bilateral decompression and transforaminal interbody fusion.  5.  Posterior arthrodesis, T10 to S1 using allograft cancellous chips.     SURGEON:  Eliseo Aggarwal MD.     ASSISTANT:  Jeff Lopez PA-C.     ANESTHESIA:  General endotracheal anesthesia.     ESTIMATED BLOOD LOSS:  1100 mL    Overall he has been doing reasonably well.  He has low back pain near the lumbosacral junction and his bilateral foot drops.  It sounds as if he is graduated from physical therapy.    Vitals:    10/30/23 1248   BP: (!) 147/91   Pulse: 112   SpO2: 97%   Weight: 96.2 kg (212 lb)   Height: 1.727 m (5' 8\")     Estimated body mass index is 32.23 kg/m  as calculated from the following:    Height as of this encounter: 1.727 m (5' 8\").    Weight as of this encounter: 96.2 kg (212 lb).  Moderate Pain (4)    Well-healed lumbar incision  Bilateral lower extremity strength 5 out of 5 except for bilateral foot drops.    Imaging: X-ray and CT of the lumbar spine show stable alignment of the hardware with progressing bony fusion.  It appears that there is bridging bone across the lumbosacral junction as well.  The imaging is reviewed with the patient shown to the patient clinic " today.    Assessment: Status post thoracolumbar fusion.    Plan: Overall I think he is doing reasonably well.  I do not see that there is much advantage to further surgical intervention at this point.  I would like to see him back in the summer with further x-rays to make sure there is not further hardware failure or other issues.       Again, thank you for allowing me to participate in the care of your patient.        Sincerely,        Eliseo Aggarwal MD

## 2023-11-13 ENCOUNTER — MYC MEDICAL ADVICE (OUTPATIENT)
Dept: FAMILY MEDICINE | Facility: CLINIC | Age: 68
End: 2023-11-13
Payer: MEDICARE

## 2023-11-17 ENCOUNTER — VIRTUAL VISIT (OUTPATIENT)
Dept: FAMILY MEDICINE | Facility: CLINIC | Age: 68
End: 2023-11-17
Payer: MEDICARE

## 2023-11-17 DIAGNOSIS — I10 BENIGN ESSENTIAL HYPERTENSION: ICD-10-CM

## 2023-11-17 PROCEDURE — 99214 OFFICE O/P EST MOD 30 MIN: CPT | Mod: 95 | Performed by: PHYSICIAN ASSISTANT

## 2023-11-17 RX ORDER — LISINOPRIL 40 MG/1
40 TABLET ORAL DAILY
Qty: 90 TABLET | Refills: 1 | Status: SHIPPED | OUTPATIENT
Start: 2023-11-17 | End: 2024-04-25

## 2023-11-17 RX ORDER — CHLORTHALIDONE 25 MG/1
25 TABLET ORAL DAILY
Qty: 90 TABLET | Refills: 1 | Status: SHIPPED | OUTPATIENT
Start: 2023-11-17 | End: 2024-04-25

## 2023-11-17 NOTE — PROGRESS NOTES
"Philipp is a 67 year old who is being evaluated via a billable telephone visit.      What phone number would you like to be contacted at? [phone]  How would you like to obtain your AVS? Marty    Distant Location (provider location):  On-site    Assessment & Plan     Benign essential hypertension    Recent blood pressures at home and at office visits have been elevated. Was previously on chlorthalidone in addition to lisinopril. Will add this back in. He will check BPs at home to make sure improving. Follow-up in person in 6 months.    - lisinopril (ZESTRIL) 40 MG tablet; Take 1 tablet (40 mg) by mouth daily  - chlorthalidone (HYGROTON) 25 MG tablet; Take 1 tablet (25 mg) by mouth daily                   Aayush Moore PA-C  [facility]    Subjective   Philipp is a 67 year old, presenting for the following health issues:  Hypertension        11/17/2023    10:54 AM   Additional Questions   Roomed by Tawnya Spencer         11/17/2023    10:55 AM   Patient Reported Additional Medications   Patient reports taking the following new medications fish oil       HPI     Hypertension Follow-up    Do you check your blood pressure regularly outside of the clinic? No - checked yesterday  Are you following a low salt diet? No  Are your blood pressures ever more than 140 on the top number (systolic) OR more   than 90 on the bottom number (diastolic), for example 140/90? Yes      Review of Systems   Constitutional, HEENT, cardiovascular, pulmonary, gi and gu systems are negative, except as otherwise noted.      Objective    Vitals - Patient Reported  Weight (Patient Reported): 95.3 kg (210 lb)  Height (Patient Reported): 172.7 cm (5' 8\")  BMI (Based on Pt Reported Ht/Wt): 31.93      Vitals:  No vitals were obtained today due to virtual visit.    Physical Exam   healthy, alert, and no distress  PSYCH: Alert and oriented times 3; coherent speech, normal   rate and volume, able to articulate logical " thoughts, able   to abstract reason, no tangential thoughts, no hallucinations   or delusions  His affect is normal and pleasant  RESP: No cough, no audible wheezing, able to talk in full sentences  Remainder of exam unable to be completed due to telephone visits                Phone call duration: 5 minutes

## 2023-11-28 ENCOUNTER — MYC MEDICAL ADVICE (OUTPATIENT)
Dept: FAMILY MEDICINE | Facility: CLINIC | Age: 68
End: 2023-11-28
Payer: MEDICARE

## 2023-11-28 DIAGNOSIS — D64.9 ANEMIA, UNSPECIFIED TYPE: ICD-10-CM

## 2023-11-28 RX ORDER — FERROUS GLUCONATE 324(38)MG
324 TABLET ORAL
Qty: 90 TABLET | Refills: 1 | Status: SHIPPED | OUTPATIENT
Start: 2023-11-28

## 2023-12-15 ENCOUNTER — DOCUMENTATION ONLY (OUTPATIENT)
Dept: NEUROSURGERY | Facility: CLINIC | Age: 68
End: 2023-12-15
Payer: MEDICARE

## 2023-12-15 NOTE — PROGRESS NOTES
PT POC received and signed by Dr. Aggarwal.     Faxed  PT POC  December 15, 2023 to fax number [phone]    Right Fax confirmed at 2:27 PM

## 2023-12-15 NOTE — PROGRESS NOTES
Faxed  PT POC  December 15, 2023 to fax number [phone]    Right Fax confirmed at 3:00 PM    Ying Mejia

## 2023-12-29 ENCOUNTER — DOCUMENTATION ONLY (OUTPATIENT)
Dept: NEUROSURGERY | Facility: CLINIC | Age: 68
End: 2023-12-29
Payer: MEDICARE

## 2024-01-02 NOTE — PROGRESS NOTES
Faxed Form January 2, 2024 to fax number [phone] [facility]     Right Fax confirmed at 2:02 PM    Ynag Rico

## 2024-01-15 ENCOUNTER — VIRTUAL VISIT (OUTPATIENT)
Dept: NEUROSURGERY | Facility: CLINIC | Age: 69
End: 2024-01-15
Attending: NEUROLOGICAL SURGERY
Payer: MEDICARE

## 2024-01-15 DIAGNOSIS — Z98.1 S/P SPINAL FUSION: Primary | ICD-10-CM

## 2024-01-15 PROCEDURE — 99442 PR PHYSICIAN TELEPHONE EVALUATION 11-20 MIN: CPT | Mod: 93 | Performed by: NEUROLOGICAL SURGERY

## 2024-01-15 NOTE — LETTER
"    1/15/2024         RE: Philipp Bland  [address] [address]        Dear Colleague,    Thank you for referring your patient, Philipp Bland, to the [facility] NEUROSURGERY CLINIC [place]. Please see a copy of my visit note below.    It was a pleasure to see Philipp Bland today in Neurosurgery Clinic. He is a 68 year old male who underwent:    Procedure Date: 12/15/2022     PREOPERATIVE DIAGNOSIS:  Lumbar stenosis and spondylolisthesis with radiculopathy.     POSTOPERATIVE DIAGNOSIS:  Lumbar stenosis and spondylolisthesis with radiculopathy.     PROCEDURE:  1.  T10 to S1 posterior segmental instrumentation.  2.  Pelvic instrumentation with S2 alar iliac screws.    3.  Screws cement augmentation T10 and T11.   4.  L2 to S1 bilateral decompression and transforaminal interbody fusion.  5.  Posterior arthrodesis, T10 to S1 using allograft cancellous chips.     SURGEON:  Eliseo Aggarwal MD.     ASSISTANT:  Jeff Lopez PA-C.     ANESTHESIA:  General endotracheal anesthesia.     ESTIMATED BLOOD LOSS:  1100 mL    Overall he has been stable.  He continues to struggle during the day and needs to lay down from time to time.    He has questions about removal of the hardware potentially.    There were no vitals filed for this visit.  Estimated body mass index is 32.23 kg/m  as calculated from the following:    Height as of 10/30/23: 1.727 m (5' 8\").    Weight as of 10/30/23: 96.2 kg (212 lb).  Data Unavailable    Phone visit    Imaging: Review of his x-rays and CT scan from October 2023 show bridging bone across most of the construct.  Imaging was discussed with the patient over the phone today.    Assessment: Status post thoracolumbar fusion.    Plan: I think it might be reasonable to consider hardware removal although the pelvic screws and cement augmented screws may be difficult to remove.  We will see him back in late March or early April with x-rays to discuss further.     This " phone visit took 12 minutes.  MD Location: [place], [place]  Patient Location: [place], [place]       Again, thank you for allowing me to participate in the care of your patient.        Sincerely,        Eliseo Aggarwal MD

## 2024-01-15 NOTE — PROGRESS NOTES
"It was a pleasure to see Philipp Bland today in Neurosurgery Clinic. He is a 68 year old male who underwent:    Procedure Date: 12/15/2022     PREOPERATIVE DIAGNOSIS:  Lumbar stenosis and spondylolisthesis with radiculopathy.     POSTOPERATIVE DIAGNOSIS:  Lumbar stenosis and spondylolisthesis with radiculopathy.     PROCEDURE:  1.  T10 to S1 posterior segmental instrumentation.  2.  Pelvic instrumentation with S2 alar iliac screws.    3.  Screws cement augmentation T10 and T11.   4.  L2 to S1 bilateral decompression and transforaminal interbody fusion.  5.  Posterior arthrodesis, T10 to S1 using allograft cancellous chips.     SURGEON:  Eliseo Aggarwal MD.     ASSISTANT:  Jeff Lopez PA-C.     ANESTHESIA:  General endotracheal anesthesia.     ESTIMATED BLOOD LOSS:  1100 mL    Overall he has been stable.  He continues to struggle during the day and needs to lay down from time to time.    He has questions about removal of the hardware potentially.    There were no vitals filed for this visit.  Estimated body mass index is 32.23 kg/m  as calculated from the following:    Height as of 10/30/23: 1.727 m (5' 8\").    Weight as of 10/30/23: 96.2 kg (212 lb).  Data Unavailable    Phone visit    Imaging: Review of his x-rays and CT scan from October 2023 show bridging bone across most of the construct.  Imaging was discussed with the patient over the phone today.    Assessment: Status post thoracolumbar fusion.    Plan: I think it might be reasonable to consider hardware removal although the pelvic screws and cement augmented screws may be difficult to remove.  We will see him back in late March or early April with x-rays to discuss further.     This phone visit took 12 minutes.  MD Location: [place], [place]  Patient Location: [place], [place]   "

## 2024-02-21 ENCOUNTER — TELEPHONE (OUTPATIENT)
Dept: NEUROSURGERY | Facility: CLINIC | Age: 69
End: 2024-02-21
Payer: MEDICARE

## 2024-02-21 DIAGNOSIS — Z98.1 S/P SPINAL FUSION: Primary | ICD-10-CM

## 2024-02-21 RX ORDER — METHYLPREDNISOLONE 4 MG
TABLET, DOSE PACK ORAL
Qty: 21 TABLET | Refills: 0 | Status: CANCELLED | OUTPATIENT
Start: 2024-02-21

## 2024-02-21 NOTE — TELEPHONE ENCOUNTER
Patient returned call, reviewed recommendations.     Patient is going to try ibuprofen, tylenol and icing for now. He will let us know if he would like to try the MDP.     Patient verified he is not diabetic and does not take blood thinners.

## 2024-02-21 NOTE — TELEPHONE ENCOUNTER
Patient underwent TLIF on 12/15/22 with Dr. Aggarwal.     Last seen in clinic by Dr. Aggarwal on 1/15/24, at that time Dr. Aggarwal stated :  Plan: I think it might be reasonable to consider hardware removal although the pelvic screws and cement augmented screws may be difficult to remove.  We will see him back in late March or early April with x-rays to discuss further.     Patient scheduled for this appointment on 4/12.     Patient calling today to report increased pain after increased activity.     Called patient to further discuss.     Patient reports having a busy weekend that caused him to wake up with increased pain Monday morning.     He reports the pain is located in his upper mid back, L>R. Painful to cough or sneeze.    The pain comes and goes, while sitting he reports it is fine, but with movement it can get up to a 7/10.     No new n/t or weakness, no bowel or bladder changes.     He has been using ibuprofen for pain control. Recommended alternating ibuprofen and tylenol and icing the affected area.     Will send updates to care team. Would MDP be appropriate?

## 2024-02-21 NOTE — TELEPHONE ENCOUNTER
"[facility] Call Center    Phone Message    May a detailed message be left on voicemail: yes     Reason for Call: Other: Patient called stating the last three days have been extra painful, states he had a busy weekend and did a \"fair amount of stuff\", pain has not let up since Monday. He states three weeks ago he had some busy days and then the pain went back to normal for him after a couple days. Patient states one spot in the upper left part of his back is in a lot of pain. Writer found sooner appointments in the schedule, but patient declined stating he would like a phone call before being seen in the office. Please review.     Action Taken: Message routed to:  Other: Spine and Brain     Travel Screening: Not Applicable                                                                   "

## 2024-02-21 NOTE — TELEPHONE ENCOUNTER
Macey Danielson PA-C stated she agrees with recommendations and MDP. She would like us to verify blood thinner and diabetes status prior to prescribing.     Attempted to reach out to patient, no answer. Left voice message for them to call clinic back to further discuss.

## 2024-02-23 NOTE — TELEPHONE ENCOUNTER
Dr. Aggarwal's last OV note states he will see him back in late March or early April with x-rays to discuss further.     Routed to scheduling to schedule sooner if pt having troubles.

## 2024-02-23 NOTE — TELEPHONE ENCOUNTER
[facility] Call Center    Phone Message    May a detailed message be left on voicemail: yes     Reason for Call: Other: Patient is calling back, he thought that he had a phone appointment with Dr. Aggarwal today but writer informed patient that he is scheduled for 4/12. Patient states that he has been having troubles still, writer attempted to reach out to the nurse but the patient hung up while writer had him on hold. Patient is wanting to know if he can have a phone appointment with Dr. Aggarwal sooner as well. Please review.      Action Taken: Message routed to:  Other: Neurosurgery    Travel Screening: Not Applicable

## 2024-02-27 ENCOUNTER — TELEPHONE (OUTPATIENT)
Dept: NEUROSURGERY | Facility: CLINIC | Age: 69
End: 2024-02-27
Payer: MEDICARE

## 2024-02-27 NOTE — TELEPHONE ENCOUNTER
Patient is scheduled to see Dr. Aggarwal at 9:40am on 3/1/24 and needs an XR prior. Patient may want a later appointment so we're holding 11:40am on Dr. Aggarwal's schedule should he want to move to a later appointment. Regardless of which one he wants he needs the XR added prior. Writer JAVY for patient to call back to schedule the XR and advise which time he wants for Dr. Aggarwal.

## 2024-02-29 ENCOUNTER — TELEPHONE (OUTPATIENT)
Dept: NEUROSURGERY | Facility: CLINIC | Age: 69
End: 2024-02-29
Payer: MEDICARE

## 2024-02-29 NOTE — TELEPHONE ENCOUNTER
[facility] Call Center    Phone Message    May a detailed message be left on voicemail: yes     Reason for Call: Other: Patient stated he talked to radiology about his x-rays that are scheduled and that he can't stand the pain gets to be to much. Radiology told him that he needs Dr. Aggarwal to sign off so the patient can get some novocain before the x-rays. Please review and call back.     Action Taken: Message routed to:  Other: [facility] Neurosurgery    Travel Screening: Not Applicable

## 2024-02-29 NOTE — TELEPHONE ENCOUNTER
Placed call to pt -    Pt requesting novocain or another pain management for his XR.     Pt updated that we can not provide pain medication prescriptions as he is not in the acute post op phase. Pt updated the XR should only take about 15 minutes. Recommended he use NSAIDs or tylenol. Pt frustrated with that answer. Pt updated that he can discuss other strategies with his PCP.

## 2024-03-01 ENCOUNTER — OFFICE VISIT (OUTPATIENT)
Dept: NEUROSURGERY | Facility: CLINIC | Age: 69
End: 2024-03-01
Payer: MEDICARE

## 2024-03-01 VITALS — HEART RATE: 96 BPM | DIASTOLIC BLOOD PRESSURE: 85 MMHG | OXYGEN SATURATION: 96 % | SYSTOLIC BLOOD PRESSURE: 125 MMHG

## 2024-03-01 DIAGNOSIS — M96.0 PSEUDARTHROSIS FOLLOWING SPINAL FUSION: ICD-10-CM

## 2024-03-01 DIAGNOSIS — Z98.1 S/P SPINAL FUSION: Primary | ICD-10-CM

## 2024-03-01 PROCEDURE — 99213 OFFICE O/P EST LOW 20 MIN: CPT | Performed by: NEUROLOGICAL SURGERY

## 2024-03-01 RX ORDER — METHOCARBAMOL 750 MG/1
750 TABLET, FILM COATED ORAL 4 TIMES DAILY PRN
Qty: 60 TABLET | Refills: 2 | Status: SHIPPED | OUTPATIENT
Start: 2024-03-01

## 2024-03-01 ASSESSMENT — PAIN SCALES - GENERAL: PAINLEVEL: MODERATE PAIN (4)

## 2024-03-01 NOTE — LETTER
"    3/1/2024         RE: Philipp Bland  [address] [address]        Dear Colleague,    Thank you for referring your patient, Philipp Bland, to the [facility] NEUROLOGY [facility]. Please see a copy of my visit note below.    It was a pleasure to see Philipp Bland today in Neurosurgery Clinic. He is a 68 year old male who underwent:    Procedure Date: 12/15/2022     PREOPERATIVE DIAGNOSIS:  Lumbar stenosis and spondylolisthesis with radiculopathy.     POSTOPERATIVE DIAGNOSIS:  Lumbar stenosis and spondylolisthesis with radiculopathy.     PROCEDURE:  1.  T10 to S1 posterior segmental instrumentation.  2.  Pelvic instrumentation with S2 alar iliac screws.    3.  Screws cement augmentation T10 and T11.   4.  L2 to S1 bilateral decompression and transforaminal interbody fusion.  5.  Posterior arthrodesis, T10 to S1 using allograft cancellous chips.     SURGEON:  Eliseo Aggarwal MD.     ASSISTANT:  Jeff Lopez PA-C.     ANESTHESIA:  General endotracheal anesthesia.     ESTIMATED BLOOD LOSS:  1100 mL    He comes in today with worsening back pain particularly at the top end of his construct.  It sounds like he has been struggling more functionally.  Neurologically he has been stable with bilateral foot drops.    Vitals:    03/01/24 1514   BP: 125/85   Pulse: 96   SpO2: 96%     Estimated body mass index is 32.23 kg/m  as calculated from the following:    Height as of 10/30/23: 1.727 m (5' 8\").    Weight as of 10/30/23: 96.2 kg (212 lb).  Moderate Pain (4)    Awake and alert.  Well-healed thoracolumbar incision.  Bilateral lower extremity strength is 5 out of 5 except for 0-1 out of 5 ankle dorsiflexion and 2 out of 5 inversion and eversion.    Imaging: The patient was unable to stand today long enough to do his standing scoliosis x-rays.  So no new imaging was reviewed.    Assessment: Status post thoracolumbar fusion with increasing pain and concern for pseudoarthrosis or hardware " failure.    Plan: I have ordered thoracic and lumbar CTs to evaluate his hardware and fusion.  We will follow-up with a phone visit [place] these imaging studies are done.       Again, thank you for allowing me to participate in the care of your patient.        Sincerely,        Eliseo Aggarwal MD

## 2024-03-01 NOTE — PATIENT INSTRUCTIONS
Patient Next Steps:      Order placed for cervical and thoracic CT imaging. Central Scheduling will call you to make the appointment. If you do not hear from them within 1-2 business days you can call the numbers below.   [phone]    Robaxin has been sent to your pharmacy     Please schedule a telephone visit with Dr. Aggarwal after the imaging has been completed to review.       Please call us if you have any further questions or concerns.    [facility] Neurosurgery Clinic   Phone: [phone]  Fax: [phone]

## 2024-03-01 NOTE — NURSING NOTE
"Philipp Bland is a 68 year old male who presents for:  Chief Complaint   Patient presents with    RECHECK     All of his hardware is hurting - lvl 4 sitting and rolling out of bed is very painful        Initial Vitals:  /85   Pulse 96   SpO2 96%  Estimated body mass index is 32.23 kg/m  as calculated from the following:    Height as of 10/30/23: 5' 8\" (1.727 m).    Weight as of 10/30/23: 212 lb (96.2 kg).. There is no height or weight on file to calculate BSA. BP completed using cuff size: large  Moderate Pain (4)    Nursing Comments:     Kevin Hendrickson    "

## 2024-03-01 NOTE — PROGRESS NOTES
"It was a pleasure to see Philipp Bland today in Neurosurgery Clinic. He is a 68 year old male who underwent:    Procedure Date: 12/15/2022     PREOPERATIVE DIAGNOSIS:  Lumbar stenosis and spondylolisthesis with radiculopathy.     POSTOPERATIVE DIAGNOSIS:  Lumbar stenosis and spondylolisthesis with radiculopathy.     PROCEDURE:  1.  T10 to S1 posterior segmental instrumentation.  2.  Pelvic instrumentation with S2 alar iliac screws.    3.  Screws cement augmentation T10 and T11.   4.  L2 to S1 bilateral decompression and transforaminal interbody fusion.  5.  Posterior arthrodesis, T10 to S1 using allograft cancellous chips.     SURGEON:  Eliseo Aggarwal MD.     ASSISTANT:  Jeff Lopez PA-C.     ANESTHESIA:  General endotracheal anesthesia.     ESTIMATED BLOOD LOSS:  1100 mL    He comes in today with worsening back pain particularly at the top end of his construct.  It sounds like he has been struggling more functionally.  Neurologically he has been stable with bilateral foot drops.    Vitals:    03/01/24 1514   BP: 125/85   Pulse: 96   SpO2: 96%     Estimated body mass index is 32.23 kg/m  as calculated from the following:    Height as of 10/30/23: 1.727 m (5' 8\").    Weight as of 10/30/23: 96.2 kg (212 lb).  Moderate Pain (4)    Awake and alert.  Well-healed thoracolumbar incision.  Bilateral lower extremity strength is 5 out of 5 except for 0-1 out of 5 ankle dorsiflexion and 2 out of 5 inversion and eversion.    Imaging: The patient was unable to stand today long enough to do his standing scoliosis x-rays.  So no new imaging was reviewed.    Assessment: Status post thoracolumbar fusion with increasing pain and concern for pseudoarthrosis or hardware failure.    Plan: I have ordered thoracic and lumbar CTs to evaluate his hardware and fusion.  We will follow-up with a phone visit [place] these imaging studies are done.     "

## 2024-03-27 ENCOUNTER — HOSPITAL ENCOUNTER (OUTPATIENT)
Dept: CT IMAGING | Facility: CLINIC | Age: 69
Discharge: HOME | End: 2024-03-27
Attending: NEUROLOGICAL SURGERY
Payer: MEDICARE

## 2024-03-27 DIAGNOSIS — M96.0 PSEUDARTHROSIS FOLLOWING SPINAL FUSION: ICD-10-CM

## 2024-03-27 DIAGNOSIS — Z98.1 S/P SPINAL FUSION: ICD-10-CM

## 2024-03-27 PROCEDURE — 72128 CT CHEST SPINE W/O DYE: CPT | Mod: MG

## 2024-03-27 PROCEDURE — 72131 CT LUMBAR SPINE W/O DYE: CPT | Mod: ME

## 2024-03-27 PROCEDURE — G1010 CDSM STANSON: HCPCS

## 2024-04-12 ENCOUNTER — OFFICE VISIT (OUTPATIENT)
Dept: NEUROSURGERY | Facility: CLINIC | Age: 69
End: 2024-04-12
Payer: MEDICARE

## 2024-04-12 VITALS — HEART RATE: 113 BPM | OXYGEN SATURATION: 96 % | SYSTOLIC BLOOD PRESSURE: 129 MMHG | DIASTOLIC BLOOD PRESSURE: 81 MMHG

## 2024-04-12 DIAGNOSIS — M21.371 BILATERAL FOOT-DROP: ICD-10-CM

## 2024-04-12 DIAGNOSIS — M21.372 BILATERAL FOOT-DROP: ICD-10-CM

## 2024-04-12 DIAGNOSIS — Z98.1 S/P SPINAL FUSION: Primary | ICD-10-CM

## 2024-04-12 PROCEDURE — 99213 OFFICE O/P EST LOW 20 MIN: CPT | Performed by: NEUROLOGICAL SURGERY

## 2024-04-12 ASSESSMENT — PAIN SCALES - GENERAL: PAINLEVEL: MODERATE PAIN (5)

## 2024-04-12 NOTE — NURSING NOTE
"Philipp Bland is a 68 year old male who presents for:  Chief Complaint   Patient presents with    Follow Up     Imaging review. Lower back pain. Pain lvl 5.        Initial Vitals:  /81   Pulse 113   SpO2 96%  Estimated body mass index is 32.23 kg/m  as calculated from the following:    Height as of 10/30/23: 5' 8\" (1.727 m).    Weight as of 10/30/23: 212 lb (96.2 kg).. There is no height or weight on file to calculate BSA. BP completed using cuff size: regular  Moderate Pain (5)        Jazmín Segura    "

## 2024-04-12 NOTE — PROGRESS NOTES
"It was a pleasure to see Philipp Bland today in Neurosurgery Clinic. He is a 68 year old male who is here to discuss possible removal of his thoracolumbar hardware.    He continues to have pain he thinks is related to the hardware.  His symptoms seem to worsen in February although he has made some improvement since that time.        Vitals:    04/12/24 1044   BP: 129/81   Pulse: 113   SpO2: 96%     Estimated body mass index is 32.23 kg/m  as calculated from the following:    Height as of 10/30/23: 1.727 m (5' 8\").    Weight as of 10/30/23: 96.2 kg (212 lb).  Moderate Pain (5)    Well-healed incision  Bilateral foot drops    Imaging: Review of his CT scan of the thoracic and lumbar spine shows some stable haloing around one of the S1 screws and the clay on the left side that is out of the pelvic instrumentation.  However it appears that he has achieved bony fusion across the construct.  Imaging was reviewed with the patient and the patient clinic today.    Assessment: Pain from thoracolumbar hardware.  Status post fusion    Plan: I think would be reasonable to consider hardware removal along with bilateral L5-S1 foraminotomies.  I explained to the patient that I think there is no guarantee to positive results but given the patient's concern about pain from the hardware and that this is I think a likely cause for his pain at this point I think it is certainly reasonable to consider.  We discussed the typical postoperative course and the patient will consider this and will let us know if and when he wishes to proceed with surgery.     "

## 2024-04-12 NOTE — LETTER
"    4/12/2024         RE: Philipp Bland  [address] [address]        Dear Colleague,    Thank you for referring your patient, Philipp Bland, to the [facility] NEUROLOGY [facility]. Please see a copy of my visit note below.    It was a pleasure to see Philipp Bland today in Neurosurgery Clinic. He is a 68 year old male who is here to discuss possible removal of his thoracolumbar hardware.    He continues to have pain he thinks is related to the hardware.  His symptoms seem to worsen in February although he has made some improvement since that time.        Vitals:    04/12/24 1044   BP: 129/81   Pulse: 113   SpO2: 96%     Estimated body mass index is 32.23 kg/m  as calculated from the following:    Height as of 10/30/23: 1.727 m (5' 8\").    Weight as of 10/30/23: 96.2 kg (212 lb).  Moderate Pain (5)    Well-healed incision  Bilateral foot drops    Imaging: Review of his CT scan of the thoracic and lumbar spine shows some stable haloing around one of the S1 screws and the clay on the left side that is out of the pelvic instrumentation.  However it appears that he has achieved bony fusion across the construct.  Imaging was reviewed with the patient and the patient clinic today.    Assessment: Pain from thoracolumbar hardware.  Status post fusion    Plan: I think would be reasonable to consider hardware removal along with bilateral L5-S1 foraminotomies.  I explained to the patient that I think there is no guarantee to positive results but given the patient's concern about pain from the hardware and that this is I think a likely cause for his pain at this point I think it is certainly reasonable to consider.  We discussed the typical postoperative course and the patient will consider this and will let us know if and when he wishes to proceed with surgery.         Again, thank you for allowing me to participate in the care of your patient.        Sincerely,        Eliseo Aggarwal MD  "

## 2024-04-17 ENCOUNTER — PREP FOR PROCEDURE (OUTPATIENT)
Dept: NEUROLOGY | Facility: CLINIC | Age: 69
End: 2024-04-17
Payer: MEDICARE

## 2024-04-17 ENCOUNTER — TELEPHONE (OUTPATIENT)
Dept: NEUROSURGERY | Facility: CLINIC | Age: 69
End: 2024-04-17
Payer: MEDICARE

## 2024-04-17 DIAGNOSIS — Z01.818 PRE-OP TESTING: Primary | ICD-10-CM

## 2024-04-17 DIAGNOSIS — T84.84XA PAINFUL ORTHOPAEDIC HARDWARE (H): Primary | ICD-10-CM

## 2024-04-17 DIAGNOSIS — M48.061 SPINAL STENOSIS OF LUMBAR REGION WITH RADICULOPATHY: ICD-10-CM

## 2024-04-17 DIAGNOSIS — M54.16 SPINAL STENOSIS OF LUMBAR REGION WITH RADICULOPATHY: ICD-10-CM

## 2024-04-17 DIAGNOSIS — Z98.1 S/P SPINAL FUSION: ICD-10-CM

## 2024-04-17 RX ORDER — CHLORHEXIDINE GLUCONATE 40 MG/ML
SOLUTION TOPICAL DAILY PRN
Qty: 236 ML | Refills: 0 | Status: ON HOLD | OUTPATIENT
Start: 2024-04-17 | End: 2024-05-03

## 2024-04-17 NOTE — PATIENT INSTRUCTIONS
Patient Instructions    Surgery scheduled at [facility] for Thoracic 10-Pelvis instrumentation. Bilateral L5-S1 redo foraminotomy  with Dr. Aggarwal    Pre-Operative  Surgical risks: blood clots in the leg or lung, problems urinating, nerve damage, drainage from the incision, infection,stiffness  Pre-operative physical with primary care physician within 30 days of surgical date.   4-7 night hospitalization stay  Shower procedure  Please shower with antimicrobial soap the night before surgery and morning of surgery. Please refer to showering instruction sheet in folder.  Eating/Drinking  Stop all solid foods 8 hours before surgery.  Keep drinking clear liquids until 4 hours before surgery  Clear liquids include water, clear juice, black coffee, or clear tea without milk, Gatorade, clear soda.   Medications  Hold Aspirin, NSAIDs (Advil/Ibuprofen, Indocin, Naproxen,Nuprin,Relafen/Nabumetone, Diclofenac,Meloxicam, Aleve, Celebrex) x 7 days prior to surgical date  You can take Tylenol (Acetaminophen) for pain, 1000 mg  Do not exceed 3,000 mg per day   If you are on chronic pain medication (oxycodone, Percocet, hydrocodone, Vicodin, Norco, Dilaudid, morphine, MS Contin, naltrexone, Suboxone, etc) or have a pain contract we will reach out to your pain clinic to gather your most recent records. Continue obtaining your pain medications from your current provider until surgery. Our team will manage your acute post-op pain in the hospital and during the recovery period. Your pain team will continue to manage your chronic pain. Please contact your provider who manages your pain medications to inform them of your upcoming surgery.  Any other medications prescribed, please discuss with your primary care provider at your pre-operative physical   You may NOT receive the COVID-19 vaccine 72 hours before or after surgery.    Pain Management  Dealing with pain  As your body heals, you might feel a stabbing, burning,  or aching pain. You may also have some numbness.  Everyone feels pain differently, we may ask you to rate your pain using a pain scale. This will let us know how much pain you feel.   Keep in mind that medicine won't take away all of your pain. It helps to try other ways to relax and ease pain.   Things to help with pain  After surgery, we will give you medicine for your pain. These medications work well, but they can make you drowsy, itchy, or sick to your stomach. If we give you narcotics for pain, try to take the pills with food.   For mild to moderate pain, you can take medication such as Tylenol. These can be used with narcotics, but make sure that your narcotic does not contain Tylenol.   Do NOT drive while taking narcotic pain medication  Do NOT drink alcohol while using any pain medication  You can utilize ice as needed (no longer than 20 minutes at one time)  You may resume taking NSAIDs (ex. Ibuprofen, aleve, naproxen) 12 weeks after surgery as it may cause bleeding and interfere with bone healing     Incision Care  No submerging incision in water such as pools, hot tubs, baths for at least 8 weeks or until incision is healed  You may get your incision wet in the shower. Allow water to run over incision, and gently pat dry.   Remove dressing as instructed upon discharge  Watch for signs of infection  Redness, swelling, warmth, drainage, and fever of 101 degrees or higher  Notify clinic [phone].    Activity Restrictions  For the first 6-8 weeks, no lifting > 10 pounds, no bending, twisting, or overhead reaching.  Take stairs in moderation   Ok to walk as tolerated, take short frequent walks. You may gradually increase the distance as tolerated.   Avoid bed rest and prolonged sitting for longer than 30 minutes (change positions frequently while awake)  No contact sports until after follow up visit  No high impact activities such as; running/jogging, snowmobile or 4 hartmann riding or any other  recreational vehicles  Please call the clinic if you develop any of the following symptoms:  Swelling and/or warmth in one or both legs  Pain or tenderness in your leg, ankle, foot, or arm   Red or discolored skin     Post-Op Follow Up Appointments  2 week staple/suture removal with RN  6 week post op with xray prior   3 months post op with xray prior   1 year post op with xray prior  Please call to schedule follow up and xray appointments: [phone]    Resources  If you are currently employed, you will need to be off work for 4-6 weeks for post op recovery and healing.  Please fax any FMLA/short term disability paperwork to [phone]  You may call our clinic when you'd like to return to work and we can provide a work letter  To allow staff adequate time to complete paperwork, please send as soon as possible     [facility]  Phone: [phone]  Fax: [phone]

## 2024-04-25 ENCOUNTER — OFFICE VISIT (OUTPATIENT)
Dept: FAMILY MEDICINE | Facility: CLINIC | Age: 69
End: 2024-04-25
Payer: MEDICARE

## 2024-04-25 VITALS
DIASTOLIC BLOOD PRESSURE: 84 MMHG | OXYGEN SATURATION: 100 % | RESPIRATION RATE: 26 BRPM | BODY MASS INDEX: 31.83 KG/M2 | TEMPERATURE: 98.1 F | SYSTOLIC BLOOD PRESSURE: 130 MMHG | WEIGHT: 210 LBS | HEART RATE: 111 BPM | HEIGHT: 68 IN

## 2024-04-25 DIAGNOSIS — I10 BENIGN ESSENTIAL HYPERTENSION: ICD-10-CM

## 2024-04-25 DIAGNOSIS — T84.498A LOOSENING OF HARDWARE IN SPINE (H): ICD-10-CM

## 2024-04-25 DIAGNOSIS — M54.50 LUMBAR SPINE PAIN: ICD-10-CM

## 2024-04-25 DIAGNOSIS — Z01.818 PREOP GENERAL PHYSICAL EXAM: Primary | ICD-10-CM

## 2024-04-25 LAB
ERYTHROCYTE [DISTWIDTH] IN BLOOD BY AUTOMATED COUNT: 13 % (ref 10–15)
HCT VFR BLD AUTO: 41.4 % (ref 40–53)
HGB BLD-MCNC: 14 G/DL (ref 13.3–17.7)
MCH RBC QN AUTO: 31.9 PG (ref 26.5–33)
MCHC RBC AUTO-ENTMCNC: 33.8 G/DL (ref 31.5–36.5)
MCV RBC AUTO: 94 FL (ref 78–100)
PLATELET # BLD AUTO: 246 10E3/UL (ref 150–450)
RBC # BLD AUTO: 4.39 10E6/UL (ref 4.4–5.9)
WBC # BLD AUTO: 4.8 10E3/UL (ref 4–11)

## 2024-04-25 PROCEDURE — 93000 ELECTROCARDIOGRAM COMPLETE: CPT | Performed by: PHYSICIAN ASSISTANT

## 2024-04-25 PROCEDURE — 80048 BASIC METABOLIC PNL TOTAL CA: CPT | Performed by: PHYSICIAN ASSISTANT

## 2024-04-25 PROCEDURE — G2211 COMPLEX E/M VISIT ADD ON: HCPCS | Performed by: PHYSICIAN ASSISTANT

## 2024-04-25 PROCEDURE — 85027 COMPLETE CBC AUTOMATED: CPT | Performed by: PHYSICIAN ASSISTANT

## 2024-04-25 PROCEDURE — 99214 OFFICE O/P EST MOD 30 MIN: CPT | Performed by: PHYSICIAN ASSISTANT

## 2024-04-25 PROCEDURE — 36415 COLL VENOUS BLD VENIPUNCTURE: CPT | Performed by: PHYSICIAN ASSISTANT

## 2024-04-25 RX ORDER — RESPIRATORY SYNCYTIAL VIRUS VACCINE 120MCG/0.5
0.5 KIT INTRAMUSCULAR ONCE
Qty: 1 EACH | Refills: 0 | Status: CANCELLED | OUTPATIENT
Start: 2024-04-25 | End: 2024-04-25

## 2024-04-25 RX ORDER — CHLORTHALIDONE 25 MG/1
25 TABLET ORAL DAILY
Qty: 90 TABLET | Refills: 1 | Status: ON HOLD | OUTPATIENT
Start: 2024-04-25 | End: 2024-05-03

## 2024-04-25 RX ORDER — LISINOPRIL 40 MG/1
40 TABLET ORAL DAILY
Qty: 90 TABLET | Refills: 1 | Status: SHIPPED | OUTPATIENT
Start: 2024-04-25

## 2024-04-25 NOTE — PROGRESS NOTES
Preoperative Evaluation  [facility]  [address] [address]  Phone: [phone]  Primary Provider: No Ref-Primary, Physician  Pre-op Performing Provider: CARRIE DORAN  Apr 25, 2024       Philipp is a 68 year old, presenting for the following:  Pre-Op Exam        4/25/2024    10:47 AM   Additional Questions   Roomed by Tawnya Spencer     Surgical Information  Surgery/Procedure:   Removal Thoracic 10 Pelvis instrumentation   Bilateral Lumbar 5 to sacral 1 redo foraminotomy   Surgery Location: [facility]   Surgeon: Eliseo Aggarwal MD   Surgery Date: 4/29/2024  Time of Surgery: 7:30 am  Where patient plans to recover: At a TCU (Transitional Care Unit)  Fax number for surgical facility: Note does not need to be faxed, will be available electronically in Epic.    Assessment & Plan     The proposed surgical procedure is considered INTERMEDIATE risk.    Preop general physical exam    Cleared for surgery.    - CBC with platelets; Future  - Basic metabolic panel  (Ca, Cl, CO2, Creat, Gluc, K, Na, BUN); Future  - EKG 12-lead complete w/read - Clinics  - CBC with platelets  - Basic metabolic panel  (Ca, Cl, CO2, Creat, Gluc, K, Na, BUN)      Lumbar spine pain    Surgery planned.      Loosening of hardware in spine (H24)    Surgery planned.      Benign essential hypertension    Well controlled. Refilled medications. Follow-up in 6 months.    - chlorthalidone (HYGROTON) 25 MG tablet; Take 1 tablet (25 mg) by mouth daily  - lisinopril (ZESTRIL) 40 MG tablet; Take 1 tablet (40 mg) by mouth daily         - No identified additional risk factors other than previously addressed    Antiplatelet or Anticoagulation Medication Instructions   - Patient is on no antiplatelet or anticoagulation medications.    Additional Medication Instructions   - ACE/ARB: HOLD on day of surgery (minimum 11 hours for general anesthesia).   - Diuretics: HOLD on the day of  surgery.    Recommendation  APPROVAL GIVEN to proceed with proposed procedure, without further diagnostic evaluation.          Subjective       HPI related to upcoming procedure: Low back pain, loosening of hardware in back          4/25/2024    11:05 AM   Preop Questions   1. Have you ever had a heart attack or stroke? No   2. Have you ever had surgery on your heart or blood vessels, such as a stent placement, a coronary artery bypass, or surgery on an artery in your head, neck, heart, or legs? No   3. Do you have chest pain with activity? No   4. Do you have a history of  heart failure? No   5. Do you currently have a cold, bronchitis or symptoms of other infection? No   6. Do you have a cough, shortness of breath, or wheezing? No   7. Do you or anyone in your family have previous history of blood clots? No   8. Do you or does anyone in your family have a serious bleeding problem such as prolonged bleeding following surgeries or cuts? No   9. Have you ever had problems with anemia or been told to take iron pills? YES - takes iron daily   10. Have you had any abnormal blood loss such as black, tarry or bloody stools? No   11. Have you ever had a blood transfusion? No   12. Are you willing to have a blood transfusion if it is medically needed before, during, or after your surgery? Yes   13. Have you or any of your relatives ever had problems with anesthesia? No   14. Do you have sleep apnea, excessive snoring or daytime drowsiness? No   15. Do you have any artifical heart valves or other implanted medical devices like a pacemaker, defibrillator, or continuous glucose monitor? No   16. Do you have artificial joints? No   17. Are you allergic to latex? No     Health Care Directive  Patient does not have a Health Care Directive or Living Will: Discussed advance care planning with patient; however, patient declined at this time.    Preoperative Review of    reviewed - no record of controlled substances  prescribed.      Status of Chronic Conditions:  HYPERTENSION - Patient has longstanding history of HTN , currently denies any symptoms referable to elevated blood pressure. Specifically denies chest pain, palpitations, dyspnea, orthopnea, PND or peripheral edema. Blood pressure readings have been in normal range. Current medication regimen is as listed below. Patient denies any side effects of medication.     Patient Active Problem List    Diagnosis Date Noted    Loosening of hardware in spine (H24) 03/27/2023     Priority: Medium    S/P spinal fusion 12/15/2022     Priority: Medium    Other secondary scoliosis, lumbar region 10/04/2022     Priority: Medium    Other osteoporosis without current pathological fracture 08/23/2022     Priority: Medium    Lumbar spine pain 02/11/2022     Priority: Medium    Other spondylosis, lumbar region 11/17/2021     Priority: Medium    Spondylolisthesis of lumbar region 02/12/2021     Priority: Medium    Spinal stenosis of lumbar region with radiculopathy 02/12/2021     Priority: Medium    Advanced directives, counseling/discussion 04/08/2013     Priority: Medium                   HTN, goal below 140/90 02/06/2012     Priority: Medium    CARDIOVASCULAR SCREENING; LDL GOAL LESS THAN 130 10/31/2010     Priority: Medium    Onychomycosis 09/29/2008     Priority: Medium      Past Medical History:   Diagnosis Date    Essential hypertension, benign 01/01/2001    Family history of alcoholism      Past Surgical History:   Procedure Laterality Date    OPTICAL TRACKING SYSTEM FUSION SPINE POSTERIOR LUMBAR THREE+ LEVELS N/A 12/15/2022    Procedure: Thoracic 10 to Sacral 1 posterior segmental instrumentation. Pelvic instrumentation. Screw cement augmentation L10 to L11. Lumbar 2 to Sacral 1 bilateral decompression and transforaminal interbody fusion. Posterior arthrodesis Thoracic 10-Sacral 1.;  Surgeon: Eilseo Aggarwal MD;  Location: [facility] OR     Current Outpatient Medications   Medication  Sig Dispense Refill    chlorhexidine (HIBICLENS) 4 % solution Apply topically daily as needed for wound care 236 mL 0    chlorthalidone (HYGROTON) 25 MG tablet Take 1 tablet (25 mg) by mouth daily 90 tablet 1    ferrous gluconate (FERGON) 324 (38 Fe) MG tablet Take 1 tablet (324 mg) by mouth daily (with breakfast) 90 tablet 1    lisinopril (ZESTRIL) 40 MG tablet Take 1 tablet (40 mg) by mouth daily 90 tablet 1    methocarbamol (ROBAXIN) 750 MG tablet Take 1 tablet (750 mg) by mouth 4 times daily as needed for muscle spasms 60 tablet 2    omeprazole (PRILOSEC) 20 MG DR capsule Take 20 mg by mouth      vitamin B-12 (CYANOCOBALAMIN) 2000 MCG tablet Take 2,000 mcg by mouth      Vitamin D3 (CHOLECALCIFEROL) 25 mcg (1000 units) tablet Take 1 tablet (25 mcg) by mouth daily 30 tablet 0    zinc sulfate (ZINCATE) 220 (50 Zn) MG capsule Take 1 capsule (220 mg) by mouth daily 30 capsule 0       Allergies   Allergen Reactions    No Known Drug Allergy         Social History     Tobacco Use    Smoking status: Never    Smokeless tobacco: Never   Substance Use Topics    Alcohol use: Yes     Comment: 2-3 4x /week     Family History   Problem Relation Age of Onset    Hypertension Father      History   Drug Use No         Review of Systems    Review of Systems  CONSTITUTIONAL: NEGATIVE for fever, chills, change in weight  INTEGUMENTARY/SKIN: NEGATIVE for worrisome rashes, moles or lesions  EYES: NEGATIVE for vision changes or irritation  ENT/MOUTH: NEGATIVE for ear, mouth and throat problems  RESP: NEGATIVE for significant cough or SOB  BREAST: NEGATIVE for masses, tenderness or discharge  CV: NEGATIVE for chest pain, palpitations or peripheral edema  GI: NEGATIVE for nausea, abdominal pain, heartburn, or change in bowel habits  : NEGATIVE for frequency, dysuria, or hematuria  MUSCULOSKELETAL: NEGATIVE for significant arthralgias or myalgia  NEURO: NEGATIVE for weakness, dizziness or paresthesias  ENDOCRINE: NEGATIVE for temperature  "intolerance, skin/hair changes  HEME: NEGATIVE for bleeding problems  PSYCHIATRIC: NEGATIVE for changes in mood or affect      Objective    BP (!) 141/90 (BP Location: Right arm, Patient Position: Sitting, Cuff Size: Adult Regular)   Pulse 111   Temp 98.1  F (36.7  C) (Tympanic)   Resp 26   Ht 1.727 m (5' 8\")   Wt 95.3 kg (210 lb)   SpO2 100%   BMI 31.93 kg/m     Estimated body mass index is 31.93 kg/m  as calculated from the following:    Height as of this encounter: 1.727 m (5' 8\").    Weight as of this encounter: 95.3 kg (210 lb).      Physical Exam  GENERAL: alert and no distress  EYES: Eyes grossly normal to inspection, PERRL and conjunctivae and sclerae normal  HENT: ear canals and TM's normal, nose and mouth without ulcers or lesions  RESP: lungs clear to auscultation - no rales, rhonchi or wheezes  CV: regular rate and rhythm, normal S1 S2, no S3 or S4, no murmur, click or rub, no peripheral edema  ABDOMEN: soft, nontender, no hepatosplenomegaly, no masses and bowel sounds normal  MS: no gross musculoskeletal defects noted, no edema  SKIN: no suspicious lesions or rashes  NEURO: Normal strength and tone, mentation intact and speech normal  PSYCH: mentation appears normal, affect normal/bright    Recent Labs   Lab Test 01/02/23  0701 01/01/23  0632 12/30/22  1145 12/15/22  1159 12/15/22  0603   HGB 8.7* 8.7* 8.8*   < > 14.5   * 561* 658*   < > 273   INR  --   --   --   --  1.05     --  132*   < > 129*   POTASSIUM 4.5  --  4.2   < > 3.9   CR 0.62*  --  0.59*   < >  --     < > = values in this interval not displayed.        Diagnostics  Labs pending at this time.  Results will be reviewed when available.   EKG: appears normal, NSR, sinus tachycardia, normal axis, normal intervals, no acute ST/T changes c/w ischemia, no LVH by voltage criteria, Premature Atrial Contractions (PAC) noted, unchanged from previous tracings    Revised Cardiac Risk Index (RCRI)  The patient has the following " serious cardiovascular risks for perioperative complications:   - No serious cardiac risks = 0 points     RCRI Interpretation: 0 points: Class I (very low risk - 0.4% complication rate)         Signed Electronically by: Aayush Moore PA-C  Copy of this evaluation report is provided to requesting physician.

## 2024-04-26 LAB
ANION GAP SERPL CALCULATED.3IONS-SCNC: 14 MMOL/L (ref 7–15)
BUN SERPL-MCNC: 12.5 MG/DL (ref 8–23)
CALCIUM SERPL-MCNC: 9.6 MG/DL (ref 8.8–10.2)
CHLORIDE SERPL-SCNC: 90 MMOL/L (ref 98–107)
CREAT SERPL-MCNC: 0.71 MG/DL (ref 0.67–1.17)
DEPRECATED HCO3 PLAS-SCNC: 25 MMOL/L (ref 22–29)
EGFRCR SERPLBLD CKD-EPI 2021: >90 ML/MIN/1.73M2
GLUCOSE SERPL-MCNC: 93 MG/DL (ref 70–99)
POTASSIUM SERPL-SCNC: 4.8 MMOL/L (ref 3.4–5.3)
SODIUM SERPL-SCNC: 129 MMOL/L (ref 135–145)

## 2024-04-26 RX ORDER — IBUPROFEN 200 MG
200-800 TABLET ORAL EVERY 8 HOURS PRN
Status: ON HOLD | COMMUNITY
End: 2024-05-03

## 2024-04-26 RX ORDER — ACETAMINOPHEN 500 MG
500-1000 TABLET ORAL EVERY 6 HOURS PRN
Status: ON HOLD | COMMUNITY
End: 2024-05-03

## 2024-04-26 NOTE — PROGRESS NOTES
PTA medications updated by Medication Scribe prior to surgery via phone call with patient (last doses completed by Nurse)     Medication history sources: Patient, Surescripts, and H&P  In the past week, patient estimated taking medication this percent of the time: Greater than 90%      Significant changes made to the medication list:  Patient reports no longer taking the following meds (med scribe removed from PTA med list): Vitamin B-12      Additional medication history information:   None    Medication reconciliation completed by provider prior to medication history? No    Time spent in this activity: 25 minutes    The information provided in this note is only as accurate as the sources available at the time of update(s)      Prior to Admission medications    Medication Sig Last Dose Taking? Auth Provider Long Term End Date   acetaminophen (TYLENOL) 500 MG tablet Take 500-1,000 mg by mouth every 6 hours as needed for mild pain Unknown at prn Yes Reported, Patient No    chlorhexidine (HIBICLENS) 4 % solution Apply topically daily as needed for wound care  at am Yes Eliseo Aggarwal MD     chlorthalidone (HYGROTON) 25 MG tablet Take 1 tablet (25 mg) by mouth daily  at am Yes aAyush Moore PA-C Yes    ferrous gluconate (FERGON) 324 (38 Fe) MG tablet Take 1 tablet (324 mg) by mouth daily (with breakfast)  at am Yes Aayush Moore PA-C No    ibuprofen (ADVIL/MOTRIN) 200 MG tablet Take 200-800 mg by mouth every 8 hours as needed for pain 4/22/2024 at PRN Yes Reported, Patient No    lisinopril (ZESTRIL) 40 MG tablet Take 1 tablet (40 mg) by mouth daily  at am Yes Aayush Moore PA-C Yes    methocarbamol (ROBAXIN) 750 MG tablet Take 1 tablet (750 mg) by mouth 4 times daily as needed for muscle spasms Unknown at prn Yes Eliseo Aggarwal MD No    omeprazole (PRILOSEC) 20 MG DR capsule Take 20 mg by mouth daily  at am Yes Reported, Patient No    Vitamin D3 (CHOLECALCIFEROL) 25 mcg (1000 units) tablet Take 1  tablet (25 mcg) by mouth daily  at am Yes Maureen Carmona MD No    zinc sulfate (ZINCATE) 220 (50 Zn) MG capsule Take 1 capsule (220 mg) by mouth daily  at am Yes Maureen Carmona MD         Medication history completed by: Shannon Hernandez LPN

## 2024-04-29 ENCOUNTER — ANESTHESIA EVENT (OUTPATIENT)
Dept: SURGERY | Facility: CLINIC | Age: 69
DRG: 516 | End: 2024-04-29
Payer: MEDICARE

## 2024-04-29 ENCOUNTER — HOSPITAL ENCOUNTER (INPATIENT)
Facility: CLINIC | Age: 69
LOS: 5 days | Discharge: HOME | DRG: 516 | End: 2024-05-04
Attending: NEUROLOGICAL SURGERY | Admitting: NEUROLOGICAL SURGERY
Payer: MEDICARE

## 2024-04-29 ENCOUNTER — ANESTHESIA (OUTPATIENT)
Dept: SURGERY | Facility: CLINIC | Age: 69
DRG: 516 | End: 2024-04-29
Payer: MEDICARE

## 2024-04-29 DIAGNOSIS — T85.848D PAIN FROM IMPLANTED HARDWARE, SUBSEQUENT ENCOUNTER: Primary | ICD-10-CM

## 2024-04-29 PROBLEM — T85.848A PAIN FROM IMPLANTED HARDWARE: Status: ACTIVE | Noted: 2024-04-29

## 2024-04-29 LAB
ABO/RH(D): NORMAL
ANTIBODY SCREEN: NEGATIVE
APTT PPP: 36 SECONDS (ref 22–38)
ERYTHROCYTE [DISTWIDTH] IN BLOOD BY AUTOMATED COUNT: 13.4 % (ref 10–15)
GLUCOSE SERPL-MCNC: 113 MG/DL (ref 70–99)
HCT VFR BLD AUTO: 41.5 % (ref 40–53)
HGB BLD-MCNC: 13.9 G/DL (ref 13.3–17.7)
INR PPP: 0.96 (ref 0.85–1.15)
MCH RBC QN AUTO: 31.7 PG (ref 26.5–33)
MCHC RBC AUTO-ENTMCNC: 33.5 G/DL (ref 31.5–36.5)
MCV RBC AUTO: 95 FL (ref 78–100)
PLATELET # BLD AUTO: 255 10E3/UL (ref 150–450)
POTASSIUM SERPL-SCNC: 4.5 MMOL/L (ref 3.4–5.3)
RBC # BLD AUTO: 4.39 10E6/UL (ref 4.4–5.9)
SODIUM SERPL-SCNC: 130 MMOL/L (ref 135–145)
SPECIMEN EXPIRATION DATE: NORMAL
WBC # BLD AUTO: 6.4 10E3/UL (ref 4–11)

## 2024-04-29 PROCEDURE — 84132 ASSAY OF SERUM POTASSIUM: CPT | Performed by: ANESTHESIOLOGY

## 2024-04-29 PROCEDURE — 370N000017 HC ANESTHESIA TECHNICAL FEE, PER MIN: Performed by: NEUROLOGICAL SURGERY

## 2024-04-29 PROCEDURE — 84295 ASSAY OF SERUM SODIUM: CPT | Performed by: ANESTHESIOLOGY

## 2024-04-29 PROCEDURE — 250N000025 HC SEVOFLURANE, PER MIN: Performed by: NEUROLOGICAL SURGERY

## 2024-04-29 PROCEDURE — 250N000011 HC RX IP 250 OP 636: Mod: JZ | Performed by: ANESTHESIOLOGY

## 2024-04-29 PROCEDURE — 86900 BLOOD TYPING SEROLOGIC ABO: CPT | Performed by: ANESTHESIOLOGY

## 2024-04-29 PROCEDURE — 99222 1ST HOSP IP/OBS MODERATE 55: CPT | Performed by: INTERNAL MEDICINE

## 2024-04-29 PROCEDURE — 250N000009 HC RX 250: Performed by: ANESTHESIOLOGY

## 2024-04-29 PROCEDURE — 85027 COMPLETE CBC AUTOMATED: CPT | Performed by: NEUROLOGICAL SURGERY

## 2024-04-29 PROCEDURE — 360N000084 HC SURGERY LEVEL 4 W/ FLUORO, PER MIN: Performed by: NEUROLOGICAL SURGERY

## 2024-04-29 PROCEDURE — 258N000003 HC RX IP 258 OP 636: Performed by: ANESTHESIOLOGY

## 2024-04-29 PROCEDURE — 250N000009 HC RX 250: Performed by: NURSE ANESTHETIST, CERTIFIED REGISTERED

## 2024-04-29 PROCEDURE — 250N000013 HC RX MED GY IP 250 OP 250 PS 637: Performed by: NURSE PRACTITIONER

## 2024-04-29 PROCEDURE — 250N000005 HC OR RX SURGIFLO HEMOSTATIC MATRIX 10ML 199102S OPNP: Performed by: NEUROLOGICAL SURGERY

## 2024-04-29 PROCEDURE — 36415 COLL VENOUS BLD VENIPUNCTURE: CPT | Performed by: ANESTHESIOLOGY

## 2024-04-29 PROCEDURE — 85610 PROTHROMBIN TIME: CPT | Performed by: NEUROLOGICAL SURGERY

## 2024-04-29 PROCEDURE — 250N000011 HC RX IP 250 OP 636: Performed by: NEUROLOGICAL SURGERY

## 2024-04-29 PROCEDURE — 22852 REMOVE SPINE FIXATION DEVICE: CPT | Performed by: ANESTHESIOLOGY

## 2024-04-29 PROCEDURE — 22852 REMOVE SPINE FIXATION DEVICE: CPT | Mod: AS | Performed by: PHYSICIAN ASSISTANT

## 2024-04-29 PROCEDURE — 120N000001 HC R&B MED SURG/OB

## 2024-04-29 PROCEDURE — 0RP604Z REMOVAL OF INTERNAL FIXATION DEVICE FROM THORACIC VERTEBRAL JOINT, OPEN APPROACH: ICD-10-PCS | Performed by: NEUROLOGICAL SURGERY

## 2024-04-29 PROCEDURE — P9045 ALBUMIN (HUMAN), 5%, 250 ML: HCPCS | Mod: JZ | Performed by: ANESTHESIOLOGY

## 2024-04-29 PROCEDURE — 22852 REMOVE SPINE FIXATION DEVICE: CPT | Performed by: NEUROLOGICAL SURGERY

## 2024-04-29 PROCEDURE — 250N000009 HC RX 250: Performed by: NEUROLOGICAL SURGERY

## 2024-04-29 PROCEDURE — 999N000141 HC STATISTIC PRE-PROCEDURE NURSING ASSESSMENT: Performed by: NEUROLOGICAL SURGERY

## 2024-04-29 PROCEDURE — 99222 1ST HOSP IP/OBS MODERATE 55: CPT | Performed by: NURSE PRACTITIONER

## 2024-04-29 PROCEDURE — 258N000003 HC RX IP 258 OP 636: Performed by: PHYSICIAN ASSISTANT

## 2024-04-29 PROCEDURE — 85730 THROMBOPLASTIN TIME PARTIAL: CPT | Performed by: NEUROLOGICAL SURGERY

## 2024-04-29 PROCEDURE — 272N000001 HC OR GENERAL SUPPLY STERILE: Performed by: NEUROLOGICAL SURGERY

## 2024-04-29 PROCEDURE — 250N000013 HC RX MED GY IP 250 OP 250 PS 637: Performed by: PHYSICIAN ASSISTANT

## 2024-04-29 PROCEDURE — 250N000011 HC RX IP 250 OP 636: Performed by: ANESTHESIOLOGY

## 2024-04-29 PROCEDURE — 710N000009 HC RECOVERY PHASE 1, LEVEL 1, PER MIN: Performed by: NEUROLOGICAL SURGERY

## 2024-04-29 PROCEDURE — 22852 REMOVE SPINE FIXATION DEVICE: CPT | Performed by: NURSE ANESTHETIST, CERTIFIED REGISTERED

## 2024-04-29 PROCEDURE — 82947 ASSAY GLUCOSE BLOOD QUANT: CPT | Performed by: ANESTHESIOLOGY

## 2024-04-29 PROCEDURE — 258N000003 HC RX IP 258 OP 636: Performed by: NEUROLOGICAL SURGERY

## 2024-04-29 RX ORDER — NALOXONE HYDROCHLORIDE 0.4 MG/ML
0.4 INJECTION, SOLUTION INTRAMUSCULAR; INTRAVENOUS; SUBCUTANEOUS
Status: DISCONTINUED | OUTPATIENT
Start: 2024-04-29 | End: 2024-05-04 | Stop reason: HOSPADM

## 2024-04-29 RX ORDER — KETOROLAC TROMETHAMINE 30 MG/ML
INJECTION, SOLUTION INTRAMUSCULAR; INTRAVENOUS PRN
Status: DISCONTINUED | OUTPATIENT
Start: 2024-04-29 | End: 2024-04-29

## 2024-04-29 RX ORDER — METHOCARBAMOL 750 MG/1
750 TABLET, FILM COATED ORAL 4 TIMES DAILY
Status: DISCONTINUED | OUTPATIENT
Start: 2024-04-29 | End: 2024-05-04 | Stop reason: HOSPADM

## 2024-04-29 RX ORDER — NALOXONE HYDROCHLORIDE 0.4 MG/ML
0.2 INJECTION, SOLUTION INTRAMUSCULAR; INTRAVENOUS; SUBCUTANEOUS
Status: DISCONTINUED | OUTPATIENT
Start: 2024-04-29 | End: 2024-05-04 | Stop reason: HOSPADM

## 2024-04-29 RX ORDER — DEXMEDETOMIDINE HYDROCHLORIDE 4 UG/ML
INJECTION, SOLUTION INTRAVENOUS PRN
Status: DISCONTINUED | OUTPATIENT
Start: 2024-04-29 | End: 2024-04-29

## 2024-04-29 RX ORDER — KETAMINE HYDROCHLORIDE 10 MG/ML
INJECTION INTRAMUSCULAR; INTRAVENOUS PRN
Status: DISCONTINUED | OUTPATIENT
Start: 2024-04-29 | End: 2024-04-29

## 2024-04-29 RX ORDER — LISINOPRIL 40 MG/1
40 TABLET ORAL DAILY
Status: DISCONTINUED | OUTPATIENT
Start: 2024-04-29 | End: 2024-05-03

## 2024-04-29 RX ORDER — PANTOPRAZOLE SODIUM 40 MG/1
40 TABLET, DELAYED RELEASE ORAL DAILY
Status: DISCONTINUED | OUTPATIENT
Start: 2024-04-29 | End: 2024-05-04 | Stop reason: HOSPADM

## 2024-04-29 RX ORDER — CHLORTHALIDONE 25 MG/1
25 TABLET ORAL DAILY
Status: DISCONTINUED | OUTPATIENT
Start: 2024-04-29 | End: 2024-05-04 | Stop reason: HOSPADM

## 2024-04-29 RX ORDER — TRANEXAMIC ACID 10 MG/ML
1 INJECTION, SOLUTION INTRAVENOUS CONTINUOUS
Status: DISCONTINUED | OUTPATIENT
Start: 2024-04-29 | End: 2024-04-29 | Stop reason: HOSPADM

## 2024-04-29 RX ORDER — OXYCODONE HYDROCHLORIDE 5 MG/1
5 TABLET ORAL
Status: DISCONTINUED | OUTPATIENT
Start: 2024-04-29 | End: 2024-04-29

## 2024-04-29 RX ORDER — PROCHLORPERAZINE MALEATE 5 MG
5 TABLET ORAL EVERY 6 HOURS PRN
Status: DISCONTINUED | OUTPATIENT
Start: 2024-04-29 | End: 2024-05-04 | Stop reason: HOSPADM

## 2024-04-29 RX ORDER — BISACODYL 10 MG
10 SUPPOSITORY, RECTAL RECTAL DAILY PRN
Status: DISCONTINUED | OUTPATIENT
Start: 2024-05-02 | End: 2024-05-04 | Stop reason: HOSPADM

## 2024-04-29 RX ORDER — ONDANSETRON 2 MG/ML
4 INJECTION INTRAMUSCULAR; INTRAVENOUS EVERY 6 HOURS PRN
Status: DISCONTINUED | OUTPATIENT
Start: 2024-04-29 | End: 2024-05-04 | Stop reason: HOSPADM

## 2024-04-29 RX ORDER — ACETAMINOPHEN 325 MG/1
975 TABLET ORAL EVERY 8 HOURS
Status: COMPLETED | OUTPATIENT
Start: 2024-04-29 | End: 2024-05-02

## 2024-04-29 RX ORDER — AMOXICILLIN 250 MG
1 CAPSULE ORAL 2 TIMES DAILY
Status: DISCONTINUED | OUTPATIENT
Start: 2024-04-29 | End: 2024-05-04 | Stop reason: HOSPADM

## 2024-04-29 RX ORDER — ACETAMINOPHEN 325 MG/1
650 TABLET ORAL EVERY 4 HOURS PRN
Status: DISCONTINUED | OUTPATIENT
Start: 2024-05-02 | End: 2024-05-04 | Stop reason: HOSPADM

## 2024-04-29 RX ORDER — LIDOCAINE 40 MG/G
CREAM TOPICAL
Status: DISCONTINUED | OUTPATIENT
Start: 2024-04-29 | End: 2024-05-04 | Stop reason: HOSPADM

## 2024-04-29 RX ORDER — HYDROXYZINE HYDROCHLORIDE 10 MG/1
10-20 TABLET, FILM COATED ORAL EVERY 6 HOURS PRN
Status: DISCONTINUED | OUTPATIENT
Start: 2024-04-29 | End: 2024-05-04 | Stop reason: HOSPADM

## 2024-04-29 RX ORDER — PROPOFOL 10 MG/ML
INJECTION, EMULSION INTRAVENOUS PRN
Status: DISCONTINUED | OUTPATIENT
Start: 2024-04-29 | End: 2024-04-29

## 2024-04-29 RX ORDER — SODIUM CHLORIDE 9 MG/ML
INJECTION, SOLUTION INTRAVENOUS CONTINUOUS PRN
Status: DISCONTINUED | OUTPATIENT
Start: 2024-04-29 | End: 2024-04-29

## 2024-04-29 RX ORDER — LIDOCAINE 4 G/G
2 PATCH TOPICAL
Status: DISCONTINUED | OUTPATIENT
Start: 2024-04-29 | End: 2024-05-04 | Stop reason: HOSPADM

## 2024-04-29 RX ORDER — NALOXONE HYDROCHLORIDE 0.4 MG/ML
0.1 INJECTION, SOLUTION INTRAMUSCULAR; INTRAVENOUS; SUBCUTANEOUS
Status: DISCONTINUED | OUTPATIENT
Start: 2024-04-29 | End: 2024-04-29 | Stop reason: HOSPADM

## 2024-04-29 RX ORDER — OXYCODONE HYDROCHLORIDE 5 MG/1
5 TABLET ORAL EVERY 4 HOURS PRN
Status: DISCONTINUED | OUTPATIENT
Start: 2024-04-29 | End: 2024-04-29

## 2024-04-29 RX ORDER — PROPOFOL 10 MG/ML
INJECTION, EMULSION INTRAVENOUS CONTINUOUS PRN
Status: DISCONTINUED | OUTPATIENT
Start: 2024-04-29 | End: 2024-04-29

## 2024-04-29 RX ORDER — CALCIUM CARBONATE 500 MG/1
500 TABLET, CHEWABLE ORAL 4 TIMES DAILY PRN
Status: DISCONTINUED | OUTPATIENT
Start: 2024-04-29 | End: 2024-04-29

## 2024-04-29 RX ORDER — HYDROMORPHONE HCL IN WATER/PF 6 MG/30 ML
0.4 PATIENT CONTROLLED ANALGESIA SYRINGE INTRAVENOUS EVERY 5 MIN PRN
Status: DISCONTINUED | OUTPATIENT
Start: 2024-04-29 | End: 2024-04-29 | Stop reason: HOSPADM

## 2024-04-29 RX ORDER — HYDROMORPHONE HYDROCHLORIDE 1 MG/ML
INJECTION, SOLUTION INTRAMUSCULAR; INTRAVENOUS; SUBCUTANEOUS PRN
Status: DISCONTINUED | OUTPATIENT
Start: 2024-04-29 | End: 2024-04-29

## 2024-04-29 RX ORDER — CEFAZOLIN SODIUM/WATER 2 G/20 ML
2 SYRINGE (ML) INTRAVENOUS SEE ADMIN INSTRUCTIONS
Status: DISCONTINUED | OUTPATIENT
Start: 2024-04-29 | End: 2024-04-29 | Stop reason: HOSPADM

## 2024-04-29 RX ORDER — OXYCODONE HYDROCHLORIDE 5 MG/1
10 TABLET ORAL
Status: DISCONTINUED | OUTPATIENT
Start: 2024-04-29 | End: 2024-04-30

## 2024-04-29 RX ORDER — OXYCODONE HYDROCHLORIDE 5 MG/1
10 TABLET ORAL EVERY 4 HOURS PRN
Status: DISCONTINUED | OUTPATIENT
Start: 2024-04-29 | End: 2024-04-29

## 2024-04-29 RX ORDER — HYDROMORPHONE HCL IN WATER/PF 6 MG/30 ML
0.4 PATIENT CONTROLLED ANALGESIA SYRINGE INTRAVENOUS
Status: DISCONTINUED | OUTPATIENT
Start: 2024-04-29 | End: 2024-04-30

## 2024-04-29 RX ORDER — ONDANSETRON 2 MG/ML
4 INJECTION INTRAMUSCULAR; INTRAVENOUS EVERY 30 MIN PRN
Status: DISCONTINUED | OUTPATIENT
Start: 2024-04-29 | End: 2024-04-29 | Stop reason: HOSPADM

## 2024-04-29 RX ORDER — SODIUM CHLORIDE 9 MG/ML
INJECTION, SOLUTION INTRAVENOUS CONTINUOUS
Status: DISCONTINUED | OUTPATIENT
Start: 2024-04-29 | End: 2024-04-30

## 2024-04-29 RX ORDER — LIDOCAINE HYDROCHLORIDE 20 MG/ML
INJECTION, SOLUTION INFILTRATION; PERINEURAL PRN
Status: DISCONTINUED | OUTPATIENT
Start: 2024-04-29 | End: 2024-04-29

## 2024-04-29 RX ORDER — FENTANYL CITRATE 0.05 MG/ML
25 INJECTION, SOLUTION INTRAMUSCULAR; INTRAVENOUS EVERY 5 MIN PRN
Status: DISCONTINUED | OUTPATIENT
Start: 2024-04-29 | End: 2024-04-29 | Stop reason: HOSPADM

## 2024-04-29 RX ORDER — POLYETHYLENE GLYCOL 3350 17 G/17G
17 POWDER, FOR SOLUTION ORAL DAILY
Status: DISCONTINUED | OUTPATIENT
Start: 2024-04-30 | End: 2024-05-04 | Stop reason: HOSPADM

## 2024-04-29 RX ORDER — ONDANSETRON 2 MG/ML
INJECTION INTRAMUSCULAR; INTRAVENOUS PRN
Status: DISCONTINUED | OUTPATIENT
Start: 2024-04-29 | End: 2024-04-29

## 2024-04-29 RX ORDER — TRANEXAMIC ACID 10 MG/ML
1000 INJECTION, SOLUTION INTRAVENOUS ONCE
Status: COMPLETED | OUTPATIENT
Start: 2024-04-29 | End: 2024-04-29

## 2024-04-29 RX ORDER — BUPIVACAINE HYDROCHLORIDE AND EPINEPHRINE 2.5; 5 MG/ML; UG/ML
INJECTION, SOLUTION INFILTRATION; PERINEURAL PRN
Status: DISCONTINUED | OUTPATIENT
Start: 2024-04-29 | End: 2024-04-29 | Stop reason: HOSPADM

## 2024-04-29 RX ORDER — SODIUM CHLORIDE, SODIUM LACTATE, POTASSIUM CHLORIDE, CALCIUM CHLORIDE 600; 310; 30; 20 MG/100ML; MG/100ML; MG/100ML; MG/100ML
INJECTION, SOLUTION INTRAVENOUS CONTINUOUS PRN
Status: DISCONTINUED | OUTPATIENT
Start: 2024-04-29 | End: 2024-04-29

## 2024-04-29 RX ORDER — SODIUM CHLORIDE, SODIUM LACTATE, POTASSIUM CHLORIDE, CALCIUM CHLORIDE 600; 310; 30; 20 MG/100ML; MG/100ML; MG/100ML; MG/100ML
INJECTION, SOLUTION INTRAVENOUS CONTINUOUS
Status: DISCONTINUED | OUTPATIENT
Start: 2024-04-29 | End: 2024-04-29 | Stop reason: HOSPADM

## 2024-04-29 RX ORDER — VASOPRESSIN IN 0.9 % NACL 2 UNIT/2ML
SYRINGE (ML) INTRAVENOUS PRN
Status: DISCONTINUED | OUTPATIENT
Start: 2024-04-29 | End: 2024-04-29

## 2024-04-29 RX ORDER — CALCIUM CARBONATE 500 MG/1
500 TABLET, CHEWABLE ORAL 4 TIMES DAILY PRN
Status: DISCONTINUED | OUTPATIENT
Start: 2024-04-29 | End: 2024-05-04 | Stop reason: HOSPADM

## 2024-04-29 RX ORDER — ONDANSETRON 4 MG/1
4 TABLET, ORALLY DISINTEGRATING ORAL EVERY 6 HOURS PRN
Status: DISCONTINUED | OUTPATIENT
Start: 2024-04-29 | End: 2024-05-04 | Stop reason: HOSPADM

## 2024-04-29 RX ORDER — OXYCODONE HYDROCHLORIDE 5 MG/1
5 TABLET ORAL
Status: DISCONTINUED | OUTPATIENT
Start: 2024-04-29 | End: 2024-04-30

## 2024-04-29 RX ORDER — CEFAZOLIN SODIUM/WATER 2 G/20 ML
2 SYRINGE (ML) INTRAVENOUS
Status: COMPLETED | OUTPATIENT
Start: 2024-04-29 | End: 2024-04-29

## 2024-04-29 RX ORDER — VITAMIN B COMPLEX
25 TABLET ORAL DAILY
Status: DISCONTINUED | OUTPATIENT
Start: 2024-04-30 | End: 2024-05-04 | Stop reason: HOSPADM

## 2024-04-29 RX ORDER — DEXAMETHASONE SODIUM PHOSPHATE 4 MG/ML
INJECTION, SOLUTION INTRA-ARTICULAR; INTRALESIONAL; INTRAMUSCULAR; INTRAVENOUS; SOFT TISSUE PRN
Status: DISCONTINUED | OUTPATIENT
Start: 2024-04-29 | End: 2024-04-29

## 2024-04-29 RX ORDER — HYDROXYZINE HYDROCHLORIDE 10 MG/1
10 TABLET, FILM COATED ORAL EVERY 6 HOURS PRN
Status: DISCONTINUED | OUTPATIENT
Start: 2024-04-29 | End: 2024-04-29

## 2024-04-29 RX ORDER — HYDROMORPHONE HCL IN WATER/PF 6 MG/30 ML
0.2 PATIENT CONTROLLED ANALGESIA SYRINGE INTRAVENOUS EVERY 5 MIN PRN
Status: DISCONTINUED | OUTPATIENT
Start: 2024-04-29 | End: 2024-04-29 | Stop reason: HOSPADM

## 2024-04-29 RX ORDER — ZINC SULFATE 50(220)MG
220 CAPSULE ORAL DAILY
Status: DISCONTINUED | OUTPATIENT
Start: 2024-04-30 | End: 2024-05-04 | Stop reason: HOSPADM

## 2024-04-29 RX ORDER — FERROUS GLUCONATE 324(38)MG
324 TABLET ORAL
Status: DISCONTINUED | OUTPATIENT
Start: 2024-04-30 | End: 2024-05-04 | Stop reason: HOSPADM

## 2024-04-29 RX ORDER — FENTANYL CITRATE 0.05 MG/ML
50 INJECTION, SOLUTION INTRAMUSCULAR; INTRAVENOUS EVERY 5 MIN PRN
Status: DISCONTINUED | OUTPATIENT
Start: 2024-04-29 | End: 2024-04-29 | Stop reason: HOSPADM

## 2024-04-29 RX ORDER — HYDROMORPHONE HCL IN WATER/PF 6 MG/30 ML
0.2 PATIENT CONTROLLED ANALGESIA SYRINGE INTRAVENOUS
Status: DISCONTINUED | OUTPATIENT
Start: 2024-04-29 | End: 2024-04-30

## 2024-04-29 RX ORDER — ONDANSETRON 4 MG/1
4 TABLET, ORALLY DISINTEGRATING ORAL EVERY 30 MIN PRN
Status: DISCONTINUED | OUTPATIENT
Start: 2024-04-29 | End: 2024-04-29 | Stop reason: HOSPADM

## 2024-04-29 RX ORDER — FENTANYL CITRATE 50 UG/ML
INJECTION, SOLUTION INTRAMUSCULAR; INTRAVENOUS PRN
Status: DISCONTINUED | OUTPATIENT
Start: 2024-04-29 | End: 2024-04-29

## 2024-04-29 RX ADMIN — HYDROMORPHONE HYDROCHLORIDE 0.5 MG: 1 INJECTION, SOLUTION INTRAMUSCULAR; INTRAVENOUS; SUBCUTANEOUS at 08:23

## 2024-04-29 RX ADMIN — MIDAZOLAM 2 MG: 1 INJECTION INTRAMUSCULAR; INTRAVENOUS at 07:31

## 2024-04-29 RX ADMIN — ACETAMINOPHEN 975 MG: 325 TABLET, FILM COATED ORAL at 20:07

## 2024-04-29 RX ADMIN — OXYCODONE HYDROCHLORIDE 10 MG: 5 TABLET ORAL at 13:31

## 2024-04-29 RX ADMIN — ALBUMIN HUMAN: 0.05 INJECTION, SOLUTION INTRAVENOUS at 09:46

## 2024-04-29 RX ADMIN — PHENYLEPHRINE HYDROCHLORIDE 200 MCG: 10 INJECTION INTRAVENOUS at 08:58

## 2024-04-29 RX ADMIN — Medication 10 MG: at 09:31

## 2024-04-29 RX ADMIN — PHENYLEPHRINE HYDROCHLORIDE 100 MCG: 10 INJECTION INTRAVENOUS at 08:30

## 2024-04-29 RX ADMIN — OXYCODONE HYDROCHLORIDE 5 MG: 5 TABLET ORAL at 21:18

## 2024-04-29 RX ADMIN — TRANEXAMIC ACID 1 MG/KG/HR: 10 INJECTION, SOLUTION INTRAVENOUS at 07:47

## 2024-04-29 RX ADMIN — OXYCODONE HYDROCHLORIDE 5 MG: 5 TABLET ORAL at 16:49

## 2024-04-29 RX ADMIN — SODIUM CHLORIDE, POTASSIUM CHLORIDE, SODIUM LACTATE AND CALCIUM CHLORIDE: 600; 310; 30; 20 INJECTION, SOLUTION INTRAVENOUS at 06:53

## 2024-04-29 RX ADMIN — Medication 1 UNITS: at 09:33

## 2024-04-29 RX ADMIN — ALBUMIN HUMAN: 0.05 INJECTION, SOLUTION INTRAVENOUS at 09:23

## 2024-04-29 RX ADMIN — DEXMEDETOMIDINE HYDROCHLORIDE 10 MCG: 200 INJECTION INTRAVENOUS at 10:15

## 2024-04-29 RX ADMIN — LISINOPRIL 40 MG: 40 TABLET ORAL at 12:53

## 2024-04-29 RX ADMIN — Medication 20 MG: at 08:04

## 2024-04-29 RX ADMIN — METHOCARBAMOL 750 MG: 750 TABLET ORAL at 20:07

## 2024-04-29 RX ADMIN — PHENYLEPHRINE HYDROCHLORIDE 200 MCG: 10 INJECTION INTRAVENOUS at 08:02

## 2024-04-29 RX ADMIN — METHOCARBAMOL 750 MG: 750 TABLET ORAL at 12:53

## 2024-04-29 RX ADMIN — SODIUM CHLORIDE, POTASSIUM CHLORIDE, SODIUM LACTATE AND CALCIUM CHLORIDE: 600; 310; 30; 20 INJECTION, SOLUTION INTRAVENOUS at 07:50

## 2024-04-29 RX ADMIN — FENTANYL CITRATE 50 MCG: 50 INJECTION INTRAMUSCULAR; INTRAVENOUS at 07:37

## 2024-04-29 RX ADMIN — PHENYLEPHRINE HYDROCHLORIDE 100 MCG: 10 INJECTION INTRAVENOUS at 08:14

## 2024-04-29 RX ADMIN — DICLOFENAC 4 G: 10 GEL TOPICAL at 18:50

## 2024-04-29 RX ADMIN — LIDOCAINE HYDROCHLORIDE 100 MG: 20 INJECTION, SOLUTION INFILTRATION; PERINEURAL at 07:37

## 2024-04-29 RX ADMIN — SENNOSIDES AND DOCUSATE SODIUM 1 TABLET: 50; 8.6 TABLET ORAL at 20:07

## 2024-04-29 RX ADMIN — ROCURONIUM BROMIDE 100 MG: 50 INJECTION, SOLUTION INTRAVENOUS at 07:37

## 2024-04-29 RX ADMIN — Medication 2 G: at 07:47

## 2024-04-29 RX ADMIN — ACETAMINOPHEN 975 MG: 325 TABLET, FILM COATED ORAL at 12:53

## 2024-04-29 RX ADMIN — ALBUMIN HUMAN: 0.05 INJECTION, SOLUTION INTRAVENOUS at 08:34

## 2024-04-29 RX ADMIN — PROPOFOL 30 MCG/KG/MIN: 10 INJECTION, EMULSION INTRAVENOUS at 07:44

## 2024-04-29 RX ADMIN — PANTOPRAZOLE SODIUM 40 MG: 40 TABLET, DELAYED RELEASE ORAL at 12:53

## 2024-04-29 RX ADMIN — SODIUM CHLORIDE: 9 INJECTION, SOLUTION INTRAVENOUS at 12:03

## 2024-04-29 RX ADMIN — SENNOSIDES AND DOCUSATE SODIUM 1 TABLET: 50; 8.6 TABLET ORAL at 12:53

## 2024-04-29 RX ADMIN — Medication 1 UNITS: at 09:23

## 2024-04-29 RX ADMIN — SUGAMMADEX 200 MG: 100 INJECTION, SOLUTION INTRAVENOUS at 10:10

## 2024-04-29 RX ADMIN — DICLOFENAC 4 G: 10 GEL TOPICAL at 21:20

## 2024-04-29 RX ADMIN — PROPOFOL 50 MG: 10 INJECTION, EMULSION INTRAVENOUS at 07:44

## 2024-04-29 RX ADMIN — FENTANYL CITRATE 50 MCG: 50 INJECTION INTRAMUSCULAR; INTRAVENOUS at 07:51

## 2024-04-29 RX ADMIN — METHOCARBAMOL 750 MG: 750 TABLET ORAL at 16:48

## 2024-04-29 RX ADMIN — PROPOFOL 250 MG: 10 INJECTION, EMULSION INTRAVENOUS at 07:37

## 2024-04-29 RX ADMIN — SODIUM CHLORIDE: 0.9 INJECTION, SOLUTION INTRAVENOUS at 09:33

## 2024-04-29 RX ADMIN — CHLORTHALIDONE 25 MG: 25 TABLET ORAL at 12:53

## 2024-04-29 RX ADMIN — TRANEXAMIC ACID 1 G: 10 INJECTION, SOLUTION INTRAVENOUS at 07:42

## 2024-04-29 RX ADMIN — PHENYLEPHRINE HYDROCHLORIDE 0.3 MCG/KG/MIN: 10 INJECTION INTRAVENOUS at 08:07

## 2024-04-29 RX ADMIN — LIDOCAINE 2 PATCH: 4 PATCH TOPICAL at 20:10

## 2024-04-29 RX ADMIN — ONDANSETRON 4 MG: 2 INJECTION INTRAMUSCULAR; INTRAVENOUS at 09:29

## 2024-04-29 RX ADMIN — FENTANYL CITRATE 50 MCG: 50 INJECTION, SOLUTION INTRAMUSCULAR; INTRAVENOUS at 10:56

## 2024-04-29 RX ADMIN — PHENYLEPHRINE HYDROCHLORIDE 200 MCG: 10 INJECTION INTRAVENOUS at 09:15

## 2024-04-29 RX ADMIN — DEXAMETHASONE SODIUM PHOSPHATE 4 MG: 4 INJECTION, SOLUTION INTRA-ARTICULAR; INTRALESIONAL; INTRAMUSCULAR; INTRAVENOUS; SOFT TISSUE at 07:59

## 2024-04-29 ASSESSMENT — ACTIVITIES OF DAILY LIVING (ADL)
ADLS_ACUITY_SCORE: 35
ADLS_ACUITY_SCORE: 40
ADLS_ACUITY_SCORE: 32
ADLS_ACUITY_SCORE: 35
ADLS_ACUITY_SCORE: 39
ADLS_ACUITY_SCORE: 32
ADLS_ACUITY_SCORE: 40
ADLS_ACUITY_SCORE: 39
ADLS_ACUITY_SCORE: 39
ADLS_ACUITY_SCORE: 32
ADLS_ACUITY_SCORE: 40
ADLS_ACUITY_SCORE: 35
ADLS_ACUITY_SCORE: 35
ADLS_ACUITY_SCORE: 33

## 2024-04-29 ASSESSMENT — ENCOUNTER SYMPTOMS
DYSRHYTHMIAS: 0
SEIZURES: 0

## 2024-04-29 ASSESSMENT — LIFESTYLE VARIABLES: TOBACCO_USE: 0

## 2024-04-29 NOTE — OR NURSING
Transfer criteria met.  Order received per Dr. Sage to transfer to [facility] for continued recovery.  Hand-off report given to RN.  To 7927-1 per cart with all belongings.  Will transport with Capnography monitoring.  Will notify family of transfer.

## 2024-04-29 NOTE — CONSULTS
"[facility] ACUTE PAIN SERVICE CONSULTATION   [facility]   [employer] Web [employer]fe    Date of Admission:  4/29/2024  Date of Consult (When I saw the patient): 04/29/24  Physician requesting consult: Jeff Lopez    Service: Neurosurgery  Reason for consult: Pain management     Assessment/Plan:     Philipp Bland is a 68 year old male who was admitted on 4/29/2024.  1 day of planned surgery T10 to pelvis instrument removal with EBL of 300 mL. I was asked to see the patient for acute postoperative pain with pain type musculoskeletal.  Patient has a past medical history of essential hypertension, elevated PSA, osteoporosis, Paget's disease, spondylolisthesis of lumbar lumbar disc disease, multilevel fusion about 16 months ago, chronic pain not on opiates, and pain associated with thoracolumbar hardware status post lumbar fusion. Patient reports pain of longstanding began and.to increase recently and was felt due to hardware malfunction Describes pain as dull achy,radiates into upper buttocks with numeric rating of 2-7/10, presently 8/10. The patient denies shortness of breath, chest pain or tightness changes in urination, constipation, localized leg erythema or pain. The patient does does not smoke and no chemical dependency history, consumes alcohol 2-4 times per week. Opioid tolerance is naïve.     Opioid Induced Respiratory Depression Risk Assessment:  (Low 0-1; Moderate 2-4; or High >4 or >/= 3 if two of the risk factors are age > 60 and opioid naive) due to the following risk factors: LEW, COPD/Asthma/pulmonary disease, CHF, renal dysfunction, hepatic dysfunction, Obesity, Smoker, Age>60, >2 opioid therapies, concomitant CNS depressants, opioid naive status, or post surgical   CrCl is 114.6 mg/dl\"?   [place] pulled from [place] on 04/29/24.  No controlled substances in the past 12 months.  Chronic opioid use = 0 mg MME, opioid used this past 24 hours in the form of oral none, " " IV fentanyl 50 mcg,   Adjuviants: Bupivacaine 0.25% 16 mL.     PLAN:   1) Pain is consistent with musculoskeletal, secondary to planned procedure, in the setting of lumbar spondylolisthesis, pain related to loosening hardware. Treatment plan includes multimodal pain approach, medications as listed below, reviewed.  Discharge medications, advised keeping track of doses, educated on tapering off as pain improves, watch for constipation and stool softeners, no driving or alcohol with opioids, store medications in a safe place, advised to take no more than the prescribed dose as increased doses may cause respiratory depression or death. Patient is understanding of the plan. All questions and concerns addressed to patient's satisfaction.     2)Multimodal Medication Therapy  Topical:None .   Adjuvants:Tylenol 975 mg every 8 hrs\", Hydroxyzine 10 to 20 mg every 6 hours as needed Muscle relaxer's 750 mg 4 times daily. \"   Antidepressants/anxiolytics: None    Opioids:Oxycodone 5 to 10 mg every 4 hours as needed  IV Pain medication: {Blank multiple:31020:: \"Dilaudid 0.2-0.4 every 2 hours as needed\",   3) Non-medication interventions- Ice, Heat, physical therapy, distraction aroma therapy  4) Interdisciplinary team care appreciate input from care coordination, hospitalist if indicated by surgery team and rehabilitative services  5)Constipation Prophylaxis- senna and miralax  continue to monitor.   6) Follow up   -acute pain team will continue to follow.   -Opioid prescriber has been none. PCP is Aayush Moore    -Discharge Recommendations - We recommend prescribing the following at the time of discharge:    Oxycodone dose to be determined  Methocarbamol 750 mg 4 times daily as needed  Hydroxyzine 10 mg every 6 hours as needed  Disposition: TCU    Prescribing at discharge: Surgeon       History of Present Illness (HPI):       Philipp Bland is a 68 year old  male with a past medical history noted above and presents " "following a planned procedure for T10 through pelvis instrumentation removal due to pain associated with hardware malfunction thought o be causal to increase pain in setting of spondylolisthesis and lumbar pain chronic.  The patient reports pain that is located  in back and not radiate.  Alleviating factors include rest, pain medication and exacerbates include movement.  Current pain is rated at 2/10 and goal is to/10.  Per [place]  review noted above. The patient has a naïve opioid tolerance. Opioid induced side effects including sedation, respiratory suppression, nausea, and constipation. The patient does not history of LEW, end-stage renal or hepatic dysfunction, or substance use disorder.     Discussed multimodal interventions as well as other than systemic pharmacologic treatments for acute and chronic pain .    Review of medical record/Summary of labs and care everywhere as part of comprehensive review.      Past pain treatments have include surgery, medications, physical therapy.    UDS No results found for: \"UAMP\", \"UBARB\", \"UCANN\", \"UCOC\", \"OPIT\", \"UPCP\"        Medical History   has a past medical history of Essential hypertension, benign (01/01/2001) and Family history of alcoholism.    He has no past medical history of Asymptomatic human immunodeficiency virus (HIV) infection status (H), Blood in semen, Cerebral palsy (H), Complication of anesthesia, Congenital renal agenesis and dysgenesis, Epididymitis, bilateral, Epididymitis, left, Epididymitis, right, Goiter, Gout, Hernia, abdominal, History of spinal cord injury, History of thrombophlebitis, Horseshoe kidney, Hydrocephalus (H), Malignant hyperthermia, Mumps, Orchitis, epididymitis, and epididymo-orchitis, with abscess, Palpitations, Parkinsons disease (H), Penile discharge, Prostate infection, Spider veins, Spina bifida (H), STD (sexually transmitted disease), Swelling of testicle, Tethered cord (H), or Tuberculosis.       Surgical History   has a " past surgical history that includes Optical tracking system fusion spine posterior lumbar three+ levels (N/A, 12/15/2022).     Allergies     Allergies   Allergen Reactions    No Known Drug Allergy         Current Home Medications   Prior to Admission medications    Medication Sig Start Date End Date Taking? Authorizing Provider   acetaminophen (TYLENOL) 500 MG tablet Take 500-1,000 mg by mouth every 6 hours as needed for mild pain   Yes Reported, Patient   chlorhexidine (HIBICLENS) 4 % solution Apply topically daily as needed for wound care 4/17/24  Yes Eliseo Aggarwal MD   chlorthalidone (HYGROTON) 25 MG tablet Take 1 tablet (25 mg) by mouth daily 4/25/24  Yes Aayush Moore PA-C   ferrous gluconate (FERGON) 324 (38 Fe) MG tablet Take 1 tablet (324 mg) by mouth daily (with breakfast) 11/28/23  Yes Aayush Moore PA-C   ibuprofen (ADVIL/MOTRIN) 200 MG tablet Take 200-800 mg by mouth every 8 hours as needed for pain   Yes Reported, Patient   lisinopril (ZESTRIL) 40 MG tablet Take 1 tablet (40 mg) by mouth daily 4/25/24  Yes Aayush Moore PA-C   methocarbamol (ROBAXIN) 750 MG tablet Take 1 tablet (750 mg) by mouth 4 times daily as needed for muscle spasms 3/1/24  Yes Eliseo Aggarwal MD   omeprazole (PRILOSEC) 20 MG DR capsule Take 20 mg by mouth daily   Yes Reported, Patient   Vitamin D3 (CHOLECALCIFEROL) 25 mcg (1000 units) tablet Take 1 tablet (25 mcg) by mouth daily 1/6/23  Yes Maureen Carmona MD   zinc sulfate (ZINCATE) 220 (50 Zn) MG capsule Take 1 capsule (220 mg) by mouth daily 1/6/23  Yes Maureen Carmona MD          Social History  Reviewed, and he  reports that he has never smoked. He has never used smokeless tobacco. He reports current alcohol use. He reports that he does not use drugs.      Family History- Reviewed care everywhere to find family history Nothing relevant to pain consult    Reviewed, and family history includes Hypertension in his father.    Review of Systems  Complete  ROS reviewed, unless noted  , all other systems reviewed (with patient) and all others found to be negative.         Objective:     Vitals:  B/P: 124/80, T: 98.2, P: 104, R: 19     Weight:   210 lbs 0 oz  Body mass index is 31.93 kg/m .      Physical Exam:     General Appearance:  Alert, cooperative, no distress, appears stated age, grooming  good  Patient is  pleasant, able to make needs known   Head:  Normocephalic, without obvious abnormality, atraumatic   Eyes:  Pupils are normal size mid position   ENT/Throat: Lips and mouth are moist   Lymph/Neck: Supple, symmetrical, trachea midline, no adenopathy, thyroid: not enlarged, symmetric    Lungs:   Clear to auscultation bilaterally, respirations unlabored   Chest Wall:  No tenderness or deformity   Cardiovascular/Heart:  Regular rate and rhythm, S1, S2,no edema   Abdomen:   Soft, non-tender, bowel sounds active all four quadrants,  no masses, no organomegaly   Musculoskeletal: Extremities normal, atraumatic  Incision with dressing and drain over T spine no new bleeding   Skin: Skin color good, lesions none   Neurologic  Affect: Alert and oriented X 3, Moves all 4 extremities. 5/5 strength   normal, abberant pain behaviors No          Imaging Reviewed Personally By Myself     No results found for this visit on 04/29/24.     Labs Reviewed Personally By Myself    Sodium   Date Value Ref Range Status   04/29/2024 130 (L) 135 - 145 mmol/L Final     Comment:     Reference intervals for this test were updated on 09/26/2023 to more accurately reflect our healthy population. There may be differences in the flagging of prior results with similar values performed with this method. Interpretation of those prior results can be made in the context of the updated reference intervals.    04/08/2013 141 133 - 144 mmol/L Final     Potassium   Date Value Ref Range Status   04/29/2024 4.5 3.4 - 5.3 mmol/L Final   01/02/2023 4.5 3.4 - 5.3 mmol/L Final   04/08/2013 4.3 3.4 - 5.3 mmol/L  Final     Chloride   Date Value Ref Range Status   04/25/2024 90 (L) 98 - 107 mmol/L Final   01/02/2023 103 94 - 109 mmol/L Final   04/08/2013 101 94 - 109 mmol/L Final     Carbon Dioxide   Date Value Ref Range Status   04/08/2013 25 20 - 32 mmol/L Final     Carbon Dioxide (CO2)   Date Value Ref Range Status   04/25/2024 25 22 - 29 mmol/L Final   01/02/2023 25 20 - 32 mmol/L Final     Anion Gap   Date Value Ref Range Status   04/25/2024 14 7 - 15 mmol/L Final   01/02/2023 5 3 - 14 mmol/L Final   04/08/2013 14 6 - 17 mmol/L Final     Glucose   Date Value Ref Range Status   04/29/2024 113 (H) 70 - 99 mg/dL Final   01/02/2023 107 (H) 70 - 99 mg/dL Final   04/08/2013 93 60 - 99 mg/dL Final     Urea Nitrogen   Date Value Ref Range Status   04/25/2024 12.5 8.0 - 23.0 mg/dL Final   01/02/2023 13 7 - 30 mg/dL Final   04/08/2013 26 7 - 30 mg/dL Final     Creatinine   Date Value Ref Range Status   04/25/2024 0.71 0.67 - 1.17 mg/dL Final   04/08/2013 1.27 (H) 0.66 - 1.25 mg/dL Final     GFR Estimate   Date Value Ref Range Status   04/25/2024 >90 >60 mL/min/1.73m2 Final   04/08/2013 59 (L) >60 mL/min/1.7m2 Final     Calcium   Date Value Ref Range Status   04/25/2024 9.6 8.8 - 10.2 mg/dL Final   04/08/2013 9.5 8.5 - 10.4 mg/dL Final     Critical medical decision making:   High risk medication use Yes, High risk medication monitoring Yes, Relevant labs,imaging notes reviewed by me? Yes  Patient education on pain plan? Yes  Please see A&P for additional details of medical decision making.  Medical complexity over the past 24 hours:  - Prescription DRUG MANAGEMENT performed  - reduced interval of Oxycodone to Q 3 prn, added topicals and range dosed hydroxyzine     60 MINUTES SPENT BY ME on the date of service doing chart review, history, exam, documentation & further activities per the note.  I communicated with the  surgery PA-c providing on pain plan care today? Yes  I communicated with the bed side nurse on pian plan of care?   Yes  Thank you for this consultation in the care of Philipp Bland.      Ofe Jaramillo RN, PGMT-BC APRN,CNP,ACHPN   Acute Pain Team ( [facility], [facility], [facility]) 8-4:30 after 3:30 page house officer   No weekend coverage   Securely message with the Vocera Web Console (learn more here)

## 2024-04-29 NOTE — PROGRESS NOTES
"Neurosurgery Progress     Dx:     POD #0 s/p removal thoracic 10 pelvis instrumentation.     Neuro stable.     TODAY'S PLAN:     -Hospitalist consult.  -Pain Management consult.   -No post-op abx.  -No imaging needed.   -Advance activity as tolerated.  -Continue supportive and symptomatic treatment.  -Start physical therapy.  -Pain control measures.  -Advance diet as tolerated.  -Routine wound care.     In the event that patient's symptoms worsen or change we would appreciate being contacted. We did discuss signs of a worsening problem that he should seek being evaluated.     We did review the above information with the patient whom agrees with the plan and did verbalize understanding.   ________________________________________________________________     BP 92/65   Pulse 108   Temp 97.3  F (36.3  C) (Temporal)   Resp 15   Ht 1.727 m (5' 8\")   Wt 95.3 kg (210 lb)   SpO2 97%   BMI 31.93 kg/m       Patient examined in PACU. He appears comfortable and in no apparent distress. He is moving all of his extremities well.   CN II-XII intact, alert and appropriate with conversation. Follows all commands.   Bilateral upper and lower extremities with appropriate strength. Sensation is also intact throughout - baseline neuropathy. He does have an acceptable post-operative range of motion.   Bandage is clean, dry and intact.   Drain intact.   Calves soft and non-tender.       All pertinent labs and updated imaging reviewed in EPIC.     Rudy Lopez PA-C   Neurosurgery     Reviewed with Dr. Aggarwal    "

## 2024-04-29 NOTE — PROGRESS NOTES
4/29/24; [id number]    A&O X4; A1 gb/walker ( has stood at and dangled at side of bed); pain managed with scheduled Tylenol, Robaxin, PRN Oxy; IVF @ 75mL/hr until tomorrow w/ recheck of Na+; patient enjoys popsicles; incision marked w/ some drainage; Hemovac w/ no drainage; Acosta pulled and DTV.

## 2024-04-29 NOTE — ANESTHESIA PROCEDURE NOTES
Airway       Patient location during procedure: OR       Procedure Start/Stop Times: 4/29/2024 7:41 AM  Staff -        Anesthesiologist:  Torri Sage MD       CRNA: Alyson Mendieta APRN CRNA       Other Anesthesia Staff: Breezy Palacio       Performed By: AWILDA and with CRNAs       Procedure performed by resident/fellow/CRNA in presence of a teaching physician.    Consent for Airway        Urgency: elective  Indications and Patient Condition       Indications for airway management: palmer-procedural       Induction type:intravenous       Mask difficulty assessment: 3 - difficult mask (inadequate, unstable, or two providers) +/- NMBA    Final Airway Details       Final airway type: endotracheal airway       Successful airway: ETT - single  Endotracheal Airway Details        ETT size (mm): 8.0       Cuffed: yes       Successful intubation technique: video laryngoscopy       VL Blade Size: Parnell 4       Grade View of Cords: 1       Adjucts: stylet       Position: Right       Measured from: gums/teeth       Secured at (cm): 24       Bite block used: Molar    Post intubation assessment        Placement verified by: capnometry, equal breath sounds and chest rise        Number of attempts at approach: 1       Number of other approaches attempted: 0       Secured with: tape       Ease of procedure: easy       Dentition: Intact and Unchanged    Medication(s) Administered   Medication Administration Time: 4/29/2024 7:41 AM

## 2024-04-29 NOTE — CONSULTS
[facility]  Consult Note - Hospitalist Service  Date of Admission:  4/29/2024  Consult Requested by: Dr. Aggarwal  Reason for Consult: Postoperative Co. medical management of essential hypertension, GERD    Assessment & Plan     Philipp Bland is a 68 year old male with past medical history significant for lumbar stenosis and spondylolisthesis with radiculopathy who has undergone T10-S1 posterior segmental instrumentation with S2 alar iliac screws, screws cement augmentation T10 and T11, L2-S1 bilateral decompression and transforaminal interbody fusion with posterior arthrodesis, T10-S1 in December 2022.  Patient has been following with Dr. Aggarwal.  In the last few months his pain has been worsening and patient has been functionally struggling.  Neurologically he was found to be stable with bilateral foot drops.  Patient was noticed to be unable to stand and his previous neurosurgery appointment on 03/01 to do his a standing scoliosis x-ray.  Dr. Aggarwal ordered thoracic and lumbar CT to evaluate his hardware and fusion.  CT scan of the thoracic and lumbar spine showed a stable haloing around one of the S1 screws and the clay on the left side that is out of the pelvic instrumentation.  However it appears that he has achieved bony fusion across the construct.  Neurosurgery thought that it would be reasonable to consider hardware removal along with a bilateral L5-S1 foraminotomies.  Patient has undergone removal of thoracic 10 pelvis instrumentation, hardware removed in its entirety. Fusion explored and appeared to be solid from T10 to the pelvis.  Hospital medicine has been consulted for medical comanagement and management of his longstanding essential hypertension    Spinal stenosis of lumbar region with radiculopathy:  S/p painful orthopedic hardware removal of thoracic 10 pelvic instrumentation;    --Postoperative management per neurosurgery.  --Neurosurgery is recommending no postop antibiotics,  "patient has received cefazolin 1 dose preoperatively.  --Pain management consultation has been requested.  --Advance activity as tolerated.  --Physical therapy has been ordered.  --Would recommend stopping IV fluids tomorrow morning if blood pressure remains stable.  -- Patient has received Tranexamic acid IV preoperatively, will let neurosurgery team review regarding DVT prophylaxis postoperatively    Essential hypertension:  PTA medications include chlorthalidone 25 mg p.o. daily along with lisinopril 40 mg p.o. daily  Combination of chlorthalidone with ACE inhibitors might be causing hyponatremia, see below    --Will place holding parameters on lisinopril 40 mg p.o. daily, start from tomorrow morning  -- Recommend holding chlorthalidone for now, monitor blood pressure and check BMP    Mild hyponatremia: Most likely secondary to chlorthalidone  -- As above, will hold chlorthalidone for now, recheck BMP, once sodium is improving probably can be restarted but will recommend further adjustment in an outpatient setting  -- If no issues with chronic edema, then will recommend stopping diuretic and using alternate antihypertensive medication.    GERD:  -- Continue pantoprazole 40 mg p.o. daily as an alternate to PTA omeprazole       Clinically Significant Risk Factors Present on Admission                  # Hypertension: Noted on problem list      # Obesity: Estimated body mass index is 31.93 kg/m  as calculated from the following:    Height as of this encounter: 1.727 m (5' 8\").    Weight as of this encounter: 95.3 kg (210 lb).              Joann Scott MD  Hospitalist Service  Securely message with [employer] (more info)  Text page via [facility] Paging/Directory   ______________________________________________________________________    Chief Complaint   Postoperative medical management of essential hypertension\" medical management    History is obtained from the patient    History of Present Illness     Philipp Bland is a 68 " year old male with past medical history significant for lumbar stenosis and spondylolisthesis with radiculopathy who has undergone T10-S1 posterior segmental instrumentation with S2 alar iliac screws, screws cement augmentation T10 and T11, L2-S1 bilateral decompression and transforaminal interbody fusion with posterior arthrodesis, T10-S1 in December 2022.  Patient has been following with Dr. Aggarwal.  In the last few months his pain has been worsening and patient has been functionally struggling.  Neurologically he was found to be stable with bilateral foot drops.  Patient was noticed to be unable to stand and his previous neurosurgery appointment on 03/01 to do his a standing scoliosis x-ray.  Dr. Aggarwal ordered thoracic and lumbar CT to evaluate his hardware and fusion.  CT scan of the thoracic and lumbar spine showed a stable haloing around one of the S1 screws and the clay on the left side that is out of the pelvic instrumentation.  However it appears that he has achieved bony fusion across the construct.  Neurosurgery thought that it would be reasonable to consider hardware removal along with a bilateral L5-S1 foraminotomies.     Patient has undergone elective thoracic 10 pelvis instrumentation removal on 04/29/2024 and internal medicine service has been consulted for medical management of his longstanding essential hypertension.    Other than his back pain his past medical history significant for essential hypertension.  She has no known drug allergies patient is not a smoker and consumes alcohol 2-3 times per week.  Again his surgical history is significant for undergoing spinal surgery in December 2022.      Past Medical History    Past Medical History:   Diagnosis Date    Essential hypertension, benign 01/01/2001    Family history of alcoholism        Past Surgical History   Past Surgical History:   Procedure Laterality Date    OPTICAL TRACKING SYSTEM FUSION SPINE POSTERIOR LUMBAR THREE+ LEVELS N/A 12/15/2022     Procedure: Thoracic 10 to Sacral 1 posterior segmental instrumentation. Pelvic instrumentation. Screw cement augmentation L10 to L11. Lumbar 2 to Sacral 1 bilateral decompression and transforaminal interbody fusion. Posterior arthrodesis Thoracic 10-Sacral 1.;  Surgeon: Eliseo Aggarwal MD;  Location:  OR       Medications   Medications Prior to Admission   Medication Sig Dispense Refill Last Dose    acetaminophen (TYLENOL) 500 MG tablet Take 500-1,000 mg by mouth every 6 hours as needed for mild pain   4/28/2024 at prn    chlorhexidine (HIBICLENS) 4 % solution Apply topically daily as needed for wound care 236 mL 0 4/29/2024 at am    chlorthalidone (HYGROTON) 25 MG tablet Take 1 tablet (25 mg) by mouth daily 90 tablet 1 4/28/2024 at am    ferrous gluconate (FERGON) 324 (38 Fe) MG tablet Take 1 tablet (324 mg) by mouth daily (with breakfast) 90 tablet 1 4/28/2024 at am    ibuprofen (ADVIL/MOTRIN) 200 MG tablet Take 200-800 mg by mouth every 8 hours as needed for pain   4/22/2024 at PRN    lisinopril (ZESTRIL) 40 MG tablet Take 1 tablet (40 mg) by mouth daily 90 tablet 1 4/28/2024 at am    methocarbamol (ROBAXIN) 750 MG tablet Take 1 tablet (750 mg) by mouth 4 times daily as needed for muscle spasms 60 tablet 2 4/28/2024 at prn    omeprazole (PRILOSEC) 20 MG DR capsule Take 20 mg by mouth daily   4/28/2024 at am    Vitamin D3 (CHOLECALCIFEROL) 25 mcg (1000 units) tablet Take 1 tablet (25 mcg) by mouth daily 30 tablet 0 4/28/2024 at am    zinc sulfate (ZINCATE) 220 (50 Zn) MG capsule Take 1 capsule (220 mg) by mouth daily 30 capsule 0 4/28/2024 at am          Review of Systems    The 10 point Review of Systems is negative other than noted in the HPI or here.     Social History   I have reviewed this patient's social history and updated it with pertinent information if needed.  Social History     Tobacco Use    Smoking status: Never    Smokeless tobacco: Never   Vaping Use    Vaping status: Never Used    Substance Use Topics    Alcohol use: Yes     Comment: 2-3 4x /week    Drug use: No     Family History   I have reviewed this patient's family history and updated it with pertinent information if needed.  Family History   Problem Relation Age of Onset    Hypertension Father        Allergies   Allergies   Allergen Reactions    No Known Drug Allergy         Physical Exam   Vital Signs: Temp: 98.2  F (36.8  C) Temp src: Oral BP: 104/73 Pulse: 103   Resp: 19 SpO2: 99 % O2 Device: Nasal cannula Oxygen Delivery: 1 LPM  Weight: 210 lbs 0 oz    Physical Exam  Vitals and nursing note reviewed.   Constitutional:       Appearance: He is well-developed.   HENT:      Head: Normocephalic and atraumatic.   Eyes:      Pupils: Pupils are equal, round, and reactive to light.   Neck:      Thyroid: No thyromegaly.   Cardiovascular:      Rate and Rhythm: Normal rate and regular rhythm.      Heart sounds: Normal heart sounds.   Pulmonary:      Effort: Pulmonary effort is normal. No respiratory distress.      Breath sounds: Normal breath sounds.   Abdominal:      General: Bowel sounds are normal. There is no distension.      Palpations: Abdomen is soft.   Musculoskeletal:         General: No tenderness. Normal range of motion.      Cervical back: Normal range of motion and neck supple.   Skin:     General: Skin is warm and dry.      Comments: Surgical drain in place   Neurological:      Mental Status: He is alert and oriented to person, place, and time.   Psychiatric:         Behavior: Behavior normal.       Medical Decision Making       65 MINUTES SPENT BY ME on the date of service doing chart review, history, exam, documentation & further activities per the note.      Data     I have personally reviewed the following data over the past 24 hrs:    6.4  \   13.9   / 255     130 (L) N/A N/A /  113 (H)   4.5 N/A N/A \     INR:  0.96 PTT:  36   D-dimer:  N/A Fibrinogen:  N/A       Imaging results reviewed over the past 24 hrs:   No results  found for this or any previous visit (from the past 24 hour(s)).

## 2024-04-29 NOTE — ANESTHESIA PREPROCEDURE EVALUATION
Anesthesia Pre-Procedure Evaluation    Patient: Philipp Bland   MRN: [id number] : 1955        Procedure : Procedure(s):  Removal Thoracic 10 Pelvis instrumentation  Bilateral Lumbar 5 to sacral 1 redo foraminotomy          Past Medical History:   Diagnosis Date    Essential hypertension, benign 2001    Family history of alcoholism       Past Surgical History:   Procedure Laterality Date    OPTICAL TRACKING SYSTEM FUSION SPINE POSTERIOR LUMBAR THREE+ LEVELS N/A 12/15/2022    Procedure: Thoracic 10 to Sacral 1 posterior segmental instrumentation. Pelvic instrumentation. Screw cement augmentation L10 to L11. Lumbar 2 to Sacral 1 bilateral decompression and transforaminal interbody fusion. Posterior arthrodesis Thoracic 10-Sacral 1.;  Surgeon: Eliseo Aggarwal MD;  Location: [facility] OR      Allergies   Allergen Reactions    No Known Drug Allergy       Social History     Tobacco Use    Smoking status: Never    Smokeless tobacco: Never   Substance Use Topics    Alcohol use: Yes     Comment: 2-3 4x /week      Wt Readings from Last 1 Encounters:   24 95.3 kg (210 lb)        Anesthesia Evaluation   Pt has had prior anesthetic.     No history of anesthetic complications       ROS/MED HX  ENT/Pulmonary:     (+)     LEW risk factors,                                (-) tobacco use and recent URI   Neurologic: Comment: Spinal stenosis of lumbar region with radiculopathy   (-) no seizures, no CVA and no TIA   Cardiovascular:     (+)  hypertension- -   -  - -                                   (-) arrhythmias   METS/Exercise Tolerance: 1 - Eating, dressing    Hematologic:     (+)      anemia,       (-) history of blood clots   Musculoskeletal: Comment: Spondylolisthesis of lumbar region  Paget disease of bone  Monoclonal gammopathy    RIGHT shoulder limited ROM: abduction ~45 degrees with 90-deg elbow flexion  (+)  arthritis,             GI/Hepatic:     (+) GERD, Asymptomatic on medication,               (-)  "liver disease   Renal/Genitourinary:     (+)        BPH,   (-) renal disease   Endo:     (+)               Obesity (BMI 31.9),       Psychiatric/Substance Use:       Infectious Disease:    (-) Recent Fever   Malignancy:       Other:      (+)  , H/O Chronic Pain,         Physical Exam    Airway      Comment: Large neck circumference    Mallampati: III   TM distance: > 3 FB   Neck ROM: limited   Mouth opening: > 3 cm    Respiratory Devices and Support         Dental       (+) Modest Abnormalities - crowns, retainers, 1 or 2 missing teeth      Cardiovascular          Rhythm and rate: regular and normal     Pulmonary           breath sounds clear to auscultation           OUTSIDE LABS:  CBC:   Lab Results   Component Value Date    WBC 6.4 04/29/2024    WBC 4.8 04/25/2024    HGB 13.9 04/29/2024    HGB 14.0 04/25/2024    HCT 41.5 04/29/2024    HCT 41.4 04/25/2024     04/29/2024     04/25/2024     BMP:   Lab Results   Component Value Date     (L) 04/25/2024     01/02/2023    POTASSIUM 4.8 04/25/2024    POTASSIUM 4.5 01/02/2023    CHLORIDE 90 (L) 04/25/2024    CHLORIDE 103 01/02/2023    CO2 25 04/25/2024    CO2 25 01/02/2023    BUN 12.5 04/25/2024    BUN 13 01/02/2023    CR 0.71 04/25/2024    CR 0.62 (L) 01/02/2023    GLC 93 04/25/2024     (H) 01/02/2023     COAGS:   Lab Results   Component Value Date    PTT 32 12/15/2022    INR 1.05 12/15/2022     POC: No results found for: \"BGM\", \"HCG\", \"HCGS\"  HEPATIC:   Lab Results   Component Value Date    ALBUMIN 3.0 (L) 12/16/2022    PROTTOTAL 5.6 (L) 12/16/2022    ALT 34 12/16/2022    AST 32 12/16/2022    ALKPHOS 71 12/16/2022    BILITOTAL 0.9 12/16/2022     OTHER:   Lab Results   Component Value Date    PH 7.39 12/15/2022    LACT 0.6 (L) 01/02/2023    SHELBY 9.6 04/25/2024    TSH 4.17 06/17/2022    CRP 12.0 (H) 01/05/2023    SED 9 12/02/2022       Anesthesia Plan    ASA Status:  2    NPO Status:  NPO Appropriate    Anesthesia Type: General.     - " "Airway: ETT   Induction: Intravenous, Propofol.   Maintenance: Balanced.   Techniques and Equipment:     - Airway: Video-Laryngoscope     - Lines/Monitors: 2nd IV     Consents    Anesthesia Plan(s) and associated risks, benefits, and realistic alternatives discussed. Questions answered and patient/representative(s) expressed understanding.     - Discussed: Risks, Benefits and Alternatives for the PROCEDURE were discussed     - Discussed with:  Patient (& adult son)            Postoperative Care    Pain management: IV analgesics, Oral pain medications, Multi-modal analgesia.   PONV prophylaxis: Ondansetron (or other 5HT-3), Dexamethasone or Solumedrol, Background Propofol Infusion     Comments:               Torri Sage MD    I have reviewed the pertinent notes and labs in the chart from the past 30 days and (re)examined the patient.  Any updates or changes from those notes are reflected in this note.     # Hyponatremia: Lowest Na = 129 mmol/L in last 30 days, will monitor as appropriate          # Obesity: Estimated body mass index is 31.93 kg/m  as calculated from the following:    Height as of this encounter: 1.727 m (5' 8\").    Weight as of this encounter: 95.3 kg (210 lb).      "

## 2024-04-29 NOTE — ANESTHESIA POSTPROCEDURE EVALUATION
Patient: Philipp Bland    Procedure: Procedure(s):  Removal Thoracic 10 Pelvis instrumentation       Anesthesia Type:  General    Note:  Disposition: Admission   Postop Pain Control: Uneventful            Sign Out: Well controlled pain   PONV: No   Neuro/Psych: Uneventful            Sign Out: Acceptable/Baseline neuro status   Airway/Respiratory: Uneventful            Sign Out: Acceptable/Baseline resp. status   CV/Hemodynamics: Uneventful            Sign Out: Acceptable CV status; No obvious hypovolemia; No obvious fluid overload   Other NRE: NONE   DID A NON-ROUTINE EVENT OCCUR? No           Last vitals:  Vitals Value Taken Time   /71 04/29/24 1130   Temp 36.4  C (97.6  F) 04/29/24 1115   Pulse 100 04/29/24 1135   Resp 14 04/29/24 1135   SpO2 97 % 04/29/24 1136   Vitals shown include unfiled device data.    Electronically Signed By: Torri Sage MD  April 29, 2024  3:32 PM

## 2024-04-29 NOTE — OP NOTE
April 29, 2024    [facility]   Operative Note    Pre-operative diagnosis: Painful orthopaedic hardware (H24) [T84.84XA]  Spinal stenosis of lumbar region with radiculopathy [M48.061, M54.16]   Post-operative diagnosis Same   Procedure: Procedure(s):  Removal Thoracic 10 Pelvis instrumentation    Surgeon(s): Surgeons and Role:     * Eliseo Aggarwal MD - Primary     * Jeff Lopez PA-C - Assisting   Estimated blood loss: 300 mL    Specimens: * No specimens in log *   Findings: Hardware removed in its entirety.  Fusion explored and appeared to be solid from T10 to the pelvis.         Indications: Patient is a 68-year-old male underwent a previous spinal fusion who continues to have pain related to his instrumentation.  The imaging appears to show solid fusion so he is brought for hardware removal. Please note that Jeff Lopez PA-C's assistance was needed for positioning, retraction, suctioning, and closure.     Description of procedure: Patient was brought to the operating room.  General endotracheal anesthesia was induced.  The patient was rolled into the prone position on the Lenin fourposter table.  The back was prepped and draped in sterile fashion and his previous midline incision was opened from T10 to the pelvis and the hardware exposed bilaterally.    The setscrews were removed and then the rods were removed.  There were noted to be 2 setscrews that had become disconnected but these were found and removed as well.  The pedicle screws and pelvic instrumentation were then unscrewed.  After further review of the imaging it was felt that the planned foraminotomies would not be able to be successfully completed in a meaningful fashion and so this was not done.  The wound was copiously irrigated.  A subfascial Hemovac drain was placed.  The fascia was closed with 0 Vicryl.  2-0 Vicryl was used to the subcutaneous layer and 3-0 nylon for skin.  Sterile dressing was  applied

## 2024-04-29 NOTE — ANESTHESIA CARE TRANSFER NOTE
Patient: Philipp Bland    Procedure: Procedure(s):  Removal Thoracic 10 Pelvis instrumentation       Diagnosis: Painful orthopaedic hardware (H24) [T84.84XA]  Spinal stenosis of lumbar region with radiculopathy [M48.061, M54.16]  Diagnosis Additional Information: No value filed.    Anesthesia Type:   General     Note:    Oropharynx: oropharynx clear of all foreign objects and spontaneously breathing  Level of Consciousness: drowsy  Oxygen Supplementation: face mask  Level of Supplemental Oxygen (L/min / FiO2): 8  Independent Airway: airway patency satisfactory and stable  Dentition: dentition unchanged  Vital Signs Stable: post-procedure vital signs reviewed and stable  Report to RN Given: handoff report given  Patient transferred to: PACU    Handoff Report: Identifed the Patient, Identified the Reponsible Provider, Reviewed the pertinent medical history, Discussed the surgical course, Reviewed Intra-OP anesthesia mangement and issues during anesthesia, Set expectations for post-procedure period and Allowed opportunity for questions and acknowledgement of understanding      Vitals:  Vitals Value Taken Time   BP 93/64    Temp     Pulse 101 04/29/24 1025   Resp 18 04/29/24 1025   SpO2 98 % 04/29/24 1025   Vitals shown include unfiled device data.    Electronically Signed By: DAWIT North CRNA  April 29, 2024  10:26 AM

## 2024-04-30 ENCOUNTER — APPOINTMENT (OUTPATIENT)
Dept: PHYSICAL THERAPY | Facility: CLINIC | Age: 69
DRG: 516 | End: 2024-04-30
Attending: PHYSICIAN ASSISTANT
Payer: MEDICARE

## 2024-04-30 ENCOUNTER — APPOINTMENT (OUTPATIENT)
Dept: ULTRASOUND IMAGING | Facility: CLINIC | Age: 69
DRG: 516 | End: 2024-04-30
Attending: INTERNAL MEDICINE
Payer: MEDICARE

## 2024-04-30 ENCOUNTER — APPOINTMENT (OUTPATIENT)
Dept: OCCUPATIONAL THERAPY | Facility: CLINIC | Age: 69
DRG: 516 | End: 2024-04-30
Attending: PHYSICIAN ASSISTANT
Payer: MEDICARE

## 2024-04-30 LAB
ALBUMIN UR-MCNC: 30 MG/DL
ANION GAP SERPL CALCULATED.3IONS-SCNC: 14 MMOL/L (ref 7–15)
ANION GAP SERPL CALCULATED.3IONS-SCNC: 14 MMOL/L (ref 7–15)
APPEARANCE UR: ABNORMAL
BILIRUB UR QL STRIP: NEGATIVE
BUN SERPL-MCNC: 35.7 MG/DL (ref 8–23)
BUN SERPL-MCNC: 37.9 MG/DL (ref 8–23)
CALCIUM SERPL-MCNC: 8 MG/DL (ref 8.8–10.2)
CALCIUM SERPL-MCNC: 8 MG/DL (ref 8.8–10.2)
CHLORIDE SERPL-SCNC: 94 MMOL/L (ref 98–107)
CHLORIDE SERPL-SCNC: 95 MMOL/L (ref 98–107)
COLOR UR AUTO: YELLOW
CREAT SERPL-MCNC: 2.03 MG/DL (ref 0.67–1.17)
CREAT SERPL-MCNC: 2.08 MG/DL (ref 0.67–1.17)
CREAT UR-MCNC: 164.5 MG/DL
DEPRECATED HCO3 PLAS-SCNC: 22 MMOL/L (ref 22–29)
DEPRECATED HCO3 PLAS-SCNC: 23 MMOL/L (ref 22–29)
EGFRCR SERPLBLD CKD-EPI 2021: 34 ML/MIN/1.73M2
EGFRCR SERPLBLD CKD-EPI 2021: 35 ML/MIN/1.73M2
FRACT EXCRET NA UR+SERPL-RTO: 0.4 %
GLUCOSE SERPL-MCNC: 119 MG/DL (ref 70–99)
GLUCOSE SERPL-MCNC: 119 MG/DL (ref 70–99)
GLUCOSE UR STRIP-MCNC: NEGATIVE MG/DL
HGB BLD-MCNC: 8.9 G/DL (ref 13.3–17.7)
HGB BLD-MCNC: 8.9 G/DL (ref 13.3–17.7)
HGB UR QL STRIP: ABNORMAL
HYALINE CASTS: 34 /LPF
KETONES UR STRIP-MCNC: NEGATIVE MG/DL
LEUKOCYTE ESTERASE UR QL STRIP: ABNORMAL
MUCOUS THREADS #/AREA URNS LPF: PRESENT /LPF
NITRATE UR QL: NEGATIVE
PH UR STRIP: 5.5 [PH] (ref 5–7)
POTASSIUM SERPL-SCNC: 4.7 MMOL/L (ref 3.4–5.3)
POTASSIUM SERPL-SCNC: 4.8 MMOL/L (ref 3.4–5.3)
POTASSIUM SERPL-SCNC: 4.9 MMOL/L (ref 3.4–5.3)
RBC URINE: 13 /HPF
SODIUM SERPL-SCNC: 131 MMOL/L (ref 135–145)
SODIUM SERPL-SCNC: 131 MMOL/L (ref 135–145)
SODIUM UR-SCNC: 46 MMOL/L
SP GR UR STRIP: 1.02 (ref 1–1.03)
SQUAMOUS EPITHELIAL: 4 /HPF
UROBILINOGEN UR STRIP-MCNC: NORMAL MG/DL
WBC URINE: 53 /HPF

## 2024-04-30 PROCEDURE — 120N000001 HC R&B MED SURG/OB

## 2024-04-30 PROCEDURE — 81001 URINALYSIS AUTO W/SCOPE: CPT | Performed by: INTERNAL MEDICINE

## 2024-04-30 PROCEDURE — 82570 ASSAY OF URINE CREATININE: CPT | Performed by: INTERNAL MEDICINE

## 2024-04-30 PROCEDURE — 97161 PT EVAL LOW COMPLEX 20 MIN: CPT | Mod: GP | Performed by: PHYSICAL THERAPIST

## 2024-04-30 PROCEDURE — 85018 HEMOGLOBIN: CPT | Performed by: PHYSICIAN ASSISTANT

## 2024-04-30 PROCEDURE — 84132 ASSAY OF SERUM POTASSIUM: CPT | Performed by: INTERNAL MEDICINE

## 2024-04-30 PROCEDURE — 97165 OT EVAL LOW COMPLEX 30 MIN: CPT | Mod: GO

## 2024-04-30 PROCEDURE — 97530 THERAPEUTIC ACTIVITIES: CPT | Mod: GO

## 2024-04-30 PROCEDURE — 80048 BASIC METABOLIC PNL TOTAL CA: CPT | Performed by: INTERNAL MEDICINE

## 2024-04-30 PROCEDURE — 82565 ASSAY OF CREATININE: CPT | Performed by: INTERNAL MEDICINE

## 2024-04-30 PROCEDURE — 258N000003 HC RX IP 258 OP 636: Performed by: PHYSICIAN ASSISTANT

## 2024-04-30 PROCEDURE — 250N000013 HC RX MED GY IP 250 OP 250 PS 637: Performed by: PHYSICIAN ASSISTANT

## 2024-04-30 PROCEDURE — 99233 SBSQ HOSP IP/OBS HIGH 50: CPT

## 2024-04-30 PROCEDURE — 250N000013 HC RX MED GY IP 250 OP 250 PS 637: Performed by: NURSE PRACTITIONER

## 2024-04-30 PROCEDURE — 250N000013 HC RX MED GY IP 250 OP 250 PS 637

## 2024-04-30 PROCEDURE — 258N000003 HC RX IP 258 OP 636: Performed by: INTERNAL MEDICINE

## 2024-04-30 PROCEDURE — 85018 HEMOGLOBIN: CPT | Performed by: INTERNAL MEDICINE

## 2024-04-30 PROCEDURE — 87086 URINE CULTURE/COLONY COUNT: CPT | Performed by: INTERNAL MEDICINE

## 2024-04-30 PROCEDURE — 999N000128 HC STATISTIC PERIPHERAL IV START W/O US GUIDANCE

## 2024-04-30 PROCEDURE — 99233 SBSQ HOSP IP/OBS HIGH 50: CPT | Performed by: INTERNAL MEDICINE

## 2024-04-30 PROCEDURE — 36415 COLL VENOUS BLD VENIPUNCTURE: CPT | Performed by: INTERNAL MEDICINE

## 2024-04-30 PROCEDURE — 76770 US EXAM ABDO BACK WALL COMP: CPT

## 2024-04-30 PROCEDURE — 97530 THERAPEUTIC ACTIVITIES: CPT | Mod: GP | Performed by: PHYSICAL THERAPIST

## 2024-04-30 RX ORDER — OXYCODONE HYDROCHLORIDE 5 MG/1
5 TABLET ORAL EVERY 4 HOURS PRN
Status: DISCONTINUED | OUTPATIENT
Start: 2024-04-30 | End: 2024-05-04 | Stop reason: HOSPADM

## 2024-04-30 RX ORDER — SODIUM CHLORIDE 9 MG/ML
INJECTION, SOLUTION INTRAVENOUS CONTINUOUS
Status: DISCONTINUED | OUTPATIENT
Start: 2024-04-30 | End: 2024-05-01

## 2024-04-30 RX ORDER — OXYCODONE HYDROCHLORIDE 5 MG/1
10 TABLET ORAL EVERY 4 HOURS PRN
Status: DISCONTINUED | OUTPATIENT
Start: 2024-04-30 | End: 2024-05-01

## 2024-04-30 RX ADMIN — SODIUM CHLORIDE: 9 INJECTION, SOLUTION INTRAVENOUS at 01:08

## 2024-04-30 RX ADMIN — ACETAMINOPHEN 975 MG: 325 TABLET, FILM COATED ORAL at 15:05

## 2024-04-30 RX ADMIN — SODIUM CHLORIDE 500 ML: 9 INJECTION, SOLUTION INTRAVENOUS at 18:02

## 2024-04-30 RX ADMIN — METHOCARBAMOL 750 MG: 750 TABLET ORAL at 08:31

## 2024-04-30 RX ADMIN — OXYCODONE HYDROCHLORIDE 5 MG: 5 TABLET ORAL at 19:29

## 2024-04-30 RX ADMIN — METHOCARBAMOL 750 MG: 750 TABLET ORAL at 18:03

## 2024-04-30 RX ADMIN — SENNOSIDES AND DOCUSATE SODIUM 1 TABLET: 50; 8.6 TABLET ORAL at 20:37

## 2024-04-30 RX ADMIN — PANTOPRAZOLE SODIUM 40 MG: 40 TABLET, DELAYED RELEASE ORAL at 08:31

## 2024-04-30 RX ADMIN — OXYCODONE HYDROCHLORIDE 5 MG: 5 TABLET ORAL at 06:12

## 2024-04-30 RX ADMIN — POLYETHYLENE GLYCOL 3350 17 G: 17 POWDER, FOR SOLUTION ORAL at 08:31

## 2024-04-30 RX ADMIN — SENNOSIDES AND DOCUSATE SODIUM 1 TABLET: 50; 8.6 TABLET ORAL at 08:30

## 2024-04-30 RX ADMIN — SODIUM CHLORIDE 1000 ML: 9 INJECTION, SOLUTION INTRAVENOUS at 11:01

## 2024-04-30 RX ADMIN — ZINC SULFATE 220 MG (50 MG) CAPSULE 220 MG: CAPSULE at 08:31

## 2024-04-30 RX ADMIN — METHOCARBAMOL 750 MG: 750 TABLET ORAL at 15:04

## 2024-04-30 RX ADMIN — FERROUS GLUCONATE 324 MG: 324 TABLET ORAL at 08:31

## 2024-04-30 RX ADMIN — ACETAMINOPHEN 975 MG: 325 TABLET, FILM COATED ORAL at 20:37

## 2024-04-30 RX ADMIN — OXYCODONE HYDROCHLORIDE 5 MG: 5 TABLET ORAL at 01:31

## 2024-04-30 RX ADMIN — Medication 25 MCG: at 08:31

## 2024-04-30 RX ADMIN — METHOCARBAMOL 750 MG: 750 TABLET ORAL at 20:37

## 2024-04-30 RX ADMIN — SODIUM CHLORIDE: 9 INJECTION, SOLUTION INTRAVENOUS at 12:32

## 2024-04-30 RX ADMIN — ACETAMINOPHEN 975 MG: 325 TABLET, FILM COATED ORAL at 06:12

## 2024-04-30 RX ADMIN — LIDOCAINE 2 PATCH: 4 PATCH TOPICAL at 20:37

## 2024-04-30 ASSESSMENT — ACTIVITIES OF DAILY LIVING (ADL)
ADLS_ACUITY_SCORE: 40
PREVIOUS_RESPONSIBILITIES: MEAL PREP;MEDICATION MANAGEMENT;FINANCES;DRIVING
ADLS_ACUITY_SCORE: 40

## 2024-04-30 NOTE — PROVIDER NOTIFICATION
MD Notification    Notified Person: MD    Notified Person Name: Shashi    Notification Date/Time: 4/30/24; 1500    Notification Interaction: [email or web address] messaging    Purpose of Notification: 2417; the patient has been unable to void adequately yet PVR is 0mL. He had his 1L bolus and is running 100mL NS, per orders.     Orders Received: response is no new orders.    Comments: sent to Dr. Danielle; UA is back; renal US is back.

## 2024-04-30 NOTE — PROGRESS NOTES
[facility]    Neurosurgery  Daily Note    Assessment & Plan   Procedure(s):  Removal Thoracic 10 Pelvis instrumentation   1 Day Post-Op  Surgeon: Dr. Aggarwal    AM ROUNDS: pain well controlled. Denies radicular symptoms, paresthesias, weakness. Drain output 0 overnight.   Exam: intact with exception of BL DF weakness at baseline per patient. Dressing with moderate drainage.     Plan:  -Ambulate  -Advance activity as tolerated  -Continue supportive and symptomatic treatment  -Start or continue physical therapy  -Pain control measures  -No post op abx  -Advance diet as tolerated  -Routine wound care  -Remove drain and place gauze and tape over site  -Change dressing and replace with folded ABD and tape to act as pressure dressing     Plans reviewed with Dr. Aggarwal   PT recs: not in yet     Macey Quiros PA-C  [facility] Neurosurgery  [address] [place] [address]  Tel [phone]  Fax [phone]  Text page via [employer] Paging/Directory    Active Problems:    Pain from implanted hardware     Macey Danielson PA-C    Interval History   Stable     Physical Exam   Temp: 98.4  F (36.9  C) Temp src: Oral BP: 92/58 Pulse: 97   Resp: (P) 18 SpO2: 100 % O2 Device: Nasal cannula Oxygen Delivery: 1 LPM  Vitals:    04/29/24 0611   Weight: 95.3 kg (210 lb)     Vital Signs with Ranges  Temp:  [97.3  F (36.3  C)-98.4  F (36.9  C)] 98.4  F (36.9  C)  Pulse:  [] 97  Resp:  [12-24] (P) 18  BP: ()/(58-80) 92/58  SpO2:  [93 %-100 %] 100 %  I/O last 3 completed shifts:  In: 2498.98 [P.O.:60; I.V.:1938.98]  Out: 1055 [Urine:725; Drains:30; Blood:300]    Awake, alert, appropriate  Dressing with moderate drainage  5/5 motor strength in BLE with exception of 2/5 BL DF (baseline per patient)  Sensation intact to light touch       Medications   Current Facility-Administered Medications   Medication Dose Route Frequency Provider Last Rate Last Admin    sodium chloride 0.9 % infusion    Intravenous Continuous Jeff Lopez PA-C   Stopped at 04/30/24 0613      Current Facility-Administered Medications   Medication Dose Route Frequency Provider Last Rate Last Admin    acetaminophen (TYLENOL) tablet 975 mg  975 mg Oral Q8H Jeff Lopez PA-C   975 mg at 04/30/24 0612    [Held by provider] chlorthalidone (HYGROTON) tablet 25 mg  25 mg Oral Daily Joann Scott MD   25 mg at 04/29/24 1253    diclofenac (VOLTAREN) 1 % topical gel 4 g  4 g Topical TID Ofe Jaramillo APRN CNP   4 g at 04/30/24 0831    ferrous gluconate (FERGON) tablet 324 mg  324 mg Oral Daily with breakfast Jeff Lopez PA-C   324 mg at 04/30/24 0831    Lidocaine (LIDOCARE) 4 % Patch 2 patch  2 patch Transdermal Q24h Ofe Jaramillo APRN CNP   2 patch at 04/29/24 2010    [Held by provider] lisinopril (ZESTRIL) tablet 40 mg  40 mg Oral Daily Joann Scott MD   40 mg at 04/29/24 1253    methocarbamol (ROBAXIN) tablet 750 mg  750 mg Oral 4x Daily Jeff Lopez PA-C   750 mg at 04/30/24 0831    pantoprazole (PROTONIX) EC tablet 40 mg  40 mg Oral Daily Jeff Lopez PA-C   40 mg at 04/30/24 0831    polyethylene glycol (MIRALAX) Packet 17 g  17 g Oral Daily Jeff Lopez PA-C   17 g at 04/30/24 0831    senna-docusate (SENOKOT-S/PERICOLACE) 8.6-50 MG per tablet 1 tablet  1 tablet Oral BID Jeff Lopez PA-C   1 tablet at 04/30/24 0830    sodium chloride (PF) 0.9% PF flush 3 mL  3 mL Intracatheter Q8H Jeff Lopez PA-C   3 mL at 04/29/24 1203    Vitamin D3 (CHOLECALCIFEROL) tablet 25 mcg  25 mcg Oral Daily Jeff Lopez PA-C   25 mcg at 04/30/24 0831    zinc sulfate (ZINCATE) capsule 220 mg  220 mg Oral Daily Jeff Lopez PA-C   220 mg at 04/30/24 0831       Plans discussed with Dr. Aggarwal who was in agreement with plans

## 2024-04-30 NOTE — PROGRESS NOTES
4/30/24; [id number]    POD #1 T10-S removal of hardware    A&O X4; A1 gb/walker ( standing at side of bed and dangling but no steps); Hemovac removed and dressing changed this shift; pain managed w/ scheduled and PRN medications; pt not voiding well- strict I&O; MD is aware and managing; awaiting urine culture; plan for discharge when medically ready.

## 2024-04-30 NOTE — PROGRESS NOTES
04/30/24 1500   Appointment Info   Signing Clinician's Name / Credentials (OT) Chema Felder OTR/L   Living Environment   People in Home child(fortino), adult   Current Living Arrangements house   Home Accessibility no concerns   Transportation Anticipated family or friend will provide;car, drives self   Living Environment Comments Pt reports living in house w/ adult son. Ramp enterance and can have all needs met on one level. Tub shower w/ shower bench. Raised toilet seat.   Self-Care   Usual Activity Tolerance moderate   Current Activity Tolerance fair   Equipment Currently Used at Home walker, rolling;raised toilet seat;tub bench   Fall history within last six months yes   Number of times patient has fallen within last six months 1   Activity/Exercise/Self-Care Comment Pt reports being IND w/ all ADL's at baseline prior to admission.   Instrumental Activities of Daily Living (IADL)   Previous Responsibilities meal prep;medication management;finances;driving   IADL Comments Pt reports that son completes laundry and housekeeping at baseline. Pt reports that they still drive.   General Information   Onset of Illness/Injury or Date of Surgery 04/29/24   Referring Physician Jeff Loepz PA-C   Patient/Family Therapy Goal Statement (OT) Return to home.   Additional Occupational Profile Info/Pertinent History of Current Problem Per chart, Patient has undergone removal of thoracic 10 pelvis instrumentation, hardware removed in its entirety. Fusion explored and appeared to be solid from T10 to the pelvis.   Existing Precautions/Restrictions fall;spinal   Cognitive Status Examination   Orientation Status orientation to person, place and time   Visual Perception   Visual Impairment/Limitations corrective lenses for reading   Sensory   Sensory Comments Neuropathy in B feet   Pain Assessment   Patient Currently in Pain Yes, see Vital Sign flowsheet  (4/10 incision site)   Range of Motion Comprehensive   General  Range of Motion no range of motion deficits identified   Strength Comprehensive (MMT)   Comment, General Manual Muscle Testing (MMT) Assessment Not formally assessed but  strength WFL   Bed Mobility   Bed Mobility supine-sit;sit-supine   Supine-Sit [place] (Bed Mobility) supervision   Sit-Supine [place] (Bed Mobility) minimum assist (75% patient effort)   Assistive Device (Bed Mobility) bed rails   Transfers   Transfers sit-stand transfer   Sit-Stand Transfer   Sit-Stand [place] (Transfers) contact guard   Assistive Device (Sit-Stand Transfers) walker, front-wheeled   Activities of Daily Living   BADL Assessment/Intervention upper body dressing;lower body dressing;grooming;toileting   Upper Body Dressing Assessment/Training   Comment, (Upper Body Dressing) Per clinical judgement   [place] Level (Upper Body Dressing) supervision   Lower Body Dressing Assessment/Training   Assistive Devices (Lower Body Dressing) reacher;sock-aid   Comment, (Lower Body Dressing) Per clinical judgement   [place] Level (Lower Body Dressing) supervision   Grooming Assessment/Training   Position (Grooming) sink side   [place] Level (Grooming) supervision   Toileting   [place] Level (Toileting) supervision   Clinical Impression   Criteria for Skilled Therapeutic Interventions Met (OT) Yes, treatment indicated   OT Diagnosis Decreased ADL independence and activity tolerance   Influenced by the following impairments Decreased ADL independence   OT Problem List-Impairments impacting ADL problems related to;activity tolerance impaired;pain;post-surgical precautions   Assessment of Occupational Performance 3-5 Performance Deficits   Identified Performance Deficits TB dressing, toileting, toilet transfer, g/h sinkside   Planned Therapy Interventions (OT) ADL retraining;home program guidelines;progressive activity/exercise;risk factor education   Clinical Decision Making Complexity (OT) problem focused  assessment/low complexity   Risk & Benefits of therapy have been explained evaluation/treatment results reviewed;care plan/treatment goals reviewed;risks/benefits reviewed;current/potential barriers reviewed;patient   OT Total Evaluation Time   OT Eval, Low Complexity Minutes (48262) 10   OT Goals   Therapy Frequency (OT) Daily   OT Predicted Duration/Target Date for Goal Attainment 05/03/24   OT Goals Hygiene/Grooming;Upper Body Dressing;Lower Body Dressing;Toilet Transfer/Toileting   OT: Hygiene/Grooming modified independent;while standing   OT: Upper Body Dressing Modified independent;within precautions;including set-up/clothing retrieval   OT: Lower Body Dressing Modified independent;using adaptive equipment;within precautions;including set-up/clothing retrieval   OT: Toilet Transfer/Toileting Modified independent;toilet transfer;cleaning and garment management   Interventions   Interventions Quick Adds Therapeutic Activity   Therapeutic Activities   Therapeutic Activity Minutes (42181) 17   Symptoms noted during/after treatment increased pain;fatigue   Treatment Detail/Skilled Intervention Pt greeted supine in bed w/ son and nursing assistant present in room; agreeable for OT session.  Pt reporting 4/10 pain while at rest. Issued pt handout regarding completion of self care tasks following spine/back surgery. Pt recalls precautions from past procedures. Supervision sup > sit EOB via logroll technique w/ bed flat and use of bed rail. Pt able to maintain sitting balance EOB w/ supervision and was cued in PLB. CGA sit > stand EOB w/ use of FWW and VC's for safe hand placement. Pt able to march in place and complete 1 side step towards head of bed. Pt returned seated and required Min A sit > sup to help guide BLE's back onto bed via logroll. Pt able to assist w/ scooting self up towards head of bed. Pt remained supine in bed w/ all needs met and bed alarm activated.   OT Discharge Planning   OT Plan Progress  mobility to bathroom for toilet transfers and g/h sinkside, review spinal precautions, TB dressing   OT Discharge Recommendation (DC Rec) Transitional Care Facility;home with home care occupational therapy   OT Rationale for DC Rec Pt is currently limited w/ therapy d/t pain and decreased activity tolerance. Pt would benefit from continued rehab at TCU to help return to PLOF. Depending on LOS, pt may be able to progress to home w/ home therapies.   OT Brief overview of current status See above   Total Session Time   Timed Code Treatment Minutes 17   Total Session Time (sum of timed and untimed services) 27

## 2024-04-30 NOTE — PROVIDER NOTIFICATION
MD Notification    Notified Person: MD    Notified Person Name: Shashi    Notification Date/Time: 4/30/24; 0723    Notification Interaction: vocera messaging    Purpose of Notification: MD Tyler said that she wanted the patient to remain on IVF for low Na+; he is saline locked, per orders. Are you ok with him being saline locked until we get labs?     Orders Received: awaiting    Comments:

## 2024-04-30 NOTE — PROGRESS NOTES
04/30/24 1016   Appointment Info   Signing Clinician's Name / Credentials (PT) Helene Mac, PT   Living Environment   People in Home child(fortino), adult   Current Living Arrangements house   Home Accessibility no concerns   Transportation Anticipated family or friend will provide;car, drives self   Living Environment Comments Patient has a ramp to enter home and can stay on one level. Pateint I with 4ww. Son does the yard work. Son is gone for like 5 days at a time. He can rearrange his schedule though. When he is living with patient, he works nights.   Self-Care   Usual Activity Tolerance moderate   Equipment Currently Used at Home walker, rolling;raised toilet seat;tub bench   Fall history within last six months yes   Number of times patient has fallen within last six months 1   Activity/Exercise/Self-Care Comment Can go so far walking and then turns walker around and sits on it and uses feet to scoot.   General Information   Onset of Illness/Injury or Date of Surgery 04/29/24   Referring Physician Jeff Lopez PA-C   Patient/Family Therapy Goals Statement (PT) Plans to go directly home. Reports his son can rearrange his schedule so he could be home when he needs to be.   Pertinent History of Current Problem (include personal factors and/or comorbidities that impact the POC) Per chart, Patient has undergone removal of thoracic 10 pelvis instrumentation, hardware removed in its entirety. Fusion explored and appeared to be solid from T10 to the pelvis.   Existing Precautions/Restrictions fall;spinal   Cognition   Affect/Mental Status (Cognition) WNL   Orientation Status (Cognition) oriented x 4   Follows Commands (Cognition) WNL   Pain Assessment   Patient Currently in Pain Yes, see Vital Sign flowsheet  (3 at rest, at least 4 with activity.)   Posture    Posture Not impaired   Range of Motion (ROM)   ROM Comment Decreased trunk range due to spine precuations. Active right shd range limited due to  prior rotator cuff injury. Bilateral DF slightly limited due to long term foot drop.   Strength (Manual Muscle Testing)   Strength (Manual Muscle Testing) Able to perform R SLR;Able to perform L SLR;Deficits observed during functional mobility  (Extension lag with left SLR)   Strength Comments Bilateral DF 1/5, Functional deficits noted bilateral quads, hips.   Bed Mobility   Comment, (Bed Mobility) Sup to/from sit with supervision using logroll technique.   Transfers   Comment, (Transfers) Sit to stand with CGA with patient using momemtum and bed elevated so feet almost off the floor to start.   Gait/Stairs (Locomotion)   Comment, (Gait/Stairs) Unable due to pain.   Balance   Balance Comments Good sitting balance. Good standing balance with heavy reliance on 4ww.   Sensory Examination   Sensory Perception patient reports no sensory changes   Clinical Impression   Criteria for Skilled Therapeutic Intervention Yes, treatment indicated   PT Diagnosis (PT) Impaired functional independence   Influenced by the following impairments pain, decreaed trunk range due to spine precautions, baseline right shd strength and range deficits and baseline footdrop   Functional limitations due to impairments Needs A for all functional mobility   Clinical Presentation (PT Evaluation Complexity) stable   Clinical Presentation Rationale Clinical judgement   Clinical Decision Making (Complexity) low complexity   Planned Therapy Interventions (PT) bed mobility training;gait training;strengthening;transfer training   Risk & Benefits of therapy have been explained evaluation/treatment results reviewed;care plan/treatment goals reviewed;risks/benefits reviewed;current/potential barriers reviewed;participants voiced agreement with care plan;participants included;patient   PT Total Evaluation Time   PT Melal, Low Complexity Minutes (20816) 10   Physical Therapy Goals   PT Frequency 2x/day   PT Predicted Duration/Target Date for Goal Attainment  05/06/24   PT Goals Bed Mobility;Transfers;Gait;PT Goal 1   PT: Bed Mobility Independent;Supine to/from sit;Rolling   PT: Transfers Modified independent;Bed to/from chair;Assistive device;Sit to/from stand   PT: Gait Modified independent;Rolling walker;100 feet   PT: Goal 1 Patient will demonstrate understanding of spine precautions. Goal met.   Interventions   Interventions Quick Adds Therapeutic Activity   Therapeutic Activity   Therapeutic Activities: dynamic activities to improve functional performance Minutes (07512) 10   Symptoms Noted During/After Treatment Increased pain   Treatment Detail/Skilled Intervention Patient educated on spine precautions. Good awareness due to prior surgeries. In standing, pateint unable to take steps forward to get to chair due to pain. Able to march in place. Able to side step to the right to scoot up higher in the bed. Sit to supine with supervision and able to reposition with supervison.   PT Discharge Planning   PT Plan Bring 4ww to room. Bed mobility, initiate gait with 4ww. Bilateral LE ex.   PT Discharge Recommendation (DC Rec) Transitional Care Facility;home with assist;home with home care physical therapy   PT Rationale for DC Rec Patient is currently below baseline modified independence with support of 4ww. Limited by pain. Currently recommend TCU as patient at this point unable to tolerate amb but once pain decreases if patient able to progress to mod I could discharge home with HHPT if son is going to be available to assist with heavy household tasks, groceries, cooking etc.   PT Brief overview of current status A of 1 for bed mobility.   PT Equipment Needed at Discharge   (Has a 4ww already.)   Total Session Time   Timed Code Treatment Minutes 10   Total Session Time (sum of timed and untimed services) 20

## 2024-04-30 NOTE — PLAN OF CARE
Goal Outcome Evaluation:                      A&O X4;VSS on room Not OOB this shift, pain managed with scheduled Tylenol, Robaxin, PRN Oxy x1  N/S Infusing @ 75mL/hr,  incision marked w/ some drainage; Hemovac w/ no drainage; due to void.

## 2024-04-30 NOTE — PLAN OF CARE
Goal Outcome Evaluation:    Shift Summary [id number]    Admitting Diagnosis: Painful orthopaedic hardware (H24) [T84.84XA]  Spinal stenosis of lumbar region with radiculopathy [M48.061, M54.16]   Vitals WDL  Pain 3/10. Taking PRN Oxy. Last dose 0612  A&O x4  Voiding Urinal at bedside  Mobility Dangled at bedside   CMS intact  GI WDL    Orders Placed This Encounter      Advance Diet as Tolerated: Regular Diet Adult       Plan: TBD

## 2024-04-30 NOTE — PROGRESS NOTES
[facility]    Hospitalist Progress Note    Assessment & Plan     Assessment & Plan  Philipp Bland is a 68 year old male with past medical history significant for lumbar stenosis and spondylolisthesis with radiculopathy who has undergone T10-S1 posterior segmental instrumentation with S2 alar iliac screws, screws cement augmentation T10 and T11, L2-S1 bilateral decompression and transforaminal interbody fusion with posterior arthrodesis, T10-S1 in December 2022.  Patient has been following with Dr. Aggarwal.  In the last few months his pain has been worsening and patient has been functionally struggling.  Neurologically he was found to be stable with bilateral foot drops.  Patient was noticed to be unable to stand and his previous neurosurgery appointment on 03/01 to do his a standing scoliosis x-ray.  Dr. Aggarwal ordered thoracic and lumbar CT to evaluate his hardware and fusion.  CT scan of the thoracic and lumbar spine showed a stable haloing around one of the S1 screws and the clay on the left side that is out of the pelvic instrumentation.  However it appears that he has achieved bony fusion across the construct.  Neurosurgery thought that it would be reasonable to consider hardware removal along with a bilateral L5-S1 foraminotomies.  Patient has undergone removal of thoracic 10 pelvis instrumentation, hardware removed in its entirety. Fusion explored and appeared to be solid from T10 to the pelvis.  Hospital medicine has been consulted for medical comanagement and management of his longstanding essential hypertension     Spinal stenosis of lumbar region with radiculopathy:  S/p painful orthopedic hardware removal of thoracic 10 pelvic instrumentation;     --Postoperative management per neurosurgery.  --Neurosurgery is recommending no postop antibiotics, patient has received cefazolin 1 dose preoperatively.  --Pain management consultation has been requested.  --Advance activity as  tolerated.  --Physical therapy has been ordered.  --- Patient has received Tranexamic acid IV preoperatively, will let neurosurgery team review regarding DVT prophylaxis postoperatively  -up with PT, no dizziness. Hb down to 8 range  -per surgery. Doubt retroperitoneal hematoma causing hydro. No intraoperative hypotension   -drain removed  -recheck Hb afternoon and am     ABEL  -baseline 0/7 on 4/25. Was taking ibuprofen 600-800mg every day but last dose 1 week ago  - no intraoperative hypotension  - hydrochlorothiazide and lisinopril preop and on 4/29  -no iv contast  -no other culprit meds  - no bph sxs   -PVR 0.  - suspect prerenal 2/2 blood loss, hydrochlorothiazide, ace inh, possibly nsaids preop  -sbp 90s   Plan;   Fena, Ua, renal US, updated surgery  Follow up Hb and bmp  PVR--> nl   1L fluid bolus  Restart maint fluids  Am bmp  -renal US- nl    Addendum;   Stable BMP and Hb this afternoon  Sbp up to 111/-  Nl renal US  FeNA 0.4%  Completed 1L NS bolus  Pvr 0  Poor UO. Per RN only 30cc all day. Thinks she has good window to bladder  -will give another 500cc bolus  - increase maint 125cc/hr. Pt has poor po intake  -am bmp  -call MD if UO <240cc per 8 hour shift. Discussed with rn             Essential hypertension:  PTA medications include chlorthalidone 25 mg p.o. daily along with lisinopril 40 mg p.o. daily  Combination of chlorthalidone with ACE inhibitors might be causing hyponatremia, see below   - holding ace inh and hydrochlorothiazide for now given low nl sbp and ABEL     Mild hyponatremia: Most likely secondary to chlorthalidone, possibly ABEL  -hold chlorthalidone for now,   -recheck BMP am   - tx abel  -     GERD:  -- Continue pantoprazole 40 mg p.o. daily as an alternate to PTA omeprazole              Clinically Significant Risk Factors Present on Admission[]Expand by Default                  # Hypertension: Noted on problem list      # Obesity: Estimated body mass index is 31.93 kg/m  as calculated  "from the following:    Height as of this encounter: 1.727 m (5' 8\").    Weight as of this encounter: 95.3 kg (210 lb).                  Moderate complexity of care, >35min spent on pt care: care coord with rn, pt, surgery, chart review, charting, exam, .     Neftali Danielle MD, MD  Text Page  (7am to 6pm)  Interval History   Up with pt  No cp or sob  No dizziness with PT    -Data reviewed today: I reviewed all new labs and imaging results over the last 24 hours. I personally reviewed labs since admission    Physical Exam   Temp: 98.4  F (36.9  C) Temp src: Oral BP: 92/58 Pulse: 97   Resp: (P) 18 SpO2: 100 % O2 Device: Nasal cannula Oxygen Delivery: 1 LPM  Vitals:    04/29/24 0611   Weight: 95.3 kg (210 lb)     Vital Signs with Ranges  Temp:  [97.6  F (36.4  C)-98.4  F (36.9  C)] 98.4  F (36.9  C)  Pulse:  [] 97  Resp:  [12-24] (P) 18  BP: ()/(58-80) 92/58  SpO2:  [93 %-100 %] 100 %  I/O last 3 completed shifts:  In: 2498.98 [P.O.:60; I.V.:1938.98]  Out: 1055 [Urine:725; Drains:30; Blood:300]    Constitutional: In bed, nad  Respiratory: CTAB  Cardiovascular: RRR no r/g/m  GI: soft, nt, nd  Skin/Integumen: no rash or edema  Neuro; nl speech and mentation  Psych: nl affect         Medications   Current Facility-Administered Medications   Medication Dose Route Frequency Provider Last Rate Last Admin    sodium chloride 0.9 % infusion   Intravenous Continuous Jeff Lopez PA-C   Stopped at 04/30/24 0613     Current Facility-Administered Medications   Medication Dose Route Frequency Provider Last Rate Last Admin    acetaminophen (TYLENOL) tablet 975 mg  975 mg Oral Q8H Jeff Lopez PA-C   975 mg at 04/30/24 0612    [Held by provider] chlorthalidone (HYGROTON) tablet 25 mg  25 mg Oral Daily Joann Scott MD   25 mg at 04/29/24 1253    diclofenac (VOLTAREN) 1 % topical gel 4 g  4 g Topical TID Ofe Jaramillo APRN CNP   4 g at 04/29/24 2120    ferrous " gluconate (FERGON) tablet 324 mg  324 mg Oral Daily with breakfast Jeff Lopez PA-C   324 mg at 04/30/24 0831    Lidocaine (LIDOCARE) 4 % Patch 2 patch  2 patch Transdermal Q24h Ofe Jaramillo APRN CNP   2 patch at 04/29/24 2010    [Held by provider] lisinopril (ZESTRIL) tablet 40 mg  40 mg Oral Daily Joann Scott MD   40 mg at 04/29/24 1253    methocarbamol (ROBAXIN) tablet 750 mg  750 mg Oral 4x Daily Jeff Lopez PA-C   750 mg at 04/30/24 0831    pantoprazole (PROTONIX) EC tablet 40 mg  40 mg Oral Daily Jeff Lopez PA-C   40 mg at 04/30/24 0831    polyethylene glycol (MIRALAX) Packet 17 g  17 g Oral Daily Jeff Lopez PA-C   17 g at 04/30/24 0831    senna-docusate (SENOKOT-S/PERICOLACE) 8.6-50 MG per tablet 1 tablet  1 tablet Oral BID Jeff Lopez PA-C   1 tablet at 04/30/24 0830    sodium chloride (PF) 0.9% PF flush 3 mL  3 mL Intracatheter Q8H Jeff Lopez PA-C   3 mL at 04/29/24 1203    sodium chloride 0.9% BOLUS 1,000 mL  1,000 mL Intravenous Once Neftali Danielle MD        Vitamin D3 (CHOLECALCIFEROL) tablet 25 mcg  25 mcg Oral Daily Jeff Lopez PA-C   25 mcg at 04/30/24 0831    zinc sulfate (ZINCATE) capsule 220 mg  220 mg Oral Daily Jeff Lopez PA-C   220 mg at 04/30/24 0831       Data   Recent Labs   Lab 04/30/24  0840 04/29/24  0610 04/25/24  1157   WBC  --  6.4 4.8   HGB 8.9* 13.9 14.0   MCV  --  95 94   PLT  --  255 246   INR  --  0.96  --    * 130* 129*   POTASSIUM 4.9 4.5 4.8   CHLORIDE 94*  --  90*   CO2 23  --  25   BUN 35.7*  --  12.5   CR 2.08*  --  0.71   ANIONGAP 14  --  14   SHELBY 8.0*  --  9.6   * 113* 93       Imaging:   No results found for this or any previous visit (from the past 24 hour(s)).

## 2024-04-30 NOTE — PROGRESS NOTES
[facility] ACUTE INPATIENT PAIN SERVICE    [facility], [facility], [facility], [facility], [place]   PAIN FOLLOW UP     Reason for consult: post operative pain, pain type musculoskeletal   Ordering provider: Jeff Lopez PA-C    Assessment/Plan:  Philipp Bland is a 68 year old male who was admitted on 4/29/2024 for planned surgery for orthopedic hardware removal of thoracic 10 pelvic instrumentation.Patient has a past medical history of essential hypertension, elevated PSA, osteoporosis, Paget's disease, spondylolisthesis of lumbar lumbar disc disease, multilevel fusion about 16 months ago, chronic pain not on opiates, and pain associated with thoracolumbar hardware status post lumbar fusion.     Patient reports pain of longstanding began to increase recently and was felt due to hardware malfunction. The patient does does not smoke and no chemical dependency history, consumes alcohol 2-4 times per week. Opioid tolerance is naïve.      MNPMP pulled from system on 04/30/24.  No controlled substances in the past 12 months.    Opioid Induced Respiratory Depression Risk Assessment:  (Low 0-1; Moderate 2-4; or High >4 or >/= 3 if two of the risk factors are age > 60 and opioid naive) due to the following risk factors: LEW, COPD/Asthma/pulmonary disease, CHF, renal dysfunction, hepatic dysfunction, Obesity, Smoker, Age>60, >2 opioid therapies, concomitant CNS depressants, opioid naive status, or post surgical     In the last 24 hours, Philipp has received: 1 (10mg) oxycodone, 4 (5mg) oxycodone   Total MME = 45.     Adjuvants: 3 (750mg) robaxin, 3 (975mg) tylenol    Pain has improved significantly since surgery.     PLAN:  Severe exacerbation of chronic pain related to loosening of hardware. Patient is now POD1 from removal of thoracic 10 pelvis instrumentation, pain has significantly improved. Discussed continuing to taper off of opioids as tolerated. Patient in agreement to discontinue IV and decrease frequency of  "oxycodone.   Multimodal Medication Therapy:   Adjuvants:   Tylenol 975 q8h   Tylenol 650mg q4h prn   Voltaren gel tid   Lidocaine patches x2   Robaxin 750mg 4x daily   Atarax 10-20mg q6h prn   Opioids: Oxycodone 5-10mg decreased to q4h prn   IV dilaudid discontinued   Non-medication interventions- Ice, heat, physical therapy  Constipation Prophylaxis- Scheduled miralax and senna, prn suppository  Follow up /Discharge Recommendations - We recommend prescribing the following at the time of discharge:    Methocarbamol 750mg 4x daily as needed   Hydroxyzine 10mg every 6 hours as needed   Oxycodone 5mg q6h prn   Prescribing at discharge: Surgeon  Discharging to TCU    Subjective:  Philipp is endorsing 2/10 pain, and is feeling much improved since surgery. Per Philipp, he \"can already tell it was the right thing to do\" to have the surgery. He feels as though his current pain regimen is working.    Denies nausea, vomiting, diarrhea, constipation, chest pain, shortness of breath.        History   Drug Use No         Tobacco Use      Smoking status: Never      Smokeless tobacco: Never    Objective:  Vital signs in last 24 hours:  B/P: 98/65, T: 97.8, P: 104, R: 17   Blood pressure 98/65, pulse 104, temperature 97.8  F (36.6  C), temperature source Oral, resp. rate 17, height 1.727 m (5' 8\"), weight 95.3 kg (210 lb), SpO2 100%.    Review of Systems:   As per subjective, all others negative.    Physical Exam  General: in no apparent distress, laying in bed, appears comfortable  HEENT: Head normocephalic atraumatic, oral mucosa moist. Sclerae anicteric  Resp: Respirations unlabored, on room air   Skin: Warm and dry  Extremities: Able to move all four extremities spontaneously   Psych: Normal affect, pleasant  Neuro: Alert and oriented x3     Imaging:  Personally Reviewed.    No results found for this visit on 04/29/24.     Lab Results:  Personally Reviewed.   Last Comprehensive Metabolic Panel:  Sodium   Date Value Ref Range Status " "  04/29/2024 130 (L) 135 - 145 mmol/L Final     Comment:     Reference intervals for this test were updated on 09/26/2023 to more accurately reflect our healthy population. There may be differences in the flagging of prior results with similar values performed with this method. Interpretation of those prior results can be made in the context of the updated reference intervals.    04/08/2013 141 133 - 144 mmol/L Final     Potassium   Date Value Ref Range Status   04/29/2024 4.5 3.4 - 5.3 mmol/L Final   01/02/2023 4.5 3.4 - 5.3 mmol/L Final   04/08/2013 4.3 3.4 - 5.3 mmol/L Final     Chloride   Date Value Ref Range Status   04/25/2024 90 (L) 98 - 107 mmol/L Final   01/02/2023 103 94 - 109 mmol/L Final   04/08/2013 101 94 - 109 mmol/L Final     Carbon Dioxide   Date Value Ref Range Status   04/08/2013 25 20 - 32 mmol/L Final     Carbon Dioxide (CO2)   Date Value Ref Range Status   04/25/2024 25 22 - 29 mmol/L Final   01/02/2023 25 20 - 32 mmol/L Final     Anion Gap   Date Value Ref Range Status   04/25/2024 14 7 - 15 mmol/L Final   01/02/2023 5 3 - 14 mmol/L Final   04/08/2013 14 6 - 17 mmol/L Final     Glucose   Date Value Ref Range Status   04/29/2024 113 (H) 70 - 99 mg/dL Final   01/02/2023 107 (H) 70 - 99 mg/dL Final   04/08/2013 93 60 - 99 mg/dL Final     Urea Nitrogen   Date Value Ref Range Status   04/25/2024 12.5 8.0 - 23.0 mg/dL Final   01/02/2023 13 7 - 30 mg/dL Final   04/08/2013 26 7 - 30 mg/dL Final     Creatinine   Date Value Ref Range Status   04/25/2024 0.71 0.67 - 1.17 mg/dL Final   04/08/2013 1.27 (H) 0.66 - 1.25 mg/dL Final     GFR Estimate   Date Value Ref Range Status   04/25/2024 >90 >60 mL/min/1.73m2 Final   04/08/2013 59 (L) >60 mL/min/1.7m2 Final     Calcium   Date Value Ref Range Status   04/25/2024 9.6 8.8 - 10.2 mg/dL Final   04/08/2013 9.5 8.5 - 10.4 mg/dL Final        UA: No results found for: \"UAMP\", \"UBARB\", \"BENZODIAZEUR\", \"UCANN\", \"UCOC\", \"OPIT\", \"UPCP\"       Please see A&P for " additional details of medical decision making.  MANAGEMENT DISCUSSED with the following over the past 24 hours:   -Neurosurgery: updated on pain plan   NOTE(S)/MEDICAL RECORDS REVIEWED over the past 24 hours:   -Neurosurgery: op note reviewed, foraminotomies would not be able to be completed in a meaningful fashion so this was not completed.   Tests REVIEWED in the past 24 hours:  - BMP  - CBC  Medical complexity over the past 24 hours:  - Parenteral (IV) CONTROLLED SUBSTANCES ordered  - Prescription DRUG MANAGEMENT performed    AYSHA Lott-BC  Acute Care Pain Management Program   Hours of pain coverage Mon-Fri [phone], afterhours please call the house officer    [employer] (DEXTER, [place], [place], RH)   Page via [employer]- Click HERE to page Ngozi or Lucas text web console -Click for Lucas

## 2024-05-01 ENCOUNTER — APPOINTMENT (OUTPATIENT)
Dept: OCCUPATIONAL THERAPY | Facility: CLINIC | Age: 69
DRG: 516 | End: 2024-05-01
Attending: NEUROLOGICAL SURGERY
Payer: MEDICARE

## 2024-05-01 ENCOUNTER — APPOINTMENT (OUTPATIENT)
Dept: PHYSICAL THERAPY | Facility: CLINIC | Age: 69
DRG: 516 | End: 2024-05-01
Attending: NEUROLOGICAL SURGERY
Payer: MEDICARE

## 2024-05-01 LAB
ANION GAP SERPL CALCULATED.3IONS-SCNC: 6 MMOL/L (ref 7–15)
BUN SERPL-MCNC: 30.5 MG/DL (ref 8–23)
CALCIUM SERPL-MCNC: 8 MG/DL (ref 8.8–10.2)
CHLORIDE SERPL-SCNC: 96 MMOL/L (ref 98–107)
CREAT SERPL-MCNC: 1.11 MG/DL (ref 0.67–1.17)
DEPRECATED HCO3 PLAS-SCNC: 26 MMOL/L (ref 22–29)
EGFRCR SERPLBLD CKD-EPI 2021: 73 ML/MIN/1.73M2
GLUCOSE SERPL-MCNC: 113 MG/DL (ref 70–99)
POTASSIUM SERPL-SCNC: 4.6 MMOL/L (ref 3.4–5.3)
SODIUM SERPL-SCNC: 128 MMOL/L (ref 135–145)

## 2024-05-01 PROCEDURE — 82565 ASSAY OF CREATININE: CPT | Performed by: INTERNAL MEDICINE

## 2024-05-01 PROCEDURE — 99232 SBSQ HOSP IP/OBS MODERATE 35: CPT | Performed by: INTERNAL MEDICINE

## 2024-05-01 PROCEDURE — 258N000003 HC RX IP 258 OP 636: Performed by: INTERNAL MEDICINE

## 2024-05-01 PROCEDURE — 120N000001 HC R&B MED SURG/OB

## 2024-05-01 PROCEDURE — 250N000013 HC RX MED GY IP 250 OP 250 PS 637: Performed by: NURSE PRACTITIONER

## 2024-05-01 PROCEDURE — 82374 ASSAY BLOOD CARBON DIOXIDE: CPT | Performed by: INTERNAL MEDICINE

## 2024-05-01 PROCEDURE — 99232 SBSQ HOSP IP/OBS MODERATE 35: CPT

## 2024-05-01 PROCEDURE — 97530 THERAPEUTIC ACTIVITIES: CPT | Mod: GO

## 2024-05-01 PROCEDURE — 250N000013 HC RX MED GY IP 250 OP 250 PS 637: Performed by: PHYSICIAN ASSISTANT

## 2024-05-01 PROCEDURE — 97530 THERAPEUTIC ACTIVITIES: CPT | Mod: GP | Performed by: PHYSICAL THERAPIST

## 2024-05-01 PROCEDURE — 97116 GAIT TRAINING THERAPY: CPT | Mod: GP | Performed by: PHYSICAL THERAPIST

## 2024-05-01 PROCEDURE — 250N000013 HC RX MED GY IP 250 OP 250 PS 637

## 2024-05-01 PROCEDURE — 36415 COLL VENOUS BLD VENIPUNCTURE: CPT | Performed by: INTERNAL MEDICINE

## 2024-05-01 RX ADMIN — SENNOSIDES AND DOCUSATE SODIUM 1 TABLET: 50; 8.6 TABLET ORAL at 20:27

## 2024-05-01 RX ADMIN — OXYCODONE HYDROCHLORIDE 5 MG: 5 TABLET ORAL at 09:50

## 2024-05-01 RX ADMIN — ACETAMINOPHEN 975 MG: 325 TABLET, FILM COATED ORAL at 11:53

## 2024-05-01 RX ADMIN — OXYCODONE HYDROCHLORIDE 5 MG: 5 TABLET ORAL at 20:27

## 2024-05-01 RX ADMIN — SODIUM CHLORIDE: 9 INJECTION, SOLUTION INTRAVENOUS at 11:54

## 2024-05-01 RX ADMIN — METHOCARBAMOL 750 MG: 750 TABLET ORAL at 09:06

## 2024-05-01 RX ADMIN — POLYETHYLENE GLYCOL 3350 17 G: 17 POWDER, FOR SOLUTION ORAL at 09:05

## 2024-05-01 RX ADMIN — Medication 25 MCG: at 09:09

## 2024-05-01 RX ADMIN — METHOCARBAMOL 750 MG: 750 TABLET ORAL at 18:40

## 2024-05-01 RX ADMIN — LIDOCAINE 2 PATCH: 4 PATCH TOPICAL at 20:31

## 2024-05-01 RX ADMIN — METHOCARBAMOL 750 MG: 750 TABLET ORAL at 20:27

## 2024-05-01 RX ADMIN — ACETAMINOPHEN 975 MG: 325 TABLET, FILM COATED ORAL at 03:49

## 2024-05-01 RX ADMIN — OXYCODONE HYDROCHLORIDE 5 MG: 5 TABLET ORAL at 03:49

## 2024-05-01 RX ADMIN — ZINC SULFATE 220 MG (50 MG) CAPSULE 220 MG: CAPSULE at 09:05

## 2024-05-01 RX ADMIN — PANTOPRAZOLE SODIUM 40 MG: 40 TABLET, DELAYED RELEASE ORAL at 09:06

## 2024-05-01 RX ADMIN — OXYCODONE HYDROCHLORIDE 5 MG: 5 TABLET ORAL at 16:16

## 2024-05-01 RX ADMIN — SODIUM CHLORIDE: 9 INJECTION, SOLUTION INTRAVENOUS at 03:46

## 2024-05-01 RX ADMIN — METHOCARBAMOL 750 MG: 750 TABLET ORAL at 11:53

## 2024-05-01 RX ADMIN — FERROUS GLUCONATE 324 MG: 324 TABLET ORAL at 09:06

## 2024-05-01 RX ADMIN — SENNOSIDES AND DOCUSATE SODIUM 1 TABLET: 50; 8.6 TABLET ORAL at 09:05

## 2024-05-01 RX ADMIN — ACETAMINOPHEN 975 MG: 325 TABLET, FILM COATED ORAL at 20:27

## 2024-05-01 ASSESSMENT — ACTIVITIES OF DAILY LIVING (ADL)
ADLS_ACUITY_SCORE: 40
ADLS_ACUITY_SCORE: 40
ADLS_ACUITY_SCORE: 39
ADLS_ACUITY_SCORE: 40
DEPENDENT_IADLS:: INDEPENDENT
ADLS_ACUITY_SCORE: 40
ADLS_ACUITY_SCORE: 39
ADLS_ACUITY_SCORE: 40
ADLS_ACUITY_SCORE: 39
ADLS_ACUITY_SCORE: 39
ADLS_ACUITY_SCORE: 40
ADLS_ACUITY_SCORE: 39
ADLS_ACUITY_SCORE: 39

## 2024-05-01 NOTE — CONSULTS
Care Management Initial Consult    General Information  Assessment completed with: Patient,    Type of CM/SW Visit: Initial Assessment    Primary Care Provider verified and updated as needed: Yes   Readmission within the last 30 days:        Reason for Consult: discharge planning  Advance Care Planning:            Communication Assessment  Patient's communication style: spoken language (English or Bilingual)    Hearing Difficulty or Deaf: no   Wear Glasses or Blind: no    Cognitive  Cognitive/Neuro/Behavioral: WDL  Level of Consciousness: alert  Arousal Level: opens eyes spontaneously  Orientation: oriented x 4        Speech: clear, logical    Living Environment:   People in home: child(fortino), adult     Current living Arrangements: house      Able to return to prior arrangements: yes       Family/Social Support:  Care provided by: self, child(fortino)  Provides care for: no one  Marital Status: Single  Children          Description of Support System: Supportive, Involved         Current Resources:   Patient receiving home care services: No     Community Resources: None  Equipment currently used at home: walker, standard, raised toilet seat  Supplies currently used at home: None    Employment/Financial:  Employment Status:          Financial Concerns:             Does the patient's insurance plan have a 3 day qualifying hospital stay waiver?  Yes     Which insurance plan 3 day waiver is available? ACO REACH    Will the waiver be used for post-acute placement? Undetermined at this time    Lifestyle & Psychosocial Needs:  Social Determinants of Health     Food Insecurity: No Food Insecurity (12/2/2022)    Hunger Vital Sign     Worried About Running Out of Food in the Last Year: Never true     Ran Out of Food in the Last Year: Never true   Depression: Not at risk (1/15/2024)    PHQ-2     PHQ-2 Score: 0   Housing Stability: Unknown (12/2/2022)    Housing Stability Vital Sign     Unable to Pay for Housing in the Last Year: No      Number of Places Lived in the Last Year: Not on file     Unstable Housing in the Last Year: No   Tobacco Use: Low Risk  (4/29/2024)    Patient History     Smoking Tobacco Use: Never     Smokeless Tobacco Use: Never     Passive Exposure: Not on file   Financial Resource Strain: Low Risk  (12/2/2022)    Overall Financial Resource Strain (CARDIA)     Difficulty of Paying Living Expenses: Not hard at all   Alcohol Use: Not At Risk (12/2/2022)    AUDIT-C     Frequency of Alcohol Consumption: 2-3 times a week     Average Number of Drinks: 1 or 2     Frequency of Binge Drinking: Never   Transportation Needs: No Transportation Needs (12/2/2022)    PRAPARE - Transportation     Lack of Transportation (Medical): No     Lack of Transportation (Non-Medical): No   Physical Activity: Insufficiently Active (12/2/2022)    Exercise Vital Sign     Days of Exercise per Week: 4 days     Minutes of Exercise per Session: 20 min   Interpersonal Safety: Low Risk  (4/25/2024)    Interpersonal Safety     Do you feel physically and emotionally safe where you currently live?: Yes     Within the past 12 months, have you been hit, slapped, kicked or otherwise physically hurt by someone?: No     Within the past 12 months, have you been humiliated or emotionally abused in other ways by your partner or ex-partner?: No   Stress: No Stress Concern Present (12/2/2022)    [employer] - Occupational Stress Questionnaire     Feeling of Stress : Not at all   Social Connections: Moderately Integrated (12/2/2022)    Social Connection and Isolation Panel [NHANES]     Frequency of Communication with Friends and Family: More than three times a week     Frequency of Social Gatherings with Friends and Family: More than three times a week     Attends Rastafari Services: More than 4 times per year     Active Member of Clubs or Organizations: Yes     Attends Club or Organization Meetings: Not on file     Marital Status:     Health Literacy: Not on file       Functional Status:  Prior to admission patient needed assistance:   Dependent ADLs:: Independent  Dependent IADLs:: Independent       Mental Health Status:  Mental Health Status: No Current Concerns       Chemical Dependency Status:                Values/Beliefs:  Spiritual, Cultural Beliefs, Muslim Practices, Values that affect care:                 Additional Information:  Writer met with patient in his room and introduced self and role in discharge planning.  Patient shared that he lives in his sons home in [place] and most family lives in the [place] area.  Patient lives in his son's house in order to help out because son is gone for days at a time.  Ptient has been to [facility] TCU in the past and that is his first choice of where to go at discharge. Patient made additional choices for TCU as well and referrals were sent/DOD today.  Patient shared that he is independent at baseline.  Philipp's preference is to be able to discharge to home if he can get a little better.    Natasha Cantor RN

## 2024-05-01 NOTE — PROGRESS NOTES
[facility]    Neurosurgery  Daily Note    Assessment & Plan   Procedure(s):  Removal Thoracic 10 Pelvis instrumentation   2 Days Post-Op  Surgeon: Dr. Aggarwal     AM ROUNDS: doing well. Muscle spasms - controlled with meds. Pressure dressing in place, no significant drainage.      Plan:  -Ambulate  -Advance activity as tolerated  -Continue supportive and symptomatic treatment  -Start or continue physical therapy  -Pain control measures  -No post op abx  -Advance diet as tolerated  -Routine wound care- will keep open to air in the next 1-2 days      Plans reviewed with Dr. Aggarwal   PT recs: TCU versus home with assist; home with PT    Macey Quiros PA-C  [facility] Neurosurgery  [address]  [address] [place] [id number]  Tel [phone]  Fax [phone]  Text page via [employer] Paging/Directory    Active Problems:    Pain from implanted hardware     Macey Danielson PA-C    Interval History   Stable     Physical Exam   Temp: 97.9  F (36.6  C) Temp src: Oral BP: (!) 163/99 Pulse: 108   Resp: 18 SpO2: 97 % O2 Device: None (Room air)    Vitals:    04/29/24 0611   Weight: 95.3 kg (210 lb)     Vital Signs with Ranges  Temp:  [97.9  F (36.6  C)-98.4  F (36.9  C)] 97.9  F (36.6  C)  Pulse:  [] 108  Resp:  [18] 18  BP: ()/(58-99) 163/99  SpO2:  [91 %-98 %] 97 %  I/O last 3 completed shifts:  In: 1950 [P.O.:600; Other:1350]  Out: 1000 [Urine:1000]    Awake, alert, appropriate   5/5 motor strength BLE   Sensation intact  Dressing intact, no significant drainage     Medications   Current Facility-Administered Medications   Medication Dose Route Frequency Provider Last Rate Last Admin    sodium chloride 0.9 % infusion   Intravenous Continuous Neftali Danielle  mL/hr at 05/01/24 0346 New Bag at 05/01/24 0346      Current Facility-Administered Medications   Medication Dose Route Frequency Provider Last Rate Last Admin    acetaminophen (TYLENOL) tablet 975 mg  975 mg Oral Q8H  Jeff Lopez PA-C   975 mg at 05/01/24 0349    [Held by provider] chlorthalidone (HYGROTON) tablet 25 mg  25 mg Oral Daily Joann Scott MD   25 mg at 04/29/24 1253    diclofenac (VOLTAREN) 1 % topical gel 4 g  4 g Topical TID Ofe Jaramillo APRN CNP   4 g at 04/29/24 2120    ferrous gluconate (FERGON) tablet 324 mg  324 mg Oral Daily with breakfast Jeff Lopez PA-C   324 mg at 04/30/24 0831    Lidocaine (LIDOCARE) 4 % Patch 2 patch  2 patch Transdermal Q24h Ofe Jaramillo APRN CNP   2 patch at 04/30/24 2037    [Held by provider] lisinopril (ZESTRIL) tablet 40 mg  40 mg Oral Daily Joann Scott MD   40 mg at 04/29/24 1253    methocarbamol (ROBAXIN) tablet 750 mg  750 mg Oral 4x Daily Jeff Lopez PA-C   750 mg at 04/30/24 2037    pantoprazole (PROTONIX) EC tablet 40 mg  40 mg Oral Daily Jeff Lopez PA-C   40 mg at 04/30/24 0831    polyethylene glycol (MIRALAX) Packet 17 g  17 g Oral Daily Jeff Lopez PA-C   17 g at 04/30/24 0831    senna-docusate (SENOKOT-S/PERICOLACE) 8.6-50 MG per tablet 1 tablet  1 tablet Oral BID Jeff Lopez PA-C   1 tablet at 04/30/24 2037    sodium chloride (PF) 0.9% PF flush 3 mL  3 mL Intracatheter Q8H Jeff Lopez PA-C   3 mL at 05/01/24 0354    Vitamin D3 (CHOLECALCIFEROL) tablet 25 mcg  25 mcg Oral Daily Jeff Lopez PA-C   25 mcg at 04/30/24 0831    zinc sulfate (ZINCATE) capsule 220 mg  220 mg Oral Daily Jeff Lopez PA-C   220 mg at 04/30/24 0831       Plans discussed with Dr. Aggarwal who was in agreement with plans

## 2024-05-01 NOTE — PLAN OF CARE
Goal Outcome Evaluation:     Shift Summary [id number]     Admitting Diagnosis: Painful orthopaedic hardware (H24) [T84.84XA]  Spinal stenosis of lumbar region with radiculopathy [M48.061, M54.16]   Vitals WDL, ex tachy at times  Pain 3-4/10. Taking PRN Oxy. Last dose 0349  A&O x4  Voiding Urinal at bedside  Mobility Dangled and stood at bedside   CMS intact ex baseline BLE numbness  GI WDL     Orders Placed This Encounter      Advance Diet as Tolerated: Regular Diet Adult        Plan: TBD

## 2024-05-01 NOTE — PROGRESS NOTES
[facility] ACUTE INPATIENT PAIN SERVICE    [facility], [facility], [facility], [facility], [place]   PAIN FOLLOW UP    Assessment/Plan:  Philipp Bland is a 68 year old male who was admitted on 4/29/2024 for planned surgery for orthopedic hardware removal of thoracic 10 pelvic instrumentation.Patient has a past medical history of essential hypertension, elevated PSA, osteoporosis, Paget's disease, spondylolisthesis of lumbar lumbar disc disease, multilevel fusion about 16 months ago, chronic pain not on opiates, and pain associated with thoracolumbar hardware status post lumbar fusion.      Patient reports pain of longstanding began to increase recently and was felt due to hardware malfunction. The patient does does not smoke and no chemical dependency history, consumes alcohol 2-4 times per week. Opioid tolerance is naïve.      MNPMP pulled from system on 04/30/24.  No controlled substances in the past 12 months.     Opioid Induced Respiratory Depression Risk Assessment:  (Low 0-1; Moderate 2-4; or High >4 or >/= 3 if two of the risk factors are age > 60 and opioid naive) due to the following risk factors: LEW, COPD/Asthma/pulmonary disease, CHF, renal dysfunction, hepatic dysfunction, Obesity, Smoker, Age>60, >2 opioid therapies, concomitant CNS depressants, opioid naive status, or post surgical      In the last 24 hours, Philipp has received: 2 (5mg) oxycodone   Total MME = 15 - needs for opioids decreasing.      Adjuvants: 4 (750mg) robaxin, 3 (975mg) tylenol     Pain has improved significantly since surgery. Per ortho note, hemovac removed yesterday.      PLAN:  Severe exacerbation of chronic pain related to loosening of hardware. Patient is now POD2 from removal of thoracic 10 pelvis instrumentation, pain continues to improve. Discussed decreasing oxycodone from range dose of 5-10mg to just 5mg q4h, patient in agreement with the plan.  Multimodal Medication Therapy:   Adjuvants:   Tylenol 975 q8h   Tylenol 650mg  "q4h prn   Voltaren gel tid   Lidocaine patches x2   Robaxin 750mg 4x daily   Atarax 10-20mg q6h prn   No NSAIDs due to ABEL and low hemoglobin   Opioids: Oxycodone decreased to 5mg q4h prn   Non-medication interventions- Ice, heat, physical therapy  Constipation Prophylaxis- Scheduled miralax and senna, prn suppository  Follow up /Discharge Recommendations - We recommend prescribing the following at the time of discharge:    Methocarbamol 750mg 4x daily as needed   Hydroxyzine 10mg every 6 hours as needed   Oxycodone 5mg q6h prn   Prescribing at discharge: Surgeon  Discharging to TCU    Subjective:  Philipp is endorsing 3/10 pain that is improved since yesterday and \"not too bad\". Denies nausea, vomiting, diarrhea, constipation, chest pain, shortness of breath.       History   Drug Use No         Tobacco Use      Smoking status: Never      Smokeless tobacco: Never        Objective:  Vital signs in last 24 hours:  B/P: 163/99, T: 97.9, P: 108, R: 18   Blood pressure (!) 163/99, pulse 108, temperature 97.9  F (36.6  C), temperature source Oral, resp. rate 18, height 1.727 m (5' 8\"), weight 95.3 kg (210 lb), SpO2 97%.      Review of Systems:   As per subjective, all others negative.    Physical Exam  General: in no apparent distress, laying in bed and appears comfortable  HEENT: Head normocephalic atraumatic, oral mucosa moist. Sclerae anicteric  CV: Regular rhythm, normal rate, no murmurs  Resp: Respirations unlabored, on room air   Skin: No rashes or lesions  Extremities: Able to move all four extremities spontaneously   Psych: Normal affect, pleasant   Neuro: Alert and oriented x3    Imaging:  Personally Reviewed.    Results for orders placed or performed during the hospital encounter of 04/29/24   US Renal Complete Non-Vascular    Impression    IMPRESSION:  1.  Normal kidney ultrasound.    MYA HALL MD         SYSTEM ID:  [id number]        Lab Results:  Personally Reviewed.   Last Comprehensive Metabolic " "Panel:  Sodium   Date Value Ref Range Status   05/01/2024 128 (L) 135 - 145 mmol/L Final     Comment:     Reference intervals for this test were updated on 09/26/2023 to more accurately reflect our healthy population. There may be differences in the flagging of prior results with similar values performed with this method. Interpretation of those prior results can be made in the context of the updated reference intervals.    04/08/2013 141 133 - 144 mmol/L Final     Potassium   Date Value Ref Range Status   05/01/2024 4.6 3.4 - 5.3 mmol/L Final   01/02/2023 4.5 3.4 - 5.3 mmol/L Final   04/08/2013 4.3 3.4 - 5.3 mmol/L Final     Chloride   Date Value Ref Range Status   05/01/2024 96 (L) 98 - 107 mmol/L Final   01/02/2023 103 94 - 109 mmol/L Final   04/08/2013 101 94 - 109 mmol/L Final     Carbon Dioxide   Date Value Ref Range Status   04/08/2013 25 20 - 32 mmol/L Final     Carbon Dioxide (CO2)   Date Value Ref Range Status   05/01/2024 26 22 - 29 mmol/L Final   01/02/2023 25 20 - 32 mmol/L Final     Anion Gap   Date Value Ref Range Status   05/01/2024 6 (L) 7 - 15 mmol/L Final   01/02/2023 5 3 - 14 mmol/L Final   04/08/2013 14 6 - 17 mmol/L Final     Glucose   Date Value Ref Range Status   05/01/2024 113 (H) 70 - 99 mg/dL Final   01/02/2023 107 (H) 70 - 99 mg/dL Final   04/08/2013 93 60 - 99 mg/dL Final     Urea Nitrogen   Date Value Ref Range Status   05/01/2024 30.5 (H) 8.0 - 23.0 mg/dL Final   01/02/2023 13 7 - 30 mg/dL Final   04/08/2013 26 7 - 30 mg/dL Final     Creatinine   Date Value Ref Range Status   05/01/2024 1.11 0.67 - 1.17 mg/dL Final   04/08/2013 1.27 (H) 0.66 - 1.25 mg/dL Final     GFR Estimate   Date Value Ref Range Status   05/01/2024 73 >60 mL/min/1.73m2 Final   04/08/2013 59 (L) >60 mL/min/1.7m2 Final     Calcium   Date Value Ref Range Status   05/01/2024 8.0 (L) 8.8 - 10.2 mg/dL Final   04/08/2013 9.5 8.5 - 10.4 mg/dL Final        UA: No results found for: \"UAMP\", \"UBARB\", \"BENZODIAZEUR\", \"UCANN\", " "\"UCOC\", \"OPIT\", \"UPCP\"       Please see A&P for additional details of medical decision making.  MANAGEMENT DISCUSSED with the following over the past 24 hours:   -Hospitalist: reviewed plan   NOTE(S)/MEDICAL RECORDS REVIEWED over the past 24 hours:   -Hospitalist: Hemoglobin down, potentially due to ABEL. Drain removed  -Physical therapy: discharge rec to home with assist or TCU   -Occupational therapy: discharge rec to home with assist or TCU   Tests personally interpreted in the past 24 hours:  - RENAL ULTRASOUND showing normal kidney  Tests REVIEWED in the past 24 hours:  - BMP  Medical complexity over the past 24 hours:  - Prescription DRUG MANAGEMENT performed      AYSHA Lott-BC  Acute Care Pain Management Program   Hours of pain coverage Mon-Fri [id number], afterhours please call the house officer    [employer] (DEXTER, [place], [place], [place])   Page via [employer]- Click HERE to page Ngozi or Lucas text web console -Click for Vocera            "

## 2024-05-01 NOTE — PROVIDER NOTIFICATION
MD Notification    Notified Person: MD    Notified Person Name: Neftali Danielle    Notification Date/Time: 5/1/24 1135    Notification Interaction: [employer] Messaging    Purpose of Notification: Alyse Danielle. FYI: Na + 128. Thanks.    Orders Received:    Comments:

## 2024-05-01 NOTE — PLAN OF CARE
Reason for Admission: POD # 2 removal T 10 pelvis instrumentation    Cognitive/Mentation: A/Ox 4    Neuros/CMS: Intact ex baseline numbness to BLE    VS: VSS on RA.     Tele: n/a.    /GI: continent. Urinary frequency Last BM 4/29/24.     Pulmonary: LS clear. Dyspnea on exertion    Pain: 5/10 managed by tylenol and oxy X 2.     Drains/Lines: SL PIV    Skin: lumbar incision    Activity: Assist x 1 with GB/W.    Diet: regular     Therapies recs: TCU    Discharge: pending    Aggression Stoplight Tool: green    End of shift summary: sat up in recliner for a couple hours. Provider notified about Na 128 - NS stopped.

## 2024-05-01 NOTE — PROGRESS NOTES
[facility]    Hospitalist Progress Note    Assessment & Plan     Assessment & Plan  Philipp Bland is a 68 year old male with past medical history significant for lumbar stenosis and spondylolisthesis with radiculopathy who has undergone T10-S1 posterior segmental instrumentation with S2 alar iliac screws, screws cement augmentation T10 and T11, L2-S1 bilateral decompression and transforaminal interbody fusion with posterior arthrodesis, T10-S1 in December 2022.  Patient has been following with Dr. Aggarwal.  In the last few months his pain has been worsening and patient has been functionally struggling.  Neurologically he was found to be stable with bilateral foot drops.  Patient was noticed to be unable to stand and his previous neurosurgery appointment on 03/01 to do his a standing scoliosis x-ray.  Dr. Aggarwal ordered thoracic and lumbar CT to evaluate his hardware and fusion.  CT scan of the thoracic and lumbar spine showed a stable haloing around one of the S1 screws and the clay on the left side that is out of the pelvic instrumentation.  However it appears that he has achieved bony fusion across the construct.  Neurosurgery thought that it would be reasonable to consider hardware removal along with a bilateral L5-S1 foraminotomies.  Patient has undergone removal of thoracic 10 pelvis instrumentation, hardware removed in its entirety. Fusion explored and appeared to be solid from T10 to the pelvis.  Hospital medicine has been consulted for medical comanagement and management of his longstanding essential hypertension     Spinal stenosis of lumbar region with radiculopathy:  S/p painful orthopedic hardware removal of thoracic 10 pelvic instrumentation;     --Postoperative management per neurosurgery.  --Neurosurgery is recommending no postop antibiotics, patient has received cefazolin 1 dose preoperatively.  --Pain management consultation has been requested.  --Advance activity as  "tolerated.  --Physical therapy has been ordered.  --- Patient has received Tranexamic acid IV preoperatively, will let neurosurgery team review regarding DVT prophylaxis postoperatively  -up with PT, no dizziness. Hb down to 8 range and stable   -per surgery. Doubt retroperitoneal hematoma causing hydro. No intraoperative hypotension   -drain removed  -doing well.    ABEL-resolved. prerenal  -baseline 0/7 on 4/25. Was taking ibuprofen 600-800mg every day but last dose 1 week ago  - no intraoperative hypotension  - hydrochlorothiazide and lisinopril preop and on 4/29  -no iv contast  -no other culprit meds  - no bph sxs   -PVR 0 on 4/30  FeNa <1%  - suspect prerenal 2/2 blood loss, hydrochlorothiazide, ace inh, possibly nsaids preop  -sbp 90s   -UA showed wbc and rbc, ++ hyaline castsm, Ucx no growth so far  Doubt uti  Pt treated with 1.5L NS bolus and increased maint fluids  -renal US negative   - improved UO.   -Cr down to 1.1    Plan; stop fluids this afternoon. Am bmp          Essential hypertension:  PTA medications include chlorthalidone 25 mg p.o. daily along with lisinopril 40 mg p.o. daily  Combination of chlorthalidone with ACE inhibitors might be causing hyponatremia, see below  -remains normotensive off PTA meds     Plan;    - holding ace inh and hydrochlorothiazide for now given low nl sbp and ABEL     Mild hyponatremia: Most likely secondary to chlorthalidone, possibly ABEL  -hold chlorthalidone for now,   -Na 128 this am post fluids  - stop fluids this afternoon  - am bmp     GERD:  -- Continue pantoprazole 40 mg p.o. daily as an alternate to PTA omeprazole              Clinically Significant Risk Factors Present on Admission[]Expand by Default                  # Hypertension: Noted on problem list      # Obesity: Estimated body mass index is 31.93 kg/m  as calculated from the following:    Height as of this encounter: 1.727 m (5' 8\").    Weight as of this encounter: 95.3 kg (210 lb).             Dispo. " Therapy following tcu vs home  Per primary service      Moderate complexity of care, >35min spent on pt care: care coord with rn, pt,chart review, charting, exam, .     Neftali Danielle MD, MD  Text Page  (7am to 6pm)  Interval History   Seen this am   No cp or sob  Feeling ok  Passing urine now    -Data reviewed today: I reviewed all new labs and imaging results over the last 24 hours. I personally reviewed labs since admission    Physical Exam   Temp: 98.6  F (37  C) Temp src: Oral BP: 107/71 Pulse: 92   Resp: 16 SpO2: 99 % O2 Device: None (Room air)    Vitals:    04/29/24 0611   Weight: 95.3 kg (210 lb)     Vital Signs with Ranges  Temp:  [97.9  F (36.6  C)-98.6  F (37  C)] 98.6  F (37  C)  Pulse:  [] 92  Resp:  [16] 16  BP: (103-163)/(67-99) 107/71  SpO2:  [97 %-99 %] 99 %  I/O last 3 completed shifts:  In: 1310 [P.O.:960; Other:350]  Out: 1525 [Urine:1525]    Constitutional: In bed, nad  Respiratory: CTAB  Cardiovascular: RRR no r/g/m  GI: soft, nt, nd  Skin/Integumen: no rash or edema  Neuro; nl speech and mentation  Psych: nl affect         Medications   Current Facility-Administered Medications   Medication Dose Route Frequency Provider Last Rate Last Admin     Current Facility-Administered Medications   Medication Dose Route Frequency Provider Last Rate Last Admin    acetaminophen (TYLENOL) tablet 975 mg  975 mg Oral Q8H Jeff Lopez PA-C   975 mg at 05/01/24 1153    [Held by provider] chlorthalidone (HYGROTON) tablet 25 mg  25 mg Oral Daily Joann Scott MD   25 mg at 04/29/24 1253    diclofenac (VOLTAREN) 1 % topical gel 4 g  4 g Topical TID Ofe Jaramillo APRN CNP   4 g at 04/29/24 2120    ferrous gluconate (FERGON) tablet 324 mg  324 mg Oral Daily with breakfast Jeff Lopez PA-C   324 mg at 05/01/24 0906    Lidocaine (LIDOCARE) 4 % Patch 2 patch  2 patch Transdermal Q24h Ofe Jaramillo, APRN CNP   2 patch at 04/30/24 2030     [Held by provider] lisinopril (ZESTRIL) tablet 40 mg  40 mg Oral Daily Joann Scott MD   40 mg at 04/29/24 1253    methocarbamol (ROBAXIN) tablet 750 mg  750 mg Oral 4x Daily Jeff Lopez PA-C   750 mg at 05/01/24 1153    pantoprazole (PROTONIX) EC tablet 40 mg  40 mg Oral Daily Jeff Lopez PA-C   40 mg at 05/01/24 0906    polyethylene glycol (MIRALAX) Packet 17 g  17 g Oral Daily Jeff Lopez PA-C   17 g at 05/01/24 0905    senna-docusate (SENOKOT-S/PERICOLACE) 8.6-50 MG per tablet 1 tablet  1 tablet Oral BID Jeff Lopez PA-C   1 tablet at 05/01/24 0905    sodium chloride (PF) 0.9% PF flush 3 mL  3 mL Intracatheter Q8H Jeff Lopez PA-C   3 mL at 05/01/24 0354    Vitamin D3 (CHOLECALCIFEROL) tablet 25 mcg  25 mcg Oral Daily Jeff Lopez PA-C   25 mcg at 05/01/24 0909    zinc sulfate (ZINCATE) capsule 220 mg  220 mg Oral Daily Jeff Lopez PA-C   220 mg at 05/01/24 0905       Data   Recent Labs   Lab 05/01/24  0631 04/30/24  2254 04/30/24  1516 04/30/24  0840 04/29/24  0610 04/25/24  1157   WBC  --   --   --   --  6.4 4.8   HGB  --   --  8.9* 8.9* 13.9 14.0   MCV  --   --   --   --  95 94   PLT  --   --   --   --  255 246   INR  --   --   --   --  0.96  --    *  --  131* 131* 130* 129*   POTASSIUM 4.6 4.7 4.8 4.9 4.5 4.8   CHLORIDE 96*  --  95* 94*  --  90*   CO2 26  --  22 23  --  25   BUN 30.5*  --  37.9* 35.7*  --  12.5   CR 1.11  --  2.03* 2.08*  --  0.71   ANIONGAP 6*  --  14 14  --  14   SHELBY 8.0*  --  8.0* 8.0*  --  9.6   *  --  119* 119* 113* 93       Imaging:   No results found for this or any previous visit (from the past 24 hour(s)).

## 2024-05-02 ENCOUNTER — APPOINTMENT (OUTPATIENT)
Dept: PHYSICAL THERAPY | Facility: CLINIC | Age: 69
DRG: 516 | End: 2024-05-02
Attending: NEUROLOGICAL SURGERY
Payer: MEDICARE

## 2024-05-02 ENCOUNTER — APPOINTMENT (OUTPATIENT)
Dept: OCCUPATIONAL THERAPY | Facility: CLINIC | Age: 69
DRG: 516 | End: 2024-05-02
Attending: NEUROLOGICAL SURGERY
Payer: MEDICARE

## 2024-05-02 LAB
ANION GAP SERPL CALCULATED.3IONS-SCNC: 11 MMOL/L (ref 7–15)
BACTERIA UR CULT: NO GROWTH
BUN SERPL-MCNC: 18.5 MG/DL (ref 8–23)
CALCIUM SERPL-MCNC: 8.6 MG/DL (ref 8.8–10.2)
CHLORIDE SERPL-SCNC: 96 MMOL/L (ref 98–107)
CREAT SERPL-MCNC: 0.66 MG/DL (ref 0.67–1.17)
DEPRECATED HCO3 PLAS-SCNC: 23 MMOL/L (ref 22–29)
EGFRCR SERPLBLD CKD-EPI 2021: >90 ML/MIN/1.73M2
GLUCOSE SERPL-MCNC: 107 MG/DL (ref 70–99)
POTASSIUM SERPL-SCNC: 4.8 MMOL/L (ref 3.4–5.3)
SODIUM SERPL-SCNC: 130 MMOL/L (ref 135–145)

## 2024-05-02 PROCEDURE — 120N000001 HC R&B MED SURG/OB

## 2024-05-02 PROCEDURE — 97116 GAIT TRAINING THERAPY: CPT | Mod: GP | Performed by: PHYSICAL THERAPY ASSISTANT

## 2024-05-02 PROCEDURE — 36415 COLL VENOUS BLD VENIPUNCTURE: CPT | Performed by: INTERNAL MEDICINE

## 2024-05-02 PROCEDURE — 97530 THERAPEUTIC ACTIVITIES: CPT | Mod: GP | Performed by: PHYSICAL THERAPY ASSISTANT

## 2024-05-02 PROCEDURE — 99232 SBSQ HOSP IP/OBS MODERATE 35: CPT | Performed by: INTERNAL MEDICINE

## 2024-05-02 PROCEDURE — 97530 THERAPEUTIC ACTIVITIES: CPT | Mod: GO

## 2024-05-02 PROCEDURE — 99232 SBSQ HOSP IP/OBS MODERATE 35: CPT

## 2024-05-02 PROCEDURE — 250N000013 HC RX MED GY IP 250 OP 250 PS 637: Performed by: NURSE PRACTITIONER

## 2024-05-02 PROCEDURE — 250N000013 HC RX MED GY IP 250 OP 250 PS 637

## 2024-05-02 PROCEDURE — 250N000013 HC RX MED GY IP 250 OP 250 PS 637: Performed by: PHYSICIAN ASSISTANT

## 2024-05-02 PROCEDURE — 80048 BASIC METABOLIC PNL TOTAL CA: CPT | Performed by: INTERNAL MEDICINE

## 2024-05-02 RX ADMIN — ACETAMINOPHEN 650 MG: 325 TABLET, FILM COATED ORAL at 12:06

## 2024-05-02 RX ADMIN — Medication 25 MCG: at 09:14

## 2024-05-02 RX ADMIN — LIDOCAINE 2 PATCH: 4 PATCH TOPICAL at 19:45

## 2024-05-02 RX ADMIN — METHOCARBAMOL 750 MG: 750 TABLET ORAL at 17:36

## 2024-05-02 RX ADMIN — METHOCARBAMOL 750 MG: 750 TABLET ORAL at 12:06

## 2024-05-02 RX ADMIN — OXYCODONE HYDROCHLORIDE 5 MG: 5 TABLET ORAL at 02:21

## 2024-05-02 RX ADMIN — POLYETHYLENE GLYCOL 3350 17 G: 17 POWDER, FOR SOLUTION ORAL at 09:14

## 2024-05-02 RX ADMIN — METHOCARBAMOL 750 MG: 750 TABLET ORAL at 09:14

## 2024-05-02 RX ADMIN — SENNOSIDES AND DOCUSATE SODIUM 1 TABLET: 50; 8.6 TABLET ORAL at 09:14

## 2024-05-02 RX ADMIN — DICLOFENAC 4 G: 10 GEL TOPICAL at 17:36

## 2024-05-02 RX ADMIN — PANTOPRAZOLE SODIUM 40 MG: 40 TABLET, DELAYED RELEASE ORAL at 09:14

## 2024-05-02 RX ADMIN — ZINC SULFATE 220 MG (50 MG) CAPSULE 220 MG: CAPSULE at 09:14

## 2024-05-02 RX ADMIN — FERROUS GLUCONATE 324 MG: 324 TABLET ORAL at 09:14

## 2024-05-02 RX ADMIN — ACETAMINOPHEN 975 MG: 325 TABLET, FILM COATED ORAL at 06:40

## 2024-05-02 RX ADMIN — SENNOSIDES AND DOCUSATE SODIUM 1 TABLET: 50; 8.6 TABLET ORAL at 19:45

## 2024-05-02 RX ADMIN — METHOCARBAMOL 750 MG: 750 TABLET ORAL at 19:45

## 2024-05-02 RX ADMIN — OXYCODONE HYDROCHLORIDE 5 MG: 5 TABLET ORAL at 19:45

## 2024-05-02 RX ADMIN — OXYCODONE HYDROCHLORIDE 5 MG: 5 TABLET ORAL at 06:40

## 2024-05-02 ASSESSMENT — ACTIVITIES OF DAILY LIVING (ADL)
ADLS_ACUITY_SCORE: 40
ADLS_ACUITY_SCORE: 39
ADLS_ACUITY_SCORE: 40

## 2024-05-02 NOTE — PROGRESS NOTES
[facility]    Neurosurgery  Daily Note    Assessment & Plan   Procedure(s):  Removal Thoracic 10 Pelvis instrumentation   3 Days Post-Op  Surgeon: Dr. Aggarwal     AM ROUNDS: pain controlled with meds. Working with PT. Incision c/d/I with small amt drainage on dressing.      Plan:  -Ambulate  -Advance activity as tolerated  -Continue supportive and symptomatic treatment  -Start or continue physical therapy  -Pain control measures  -No post op abx  -Advance diet as tolerated  -Routine wound care- dressing off and open to air this afternoon    Per pain team recs for med orders at discharge: (appreciate their involvement)  Follow up /Discharge Recommendations - We recommend prescribing the following at the time of discharge:    Methocarbamol 750mg 4x daily as needed   Hydroxyzine 10mg every 6 hours as needed   Continue lidocaine patches and tylenol q8h prn   Oxycodone 5mg q6h prn     Plans reviewed with Dr. Aggarwal   PT recs: TCU versus home with assist; home with PT. Patient prefers home with assist 5/3 or /5/4    Macey Quiros PA-C  [facility]  [address] [place] [address]  Tel [phone]  Fax [phone]  Text page via [email or web address] Paging/Directory    Active Problems:    Pain from implanted hardware     Macey Danielson PA-C    Interval History   Stable     Physical Exam   Temp: 98.1  F (36.7  C) Temp src: Oral BP: 122/85 Pulse: 76   Resp: 16 SpO2: 98 % O2 Device: None (Room air)    Vitals:    04/29/24 0611   Weight: 95.3 kg (210 lb)     Vital Signs with Ranges  Temp:  [98.1  F (36.7  C)-99.4  F (37.4  C)] 98.1  F (36.7  C)  Pulse:  [] 76  Resp:  [16] 16  BP: (107-141)/(67-86) 122/85  SpO2:  [98 %-99 %] 98 %  I/O last 3 completed shifts:  In: 960 [P.O.:960]  Out: 1600 [Urine:1600]    Awake, alert, appropriate   5/5 motor strength BLE   Sensation intact  Dressing intact, small amt of drainage       Medications   Current Facility-Administered Medications    Medication Dose Route Frequency Provider Last Rate Last Admin      Current Facility-Administered Medications   Medication Dose Route Frequency Provider Last Rate Last Admin    [Held by provider] chlorthalidone (HYGROTON) tablet 25 mg  25 mg Oral Daily Joann Scott MD   25 mg at 04/29/24 1253    diclofenac (VOLTAREN) 1 % topical gel 4 g  4 g Topical TID Ofe Jaramillo APRN CNP   4 g at 04/29/24 2120    ferrous gluconate (FERGON) tablet 324 mg  324 mg Oral Daily with breakfast Jeff Lopez PA-C   324 mg at 05/02/24 0914    Lidocaine (LIDOCARE) 4 % Patch 2 patch  2 patch Transdermal Q24h Ofe Jaramillo APRN CNP   2 patch at 05/01/24 2031    [Held by provider] lisinopril (ZESTRIL) tablet 40 mg  40 mg Oral Daily Joann Scott MD   40 mg at 04/29/24 1253    methocarbamol (ROBAXIN) tablet 750 mg  750 mg Oral 4x Daily Jeff Lopez PA-C   750 mg at 05/02/24 0914    pantoprazole (PROTONIX) EC tablet 40 mg  40 mg Oral Daily Jeff Lopez PA-C   40 mg at 05/02/24 0914    polyethylene glycol (MIRALAX) Packet 17 g  17 g Oral Daily Jeff Lopez PA-C   17 g at 05/02/24 0914    senna-docusate (SENOKOT-S/PERICOLACE) 8.6-50 MG per tablet 1 tablet  1 tablet Oral BID Jeff Lopez PA-C   1 tablet at 05/02/24 0914    sodium chloride (PF) 0.9% PF flush 3 mL  3 mL Intracatheter Q8H Jeff Lopez PA-C   3 mL at 05/02/24 0641    Vitamin D3 (CHOLECALCIFEROL) tablet 25 mcg  25 mcg Oral Daily Jeff Lopez PA-C   25 mcg at 05/02/24 0914    zinc sulfate (ZINCATE) capsule 220 mg  220 mg Oral Daily Jeff Lopez PA-C   220 mg at 05/02/24 0914       Plans discussed with Dr. Aggarwal who was in agreement with plans

## 2024-05-02 NOTE — PROGRESS NOTES
[facility]    Hospitalist Progress Note    Assessment & Plan     Assessment & Plan  Philipp Bland is a 68 year old male with past medical history significant for lumbar stenosis and spondylolisthesis with radiculopathy who has undergone T10-S1 posterior segmental instrumentation with S2 alar iliac screws, screws cement augmentation T10 and T11, L2-S1 bilateral decompression and transforaminal interbody fusion with posterior arthrodesis, T10-S1 in December 2022.  Patient has been following with Dr. Aggarwal.  In the last few months his pain has been worsening and patient has been functionally struggling.  Neurologically he was found to be stable with bilateral foot drops.  Patient was noticed to be unable to stand and his previous neurosurgery appointment on 03/01 to do his a standing scoliosis x-ray.  Dr. Aggarwal ordered thoracic and lumbar CT to evaluate his hardware and fusion.  CT scan of the thoracic and lumbar spine showed a stable haloing around one of the S1 screws and the clay on the left side that is out of the pelvic instrumentation.  However it appears that he has achieved bony fusion across the construct.  Neurosurgery thought that it would be reasonable to consider hardware removal along with a bilateral L5-S1 foraminotomies.  Patient has undergone removal of thoracic 10 pelvis instrumentation, hardware removed in its entirety. Fusion explored and appeared to be solid from T10 to the pelvis.  Hospital medicine has been consulted for medical comanagement and management of his longstanding essential hypertension     Spinal stenosis of lumbar region with radiculopathy:  S/p painful orthopedic hardware removal of thoracic 10 pelvic instrumentation;     --Postoperative management per neurosurgery.  --Neurosurgery is recommending no postop antibiotics, patient has received cefazolin 1 dose preoperatively.  --Pain management consultation has been requested.  --Advance activity as  "tolerated.  --Physical therapy has been ordered.  --- Patient has received Tranexamic acid IV preoperatively, will let neurosurgery team review regarding DVT prophylaxis postoperatively  -up with PT, no dizziness. Hb down to 8 range and stable   -per surgery. Doubt retroperitoneal hematoma causing hydro. No intraoperative hypotension   -drain removed  -doing well.    ABEL-resolved. prerenal  -baseline 0/7 on 4/25. Was taking ibuprofen 600-800mg every day but last dose 1 week ago  - no intraoperative hypotension  - hydrochlorothiazide and lisinopril preop and on 4/29  -no iv contast  -no other culprit meds  - no bph sxs   -PVR 0 on 4/30  FeNa <1%  - suspect prerenal 2/2 blood loss, hydrochlorothiazide, ace inh, possibly nsaids preop  -sbp 90s   -UA showed wbc and rbc, ++ hyaline castsm, Ucx no growth so far  Doubt uti  Pt treated with 1.5L NS bolus and increased maint fluids  -renal US negative   - improved UO.   -Cr down to 0.66 today   - bmp prn          Essential hypertension:  PTA medications include chlorthalidone 25 mg p.o. daily along with lisinopril 40 mg p.o. daily  Combination of chlorthalidone with ACE inhibitors might be causing hyponatremia, see below  -remains normotensive off PTA meds     Plan;    - still holding ace inh and hydrochlorothiazide for now given low nl sbp and ABEL, stop hydrochlorothiazide at discharge     Mild hyponatremia: Most likely secondary to chlorthalidone, possibly ABEL  -hold chlorthalidone for now,   -Na 128 this am post fluids  - stop fluids 5/1  -Na 130 5/2  -outpatient bmp     GERD:  -- Continue pantoprazole 40 mg p.o. daily as an alternate to PTA omeprazole              Clinically Significant Risk Factors Present on Admission[]Expand by Default                  # Hypertension: Noted on problem list      # Obesity: Estimated body mass index is 31.93 kg/m  as calculated from the following:    Height as of this encounter: 1.727 m (5' 8\").    Weight as of this encounter: 95.3 kg " (210 lb).             Dispo. Therapy following tcu vs home  Per primary service      Moderate complexity of care, >35min spent on pt care: care coord with rn, pt,chart review, charting, exam, .     Neftali Danielle MD, MD  Text Page  (7am to 6pm)  Interval History   Doing well. Prefers discharge home  No cp or sob  Taking po     -Data reviewed today: I reviewed all new labs and imaging results over the last 24 hours. I personally reviewed labs since admission    Physical Exam   Temp: 98.2  F (36.8  C) Temp src: Oral BP: (!) 147/100 Pulse: 98   Resp: 18 SpO2: 98 % O2 Device: None (Room air)    Vitals:    04/29/24 0611   Weight: 95.3 kg (210 lb)     Vital Signs with Ranges  Temp:  [98.1  F (36.7  C)-99.4  F (37.4  C)] 98.2  F (36.8  C)  Pulse:  [] 98  Resp:  [18] 18  BP: (119-147)/() 147/100  SpO2:  [98 %-99 %] 98 %  I/O last 3 completed shifts:  In: 360 [P.O.:360]  Out: 1150 [Urine:1150]    Constitutional: In bed, nad  Respiratory: CTAB  Cardiovascular: RRR no r/g/m  GI: soft, nt, nd  Skin/Integumen: no rash or edema  Neuro; nl speech and mentation  Psych: nl affect         Medications   Current Facility-Administered Medications   Medication Dose Route Frequency Provider Last Rate Last Admin     Current Facility-Administered Medications   Medication Dose Route Frequency Provider Last Rate Last Admin    [Held by provider] chlorthalidone (HYGROTON) tablet 25 mg  25 mg Oral Daily Joann Scott MD   25 mg at 04/29/24 1253    diclofenac (VOLTAREN) 1 % topical gel 4 g  4 g Topical TID Ofe Jaramillo APRN CNP   4 g at 04/29/24 2120    ferrous gluconate (FERGON) tablet 324 mg  324 mg Oral Daily with breakfast Jeff Lopez PA-C   324 mg at 05/02/24 0914    Lidocaine (LIDOCARE) 4 % Patch 2 patch  2 patch Transdermal Q24h Ofe Jaramillo APRN CNP   2 patch at 05/01/24 2031    [Held by provider] lisinopril (ZESTRIL) tablet 40 mg  40 mg Oral Daily Tyler,  MD Joann   40 mg at 04/29/24 1253    methocarbamol (ROBAXIN) tablet 750 mg  750 mg Oral 4x Daily Jeff Lopez PA-C   750 mg at 05/02/24 1206    pantoprazole (PROTONIX) EC tablet 40 mg  40 mg Oral Daily Jeff Lopez PA-C   40 mg at 05/02/24 0914    polyethylene glycol (MIRALAX) Packet 17 g  17 g Oral Daily Jeff Lopez PA-C   17 g at 05/02/24 0914    senna-docusate (SENOKOT-S/PERICOLACE) 8.6-50 MG per tablet 1 tablet  1 tablet Oral BID Jeff Lopez PA-C   1 tablet at 05/02/24 0914    sodium chloride (PF) 0.9% PF flush 3 mL  3 mL Intracatheter Q8H Jeff Lopez PA-C   3 mL at 05/02/24 1512    Vitamin D3 (CHOLECALCIFEROL) tablet 25 mcg  25 mcg Oral Daily Jeff Lopez PA-C   25 mcg at 05/02/24 0914    zinc sulfate (ZINCATE) capsule 220 mg  220 mg Oral Daily Jeff Lopez PA-C   220 mg at 05/02/24 0914       Data   Recent Labs   Lab 05/02/24  0739 05/01/24  0631 04/30/24  2254 04/30/24  1516 04/30/24  0840 04/29/24  0610 04/29/24  0610   WBC  --   --   --   --   --   --  6.4   HGB  --   --   --  8.9* 8.9*  --  13.9   MCV  --   --   --   --   --   --  95   PLT  --   --   --   --   --   --  255   INR  --   --   --   --   --   --  0.96   * 128*  --  131* 131*  --  130*   POTASSIUM 4.8 4.6 4.7 4.8 4.9  --  4.5   CHLORIDE 96* 96*  --  95* 94*   < >  --    CO2 23 26  --  22 23   < >  --    BUN 18.5 30.5*  --  37.9* 35.7*   < >  --    CR 0.66* 1.11  --  2.03* 2.08*   < >  --    ANIONGAP 11 6*  --  14 14   < >  --    SHELBY 8.6* 8.0*  --  8.0* 8.0*   < >  --    * 113*  --  119* 119*  --  113*    < > = values in this interval not displayed.       Imaging:   No results found for this or any previous visit (from the past 24 hour(s)).

## 2024-05-02 NOTE — PROGRESS NOTES
[facility] ACUTE INPATIENT PAIN SERVICE    [facility], [facility], [facility], [facility], [place]   PAIN FOLLOW UP     Reason for consult: post operative pain, pain type musculoskeletal   Ordering provider: Jeff Lopez PA-C    Assessment/Plan:  Philipp Bland is a 68 year old male who was admitted on 4/29/2024 for planned surgery for orthopedic hardware removal of thoracic 10 pelvic instrumentation.Patient has a past medical history of essential hypertension, elevated PSA, osteoporosis, Paget's disease, spondylolisthesis of lumbar lumbar disc disease, multilevel fusion about 16 months ago, chronic pain not on opiates, and pain associated with thoracolumbar hardware status post lumbar fusion.      Patient reports pain of longstanding began to increase recently and was felt due to hardware malfunction. The patient does does not smoke and no chemical dependency history, consumes alcohol 2-4 times per week. Opioid tolerance is naïve.      [place] pulled from system on 04/30/24.  No controlled substances in the past 12 months.     Opioid Induced Respiratory Depression Risk Assessment:  (Low 0-1; Moderate 2-4; or High >4 or >/= 3 if two of the risk factors are age > 60 and opioid naive) due to the following risk factors: LEW, COPD/Asthma/pulmonary disease, CHF, renal dysfunction, hepatic dysfunction, Obesity, Smoker, Age>60, >2 opioid therapies, concomitant CNS depressants, opioid naive status, or post surgical      In the last 24 hours, Philipp has received: 5 (5mg) oxycodone   Total MME = 37.5 - Opioid use increased last 24 hours. Patient stated he worked with PT x3 yesterday so was feeling more sore than he has.     Adjuvants: 4 (750mg) robaxin, 3 (975mg) tylenol     PLAN:  Severe exacerbation of chronic pain related to loosening of hardware. Patient is now POD3 from removal of thoracic 10 pelvis instrumentation, pain continues to improve. Pain team signing off, reconsult if needed.   Multimodal Medication Therapy:  "  Adjuvants:   Tylenol 975 q8h   Tylenol 650mg q4h prn   Voltaren gel tid   Lidocaine patches x2   Robaxin 750mg 4x daily   Atarax 10-20mg q6h prn   No NSAIDs due to ABEL (now resolved per hospitalist note) and low hemoglobin   Opioids: Oxycodone decreased to 5mg q4h prn   Non-medication interventions- Ice, heat, physical therapy  Constipation Prophylaxis- Scheduled miralax and senna, prn suppository  Follow up /Discharge Recommendations - We recommend prescribing the following at the time of discharge:    Methocarbamol 750mg 4x daily as needed   Hydroxyzine 10mg every 6 hours as needed   Continue lidocaine patches and tylenol q8h prn   Oxycodone 5mg q6h prn   Prescribing at discharge: Surgeon  Discharging to TCU    Subjective:  Endorsing 2/10 pain that continues to improve. He was more sore yesterday due to being \"more active\" than he has been, states he had PT x3 and is very motivated to discharge home instead of to a TCU.     Denies nausea, vomiting, diarrhea, constipation, chest pain, shortness of breath.           History   Drug Use No         Tobacco Use      Smoking status: Never      Smokeless tobacco: Never      Objective:  Vital signs in last 24 hours:  B/P: 119/86, T: 99.4, P: 93, R: 16   Blood pressure 119/86, pulse 93, temperature 99.4  F (37.4  C), temperature source Axillary, resp. rate 16, height 1.727 m (5' 8\"), weight 95.3 kg (210 lb), SpO2 99%.      Review of Systems:   As per subjective, all others negative.    Physical Exam  General: in no apparent distress, laying in bed and appears comfortable  HEENT: Head normocephalic atraumatic, oral mucosa moist. Sclerae anicteric  Resp: Respirations unlabored, on room air   Skin: Warm and dry   Extremities: Able to move all four extremities spontaneously   Psych: Normal affect  Neuro: Alert and oriented x3       Imaging:  Personally Reviewed.    Results for orders placed or performed during the hospital encounter of 04/29/24   US Renal Complete " Non-Vascular    Impression    IMPRESSION:  1.  Normal kidney ultrasound.    MYA HALL MD         SYSTEM ID:  [id number]        Lab Results:  Personally Reviewed.   Last Comprehensive Metabolic Panel:  Sodium   Date Value Ref Range Status   05/01/2024 128 (L) 135 - 145 mmol/L Final     Comment:     Reference intervals for this test were updated on 09/26/2023 to more accurately reflect our healthy population. There may be differences in the flagging of prior results with similar values performed with this method. Interpretation of those prior results can be made in the context of the updated reference intervals.    04/08/2013 141 133 - 144 mmol/L Final     Potassium   Date Value Ref Range Status   05/01/2024 4.6 3.4 - 5.3 mmol/L Final   01/02/2023 4.5 3.4 - 5.3 mmol/L Final   04/08/2013 4.3 3.4 - 5.3 mmol/L Final     Chloride   Date Value Ref Range Status   05/01/2024 96 (L) 98 - 107 mmol/L Final   01/02/2023 103 94 - 109 mmol/L Final   04/08/2013 101 94 - 109 mmol/L Final     Carbon Dioxide   Date Value Ref Range Status   04/08/2013 25 20 - 32 mmol/L Final     Carbon Dioxide (CO2)   Date Value Ref Range Status   05/01/2024 26 22 - 29 mmol/L Final   01/02/2023 25 20 - 32 mmol/L Final     Anion Gap   Date Value Ref Range Status   05/01/2024 6 (L) 7 - 15 mmol/L Final   01/02/2023 5 3 - 14 mmol/L Final   04/08/2013 14 6 - 17 mmol/L Final     Glucose   Date Value Ref Range Status   05/01/2024 113 (H) 70 - 99 mg/dL Final   01/02/2023 107 (H) 70 - 99 mg/dL Final   04/08/2013 93 60 - 99 mg/dL Final     Urea Nitrogen   Date Value Ref Range Status   05/01/2024 30.5 (H) 8.0 - 23.0 mg/dL Final   01/02/2023 13 7 - 30 mg/dL Final   04/08/2013 26 7 - 30 mg/dL Final     Creatinine   Date Value Ref Range Status   05/01/2024 1.11 0.67 - 1.17 mg/dL Final   04/08/2013 1.27 (H) 0.66 - 1.25 mg/dL Final     GFR Estimate   Date Value Ref Range Status   05/01/2024 73 >60 mL/min/1.73m2 Final   04/08/2013 59 (L) >60 mL/min/1.7m2 Final  "    Calcium   Date Value Ref Range Status   05/01/2024 8.0 (L) 8.8 - 10.2 mg/dL Final   04/08/2013 9.5 8.5 - 10.4 mg/dL Final        UA: No results found for: \"UAMP\", \"UBARB\", \"BENZODIAZEUR\", \"UCANN\", \"UCOC\", \"OPIT\", \"UPCP\"       Please see A&P for additional details of medical decision making.  MANAGEMENT DISCUSSED with the following over the past 24 hours:   -Neurosurgery: discussed pain team signinf off    NOTE(S)/MEDICAL RECORDS REVIEWED over the past 24 hours:  -Neurosurgery: continues to have muscle spasms that are controlled with meds. Wound kept open to air for the next 1-2 days   -Hospitalist  Tests REVIEWED in the past 24 hours:  - BMP  Medical complexity over the past 24 hours:  - Prescription DRUG MANAGEMENT performed      AYSHA Lott-BC  Acute Care Pain Management Program   Hours of pain coverage Mon-Fri [id number], afterhours please call the house officer   [facility] (DEXTER, WILLs, [place], [place])   Page via [employer]- Click HERE to page Ngozi or Lucas text web console -Click for Voccristina            "

## 2024-05-02 NOTE — PROGRESS NOTES
Goal Outcome Evaluation:     Shift Summary [id number]   Admitting Diagnosis: Painful orthopaedic hardware (H24) [T84.84XA]  Spinal stenosis of lumbar region with radiculopathy [M48.061, M54.16]     Vitals WDL, ex tachy at times  Pain 3-4/10. Taking PRN Oxy. Last dose 0349  A&O x4  Voiding Urinal at bedside, voiding adequately   Mobility A of 1 GB/W not OOB overnight  CMS intact ex baseline BLE numbness  GI constipated. Passing gas     Orders Placed This Encounter      Advance Diet as Tolerated: Regular Diet Adult        Plan: TCU

## 2024-05-02 NOTE — PROGRESS NOTES
Care Management Follow Up    Length of Stay (days): 3    Expected Discharge Date: 05/02/2024     Concerns to be Addressed:       Patient plan of care discussed at interdisciplinary rounds: Yes    Anticipated Discharge Disposition: Transitional Care     Anticipated Discharge Services:    Anticipated Discharge DME:      Patient/family educated on Medicare website which has current facility and service quality ratings: yes  Education Provided on the Discharge Plan: Yes  Patient/Family in Agreement with the Plan: yes    Referrals Placed by CM/SW:    Private pay costs discussed: Not applicable    Additional Information:  Per thereapy Reccs, TCU is the plan, although the patient would like to discharge home.     Per consult from CCRN, patient would like to go to [facility] on [address]. Writer sent referral, as the wrong one was sent yesterday.     Writer spoke with charge rn who stated neurosurge wont be discharging him until the weekend.     The patient may improve in the meantime, care management will keep an eye out on therapy notes.         BERNARDO Mclaughlin  [facility]  Social Work

## 2024-05-02 NOTE — PLAN OF CARE
Goal Outcome Evaluation:       Summary: [id number]  Admitting Diagnosis: Painful orthopaedic hardware (H24) [T84.84XA]  Spinal stenosis of lumbar region with radiculopathy [M48.061, M54.16]     Orientation: A&O x4  POD#: Post-operative day #3  Activity Level: assist of 1 and walker   Behavior & Aggression:Green  Pain : C/O 3/10 Pain Managing with tylenol and scheduled medications  VS: Stable  Lab: hyponatremic mild improved from yesterday  Bowel/Bladder: voiding in the BR  Skin: Back incision is CDI and STANTON  Neuros:had baseline numbness to BLE deficit  Plan: Pt worked with OT and was able to ambulate to the BR, pt does not want to go to TCU which is therapy's recommendation at this time, encouraged pt to work with PT to advance to his desired discharge goal. PT will see pt later today    Orders Placed This Encounter      Advance Diet as Tolerated: Regular Diet Adult       Other Important Info:

## 2024-05-03 ENCOUNTER — APPOINTMENT (OUTPATIENT)
Dept: PHYSICAL THERAPY | Facility: CLINIC | Age: 69
DRG: 516 | End: 2024-05-03
Attending: NEUROLOGICAL SURGERY
Payer: MEDICARE

## 2024-05-03 ENCOUNTER — APPOINTMENT (OUTPATIENT)
Dept: OCCUPATIONAL THERAPY | Facility: CLINIC | Age: 69
DRG: 516 | End: 2024-05-03
Attending: NEUROLOGICAL SURGERY
Payer: MEDICARE

## 2024-05-03 LAB
ANION GAP SERPL CALCULATED.3IONS-SCNC: 12 MMOL/L (ref 7–15)
BUN SERPL-MCNC: 13.8 MG/DL (ref 8–23)
CALCIUM SERPL-MCNC: 8.8 MG/DL (ref 8.8–10.2)
CHLORIDE SERPL-SCNC: 95 MMOL/L (ref 98–107)
CREAT SERPL-MCNC: 0.63 MG/DL (ref 0.67–1.17)
DEPRECATED HCO3 PLAS-SCNC: 22 MMOL/L (ref 22–29)
EGFRCR SERPLBLD CKD-EPI 2021: >90 ML/MIN/1.73M2
GLUCOSE SERPL-MCNC: 109 MG/DL (ref 70–99)
POTASSIUM SERPL-SCNC: 4.5 MMOL/L (ref 3.4–5.3)
SODIUM SERPL-SCNC: 127 MMOL/L (ref 135–145)
SODIUM SERPL-SCNC: 129 MMOL/L (ref 135–145)

## 2024-05-03 PROCEDURE — 97116 GAIT TRAINING THERAPY: CPT | Mod: GP | Performed by: PHYSICAL THERAPIST

## 2024-05-03 PROCEDURE — 97535 SELF CARE MNGMENT TRAINING: CPT | Mod: GO

## 2024-05-03 PROCEDURE — 120N000001 HC R&B MED SURG/OB

## 2024-05-03 PROCEDURE — 84295 ASSAY OF SERUM SODIUM: CPT | Performed by: INTERNAL MEDICINE

## 2024-05-03 PROCEDURE — 250N000013 HC RX MED GY IP 250 OP 250 PS 637: Performed by: PHYSICIAN ASSISTANT

## 2024-05-03 PROCEDURE — 99232 SBSQ HOSP IP/OBS MODERATE 35: CPT | Performed by: INTERNAL MEDICINE

## 2024-05-03 PROCEDURE — 36415 COLL VENOUS BLD VENIPUNCTURE: CPT | Performed by: INTERNAL MEDICINE

## 2024-05-03 PROCEDURE — 97530 THERAPEUTIC ACTIVITIES: CPT | Mod: GP | Performed by: PHYSICAL THERAPIST

## 2024-05-03 PROCEDURE — 250N000013 HC RX MED GY IP 250 OP 250 PS 637: Performed by: NURSE PRACTITIONER

## 2024-05-03 PROCEDURE — 250N000013 HC RX MED GY IP 250 OP 250 PS 637

## 2024-05-03 PROCEDURE — 250N000013 HC RX MED GY IP 250 OP 250 PS 637: Performed by: INTERNAL MEDICINE

## 2024-05-03 RX ORDER — OXYCODONE HYDROCHLORIDE 5 MG/1
5 TABLET ORAL EVERY 6 HOURS PRN
Qty: 40 TABLET | Refills: 0 | Status: SHIPPED | OUTPATIENT
Start: 2024-05-03

## 2024-05-03 RX ORDER — HYDROXYZINE HYDROCHLORIDE 10 MG/1
10 TABLET, FILM COATED ORAL EVERY 6 HOURS PRN
Qty: 40 TABLET | Refills: 0 | Status: SHIPPED | OUTPATIENT
Start: 2024-05-03

## 2024-05-03 RX ORDER — AMOXICILLIN 250 MG
1 CAPSULE ORAL 2 TIMES DAILY PRN
Qty: 28 TABLET | Refills: 0 | Status: SHIPPED | OUTPATIENT
Start: 2024-05-03

## 2024-05-03 RX ORDER — ACETAMINOPHEN 325 MG/1
650 TABLET ORAL EVERY 8 HOURS PRN
Qty: 21 TABLET | Refills: 1 | Status: SHIPPED | OUTPATIENT
Start: 2024-05-03

## 2024-05-03 RX ORDER — HYDRALAZINE HYDROCHLORIDE 20 MG/ML
10 INJECTION INTRAMUSCULAR; INTRAVENOUS EVERY 8 HOURS PRN
Status: DISCONTINUED | OUTPATIENT
Start: 2024-05-03 | End: 2024-05-04 | Stop reason: HOSPADM

## 2024-05-03 RX ORDER — LIDOCAINE 4 G/G
2 PATCH TOPICAL EVERY 24 HOURS
Qty: 8 PATCH | Refills: 0 | Status: SHIPPED | OUTPATIENT
Start: 2024-05-03

## 2024-05-03 RX ORDER — LISINOPRIL 40 MG/1
40 TABLET ORAL DAILY
Status: DISCONTINUED | OUTPATIENT
Start: 2024-05-03 | End: 2024-05-04 | Stop reason: HOSPADM

## 2024-05-03 RX ADMIN — METHOCARBAMOL 750 MG: 750 TABLET ORAL at 09:49

## 2024-05-03 RX ADMIN — ACETAMINOPHEN 650 MG: 325 TABLET, FILM COATED ORAL at 00:27

## 2024-05-03 RX ADMIN — METHOCARBAMOL 750 MG: 750 TABLET ORAL at 20:27

## 2024-05-03 RX ADMIN — ACETAMINOPHEN 650 MG: 325 TABLET, FILM COATED ORAL at 15:25

## 2024-05-03 RX ADMIN — ZINC SULFATE 220 MG (50 MG) CAPSULE 220 MG: CAPSULE at 09:49

## 2024-05-03 RX ADMIN — LIDOCAINE 2 PATCH: 4 PATCH TOPICAL at 20:27

## 2024-05-03 RX ADMIN — DICLOFENAC 4 G: 10 GEL TOPICAL at 09:49

## 2024-05-03 RX ADMIN — SENNOSIDES AND DOCUSATE SODIUM 1 TABLET: 50; 8.6 TABLET ORAL at 20:27

## 2024-05-03 RX ADMIN — METHOCARBAMOL 750 MG: 750 TABLET ORAL at 12:14

## 2024-05-03 RX ADMIN — DICLOFENAC 4 G: 10 GEL TOPICAL at 17:12

## 2024-05-03 RX ADMIN — SENNOSIDES AND DOCUSATE SODIUM 1 TABLET: 50; 8.6 TABLET ORAL at 09:49

## 2024-05-03 RX ADMIN — OXYCODONE HYDROCHLORIDE 5 MG: 5 TABLET ORAL at 00:27

## 2024-05-03 RX ADMIN — LISINOPRIL 40 MG: 40 TABLET ORAL at 12:14

## 2024-05-03 RX ADMIN — FERROUS GLUCONATE 324 MG: 324 TABLET ORAL at 09:49

## 2024-05-03 RX ADMIN — OXYCODONE HYDROCHLORIDE 5 MG: 5 TABLET ORAL at 19:08

## 2024-05-03 RX ADMIN — Medication 25 MCG: at 09:49

## 2024-05-03 RX ADMIN — PANTOPRAZOLE SODIUM 40 MG: 40 TABLET, DELAYED RELEASE ORAL at 09:49

## 2024-05-03 RX ADMIN — METHOCARBAMOL 750 MG: 750 TABLET ORAL at 17:36

## 2024-05-03 ASSESSMENT — ACTIVITIES OF DAILY LIVING (ADL)
ADLS_ACUITY_SCORE: 40

## 2024-05-03 NOTE — PROGRESS NOTES
[facility]    Hospitalist Progress Note    Assessment & Plan     Assessment & Plan  Philipp Bland is a 68 year old male with past medical history significant for lumbar stenosis and spondylolisthesis with radiculopathy who has undergone T10-S1 posterior segmental instrumentation with S2 alar iliac screws, screws cement augmentation T10 and T11, L2-S1 bilateral decompression and transforaminal interbody fusion with posterior arthrodesis, T10-S1 in December 2022.  Patient has been following with Dr. Aggarwal.  In the last few months his pain has been worsening and patient has been functionally struggling.  Neurologically he was found to be stable with bilateral foot drops.  Patient was noticed to be unable to stand and his previous neurosurgery appointment on 03/01 to do his a standing scoliosis x-ray.  Dr. Aggarwal ordered thoracic and lumbar CT to evaluate his hardware and fusion.  CT scan of the thoracic and lumbar spine showed a stable haloing around one of the S1 screws and the clay on the left side that is out of the pelvic instrumentation.  However it appears that he has achieved bony fusion across the construct.  Neurosurgery thought that it would be reasonable to consider hardware removal along with a bilateral L5-S1 foraminotomies.  Patient has undergone removal of thoracic 10 pelvis instrumentation, hardware removed in its entirety. Fusion explored and appeared to be solid from T10 to the pelvis.  Hospital medicine has been consulted for medical comanagement and management of his longstanding essential hypertension     Spinal stenosis of lumbar region with radiculopathy:  S/p painful orthopedic hardware removal of thoracic 10 pelvic instrumentation;     --Postoperative management per neurosurgery.  --Neurosurgery is recommending no postop antibiotics, patient has received cefazolin 1 dose preoperatively.  --Pain management consultation has been requested.  --Advance activity as  tolerated.  --Physical therapy has been ordered.  --- Patient has received Tranexamic acid IV preoperatively, will let neurosurgery team review regarding DVT prophylaxis postoperatively  -up with PT, no dizziness. Hb down to 8 range and stable   -per surgery. Doubt retroperitoneal hematoma causing hydro. No intraoperative hypotension   -drain removed  -doing well.  -discharge home tomorrow. Pt declines TCU     ABEL-resolved. prerenal  -baseline 0/7 on 4/25. Was taking ibuprofen 600-800mg every day but last dose 1 week ago  - no intraoperative hypotension  - hydrochlorothiazide and lisinopril preop and on 4/29  -no iv contast  -no other culprit meds  - no bph sxs   -PVR 0 on 4/30  FeNa <1%  - suspect prerenal 2/2 blood loss, hydrochlorothiazide, ace inh, possibly nsaids preop  -sbp 90s   -UA showed wbc and rbc, ++ hyaline castsm, Ucx no growth so far  Doubt uti  Pt treated with 1.5L NS bolus and increased maint fluids  -renal US negative   - improved UO.   -Cr down to 0.6  - bmp am  -restart lisinopril as now hypertensive on 5/3         Essential hypertension:  PTA medications include chlorthalidone 25 mg p.o. daily along with lisinopril 40 mg p.o. daily  Combination of chlorthalidone with ACE inhibitors might be causing hyponatremia, see below  -remains normotensive off PTA meds     Plan;    - stop chlorthalidone  - restart Lisinopril  - bmp with pcp appt next week. Discussed plan with pt and care coordinator. See discharge orders     Mild hyponatremia:  - Most likely secondary to chlorthalidone, possibly ABEL  -asymptomatic. Mild 127-130 post op  -stop chlorthalidone   --outpatient bmp next week  Am bmp       GERD:  -- Continue pantoprazole 40 mg p.o. daily as an alternate to PTA omeprazole       R shoulder weakness  Chronic for years  Encouraged pt to discuss with pcp next week for ortho referral.   Suspect chronic rotator cuff arthropathy  Nl deltoid, biceps, triceps and  strength. No radicular sxs.             "  Clinically Significant Risk Factors Present on Admission[]Expand by Default                  # Hypertension: Noted on problem list      # Obesity: Estimated body mass index is 31.93 kg/m  as calculated from the following:    Height as of this encounter: 1.727 m (5' 8\").    Weight as of this encounter: 95.3 kg (210 lb).             Dispo. Therapy following tcu vs home  Per primary service        Neftali Danielle MD, MD  Text Page  (7am to 6pm)  Interval History   Feeling fine  Na 129. Bp is up. No cp or sob  Wants discharge home tomorrow rather than tcu    -Data reviewed today: I reviewed all new labs and imaging results over the last 24 hours. I personally reviewed labs since admission    Physical Exam   Temp: 98.2  F (36.8  C) Temp src: Oral BP: (!) 162/105 Pulse: 90   Resp: 16 SpO2: 99 % O2 Device: None (Room air)    Vitals:    04/29/24 0611   Weight: 95.3 kg (210 lb)     Vital Signs with Ranges  Temp:  [98.1  F (36.7  C)-98.2  F (36.8  C)] 98.2  F (36.8  C)  Pulse:  [81-98] 90  Resp:  [16-20] 16  BP: (130-162)/() 162/105  SpO2:  [95 %-99 %] 99 %  I/O last 3 completed shifts:  In: 240 [P.O.:240]  Out: 1200 [Urine:1200]    Constitutional: In bed, nad  Respiratory: CTAB  Cardiovascular: RRR no r/g/m  GI: soft, nt, nd  Skin/Integumen: no rash or edema  Ortho; unable to life R shoulder- chronic  Neuro: Nl deltoid, biceps, triceps and  strength.   Neuro; nl speech and mentation  Psych: nl affect         Medications   Current Facility-Administered Medications   Medication Dose Route Frequency Provider Last Rate Last Admin     Current Facility-Administered Medications   Medication Dose Route Frequency Provider Last Rate Last Admin    [Held by provider] chlorthalidone (HYGROTON) tablet 25 mg  25 mg Oral Daily Joann Scott MD   25 mg at 04/29/24 1253    diclofenac (VOLTAREN) 1 % topical gel 4 g  4 g Topical TID Ofe Jaramillo APRN CNP   4 g at 05/03/24 0949    ferrous gluconate (FERGON) " tablet 324 mg  324 mg Oral Daily with breakfast Jeff Lopez PA-C   324 mg at 05/03/24 0949    Lidocaine (LIDOCARE) 4 % Patch 2 patch  2 patch Transdermal Q24h Ofe Jaramillo, DAWIT CNP   2 patch at 05/02/24 1945    lisinopril (ZESTRIL) tablet 40 mg  40 mg Oral Daily Neftali Danielle MD        methocarbamol (ROBAXIN) tablet 750 mg  750 mg Oral 4x Daily Jeff Lopez PA-C   750 mg at 05/03/24 0949    pantoprazole (PROTONIX) EC tablet 40 mg  40 mg Oral Daily Jeff Lopez PA-C   40 mg at 05/03/24 0949    polyethylene glycol (MIRALAX) Packet 17 g  17 g Oral Daily Jeff Lopez PA-C   17 g at 05/02/24 0914    senna-docusate (SENOKOT-S/PERICOLACE) 8.6-50 MG per tablet 1 tablet  1 tablet Oral BID Jeff Lopez PA-C   1 tablet at 05/03/24 0949    sodium chloride (PF) 0.9% PF flush 3 mL  3 mL Intracatheter Q8H Jeff Lopez PA-C   3 mL at 05/02/24 1947    Vitamin D3 (CHOLECALCIFEROL) tablet 25 mcg  25 mcg Oral Daily Jeff Lopez PA-C   25 mcg at 05/03/24 0949    zinc sulfate (ZINCATE) capsule 220 mg  220 mg Oral Daily Jeff Lopez PA-C   220 mg at 05/03/24 0949       Data   Recent Labs   Lab 05/03/24  0729 05/02/24  0739 05/01/24  0631 04/30/24  2254 04/30/24  1516 04/30/24  0840 04/29/24  0610 04/29/24  0610   WBC  --   --   --   --   --   --   --  6.4   HGB  --   --   --   --  8.9* 8.9*  --  13.9   MCV  --   --   --   --   --   --   --  95   PLT  --   --   --   --   --   --   --  255   INR  --   --   --   --   --   --   --  0.96   *  127* 130* 128*  --  131* 131*  --  130*   POTASSIUM 4.5 4.8 4.6   < > 4.8 4.9  --  4.5   CHLORIDE 95* 96* 96*  --  95* 94*   < >  --    CO2 22 23 26  --  22 23   < >  --    BUN 13.8 18.5 30.5*  --  37.9* 35.7*   < >  --    CR 0.63* 0.66* 1.11  --  2.03* 2.08*   < >  --    ANIONGAP 12 11 6*  --  14 14   < >  --    SHELBY 8.8 8.6* 8.0*  --  8.0* 8.0*   < >  --    *  107* 113*  --  119* 119*  --  113*    < > = values in this interval not displayed.       Imaging:   No results found for this or any previous visit (from the past 24 hour(s)).

## 2024-05-03 NOTE — PLAN OF CARE
Goal Outcome Evaluation:       Summary : 05/02/24. [id number]  Patient is alert and oriented X4. VSS exp slightly elevated on RA with good sat. Up with assist 1 with walker and gait belt, up in chair this shift . On regular diet with good appetite. PIV saline locked. Patient reports pain, prn oxycodone, tylenol, schedule robaxin and tylenol was given with effective results.  Sodium lab value 130, MD aware.Sodium lab recheck tomorrow morning.  Neuro intact exp bilateral foot drops . CMS intact exp numbness BLE . Surgical incision intact and  open to air with no dressing. Continent of B&B,voided in bathroom at times or use urinal. Discharge pending, Per  note Patient would like to discharge home, per PT evaluation recommend TCU.

## 2024-05-03 NOTE — PLAN OF CARE
Goal Outcome Evaluation:       Pt alert and oriented x4. Able to make needs known. Denied SOB, CP, N/V. Pt has some baseline BLLE numbness and mild foot drop. A1 GB/walker. At [facility] pt linin had moderate drainage from surgical site. Area cleaned and covered. Pt to discharge tomorrow.       Patient vital signs are at baseline: Yes  Patient able to ambulate as they were prior to admission or with assist devices provided by therapies during their stay:  Yes  Patient MUST void prior to discharge:  Yes  Patient able to tolerate oral intake:  Yes  Pain has adequate pain control using Oral analgesics:  Yes  Does patient have an identified :  Yes  Has goal D/C date and time been discussed with patient:  Yes

## 2024-05-03 NOTE — PROGRESS NOTES
Care Management Follow Up    Length of Stay (days): 4    Expected Discharge Date: 05/05/2024     Concerns to be Addressed:       Patient plan of care discussed at interdisciplinary rounds: No    Anticipated Discharge Disposition: Transitional Care     Anticipated Discharge Services:    Anticipated Discharge DME:      Patient/family educated on Medicare website which has current facility and service quality ratings: yes  Education Provided on the Discharge Plan: Yes  Patient/Family in Agreement with the Plan: yes    Referrals Placed by CM/SW:    Private pay costs discussed: Not applicable    Additional Information:  Discharge plans inprogress.  Per Dr Danielle, Philipp will likely discharge to home on Saturday May 4th.  Patient does not want to go to TCU.  Dr. Danielle asked writer to set up a PCP appt with BMP lab draw, for next week.  Writer scheduled an Appt make for May 9th. & called Philipp on his mobile number to let him know.  Patient verbalized understanding.  Appointment info is on AVJOSÉ MIGUEL Cantor RN

## 2024-05-03 NOTE — PROGRESS NOTES
"Neurosurgery Progress     Dx:     POD #4 s/p removal or T10 to pelvis instrumentation.     Neuro stable.     TODAY'S PLAN:     -Anticipate discharge to home with family tomorrow, 05/04/2024.  -Discharge navigator and RX completed. Needs summary / order.   -Advance activity as tolerated.  -Continue supportive and symptomatic treatment.  -Continue physical therapy.  -Pain control measures.  -Advance diet as tolerated.  -Routine wound care.     In the event that patient's symptoms worsen or change we would appreciate being contacted. We did discuss signs of a worsening problem that he should seek being evaluated.     We did review the above information with the patient whom agrees with the plan and did verbalize understanding.   ________________________________________________________________     Mr. Bland overall feels well and denies any significant discomfort. Tolerating regular diet without n/v. Up ambulating. Voiding without difficulty.     BP (!) 162/105 (BP Location: Right arm)   Pulse 90   Temp 98.2  F (36.8  C) (Oral)   Resp 16   Ht 1.727 m (5' 8\")   Wt 95.3 kg (210 lb)   SpO2 99%   BMI 31.93 kg/m       Patient examined in room 2417 with his nurse present. He appears comfortable and in no apparent distress. He is moving all of his extremities well.   CN II-XII intact, alert and appropriate with conversation. Follows all commands.   Bilateral upper and lower extremities with appropriate strength. Deep tendon reflexes within normal limits throughout. Sensation is also intact throughout. He does have an acceptable post-operative range of motion.   His lumbar incision is absent for concerning edema, erythema or drainage.   Bandage is clean, dry and intact.   Calves soft and non-tender.     All pertinent labs and updated imaging reviewed in EPIC.     Rudy Lopez PA-C   Neurosurgery         "

## 2024-05-04 ENCOUNTER — APPOINTMENT (OUTPATIENT)
Dept: PHYSICAL THERAPY | Facility: CLINIC | Age: 69
DRG: 516 | End: 2024-05-04
Attending: NEUROLOGICAL SURGERY
Payer: MEDICARE

## 2024-05-04 VITALS
OXYGEN SATURATION: 100 % | HEIGHT: 68 IN | WEIGHT: 210 LBS | BODY MASS INDEX: 31.83 KG/M2 | DIASTOLIC BLOOD PRESSURE: 95 MMHG | HEART RATE: 90 BPM | RESPIRATION RATE: 16 BRPM | TEMPERATURE: 97.9 F | SYSTOLIC BLOOD PRESSURE: 144 MMHG

## 2024-05-04 LAB
ANION GAP SERPL CALCULATED.3IONS-SCNC: 13 MMOL/L (ref 7–15)
BUN SERPL-MCNC: 14.1 MG/DL (ref 8–23)
CALCIUM SERPL-MCNC: 9.2 MG/DL (ref 8.8–10.2)
CHLORIDE SERPL-SCNC: 94 MMOL/L (ref 98–107)
CREAT SERPL-MCNC: 0.58 MG/DL (ref 0.67–1.17)
DEPRECATED HCO3 PLAS-SCNC: 21 MMOL/L (ref 22–29)
EGFRCR SERPLBLD CKD-EPI 2021: >90 ML/MIN/1.73M2
GLUCOSE SERPL-MCNC: 125 MG/DL (ref 70–99)
POTASSIUM SERPL-SCNC: 4.5 MMOL/L (ref 3.4–5.3)
SODIUM SERPL-SCNC: 128 MMOL/L (ref 135–145)

## 2024-05-04 PROCEDURE — 80048 BASIC METABOLIC PNL TOTAL CA: CPT | Performed by: INTERNAL MEDICINE

## 2024-05-04 PROCEDURE — 36415 COLL VENOUS BLD VENIPUNCTURE: CPT | Performed by: INTERNAL MEDICINE

## 2024-05-04 PROCEDURE — 97530 THERAPEUTIC ACTIVITIES: CPT | Mod: GP | Performed by: PHYSICAL THERAPIST

## 2024-05-04 PROCEDURE — 250N000013 HC RX MED GY IP 250 OP 250 PS 637

## 2024-05-04 PROCEDURE — 99232 SBSQ HOSP IP/OBS MODERATE 35: CPT | Performed by: HOSPITALIST

## 2024-05-04 PROCEDURE — 97116 GAIT TRAINING THERAPY: CPT | Mod: GP | Performed by: PHYSICAL THERAPIST

## 2024-05-04 PROCEDURE — 250N000013 HC RX MED GY IP 250 OP 250 PS 637: Performed by: INTERNAL MEDICINE

## 2024-05-04 PROCEDURE — 250N000013 HC RX MED GY IP 250 OP 250 PS 637: Performed by: PHYSICIAN ASSISTANT

## 2024-05-04 RX ADMIN — Medication 25 MCG: at 08:41

## 2024-05-04 RX ADMIN — LISINOPRIL 40 MG: 40 TABLET ORAL at 08:41

## 2024-05-04 RX ADMIN — ZINC SULFATE 220 MG (50 MG) CAPSULE 220 MG: CAPSULE at 08:41

## 2024-05-04 RX ADMIN — PANTOPRAZOLE SODIUM 40 MG: 40 TABLET, DELAYED RELEASE ORAL at 08:40

## 2024-05-04 RX ADMIN — METHOCARBAMOL 750 MG: 750 TABLET ORAL at 08:40

## 2024-05-04 RX ADMIN — OXYCODONE HYDROCHLORIDE 5 MG: 5 TABLET ORAL at 12:27

## 2024-05-04 RX ADMIN — METHOCARBAMOL 750 MG: 750 TABLET ORAL at 13:16

## 2024-05-04 RX ADMIN — FERROUS GLUCONATE 324 MG: 324 TABLET ORAL at 08:41

## 2024-05-04 ASSESSMENT — ACTIVITIES OF DAILY LIVING (ADL)
ADLS_ACUITY_SCORE: 40
ADLS_ACUITY_SCORE: 41
ADLS_ACUITY_SCORE: 40
ADLS_ACUITY_SCORE: 40

## 2024-05-04 NOTE — DISCHARGE SUMMARY
DISCHARGE SUMMARY   Patient ID:   Philipp Bland   [id number]   68 year old   1955     Admit date: 4/29/2024     Discharge date: 05/04/2024    Admission Diagnoses: Painful orthopaedic hardware (H24) [T84.84XA]  Spinal stenosis of lumbar region with radiculopathy [M48.061, M54.16]     Procedure:     Removal Thoracic 10 Pelvis instrumentation     Post op Diagnosis:     Painful orthopaedic hardware (H24) [T84.84XA]  Spinal stenosis of lumbar region with radiculopathy [M48.061, M54.16]     Surgeon: Dr. Aggarwal    Exam:   Pt in bed. He appears comfortable and in no apparent distress, moving all extremities.   CN II-XII intact, alert and appropriate with conversation and following commands.   Bilateral upper and lower extremities with appropriate strength. DTR's WNL. Spine is non tender to palpation throughout. Hogan's and Babinski sign neg. Sensation intact throughout. Acceptable ROM.   Calves soft and non-tender bilaterally.   Back dressing changed. Incision intact. Absent for edema, erythema or drainage.   He is ambulating with a steady gait and without assistive devices. Tolerating regular diet without nausea or vomiting. Pain managed with oral medication. Voiding without difficulty. He is on room air with O2 sats greater than 90%.     Disposition: Home     Philipp Bland verbalizes understanding and is in agreement with discharge.     Done while pt still in hospital bed      Review of your medicines        START taking        Dose / Directions   hydrOXYzine HCl 10 MG tablet  Commonly known as: ATARAX      Dose: 10 mg  Take 1 tablet (10 mg) by mouth every 6 hours as needed for itching or other (pain)  Quantity: 40 tablet  Refills: 0     Lidocaine 4 % Patch  Commonly known as: LIDOCARE      Dose: 2 patch  Place 2 patches onto the skin every 24 hours To prevent lidocaine toxicity, patient should be patch free for 12 hrs daily.  Quantity: 8 patch  Refills: 0     oxyCODONE 5 MG tablet  Commonly known as: ROXICODONE       Dose: 5 mg  Take 1 tablet (5 mg) by mouth every 6 hours as needed for moderate pain  Quantity: 40 tablet  Refills: 0     senna-docusate 8.6-50 MG tablet  Commonly known as: SENOKOT-S/PERICOLACE      Dose: 1 tablet  Take 1 tablet by mouth 2 times daily as needed for constipation  Quantity: 28 tablet  Refills: 0            CONTINUE these medicines which may have CHANGED, or have new prescriptions. If we are uncertain of the size of tablets/capsules you have at home, strength may be listed as something that might have changed.        Dose / Directions   acetaminophen 325 MG tablet  Commonly known as: TYLENOL  This may have changed:   medication strength  how much to take  when to take this  reasons to take this      Dose: 650 mg  Take 2 tablets (650 mg) by mouth every 8 hours as needed for other (For optimal non-opioid multimodal pain management to improve pain control.)  Quantity: 21 tablet  Refills: 1            CONTINUE these medicines which have NOT CHANGED        Dose / Directions   ferrous gluconate 324 (38 Fe) MG tablet  Commonly known as: FERGON  Used for: Anemia, unspecified type      Dose: 324 mg  Take 1 tablet (324 mg) by mouth daily (with breakfast)  Quantity: 90 tablet  Refills: 1     lisinopril 40 MG tablet  Commonly known as: ZESTRIL  Used for: Benign essential hypertension      Dose: 40 mg  Take 1 tablet (40 mg) by mouth daily  Quantity: 90 tablet  Refills: 1     methocarbamol 750 MG tablet  Commonly known as: ROBAXIN  Used for: S/P spinal fusion      Dose: 750 mg  Take 1 tablet (750 mg) by mouth 4 times daily as needed for muscle spasms  Quantity: 60 tablet  Refills: 2     omeprazole 20 MG DR capsule  Commonly known as: PriLOSEC      Dose: 20 mg  Take 20 mg by mouth daily  Refills: 0     Vitamin D3 25 mcg (1000 units) tablet  Commonly known as: CHOLECALCIFEROL  Used for: Vitamin D deficiency      Dose: 25 mcg  Take 1 tablet (25 mcg) by mouth daily  Quantity: 30 tablet  Refills: 0     zinc sulfate  220 (50 Zn) MG capsule  Commonly known as: ZINCATE  Used for: Zinc deficiency      Dose: 220 mg  Take 1 capsule (220 mg) by mouth daily  Quantity: 30 capsule  Refills: 0            STOP taking      chlorhexidine 4 % solution  Commonly known as: HIBICLENS        chlorthalidone 25 MG tablet  Commonly known as: HYGROTON        ibuprofen 200 MG tablet  Commonly known as: ADVIL/MOTRIN                  Where to get your medicines        These medications were sent to [facility] [place] - [place] - [address]  [address], [place] [address]      Phone: [phone]   acetaminophen 325 MG tablet  hydrOXYzine HCl 10 MG tablet  Lidocaine 4 % Patch  oxyCODONE 5 MG tablet  senna-docusate 8.6-50 MG tablet          Discharge Procedure Orders   Reason for your hospital stay   Order Comments: Painful orthopaedic hardware (H24) [T84.84XA]  Spinal stenosis of lumbar region with radiculopathy [M48.061, M54.16]     Removal Thoracic 10 Pelvis instrumentation.     Activity   Order Comments: Please limit your lifting to no more that ten pounds and avoid excessive bending, twisting and turning at the lumbar spine. You should also avoid excessive jostling and jarring activities.     Order Specific Question Answer Comments   Is discharge order? Yes      Wound care and dressings   Order Comments: Instructions to care for your wound at home: ice to area for comfort, keep wound clean and dry, and may get incision wet in shower but do not soak or scrub. May keep incision open to air.     Follow-up and recommended labs and tests    Order Comments: -Your Neurosurgical follow-up appointments have been scheduled. You may call [phone] to make, confirm or change your appointment dates and/or times.  -follow up with primary care doctor next week with BMP lab test to check your kidney function and sodium level. Discuss evaluation for weak shoulder as you probably have a chronic right shoulder rotator cuff  disease that would benefit from physical therapy and orthopeadic evaluation     Patient care order   Order Comments: Avoid ibuprofen as can raise blood pressure and injury kidney. Use rarely  For now stop your Chlorthalidone blood pressure medication as it is lowering your sodium level. Sodium was 127-132 in hospital. You had acute kidney injury in hospital- likely started before surgery that resolved with IV fluids and holding your Lisinopril. We restarted your Lisinopril     Diet   Order Comments: Follow this diet upon discharge: Advance to a regular diet as tolerated.     Order Specific Question Answer Comments   Is discharge order? Yes           Respectfully,   RICHMOND Martinez, PA-C

## 2024-05-04 NOTE — PLAN OF CARE
Physical Therapy Discharge Summary    Reason for therapy discharge:    Discharged to home with home therapy.    Progress towards therapy goal(s). See goals on Care Plan in [facility] electronic health record for goal details.  Goals partially met.  Barriers to achieving goals:   discharge from facility.    Therapy recommendation(s):    Continued therapy is recommended.  Rationale/Recommendations:  Patient has a system of sitting abruptly d/t fatigue by moving 4WW from in front of himself to behind. Does not set breaks unless instructed. Fall risk noted with this. Patient is adamant that he will be returning home; declining TCU. Feel TCU would benefit patient to increase strength, endurance (gets very SOB), and skills with mobility. Patient aware of this recommendation and flatly refusing. Has 4 sons, lives with one of them, and they will take turns assisting though pt. acknowledges he will be alone some of the time. Reports that in some ways he feels he is moving better than prior to surgery. Recommend if patient does D/C to home, he receive home PT to increase the already mentioned recommendations..

## 2024-05-04 NOTE — PLAN OF CARE
Goal Outcome Evaluation:       Summary: [id number]  Admitting Diagnosis: Painful orthopaedic hardware (H24) [T84.84XA]  Spinal stenosis of lumbar region with radiculopathy [M48.061, M54.16]      Orientation: A&O x4  POD#: Post-operative day #5  Activity Level: assist of 1 and walker   Behavior & Aggression:Green  Pain : C/O 5/10 Pain Managing with oxy and scheduled medications  VS: Stable  Lab: hyponatremic  Bowel/Bladder: voiding using urinal  Skin: Back incision is CDI  Neuros:had baseline numbness to BLE deficit  Plan: discharge tomorrow to home     Orders Placed This Encounter      Advance Diet as Tolerated: Regular Diet Adult      Discharge today to home at 1320, ride provided by son. Discharge instructions and medications given. All questions answered pt verbalized understanding. Pt reported having all belongings.

## 2024-05-04 NOTE — PROGRESS NOTES
Patient vital signs are at baseline: Yes  Patient able to ambulate as they were prior to admission or with assist devices provided by therapies during their stay:  Yes  Patient MUST void prior to discharge:  Yes  Patient able to tolerate oral intake:  Yes  Pain has adequate pain control using Oral analgesics:  Yes  Does patient have an identified :  Yes  Has goal D/C date and time been discussed with patient:  Yes    A&O x4. VSS on RA. Denies chest pain, SOB or Nausea. Up with 1 with gait belt and walker. Rates pain  2-3 /10. Baseline Numbness and mild foot drop of BLE. Back spinal dressing is CDI. Pending discharge today.

## 2024-05-04 NOTE — PROGRESS NOTES
[facility]    Medicine Progress Note - Hospitalist Service    Date of Admission:  4/29/2024    Assessment & Plan   Assessment & Plan  Philipp lBand is a 68 year old male with past medical history significant for lumbar stenosis and spondylolisthesis with radiculopathy who has undergone T10-S1 posterior segmental instrumentation with S2 alar iliac screws, screws cement augmentation T10 and T11, L2-S1 bilateral decompression and transforaminal interbody fusion with posterior arthrodesis, T10-S1 in December 2022.  Patient has been following with Dr. Aggarwal.  In the last few months his pain has been worsening and patient has been functionally struggling.  Neurologically he was found to be stable with bilateral foot drops.  Patient was noticed to be unable to stand and his previous neurosurgery appointment on 03/01 to do his a standing scoliosis x-ray.  Dr. Aggarwal ordered thoracic and lumbar CT to evaluate his hardware and fusion.  CT scan of the thoracic and lumbar spine showed a stable haloing around one of the S1 screws and the clay on the left side that is out of the pelvic instrumentation.  However it appears that he has achieved bony fusion across the construct.  Neurosurgery thought that it would be reasonable to consider hardware removal along with a bilateral L5-S1 foraminotomies.  Patient has undergone removal of thoracic 10 pelvis instrumentation, hardware removed in its entirety. Fusion explored and appeared to be solid from T10 to the pelvis.  Hospital medicine was consulted for medical comanagement and management of his longstanding essential hypertension     Spinal stenosis of lumbar region with radiculopathy  S/p painful orthopedic hardware removal of thoracic 10 pelvic instrumentation on 4/29/24   - Postoperative management per neurosurgery.  - Neurosurgery is recommending no postop antibiotics, patient received cefazolin 1 dose preoperatively.  - Pain management team was  consulted.  - Advance activity as tolerated.  - Physical therapy has been ordered. Initially plan for TCU, however patient improving and he is declining TCU, now planning discharge home.  - Up with PT, no dizziness. Hemoglobin down to 8.9 and stable. Per surgery, doubt retroperitoneal hematoma causing hydro, no intraoperative hypotension.  - Drain removed.  - Doing well, plan for discharge home today.    Acute kidney injury, resolved  Baseline creatinine was 0.7 on 4/25/24. Was taking ibuprofen 600-800mg every day but last dose 1 week ago. No intraoperative hypotension.  Hydrochlorothiazide and lisinopril preop and on 4/29. No iv contast. No other culprit meds. No bph symptoms. PVR 0 on 4/30. FeNa <1%  - Suspect prerenal 2/2 blood loss, hydrochlorothiazide, ace inhibitor, possibly nsaids preop.  - UA showed wbc and rbc, ++ hyaline castsm, Ucx no growth so far  Doubt UTI.  - Renal US negative.  - Creatinine returned to baseline with IV fluids.  - PTA lisinopril restarted on 5/3/24. Hold chlorthalidone for now as noted below.  - Recheck BMP at follow up with PCP in one week.     Essential hypertension  PTA medications include chlorthalidone 25 mg p.o. daily along with lisinopril 40 mg p.o. daily.  Combination of chlorthalidone with ACE inhibitors might be causing hyponatremia, see below.  - PTA lisinopril restarted on on 5/3/24.  - Hold PTA chlorthalidone for now until follow up with PCP.  - Monitor blood pressure at home and bring a record with to you follow up appointment.    Mild hyponatremia  Most likely secondary to chlorthalidone, possibly ABEL. Asymptomatic. Mild 127-130 post op. Sodium has been a little low since 2022.  - PTA chlorthalidone discontinued for now.  - Follow up with PCP next with with BMP.     GERD  - Continue PTA omeprazole.    Right shoulder weakness  Chronic for years.  Suspect chronic rotator cuff arthropathy. Normal deltoid, biceps, triceps and  strength. No radicular symptoms.  -  "Encouraged pt to discuss with PCP next week for ortho referral.           Diet: Advance Diet as Tolerated: Regular Diet Adult  Diet    DVT Prophylaxis: Defer to primary service  Acosta Catheter: Not present  Lines: None     Cardiac Monitoring: None  Code Status: Full Code      Clinically Significant Risk Factors         # Hyponatremia: Lowest Na = 127 mmol/L in last 2 days, will monitor as appropriate          # Hypertension: Noted on problem list        # Obesity: Estimated body mass index is 31.93 kg/m  as calculated from the following:    Height as of this encounter: 1.727 m (5' 8\").    Weight as of this encounter: 95.3 kg (210 lb).             Disposition Plan     Medically Ready for Discharge: Ready Now             Davian Ortega MD  Hospitalist Service  [facility]  Securely message with [employer] (more info)  Text page via [employer] Paging/Directory   ______________________________________________________________________    Interval History   Philipp Bland was seen this morning. He feels pretty good. Looking forward to discharge home today. Nimisha controlled. Denies fevers, chest pain, shortness of breath, nausea, abdominal pain.    Physical Exam   Vital Signs: Temp: 97.9  F (36.6  C) Temp src: Oral BP: (!) 144/95 Pulse: 90   Resp: 16 SpO2: 100 % O2 Device: None (Room air)    Weight: 210 lbs 0 oz    Constitutional: awake, alert, cooperative, no apparent distress, laying in the hospital bed  Respiratory: no increased work of breathing, clear to auscultation bilaterally, no crackles or wheezing  Cardiovascular: regular rate and rhythm, normal S1 and S2, no murmur noted  GI: normal bowel sounds, soft, non-distended, non-tender    Medical Decision Making       30 MINUTES SPENT BY ME on the date of service doing chart review, history, exam, documentation & further activities per the note.      Data     I have personally reviewed the following data over the past 24 hrs:    N/A  \   N/A   / N/A "     128 (L) 94 (L) 14.1 /  125 (H)   4.5 21 (L) 0.58 (L) \

## 2024-05-04 NOTE — PLAN OF CARE
Occupational Therapy Discharge Summary    Reason for therapy discharge:    Discharged to home with home therapy.    Progress towards therapy goal(s). See goals on Care Plan in [facility] electronic health record for goal details.  Goals partially met.  Barriers to achieving goals:   discharge from facility.    Therapy recommendation(s):    Continued therapy is recommended.  Rationale/Recommendations:  Pt is currently limited w/ therapy d/t pain and decreased activity tolerance. Therapies have been recommending TCU, however, pt continuously adamant about returning home and beleives he is at his baseline. This date, son who will be assisting was present and will spend night with pt to ensure pt is safe on his own. Son also does not think pt will need TCU, but is aware of pt's limitations. If pt does return home, then rec HH OT for safety completing ADLs and assist from sons for mobility, showers, and household activities..

## 2024-05-04 NOTE — PLAN OF CARE
Goal Outcome Evaluation:          Summary: [id number]  Admitting Diagnosis: Painful orthopaedic hardware (H24) [T84.84XA]  Spinal stenosis of lumbar region with radiculopathy [M48.061, M54.16]      Orientation: A&O x4  POD#: Post-operative day #4  Activity Level: assist of 1 and walker   Behavior & Aggression:Green  Pain : C/O 3/10 Pain Managing with oxy and scheduled medications  VS: Stable  Lab: hyponatremic mild down trend  Bowel/Bladder: voiding using urinal  Skin: Back incision is CDI changed 1x for increased drainage  Neuros:had baseline numbness to BLE deficit  Plan: discharge tomorrow to home     Orders Placed This Encounter      Advance Diet as Tolerated: Regular Diet Adult        Other Important Info:

## 2024-05-06 ENCOUNTER — PATIENT OUTREACH (OUTPATIENT)
Dept: CARE COORDINATION | Facility: CLINIC | Age: 69
End: 2024-05-06
Payer: MEDICARE

## 2024-05-06 NOTE — PROGRESS NOTES
Clinic Care Coordination Contact  [facility]: Post-Discharge Note  SITUATION                                                      Admission:    Admission Date: 04/29/24   Reason for Admission: Admission Diagnoses: Painful orthopaedic hardware (H24) (T84.84XA)  Spinal stenosis of lumbar region with radiculopathy (M48.061, M54.16)      Procedure:      Removal Thoracic 10 Pelvis instrumentation  Discharge:   Discharge Date: 05/04/24  Discharge Diagnosis: Admission Diagnoses: Painful orthopaedic hardware (H24) (T84.84XA)  Spinal stenosis of lumbar region with radiculopathy (M48.061, M54.16)      Procedure:      Removal Thoracic 10 Pelvis instrumentation    BACKGROUND                                                      Per hospital discharge summary and inpatient provider notes:    Philipp Bland is a 68 year old male with past medical history significant for lumbar stenosis and spondylolisthesis with radiculopathy who has undergone T10-S1 posterior segmental instrumentation with S2 alar iliac screws, screws cement augmentation T10 and T11, L2-S1 bilateral decompression and transforaminal interbody fusion with posterior arthrodesis, T10-S1 in December 2022.     ASSESSMENT           Discharge Assessment  How are you doing now that you are home?: feeling better  How are your symptoms? (Red Flag symptoms escalate to triage hotline per guidelines): Improved  Do you feel your condition is stable enough to be safe at home until your provider visit?: Yes  Does the patient have their discharge instructions? : Yes  Does the patient have questions regarding their discharge instructions? : No  Were you started on any new medications or were there changes to any of your previous medications? : Yes  Does the patient have all of their medications?: Yes  Do you have questions regarding any of your medications? : No  Do you have all of your needed medical supplies or equipment (DME)?  (i.e. oxygen tank, CPAP, cane, etc.):  Yes  Discharge follow-up appointment scheduled within 14 calendar days? : Yes  Discharge Follow Up Appointment Date: 05/08/24  Discharge Follow Up Appointment Scheduled with?: Specialty Care Provider    Post-op (W CTA Only)  If the patient had a surgery or procedure, do they have any questions for a nurse?: No         PLAN                                                      Outpatient Plan:      Follow-up and recommended labs and tests    Order Comments: -Your Neurosurgical follow-up appointments have been scheduled. You may call [phone] to make, confirm or change your appointment dates and/or times.  -follow up with primary care doctor next week with BMP lab test to check your kidney function and sodium level. Discuss evaluation for weak shoulder as you probably have a chronic right shoulder rotator cuff disease that would benefit from physical therapy and orthopeadic evaluation       Future Appointments   Date Time Provider Department Center   5/8/2024  9:00 AM Nurse,  Spine/Brain [facility]   6/12/2024  1:30 PM Alfredito Box CNP [facility]   7/30/2024  1:40 PM Eliseo Aggarwal MD [facility]     For any urgent concerns, please contact our 24 hour nurse triage line: [phone]     LARRY Luo  [phone]  [facility]

## 2024-05-06 NOTE — PROGRESS NOTES
[facility]  Neurosurgery Daily Progress Note    Assessment & Plan   Procedure(s):  Thoracic 10 to Sacral 1 posterior segmental instrumentation. Pelvic instrumentation. Screw cement augmentation L10 to L11. Lumbar 2 to Sacral 1 bilateral decompression and transforaminal interbody fusion. Posterior arthrodesis Thoracic 10-Sacral 1.   -6 Days Post-Op  Patient reports back pain has improved. Denies leg pain. Continued left > right DF weakness. Incision CDI.     Plan:  - Continue supportive and symptomatic treatment  - PT/OT  - Orthotics referral for bilateral AFO  - Routine wound care   - Continue pain control measures as needed   - Discharge pending placement    - Follow up with [facility] clinic as scheduled    - Appreciate assistance from specialties        Discussed with Dr. Jasen Gayle, CNP  [facility] Neurosurgery  [facility]  [address] [address]  Tel [phone]  Pager [phone]    Interval History   Stable     Physical Exam   Temp: 98.1  F (36.7  C) Temp src: Oral BP: (!) 163/94 Pulse: 103   Resp: 16 SpO2: 98 % O2 Device: None (Room air)    Vitals:    12/15/22 0654   Weight: 90.7 kg (200 lb)     Vital Signs with Ranges  Temp:  [98.1  F (36.7  C)-100.2  F (37.9  C)] 98.1  F (36.7  C)  Pulse:  [] 103  Resp:  [15-21] 16  BP: (125-163)/(74-94) 163/94  SpO2:  [96 %-98 %] 98 %  I/O last 3 completed shifts:  In: -   Out: 1200 [Urine:1200]    Mental status:  Alert and oriented x 3, speech is fluent.  Motor:  Moves all extremities, left > right DF weakness   Sensation:  Intact to light touch   Incision: CDI     Medications     sodium chloride Stopped (12/17/22 0317)        chlorthalidone  25 mg Oral Daily     ferrous gluconate  324 mg Oral Daily with breakfast     lisinopril  40 mg Oral Daily     pantoprazole  40 mg Oral Daily     polyethylene glycol  17 g Oral Daily     senna-docusate  1 tablet Oral BID     sodium chloride  Lvm to call the office   (PF)  3 mL Intracatheter Q8H     zinc sulfate  220 mg Oral Daily       Aaliyah Gayle CNP  [facility] Neurosurgery   [address]  [place] [address]  Tel [phone]  Pager [phone]

## 2024-06-17 ENCOUNTER — TELEPHONE (OUTPATIENT)
Dept: NEUROSURGERY | Facility: CLINIC | Age: 69
End: 2024-06-17
Payer: MEDICARE

## 2024-06-17 NOTE — TELEPHONE ENCOUNTER
Message left for patient to reschedule July 30- offered patient to come a week early or can reschedule to the [place] location.

## 2024-06-22 ENCOUNTER — HEALTH MAINTENANCE LETTER (OUTPATIENT)
Age: 69
End: 2024-06-22

## 2024-06-24 ENCOUNTER — TELEPHONE (OUTPATIENT)
Dept: NEUROSURGERY | Facility: CLINIC | Age: 69
End: 2024-06-24
Payer: MEDICARE

## 2024-06-24 NOTE — TELEPHONE ENCOUNTER
Message left for patient that a phone visit has been approved when rescheduling the appointment on July 30. Dr. Aggarwal has time available on August 2nd for appointment to be rescheduled.    Patient advised to call clinic and reschedule to a date/time that works for patient- as a phone visit.

## 2024-06-24 NOTE — TELEPHONE ENCOUNTER
Patient was called to reschedule July 30 appointment as provider will be in surgery that entire day.    Patient lives out of the area and is requesting a phone visit since no imaging has been required for this visit.  Patient would like to take an earlier appointment in [place] if the virtual visit has been approved.

## 2024-08-02 ENCOUNTER — VIRTUAL VISIT (OUTPATIENT)
Dept: NEUROSURGERY | Facility: CLINIC | Age: 69
End: 2024-08-02
Payer: MEDICARE

## 2024-08-02 DIAGNOSIS — Z98.1 S/P SPINAL FUSION: Primary | ICD-10-CM

## 2024-08-02 PROCEDURE — 99441 PR PHYSICIAN TELEPHONE EVALUATION 5-10 MIN: CPT | Mod: 93 | Performed by: NEUROLOGICAL SURGERY

## 2024-08-02 ASSESSMENT — PAIN SCALES - GENERAL: PAINLEVEL: MILD PAIN (3)

## 2024-08-02 NOTE — PROGRESS NOTES
"It was a pleasure to see Philipp Bland today in [facility]. He is a 68 year old male who underwent:    April 29, 2024     [facility]   Operative Note     Pre-operative diagnosis: Painful orthopaedic hardware (H24) [T84.84XA]  Spinal stenosis of lumbar region with radiculopathy [M48.061, M54.16]   Post-operative diagnosis Same   Procedure: Procedure(s):  Removal Thoracic 10 Pelvis instrumentation    Surgeon(s): Surgeons and Role:     * Eliseo Aggarwal MD - Primary     * Jeff Lopez PA-C - Assisting   Estimated blood loss: 300 mL         Specimens: * No specimens in log *   Findings: Hardware removed in its entirety.  Fusion explored and appeared to be solid from T10 to the pelvis.     Overall he feels that there has been improvement since the hardware removal.  However overall this surgical process has not generally benefited him.  Phone visit    There were no vitals filed for this visit.  Estimated body mass index is 31.93 kg/m  as calculated from the following:    Height as of 4/29/24: 1.727 m (5' 8\").    Weight as of 4/29/24: 95.3 kg (210 lb).  Mild Pain (3)    Phone visit    Imaging: No new imaging    Assessment: Status post thoracolumbar hardware removal.    Plan: At this point I think he can follow-up on an as-needed basis.     This phone visit took 5 minutes.  MD Location: [facility]  Patient Location: [place]   "

## 2024-08-02 NOTE — NURSING NOTE
"Philipp Bland is a 68 year old male who presents for:  Chief Complaint   Patient presents with    Surgical Followup     12 week post-op, patient reports foot drop and pain level of 3. Patient worried surgery was not effective.        Initial Vitals:  There were no vitals taken for this visit. Estimated body mass index is 31.93 kg/m  as calculated from the following:    Height as of 4/29/24: 5' 8\" (1.727 m).    Weight as of 4/29/24: 210 lb (95.3 kg). There is no height or weight on file to calculate BSA. BP completed using cuff size: NA (Not Taken)  Mild Pain (3)        Greg Harrison    " Sheath removed and manual pressure held.

## 2024-08-19 ENCOUNTER — PATIENT OUTREACH (OUTPATIENT)
Dept: ONCOLOGY | Facility: HOSPITAL | Age: 69
End: 2024-08-19
Payer: MEDICARE

## 2024-08-19 NOTE — PROGRESS NOTES
"Message to scheduling team to reach out to Philipp to have him scheduled for 1 yr follow up with PSA prior with Dr. Albright.     Per schedulers: \"Spoke to Philipp who stated he does not want to schedule a follow-up at this time. He will call back if he changes his mind. \"    Socorro Biggs RN  08/19/24  11:33 AM     "

## 2024-08-30 DIAGNOSIS — I10 BENIGN ESSENTIAL HYPERTENSION: ICD-10-CM

## 2024-08-30 NOTE — TELEPHONE ENCOUNTER
Disp Refills Start End YASMINE    chlorthalidone (HYGROTON) 25 MG tablet (Discontinued) 90 tablet 1 4/25/2024 5/3/2024 No   Sig - Route: Take 1 tablet (25 mg) by mouth daily - Oral     Requesting refill, pharmacy notes pt states he started taking this again

## 2024-08-30 NOTE — TELEPHONE ENCOUNTER
Medication was discontinued during hospital admission 4/29/24-5/4/24.     Please call to discuss his need for restarting.     Thais CAMEJON, RN

## 2024-08-30 NOTE — TELEPHONE ENCOUNTER
"RN spoke to patient     Advised that chlorthalidone was discontinued in the hospital     Patient states \"no someone redid this\"     RN advised we do not show any documentation of this in our system     Recommended patient schedule an appointment with a PCP for follow up on BP   Explained that Aayush JONES  is not a pcp and will need to establish with a new pcp     Transferred to scheduling     Muna Charlton, Registered Nurse  [facility]         "

## 2024-09-16 RX ORDER — CHLORTHALIDONE 25 MG/1
25 TABLET ORAL DAILY
Qty: 30 TABLET | Refills: 0 | Status: SHIPPED | OUTPATIENT
Start: 2024-09-16

## 2024-09-16 NOTE — TELEPHONE ENCOUNTER
"Outgoing call to patient. Notified Patient of Aayush Moore PA-C's message: \"Sending 30 days to get to appointment but it was stopped due to low sodium in the hospital. This was never rechecked so will need to be monitored at next appointment.\"    Person was given an opportunity to ask questions, verbalized understanding of plan, and is agreeable.    Shanell Black RN on 9/16/2024 at 11:25 AM  "

## 2024-09-16 NOTE — TELEPHONE ENCOUNTER
"Incoming call from patient. Requests refill of chlorthalidone. Last ordered by Aayush Moore PA-C. It was discontinued when he was in the hospital in April, but \"shouldn't have been.\" Patient had continued taking it until it ran out. On lisinopril alone, his blood pressures are 160s/100s. Asymptomatic besides hands/feet swelling. Has visit scheduled for 9/18/24 with April Coming MD Chi but would like refill to get him to this appointment if appropriate. Or should he have visit sooner?   Medication pended below if appropriate.     Shanell Black RN    "

## 2024-09-16 NOTE — TELEPHONE ENCOUNTER
Sending 30 days to get to appointment but it was stopped due to low sodium in the hospital. This was never rechecked so will need to be monitored at next appointment.    Aayush Moore PA-C on 9/16/2024 at 9:16 AM

## 2024-09-18 ENCOUNTER — VIRTUAL VISIT (OUTPATIENT)
Dept: FAMILY MEDICINE | Facility: CLINIC | Age: 69
End: 2024-09-18
Payer: MEDICARE

## 2024-09-18 DIAGNOSIS — E87.1 HYPONATREMIA: Primary | ICD-10-CM

## 2024-09-18 DIAGNOSIS — I10 HTN, GOAL BELOW 140/90: ICD-10-CM

## 2024-09-18 PROCEDURE — 99442 PR PHYSICIAN TELEPHONE EVALUATION 11-20 MIN: CPT | Mod: 93 | Performed by: FAMILY MEDICINE

## 2024-09-18 NOTE — PROGRESS NOTES
"Philipp is a 68 year old who is being evaluated via a billable telephone visit.    What phone number would you like to be contacted at? [phone]  How would you like to obtain your AVS? Samanthaharjayleen  Originating Location (pt. Location): Home    Distant Location (provider location):  On-site    Assessment & Plan     Hyponatremia  Patient needs to have update labs to show sodium has normalized before we start him on Chlorthalidone again.  - Basic metabolic panel  (Ca, Cl, CO2, Creat, Gluc, K, Na, BUN); Future    HTN, goal below 140/90  Patient expressed frustration that his BP was high and he was being told not to take the Chlorthalidone right away.  Discussed with him that I cannot safely prescribe a medication that can lower his sodium when his last documented sodium was already low.  As soon as we get the sodium result we can have him start the chlorthalidone, but advised not to take Chlorthalidone yet as it can alter his results.  Informed patient that if his sodium is not in normal limits we will need to discuss alternative medication.  Will have staff call to schedule his lab only appt. Recommendations pending results.    The longitudinal plan of care for the diagnosis(es)/condition(s) as documented were addressed during this visit. Due to the added complexity in care, I will continue to support Philipp in the subsequent management and with ongoing continuity of care.      BMI  Estimated body mass index is 31.93 kg/m  as calculated from the following:    Height as of 4/29/24: 1.727 m (5' 8\").    Weight as of 4/29/24: 95.3 kg (210 lb).       See Patient Instructions    Subjective   Philipp is a 68 year old, presenting for the following health issues:  Hypertension      9/18/2024     3:29 PM   Additional Questions   Roomed by Deena FRANCO MA     History of Present Illness       Hypertension: He presents for follow up of hypertension.  He does check blood pressure  regularly outside of the clinic. Outside blood pressures have " been over 140/90. He does not follow a low salt diet.     He eats 0-1 servings of fruits and vegetables daily.He consumes 1 sweetened beverage(s) daily.He exercises with enough effort to increase his heart rate 9 or less minutes per day.  He exercises with enough effort to increase his heart rate 3 or less days per week.   He is taking medications regularly.     Taking Lisinopril and chlorthalidone for blood pressure, taking for many years.    He had back surgeries and chlorthalidone was stopped due to hyponatremia in the hospital.  He has not had any labs since then.  Last sodium was 128 in April.    Son is 42 and an , was evaluated due to a racing heart, got a blood pressure machine and patient checked his BP with it and was 164/110. He was provided with a 30 day supply of Chlorthalidone, which he picked up today.    Current Outpatient Medications   Medication Sig Dispense Refill    acetaminophen (TYLENOL) 325 MG tablet Take 2 tablets (650 mg) by mouth every 8 hours as needed for other (For optimal non-opioid multimodal pain management to improve pain control.) 21 tablet 1    chlorthalidone (HYGROTON) 25 MG tablet Take 1 tablet (25 mg) by mouth daily. 30 tablet 0    ferrous gluconate (FERGON) 324 (38 Fe) MG tablet Take 1 tablet (324 mg) by mouth daily (with breakfast) 90 tablet 1    hydrOXYzine HCl (ATARAX) 10 MG tablet Take 1 tablet (10 mg) by mouth every 6 hours as needed for itching or other (pain) 40 tablet 0    Lidocaine (LIDOCARE) 4 % Patch Place 2 patches onto the skin every 24 hours To prevent lidocaine toxicity, patient should be patch free for 12 hrs daily. 8 patch 0    lisinopril (ZESTRIL) 40 MG tablet Take 1 tablet (40 mg) by mouth daily 90 tablet 1    methocarbamol (ROBAXIN) 750 MG tablet Take 1 tablet (750 mg) by mouth 4 times daily as needed for muscle spasms 60 tablet 2    omeprazole (PRILOSEC) 20 MG DR capsule Take 20 mg by mouth daily      senna-docusate (SENOKOT-S/PERICOLACE) 8.6-50  "MG tablet Take 1 tablet by mouth 2 times daily as needed for constipation 28 tablet 0    Vitamin D3 (CHOLECALCIFEROL) 25 mcg (1000 units) tablet Take 1 tablet (25 mcg) by mouth daily 30 tablet 0    zinc sulfate (ZINCATE) 220 (50 Zn) MG capsule Take 1 capsule (220 mg) by mouth daily 30 capsule 0    oxyCODONE (ROXICODONE) 5 MG tablet Take 1 tablet (5 mg) by mouth every 6 hours as needed for moderate pain (Patient not taking: Reported on 8/2/2024) 40 tablet 0           Objective    Vitals - Patient Reported  Systolic (Patient Reported): (!) 164  Diastolic (Patient Reported): (!) 110  Weight (Patient Reported): 95.3 kg (210 lb)  Height (Patient Reported): 172.7 cm (5' 8\")  BMI (Based on Pt Reported Ht/Wt): 31.93    Physical Exam   General: Alert and no distress //Respiratory: No audible wheeze, cough, or shortness of breath // Psychiatric:  Appropriate affect, tone, and pace of words    Admission on 04/29/2024, Discharged on 05/04/2024   Component Date Value Ref Range Status    Potassium 04/29/2024 4.5  3.4 - 5.3 mmol/L Final    Glucose 04/29/2024 113 (H)  70 - 99 mg/dL Final    Sodium 04/29/2024 130 (L)  135 - 145 mmol/L Final    Reference intervals for this test were updated on 09/26/2023 to more accurately reflect our healthy population. There may be differences in the flagging of prior results with similar values performed with this method. Interpretation of those prior results can be made in the context of the updated reference intervals.     WBC Count 04/29/2024 6.4  4.0 - 11.0 10e3/uL Final    RBC Count 04/29/2024 4.39 (L)  4.40 - 5.90 10e6/uL Final    Hemoglobin 04/29/2024 13.9  13.3 - 17.7 g/dL Final    Hematocrit 04/29/2024 41.5  40.0 - 53.0 % Final    MCV 04/29/2024 95  78 - 100 fL Final    MCH 04/29/2024 31.7  26.5 - 33.0 pg Final    MCHC 04/29/2024 33.5  31.5 - 36.5 g/dL Final    RDW 04/29/2024 13.4  10.0 - 15.0 % Final    Platelet Count 04/29/2024 255  150 - 450 10e3/uL Final    INR 04/29/2024 0.96  0.85 - " 1.15 Final    aPTT 04/29/2024 36  22 - 38 Seconds Final    ABO/RH(D) 04/29/2024 A POS   Final    Antibody Screen 04/29/2024 Negative  Negative Final    SPECIMEN EXPIRATION DATE 04/29/2024 [id number]   Final    Hemoglobin 04/30/2024 8.9 (L)  13.3 - 17.7 g/dL Final    Sodium 04/30/2024 131 (L)  135 - 145 mmol/L Final    Reference intervals for this test were updated on 09/26/2023 to more accurately reflect our healthy population. There may be differences in the flagging of prior results with similar values performed with this method. Interpretation of those prior results can be made in the context of the updated reference intervals.     Potassium 04/30/2024 4.9  3.4 - 5.3 mmol/L Final    Chloride 04/30/2024 94 (L)  98 - 107 mmol/L Final    Carbon Dioxide (CO2) 04/30/2024 23  22 - 29 mmol/L Final    Anion Gap 04/30/2024 14  7 - 15 mmol/L Final    Urea Nitrogen 04/30/2024 35.7 (H)  8.0 - 23.0 mg/dL Final    Creatinine 04/30/2024 2.08 (H)  0.67 - 1.17 mg/dL Final    GFR Estimate 04/30/2024 34 (L)  >60 mL/min/1.73m2 Final    Calcium 04/30/2024 8.0 (L)  8.8 - 10.2 mg/dL Final    Glucose 04/30/2024 119 (H)  70 - 99 mg/dL Final    Color Urine 04/30/2024 Yellow  Colorless, Straw, Light Yellow, Yellow Final    Appearance Urine 04/30/2024 Slightly Cloudy (A)  Clear Final    Glucose Urine 04/30/2024 Negative  Negative mg/dL Final    Bilirubin Urine 04/30/2024 Negative  Negative Final    Ketones Urine 04/30/2024 Negative  Negative mg/dL Final    Specific Gravity Urine 04/30/2024 1.023  1.003 - 1.035 Final    Blood Urine 04/30/2024 Moderate (A)  Negative Final    pH Urine 04/30/2024 5.5  5.0 - 7.0 Final    Protein Albumin Urine 04/30/2024 30 (A)  Negative mg/dL Final    Urobilinogen Urine 04/30/2024 Normal  Normal, 2.0 mg/dL Final    Nitrite Urine 04/30/2024 Negative  Negative Final    Leukocyte Esterase Urine 04/30/2024 Large (A)  Negative Final    Mucus Urine 04/30/2024 Present (A)  None Seen /LPF Final    RBC Urine  04/30/2024 13 (H)  <=2 /HPF Final    WBC Urine 04/30/2024 53 (H)  <=5 /HPF Final    Squamous Epithelials Urine 04/30/2024 4 (H)  <=1 /HPF Final    Hyaline Casts Urine 04/30/2024 34 (H)  <=2 /LPF Final    Creatinine Urine mg/dL 04/30/2024 164.5  mg/dL Final    Sodium Urine mmol/L 04/30/2024 46  mmol/L Final    %FENA 04/30/2024 0.4  % Final    Adult:    <1 percent  Indicates prerenal azotemia    >3 percent  Suggests acute tubular necrosis    Neonates: <2.5 percent  Suggest prerenal azotemia    >2.5 percent  Suggest acute tubular necrosis    Sodium 04/30/2024 131 (L)  135 - 145 mmol/L Final    Reference intervals for this test were updated on 09/26/2023 to more accurately reflect our healthy population. There may be differences in the flagging of prior results with similar values performed with this method. Interpretation of those prior results can be made in the context of the updated reference intervals.     Potassium 04/30/2024 4.8  3.4 - 5.3 mmol/L Final    Chloride 04/30/2024 95 (L)  98 - 107 mmol/L Final    Carbon Dioxide (CO2) 04/30/2024 22  22 - 29 mmol/L Final    Anion Gap 04/30/2024 14  7 - 15 mmol/L Final    Urea Nitrogen 04/30/2024 37.9 (H)  8.0 - 23.0 mg/dL Final    Creatinine 04/30/2024 2.03 (H)  0.67 - 1.17 mg/dL Final    GFR Estimate 04/30/2024 35 (L)  >60 mL/min/1.73m2 Final    Calcium 04/30/2024 8.0 (L)  8.8 - 10.2 mg/dL Final    Glucose 04/30/2024 119 (H)  70 - 99 mg/dL Final    Hemoglobin 04/30/2024 8.9 (L)  13.3 - 17.7 g/dL Final    Culture 04/30/2024 No Growth   Final    Potassium 04/30/2024 4.7  3.4 - 5.3 mmol/L Final    Sodium 05/01/2024 128 (L)  135 - 145 mmol/L Final    Reference intervals for this test were updated on 09/26/2023 to more accurately reflect our healthy population. There may be differences in the flagging of prior results with similar values performed with this method. Interpretation of those prior results can be made in the context of the updated reference intervals.      Potassium 05/01/2024 4.6  3.4 - 5.3 mmol/L Final    Chloride 05/01/2024 96 (L)  98 - 107 mmol/L Final    Carbon Dioxide (CO2) 05/01/2024 26  22 - 29 mmol/L Final    Anion Gap 05/01/2024 6 (L)  7 - 15 mmol/L Final    Urea Nitrogen 05/01/2024 30.5 (H)  8.0 - 23.0 mg/dL Final    Creatinine 05/01/2024 1.11  0.67 - 1.17 mg/dL Final    GFR Estimate 05/01/2024 73  >60 mL/min/1.73m2 Final    Calcium 05/01/2024 8.0 (L)  8.8 - 10.2 mg/dL Final    Glucose 05/01/2024 113 (H)  70 - 99 mg/dL Final    Sodium 05/02/2024 130 (L)  135 - 145 mmol/L Final    Reference intervals for this test were updated on 09/26/2023 to more accurately reflect our healthy population. There may be differences in the flagging of prior results with similar values performed with this method. Interpretation of those prior results can be made in the context of the updated reference intervals.     Potassium 05/02/2024 4.8  3.4 - 5.3 mmol/L Final    Chloride 05/02/2024 96 (L)  98 - 107 mmol/L Final    Carbon Dioxide (CO2) 05/02/2024 23  22 - 29 mmol/L Final    Anion Gap 05/02/2024 11  7 - 15 mmol/L Final    Urea Nitrogen 05/02/2024 18.5  8.0 - 23.0 mg/dL Final    Creatinine 05/02/2024 0.66 (L)  0.67 - 1.17 mg/dL Final    GFR Estimate 05/02/2024 >90  >60 mL/min/1.73m2 Final    Calcium 05/02/2024 8.6 (L)  8.8 - 10.2 mg/dL Final    Glucose 05/02/2024 107 (H)  70 - 99 mg/dL Final    Sodium 05/03/2024 127 (L)  135 - 145 mmol/L Final    Reference intervals for this test were updated on 09/26/2023 to more accurately reflect our healthy population. There may be differences in the flagging of prior results with similar values performed with this method. Interpretation of those prior results can be made in the context of the updated reference intervals.     Sodium 05/03/2024 129 (L)  135 - 145 mmol/L Final    Reference intervals for this test were updated on 09/26/2023 to more accurately reflect our healthy population. There may be differences in the flagging of prior  results with similar values performed with this method. Interpretation of those prior results can be made in the context of the updated reference intervals.     Potassium 05/03/2024 4.5  3.4 - 5.3 mmol/L Final    Chloride 05/03/2024 95 (L)  98 - 107 mmol/L Final    Carbon Dioxide (CO2) 05/03/2024 22  22 - 29 mmol/L Final    Anion Gap 05/03/2024 12  7 - 15 mmol/L Final    Urea Nitrogen 05/03/2024 13.8  8.0 - 23.0 mg/dL Final    Creatinine 05/03/2024 0.63 (L)  0.67 - 1.17 mg/dL Final    GFR Estimate 05/03/2024 >90  >60 mL/min/1.73m2 Final    Calcium 05/03/2024 8.8  8.8 - 10.2 mg/dL Final    Glucose 05/03/2024 109 (H)  70 - 99 mg/dL Final    Sodium 05/04/2024 128 (L)  135 - 145 mmol/L Final    Reference intervals for this test were updated on 09/26/2023 to more accurately reflect our healthy population. There may be differences in the flagging of prior results with similar values performed with this method. Interpretation of those prior results can be made in the context of the updated reference intervals.     Potassium 05/04/2024 4.5  3.4 - 5.3 mmol/L Final    Chloride 05/04/2024 94 (L)  98 - 107 mmol/L Final    Carbon Dioxide (CO2) 05/04/2024 21 (L)  22 - 29 mmol/L Final    Anion Gap 05/04/2024 13  7 - 15 mmol/L Final    Urea Nitrogen 05/04/2024 14.1  8.0 - 23.0 mg/dL Final    Creatinine 05/04/2024 0.58 (L)  0.67 - 1.17 mg/dL Final    GFR Estimate 05/04/2024 >90  >60 mL/min/1.73m2 Final    Calcium 05/04/2024 9.2  8.8 - 10.2 mg/dL Final    Glucose 05/04/2024 125 (H)  70 - 99 mg/dL Final         Phone call duration: 15 minutes  Signed Electronically by: Josefina Mireles MD

## 2024-09-23 ENCOUNTER — LAB (OUTPATIENT)
Dept: LAB | Facility: CLINIC | Age: 69
End: 2024-09-23
Payer: MEDICARE

## 2024-09-23 DIAGNOSIS — E87.1 HYPONATREMIA: ICD-10-CM

## 2024-09-23 LAB
ANION GAP SERPL CALCULATED.3IONS-SCNC: 15 MMOL/L (ref 7–15)
BUN SERPL-MCNC: 9.6 MG/DL (ref 8–23)
CALCIUM SERPL-MCNC: 9.5 MG/DL (ref 8.8–10.4)
CHLORIDE SERPL-SCNC: 92 MMOL/L (ref 98–107)
CREAT SERPL-MCNC: 0.69 MG/DL (ref 0.67–1.17)
EGFRCR SERPLBLD CKD-EPI 2021: >90 ML/MIN/1.73M2
GLUCOSE SERPL-MCNC: 108 MG/DL (ref 70–99)
HCO3 SERPL-SCNC: 25 MMOL/L (ref 22–29)
POTASSIUM SERPL-SCNC: 3.9 MMOL/L (ref 3.4–5.3)
SODIUM SERPL-SCNC: 132 MMOL/L (ref 135–145)

## 2024-09-23 PROCEDURE — 80048 BASIC METABOLIC PNL TOTAL CA: CPT

## 2024-09-23 PROCEDURE — 36415 COLL VENOUS BLD VENIPUNCTURE: CPT

## 2024-09-26 ENCOUNTER — PATIENT OUTREACH (OUTPATIENT)
Dept: FAMILY MEDICINE | Facility: CLINIC | Age: 69
End: 2024-09-26
Payer: MEDICARE

## 2024-09-26 ENCOUNTER — MYC MEDICAL ADVICE (OUTPATIENT)
Dept: FAMILY MEDICINE | Facility: CLINIC | Age: 69
End: 2024-09-26
Payer: MEDICARE

## 2024-09-26 DIAGNOSIS — I10 HTN, GOAL BELOW 140/90: Primary | ICD-10-CM

## 2024-09-26 RX ORDER — AMLODIPINE BESYLATE 2.5 MG/1
2.5 TABLET ORAL DAILY
Qty: 30 TABLET | Refills: 1 | Status: SHIPPED | OUTPATIENT
Start: 2024-09-26

## 2024-09-26 NOTE — TELEPHONE ENCOUNTER
Patient Quality Outreach    Patient is due for the following:   Physical Annual Wellness Visit    Next Steps:   Schedule a Annual Wellness Visit    Type of outreach:    Sent letter.    Next Steps:  Reach out within 90 days via Phone, MyChart, and Letter.    Max number of attempts reached: No. Will try again in 90 days if patient still on fail list.    Questions for provider review:    None           Danita Tamayo

## 2024-09-26 NOTE — TELEPHONE ENCOUNTER
"Josefina Mireles MD- See pt's [facility] [place] [facility] ([facility] [place]) message response to lab result message below.     \"Philipp,     Your sodium levels are still low and it would not be a good idea to start you on the chlorthalidone, as this will lower those levels further.  I see that several years ago you were on a medication called Amlodipine or Norvasc.  Do you recall why this was stopped or changed?  We could consider putting you back on this medication instead of chlorthalidone.  We also could look at a different group of medications called beta blockers (Metoprolol, Carvedilol, etc).  Another option would be to have you meet with one of our pharmacists to discuss medication options.       Thoughts?     Josefina Mireles M.D.\"    Routed to MD Luzma Morales RN   Clinic RN  [facility]      "

## 2024-09-26 NOTE — LETTER
September 26, 2024      Philipp Bland  [address] [address]          Dear Philipp,     Here at [facility], we care about your health. In a recent review of your records, we show that you are due for the following Health Maintenance Items:    Annual Medicare Wellness Visit    Please call Central Scheduling to schedule a visit with your provider @ [phone].        Thank-you,  [facility] Team

## 2024-10-24 ENCOUNTER — NURSE TRIAGE (OUTPATIENT)
Dept: FAMILY MEDICINE | Facility: CLINIC | Age: 69
End: 2024-10-24
Payer: MEDICARE

## 2024-10-24 NOTE — TELEPHONE ENCOUNTER
"Nurse Triage SBAR    Is this a 2nd Level Triage? NO    Situation: Patient's son, Landon called with patient present. He reports patient is significantly weaker than normal this morning and requires 4 people to get him up off the floor. They called an ambulance to get him back to his wheel chair this morning.    Background:   Patient fell 10/23/24 - he had to crawl back to lap top in living room and sent a Facebook a neighbor who helped him to his wheel chair.    Patient uses a walker and has a wheel chair at home. They have a ramp and patient has been able to get around on his own with the walker and wheel chair. He also has been driving every day.     Patient has not started any new medications recently.    Assessment:   Patient denies hitting his head or injuring himself when he fell yesterday.     Idris reports that today, patient was walking to the bathroom with his walker and \"fell short of the toilet and ended up on the floor\". Idris got him to the toilet, but he was too weak to get back to the wheel chair. Landon called 911. That was at roughly 9:30 am. Per Idris, paramedics said if they took him in, ER would release him right away so they did not bring patient to hospital.    Idris says patient has been slowly getting weaker \"for a while\". However, today, he could see \"a big difference in strength\" and he is concerned. Patient's Dad passed away about a week ago. Idris thinks this is weighing on patient.    Idris says patient has some days shortness of breath with movement. Today breathing is worse. At rest he is okay but with movement it is like patient \"just ran a marathon\" \"he gets winded\".     Idris reports patient has steadily been gaining weight. He denies quick weight gain over the past week. He attributes it to patient not moving around as much because of the pain he has all the time.     Patient is alert and oriented.     They deny fainting, difficulty breathing, chest pain, bleeding, " "confusion, urgency or frequency of urination (any worse than usual), fever.    Idris says he works in [place] and comes to help his dad but he feels patient cannot live alone anymore and feels he should move to assisted living facility.     Protocol Recommended Disposition:   Call  Now    Recommendation: RN advised Idris to again call 911 as patient should be assessed for worsening weakness that started this morning and patient is unable to walk unsupported. Idris agrees with plan and will call 911.        Davina Valladares RN, BSN, PHN  [facility]       Reason for Disposition   SEVERE weakness (i.e., unable to walk or barely able to walk, requires support) and new-onset or worsening    Additional Information   Negative: Fainted > 15 minutes ago and still feels too weak or dizzy to stand   Negative: Shock suspected (e.g., cold/pale/clammy skin, too weak to stand, low BP, rapid pulse)   Negative: Difficult to awaken or acting confused (e.g., disoriented, slurred speech)   Negative: SEVERE difficulty breathing (e.g., struggling for each breath, speaks in single words)    Answer Assessment - Initial Assessment Questions  1. DESCRIPTION: \"Describe how you are feeling.\"      Weakness   2. SEVERITY: \"How bad is it?\"  \"Can you stand and walk?\"    - MILD (0-3): Feels weak or tired, but does not interfere with work, school or normal activities.    - MODERATE (4-7): Able to stand and walk; weakness interferes with work, school, or normal activities.    - SEVERE (8-10): Unable to stand or walk; unable to do usual activities.      Severe  3. ONSET: \"When did these symptoms begin?\" (e.g., hours, days, weeks, months)      Since this morning - he fell last night   4. CAUSE: \"What do you think is causing the weakness or fatigue?\" (e.g., not drinking enough fluids, medical problem, trouble sleeping)      Not sure - \"for the most part drinking fluids\"  5. NEW MEDICINES:  \"Have you " "started on any new medicines recently?\" (e.g., opioid pain medicines, benzodiazepines, muscle relaxants, antidepressants, antihistamines, neuroleptics, beta blockers)      No  6. OTHER SYMPTOMS: \"Do you have any other symptoms?\" (e.g., chest pain, fever, cough, SOB, vomiting, diarrhea, bleeding, other areas of pain)      Coughing for last 6 months pretty \"regularly\" worse at night, swelling in legs for past 2 years but is worse recently, SOB worse with movement, has been ongoing, but worse today  7. PREGNANCY: \"Is there any chance you are pregnant?\" \"When was your last menstrual period?\"      No    Protocols used: Weakness (Generalized) and Fatigue-A-OH    "

## 2024-12-05 DIAGNOSIS — I10 BENIGN ESSENTIAL HYPERTENSION: ICD-10-CM

## 2024-12-10 DIAGNOSIS — I10 BENIGN ESSENTIAL HYPERTENSION: ICD-10-CM

## 2024-12-10 RX ORDER — LISINOPRIL 40 MG/1
40 TABLET ORAL DAILY
Qty: 90 TABLET | Refills: 1 | Status: SHIPPED | OUTPATIENT
Start: 2024-12-10

## 2024-12-10 RX ORDER — LISINOPRIL 40 MG/1
40 TABLET ORAL DAILY
Qty: 90 TABLET | Refills: 1 | Status: CANCELLED | OUTPATIENT
Start: 2024-12-10

## 2024-12-10 NOTE — TELEPHONE ENCOUNTER
RN received call from patient, inquiring on his refill request for chlorthalidone. RN does not see an active refill request. Patient reports his PCP is Dr. Sow. Rx for chlorthalidone was last filled by Aayush Moore PA-C.     Patient confirms he takes chlorthalidone (HYGROTON) 25 MG tablet once daily. Patient has been out of this medication for the past 3 days. Patient does have home BP monitor, last checked his BP 2 weeks ago - 145/95. Patient denies any symptoms at this time.     Provider, please review and advise on refill request.     Hoang LEMOS RN 12/10/2024 at 12:35 PM

## 2024-12-10 NOTE — TELEPHONE ENCOUNTER
Called and spoke with pt,     Informed pt that the requested prescription was sent to requested pharmacy.     Pt verbalizes understanding and is agreeable with plan. Pt denies any further questions or concerns at this time.     Luzma FITZPATRICK RN   Clinic RN  [facility]

## 2024-12-10 NOTE — TELEPHONE ENCOUNTER
Medication Question or Refill    Contacts       Contact Date/Time Type Contact Phone/Fax    12/10/2024 01:12 PM CST Phone (Incoming) Dcgaetano Philipp S (Self) [phone] (M)            What medication are you calling about (include dose and sig)?: Lisinopril 40 MG tablet    Preferred Pharmacy:  [place] [address] [facility]  [address] [facility] [address]  Phone: [phone] Fax: [phone]      Controlled Substance Agreement on file:   CSA -- Patient Level:    CSA: None found at the patient level.       Who prescribed the medication?: Dr. Buckner    Do you need a refill? Yes    Do you have any questions or concerns?  No      Could we send this information to you in [facility] or would you prefer to receive a phone call?:   Patient would prefer a phone call   Okay to leave a detailed message?: Yes at Cell number on file:    Telephone Information:   Mobile [phone]

## 2024-12-12 RX ORDER — CHLORTHALIDONE 25 MG/1
25 TABLET ORAL DAILY
Qty: 30 TABLET | Refills: 0 | Status: SHIPPED | OUTPATIENT
Start: 2024-12-12

## 2024-12-12 NOTE — TELEPHONE ENCOUNTER
RN called and spoke with patient   Relayed message from Dr. Dudley Mireles and notified of refill sent to [facility]   Scheduled follow-up OV with Dr. Dudley Mireles 1/8/25 at 2:40pm  Patient to continue with the same medications at this time and monitor BP at home regularly. Create log of BP readings to bring to next appt  RN educated on red flags of HTN and instructed patient to call back if red flags occur or if patient has further questions/concerns [phone]    Patient was given an opportunity to ask questions, verbalized understanding of plan, and is agreeable.     Beatrice ROLON RN  [facility]

## 2024-12-12 NOTE — TELEPHONE ENCOUNTER
He needs to have a follow up appointment with someone regarding his blood pressure, if he is taking his meds and still above 140/95.

## 2025-01-08 ENCOUNTER — OFFICE VISIT (OUTPATIENT)
Dept: FAMILY MEDICINE | Facility: CLINIC | Age: 70
End: 2025-01-08
Payer: MEDICARE

## 2025-01-08 VITALS
WEIGHT: 215 LBS | RESPIRATION RATE: 16 BRPM | DIASTOLIC BLOOD PRESSURE: 95 MMHG | TEMPERATURE: 97.8 F | BODY MASS INDEX: 32.58 KG/M2 | OXYGEN SATURATION: 99 % | HEIGHT: 68 IN | HEART RATE: 90 BPM | SYSTOLIC BLOOD PRESSURE: 161 MMHG

## 2025-01-08 DIAGNOSIS — R93.1 ABNORMAL ECHOCARDIOGRAM: ICD-10-CM

## 2025-01-08 DIAGNOSIS — R79.89 ELEVATED LFTS: ICD-10-CM

## 2025-01-08 DIAGNOSIS — F10.10 ALCOHOL ABUSE: ICD-10-CM

## 2025-01-08 DIAGNOSIS — D50.9 IRON DEFICIENCY ANEMIA, UNSPECIFIED IRON DEFICIENCY ANEMIA TYPE: ICD-10-CM

## 2025-01-08 DIAGNOSIS — K76.0 HEPATIC STEATOSIS: ICD-10-CM

## 2025-01-08 DIAGNOSIS — Z28.21 IMMUNIZATION DECLINED: ICD-10-CM

## 2025-01-08 DIAGNOSIS — M43.16 SPONDYLOLISTHESIS OF LUMBAR REGION: ICD-10-CM

## 2025-01-08 DIAGNOSIS — I10 HTN, GOAL BELOW 140/90: ICD-10-CM

## 2025-01-08 DIAGNOSIS — R60.0 PERIPHERAL EDEMA: ICD-10-CM

## 2025-01-08 DIAGNOSIS — Z53.20 COLON CANCER SCREENING DECLINED: ICD-10-CM

## 2025-01-08 DIAGNOSIS — M54.16 SPINAL STENOSIS OF LUMBAR REGION WITH RADICULOPATHY: ICD-10-CM

## 2025-01-08 DIAGNOSIS — R31.9 HEMATURIA, UNSPECIFIED TYPE: ICD-10-CM

## 2025-01-08 DIAGNOSIS — G47.34 NOCTURNAL HYPOXIA: ICD-10-CM

## 2025-01-08 DIAGNOSIS — Z13.220 LIPID SCREENING: ICD-10-CM

## 2025-01-08 DIAGNOSIS — Z00.00 ENCOUNTER FOR MEDICARE ANNUAL WELLNESS EXAM: Primary | ICD-10-CM

## 2025-01-08 DIAGNOSIS — R97.20 ELEVATED PROSTATE SPECIFIC ANTIGEN (PSA): ICD-10-CM

## 2025-01-08 DIAGNOSIS — T85.848D PAIN FROM IMPLANTED HARDWARE, SUBSEQUENT ENCOUNTER: ICD-10-CM

## 2025-01-08 DIAGNOSIS — Z13.29 SCREENING FOR THYROID DISORDER: ICD-10-CM

## 2025-01-08 DIAGNOSIS — M48.061 SPINAL STENOSIS OF LUMBAR REGION WITH RADICULOPATHY: ICD-10-CM

## 2025-01-08 PROBLEM — R74.8 ELEVATED CREATINE KINASE LEVEL: Status: ACTIVE | Noted: 2024-10-24

## 2025-01-08 LAB
ALBUMIN UR-MCNC: 30 MG/DL
APPEARANCE UR: ABNORMAL
BACTERIA #/AREA URNS HPF: ABNORMAL /HPF
BILIRUB UR QL STRIP: ABNORMAL
COLOR UR AUTO: ABNORMAL
GLUCOSE UR STRIP-MCNC: NEGATIVE MG/DL
HGB UR QL STRIP: NEGATIVE
HYALINE CASTS #/AREA URNS LPF: ABNORMAL /LPF
KETONES UR STRIP-MCNC: 40 MG/DL
LEUKOCYTE ESTERASE UR QL STRIP: ABNORMAL
MUCOUS THREADS #/AREA URNS LPF: PRESENT /LPF
NITRATE UR QL: POSITIVE
PH UR STRIP: 5.5 [PH] (ref 5–7)
RBC #/AREA URNS AUTO: ABNORMAL /HPF
SP GR UR STRIP: >=1.03 (ref 1–1.03)
SQUAMOUS #/AREA URNS AUTO: ABNORMAL /LPF
UROBILINOGEN UR STRIP-ACNC: 2 E.U./DL
WBC #/AREA URNS AUTO: ABNORMAL /HPF
WBC CLUMPS #/AREA URNS HPF: PRESENT /HPF

## 2025-01-08 PROCEDURE — 99214 OFFICE O/P EST MOD 30 MIN: CPT | Mod: 25 | Performed by: FAMILY MEDICINE

## 2025-01-08 PROCEDURE — G0438 PPPS, INITIAL VISIT: HCPCS | Performed by: FAMILY MEDICINE

## 2025-01-08 PROCEDURE — 36415 COLL VENOUS BLD VENIPUNCTURE: CPT | Performed by: FAMILY MEDICINE

## 2025-01-08 PROCEDURE — 81001 URINALYSIS AUTO W/SCOPE: CPT | Performed by: FAMILY MEDICINE

## 2025-01-08 PROCEDURE — G2211 COMPLEX E/M VISIT ADD ON: HCPCS | Performed by: FAMILY MEDICINE

## 2025-01-08 RX ORDER — LISINOPRIL 40 MG/1
40 TABLET ORAL DAILY
Qty: 90 TABLET | Refills: 1 | Status: CANCELLED | OUTPATIENT
Start: 2025-01-08

## 2025-01-08 RX ORDER — AMLODIPINE BESYLATE 2.5 MG/1
2.5 TABLET ORAL DAILY
Qty: 90 TABLET | Refills: 1 | Status: CANCELLED | OUTPATIENT
Start: 2025-01-08

## 2025-01-08 RX ORDER — HYDROXYZINE HYDROCHLORIDE 10 MG/1
10 TABLET, FILM COATED ORAL EVERY 6 HOURS PRN
Qty: 30 TABLET | Refills: 3 | Status: SHIPPED | OUTPATIENT
Start: 2025-01-08

## 2025-01-08 RX ORDER — HYDROCHLOROTHIAZIDE 25 MG/1
25 TABLET ORAL DAILY
Qty: 30 TABLET | Refills: 1 | Status: SHIPPED | OUTPATIENT
Start: 2025-01-08

## 2025-01-08 RX ORDER — GABAPENTIN 300 MG/1
300 CAPSULE ORAL 3 TIMES DAILY PRN
Qty: 90 CAPSULE | Refills: 0 | Status: SHIPPED | OUTPATIENT
Start: 2025-01-08

## 2025-01-08 SDOH — HEALTH STABILITY: PHYSICAL HEALTH: ON AVERAGE, HOW MANY DAYS PER WEEK DO YOU ENGAGE IN MODERATE TO STRENUOUS EXERCISE (LIKE A BRISK WALK)?: 3 DAYS

## 2025-01-08 ASSESSMENT — SOCIAL DETERMINANTS OF HEALTH (SDOH): HOW OFTEN DO YOU GET TOGETHER WITH FRIENDS OR RELATIVES?: THREE TIMES A WEEK

## 2025-01-08 ASSESSMENT — PAIN SCALES - GENERAL: PAINLEVEL_OUTOF10: MODERATE PAIN (4)

## 2025-01-08 NOTE — PATIENT INSTRUCTIONS
Stop Amlodipine.  Continue Lisinopril 40 mg daily.  Start hydrochlorothiazide 25 mg daily.  Labs today.  Recheck sodium and potassium (blood work) in 2-4 weeks.  Monitor blood pressure daily.  Video visit in 4 weeks to discuss readings.  Patient Education   Preventive Care Advice   This is general advice given by our system to help you stay healthy. However, your care team may have specific advice just for you. Please talk to your care team about your preventive care needs.  Nutrition  Eat 5 or more servings of fruits and vegetables each day.  Try wheat bread, brown rice and whole grain pasta (instead of white bread, rice, and pasta).  Get enough calcium and vitamin D. Check the label on foods and aim for 100% of the RDA (recommended daily allowance).  Lifestyle  Exercise at least 150 minutes each week  (30 minutes a day, 5 days a week).  Do muscle strengthening activities 2 days a week. These help control your weight and prevent disease.  No smoking.  Wear sunscreen to prevent skin cancer.  Have a dental exam and cleaning every 6 months.  Yearly exams  See your health care team every year to talk about:  Any changes in your health.  Any medicines your care team has prescribed.  Preventive care, family planning, and ways to prevent chronic diseases.  Shots (vaccines)   HPV shots (up to age 26), if you've never had them before.  Hepatitis B shots (up to age 59), if you've never had them before.  COVID-19 shot: Get this shot when it's due.  Flu shot: Get a flu shot every year.  Tetanus shot: Get a tetanus shot every 10 years.  Pneumococcal, hepatitis A, and RSV shots: Ask your care team if you need these based on your risk.  Shingles shot (for age 50 and up)  General health tests  Diabetes screening:  Starting at age 35, Get screened for diabetes at least every 3 years.  If you are younger than age 35, ask your care team if you should be screened for diabetes.  Cholesterol test: At age 39, start having a cholesterol  test every 5 years, or more often if advised.  Bone density scan (DEXA): At age 50, ask your care team if you should have this scan for osteoporosis (brittle bones).  Hepatitis C: Get tested at least once in your life.  STIs (sexually transmitted infections)  Before age 24: Ask your care team if you should be screened for STIs.  After age 24: Get screened for STIs if you're at risk. You are at risk for STIs (including HIV) if:  You are sexually active with more than one person.  You don't use condoms every time.  You or a partner was diagnosed with a sexually transmitted infection.  If you are at risk for HIV, ask about PrEP medicine to prevent HIV.  Get tested for HIV at least once in your life, whether you are at risk for HIV or not.  Cancer screening tests  Cervical cancer screening: If you have a cervix, begin getting regular cervical cancer screening tests starting at age 21.  Breast cancer scan (mammogram): If you've ever had breasts, begin having regular mammograms starting at age 40. This is a scan to check for breast cancer.  Colon cancer screening: It is important to start screening for colon cancer at age 45.  Have a colonoscopy test every 10 years (or more often if you're at risk) Or, ask your provider about stool tests like a FIT test every year or Cologuard test every 3 years.  To learn more about your testing options, visit:   .  For help making a decision, visit:   https://bit.ly/rp32867.  Prostate cancer screening test: If you have a prostate, ask your care team if a prostate cancer screening test (PSA) at age 55 is right for you.  Lung cancer screening: If you are a current or former smoker ages 50 to 80, ask your care team if ongoing lung cancer screenings are right for you.  For informational purposes only. Not to replace the advice of your health care provider. Copyright   2023 [employer]. All rights reserved. Clinically reviewed by the [facility] Transitions [facility].  [employer] 351794 - REV 01/24.  Learning About Activities of Daily Living  What are activities of daily living?     Activities of daily living (ADLs) are the basic self-care tasks you do every day. These include eating, bathing, dressing, and moving around.  As you age, and if you have health problems, you may find that it's harder to do some of these tasks. If so, your doctor can suggest ideas that may help.  To measure what kind of help you may need, your doctor will ask how well you are able to do ADLs. Let your doctor know if there are any tasks that you are having trouble doing. This is an important first step to getting help. And when you have the help you need, you can stay as independent as possible.  How will a doctor assess your ADLs?  Asking about ADLs is part of a routine health checkup your doctor will likely do as you age. Your health check might be done in a doctor's office, in your home, or at a hospital. The goal is to find out if you are having any problems that could make it hard to care for yourself or that make it unsafe for you to be on your own.  To measure your ADLs, your doctor will ask how hard it is for you to do routine tasks. Your doctor may also want to know if you have changed the way you do a task because of a health problem. Your doctor may watch how you:  Walk back and forth.  Keep your balance while you stand or walk.  Move from sitting to standing or from a bed to a chair.  Button or unbutton a shirt or sweater.  Remove and put on your shoes.  It's common to feel a little worried or anxious if you find you can't do all the things you used to be able to do. Talking with your doctor about ADLs is a way to make sure you're as safe as possible and able to care for yourself as well as you can. You may want to bring a caregiver, friend, or family member to your checkup. They can help you talk to your doctor.  Follow-up care is a key part of your treatment and safety. Be sure to make and  go to all appointments, and call your doctor if you are having problems. It's also a good idea to know your test results and keep a list of the medicines you take.  Current as of: October 24, 2023  Content Version: 14.3    2024 [employer].   Care instructions adapted under license by your healthcare professional. If you have questions about a medical condition or this instruction, always ask your healthcare professional. [employer] disclaims any warranty or liability for your use of this information.    Preventing Falls: Care Instructions  Injuries and health problems such as trouble walking or poor eyesight can increase your risk of falling. So can some medicines. But there are things you can do to help prevent falls. You can exercise to get stronger. You can also arrange your home to make it safer.    Talk to your doctor about the medicines you take. Ask if any of them increase the risk of falls and whether they can be changed or stopped.   Try to exercise regularly. It can help improve your strength and balance. This can help lower your risk of falling.         Practice fall safety and prevention.   Wear low-heeled shoes that fit well and give your feet good support. Talk to your doctor if you have foot problems that make this hard.  Carry a cellphone or wear a medical alert device that you can use to call for help.  Use stepladders instead of chairs to reach high objects. Don't climb if you're at risk for falls. Ask for help, if needed.  Wear the correct eyeglasses, if you need them.        Make your home safer.   Remove rugs, cords, clutter, and furniture from walkways.  Keep your house well lit. Use night-lights in hallways and bathrooms.  Install and use sturdy handrails on stairways.  Wear nonskid footwear, even inside. Don't walk barefoot or in socks without shoes.        Be safe outside.   Use handrails, curb cuts, and ramps whenever possible.  Keep your hands free by using a  "shoulder bag or backpack.  Try to walk in well-lit areas. Watch out for uneven ground, changes in pavement, and debris.  Be careful in the winter. Walk on the grass or gravel when sidewalks are slippery. Use de-icer on steps and walkways. Add non-slip devices to shoes.    Put grab bars and nonskid mats in your shower or tub and near the toilet. Try to use a shower chair or bath bench when bathing.   Get into a tub or shower by putting in your weaker leg first. Get out with your strong side first. Have a phone or medical alert device in the bathroom with you.   Where can you learn more?  Go to https://www.[employer].net/patiented  Enter G117 in the search box to learn more about \"Preventing Falls: Care Instructions.\"  Current as of: July 31, 2024  Content Version: 14.3    2024 [employer].   Care instructions adapted under license by your healthcare professional. If you have questions about a medical condition or this instruction, always ask your healthcare professional. [employer] disclaims any warranty or liability for your use of this information.    9 Ways to Cut Back on Drinking  Maybe you've found yourself drinking more alcohol than you'd prefer. If you want to cut back, here are some ideas to try.    Think before you drink.  Do you really want a drink, or is it just a habit? If you're used to having a drink at a certain time, try doing something else then.     Look for substitutes.  Find some no-alcohol drinks that you enjoy, like flavored seltzer water, tea with honey, or tonic with a slice of lime. Or try alcohol-free beer or \"virgin\" cocktails (without the alcohol).     Drink more water.  Use water to quench your thirst. Drink a glass of water before you have any alcohol. Have another glass along with every drink or between drinks.     Shrink your drink.  For example, have a bottle of beer instead of a pint. Use a smaller glass for wine. Choose drinks with lower alcohol content " "(ABV%). Or use less liquor and more mixer in cocktails.     Slow down.  It's easy to drink quickly and without thinking about it. Pay attention, and make each drink last longer.     Do the math.  Total up how much you spend on alcohol each month. How much is that a year? If you cut back, what could you do with the money you save?     Take a break.  Choose a day or two each week when you won't drink at all. Notice how you feel on those days, physically and emotionally. How did you sleep? Do you feel better? Over time, add more break days.     Count calories.  Would you like to lose some weight? For some people that's a good motivator for cutting back. Figure out how many calories are in each drink. How many does that add up to in a day? In a week? In a month?     Practice saying no.  Be ready when someone offers you a drink. Try: \"Thanks, I've had enough.\" Or \"Thanks, but I'm cutting back.\" Or \"No, thanks. I feel better when I drink less.\"   Current as of: November 15, 2023  Content Version: 14.3    2024 [employer].   Care instructions adapted under license by your healthcare professional. If you have questions about a medical condition or this instruction, always ask your healthcare professional. [employer] disclaims any warranty or liability for your use of this information.  Substance Use Disorder: Care Instructions  Overview     You can improve your life and health by stopping your use of alcohol or drugs. When you don't drink or use drugs, you may feel and sleep better. You may get along better with your family, friends, and coworkers. There are medicines and programs that can help with substance use disorder.  How can you care for yourself at home?  Here are some ways to help you stay sober and prevent relapse.  If you have been given medicine to help keep you sober or reduce your cravings, be sure to take it exactly as prescribed.  Talk to your doctor about programs that can help you " stop using drugs or drinking alcohol.  Do not keep alcohol or drugs in your home.  Plan ahead. Think about what you'll say if other people ask you to drink or use drugs. Try not to spend time with people who drink or use drugs.  Use the time and money spent on drinking or drugs to do something that's important to you.  Preventing a relapse  Have a plan to deal with relapse. Learn to recognize changes in your thinking that lead you to drink or use drugs. Get help before you start to drink or use drugs again.  Try to stay away from situations, friends, or places that may lead you to drink or use drugs.  If you feel the need to drink alcohol or use drugs again, seek help right away. Call a trusted friend or family member. Some people get support from organizations such as Narcotics Anonymous or [employer] or from treatment facilities.  If you relapse, get help as soon as you can. Some people make a plan with another person that outlines what they want that person to do for them if they relapse. The plan usually includes how to handle the relapse and who to notify in case of relapse.  Don't give up. Remember that a relapse doesn't mean that you have failed. Use the experience to learn the triggers that lead you to drink or use drugs. Then quit again. Recovery is a lifelong process. Many people have several relapses before they are able to quit for good.  Follow-up care is a key part of your treatment and safety. Be sure to make and go to all appointments, and call your doctor if you are having problems. It's also a good idea to know your test results and keep a list of the medicines you take.  When should you call for help?   Call 911  anytime you think you may need emergency care. For example, call if you or someone else:    Has overdosed or has withdrawal signs. Be sure to tell the emergency workers that you are or someone else is using or trying to quit using drugs. Overdose or withdrawal signs may  "include:  Losing consciousness.  Seizure.  Seeing or hearing things that aren't there (hallucinations).     Is thinking or talking about suicide or harming others.   Where to get help 24 hours a day, 7 days a week   If you or someone you know talks about suicide, self-harm, a mental health crisis, a substance use crisis, or any other kind of emotional distress, get help right away. You can:    Call the Suicide and Crisis Lifeline at 988.     Call 1-652-200-TALK (1-496.844.3052).     Text HOME to 336263 to access the Crisis Text Line.   Consider saving these numbers in your phone.  Go to [place] for more information or to chat online.  Call your doctor now or seek immediate medical care if:    You are having withdrawal symptoms. These may include nausea or vomiting, sweating, shakiness, and anxiety.   Watch closely for changes in your health, and be sure to contact your doctor if:    You have a relapse.     You need more help or support to stop.   Where can you learn more?  Go to https://www.[employer].net/patiented  Enter H573 in the search box to learn more about \"Substance Use Disorder: Care Instructions.\"  Current as of: November 15, 2023  Content Version: 14.3    2024 [employer].   Care instructions adapted under license by your healthcare professional. If you have questions about a medical condition or this instruction, always ask your healthcare professional. [employer] disclaims any warranty or liability for your use of this information.       "

## 2025-01-08 NOTE — PROGRESS NOTES
Assessment & Plan     Encounter for Medicare annual wellness exam  Colon cancer screening declined  Immunization declined  Exam completed today, routine health maintenance items updated as able.  Labs ordered.  Follow up one year or sooner as needed.  Patient declines colon cancer screening and all immunizations.    HTN, goal below 140/90  Abnormal echocardiogram  Peripheral edema  Uncontrolled blood pressure today, patient is experiencing pain.  Given peripheral edema recommend stopping amlodipine.  He reports his pressures were controlled with chlorthalidone, but the hyponatremia was prohibitive.  Given diuresis benefits, will trial hydrochlorothiazide, but suspect that he will develop hyponatremia again.  Labs today, repeat labs in 4 weeks at follow-up visit.  Given his abnormal echo and uncontrolled blood pressures, would consider changing lisinopril to ARB such as valsartan and adding either furosemide or torsemide.  Will reevaluate in 1 month.  Patient was overwhelmed today and in pain, therefore we opted to hold off on further discussion of the abnormal echo, but will discuss at next visit.  Consider cardiology referral and/or stress test, consider addition of beta-blocker and SGLT2i.  - Basic metabolic panel  (Ca, Cl, CO2, Creat, Gluc, K, Na, BUN); Future  - hydroCHLOROthiazide (HYDRODIURIL) 25 MG tablet; Take 1 tablet (25 mg) by mouth daily.  - Basic metabolic panel  (Ca, Cl, CO2, Creat, Gluc, K, Na, BUN)  - Comprehensive metabolic panel (BMP + Alb, Alk Phos, ALT, AST, Total. Bili, TP); Future    Spinal stenosis of lumbar region with radiculopathy  Spondylolisthesis of lumbar region  Discussed with patient's trial of gabapentin 3 times daily as needed for pain.  Placing pain management referral for consultation.  Provided with DME for TENS unit.  Given daily alcohol use, recommend patient avoid acetaminophen.  May consider NSAID pending lab results.  Will follow-up in 4 weeks or sooner as needed.  - Tens  Unit/Supplies Order for DME - ONLY FOR DME  - Pain Management  Referral; Future  - gabapentin (NEURONTIN) 300 MG capsule; Take 1 capsule (300 mg) by mouth 3 times daily as needed for neuropathic pain.    Pain from implanted hardware, subsequent encounter  Status post hardware removal.  Patient does have some itching and may benefit from use of hydroxyzine for this and pain.  - hydrOXYzine HCl (ATARAX) 10 MG tablet; Take 1 tablet (10 mg) by mouth every 6 hours as needed for itching or other (pain).    Hepatic steatosis  Elevated LFTs  Fatty liver disease confirmed with ultrasound, likely alcoholic given 3-4 drinks per day.  Labs today, recommendations pending results.  - Comprehensive metabolic panel (BMP + Alb, Alk Phos, ALT, AST, Total. Bili, TP); Future    Alcohol abuse  Patient admitted to alcohol use for management of pain.  Will discuss further at next visit.    Hematuria, unspecified type  Noted on previous urinalysis, advised to have urology follow-up.  Will check today and refer as needed.  - UA Macroscopic with reflex to Microscopic and Culture - Lab Collect; Future  - UA Macroscopic with reflex to Microscopic and Culture - Lab Collect  - UA Microscopic with Reflex to Culture  - Urine Culture    Lipid screening  Discussed ASCVD risk as being greater than 20% and patient should likely be on statin for long-term risk reduction.  Will discuss further at next visit  - Lipid panel reflex to direct LDL Fasting; Future  - Lipid panel reflex to direct LDL Fasting    Nocturnal hypoxia  Not discussed today, will discuss at next visit and consider sleep referral.    Iron deficiency anemia, unspecified iron deficiency anemia type  Recommend follow up labs.  - iron  - ferritin    Screening for thyroid disorder  - TSH with reflex to T4    Elevated Prostate specific antigen (PSA)  Recheck of labs, will discuss urology referral at next visit or sooner pending results.    The longitudinal plan of care for the  "diagnosis(es)/condition(s) as documented were addressed during this visit. Due to the added complexity in care, I will continue to support Philipp in the subsequent management and with ongoing continuity of care.    [facility]  Review of the result(s) of each unique test -    Independent interpretation of a test performed by another physician/other qualified health care professional (not separately reported) -    Discussion of management or test interpretation with external physician/other qualified healthcare professional/appropriate source -    Ordering of each unique test  Prescription drug management    58 minutes spent by me on the date of the encounter doing chart review, history and exam, documentation and further activities per the note        BMI  Estimated body mass index is 32.69 kg/m  as calculated from the following:    Height as of this encounter: 1.727 m (5' 8\").    Weight as of this encounter: 97.5 kg (215 lb).       Counseling  Appropriate preventive services were addressed with this patient via screening, questionnaire, or discussion as appropriate for fall prevention, nutrition, physical activity, Tobacco-use cessation, social engagement, weight loss and cognition.  Checklist reviewing preventive services available has been given to the patient.  Reviewed patient's diet, addressing concerns and/or questions.   He is at risk for lack of exercise and has been provided with information to increase physical activity for the benefit of his well-being.   The patient was instructed to see the dentist every 6 months.   The patient reports drinking more than 3 alcoholic drinks per day and/or more than 7 drhnks per week. The patient was counseled and given information about possible harmful effects of excessive alcohol intake.Updated plan of care.  Patient reported difficulty with activities of daily living were addressed today.    See " Patient Instructions    Sherry Segal is a 69 year old, presenting for the following health issues:  Hypertension and Recheck Medication        1/8/2025     2:34 PM   Additional Questions   Roomed by Danita Tamayo, EMT-B         1/8/2025   Declines Weight   Did patient decline having their weight taken? Yes     History of Present Illness       Hypertension: He presents for follow up of hypertension.  He does check blood pressure  regularly outside of the clinic. Outside blood pressures have been over 140/90. He does not follow a low salt diet. He exercises with enough effort to increase his heart rate 9 or less minutes per day.  He exercises with enough effort to increase his heart rate 3 or less days per week.   He is taking medications regularly.      Medication Followup of Multiple  Taking Medication as prescribed: yes  Side Effects:  None  Medication Helping Symptoms:  yes     Patient has been experiencing feet and leg swelling since surgery in April 2024, had hardware taken out (removal of thoracic 10 pelvis instrumentation).  It occurs in both legs, he says it is annoying but not painful.     Follow-up on blood pressure medications.  He always took chlorthalidone and lisinopril, but his sodium has been chronically low.  Chlorthalidone was stopped and amlodipine was started in September.  Blood pressure at home today was 139/91.    Patient was hospitalized At [facility] in October 2024.  He was diagnosed with sepsis from questionable pneumonia with generalized weakness and repeated falls.  The generalized weakness and fall prompted his ER visit, where he was diagnosed with sepsis and pneumonia, along with rhabdomyolysis.  He did have mild transaminitis, likely secondary to chronic alcohol use (3-4 drinks per day).  Ultrasound was obtained indicating hepatic steatosis.    During his stay an echo was obtained to evaluate lower extremity swelling, EF showed to be 53% with indeterminant left ventricular  diastolic function, borderline enlarged right ventricular chamber.  It was not clear that he had HFpEF, and was advised to follow-up with PCP.  He experienced chronic insomnia and nocturnal hypoxia, was recommended to get a sleep study.  Additionally he had a urinalysis that showed hematuria.  It does not appear that he had any hospital follow-up upon discharge.    Per discharge summary:  ACTIVE ISSUES REQUIRING FOLLOW UP  -Please refer to Urology for hematuria  -Consider referral to spine center for alternative pain control options like TENS unit  -Consider SGLT2 inhibitor initiation for possible HFpEF  -Consider additional agent for diuresis instead of chlorthalidone  -Consider referral to Sleep Medicine if he is amenable  -Consider initiation of naltrexone or acamprosate if he is struggling with alcohol cessation     Also, he deals with chronic pain in his back, is hesitant of opioids.  Acetaminophen is not a good option for him given his alcohol use and fatty liver disease.  He currently does not have any medications listed for pain management.  He does do home exercises for his back.  He had surgery in , developed hardware failure, and declined further intervention until hardware was removed in April 2024.    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    Annual Wellness Visit     Patient has been advised of split billing requirements and indicates understanding: Yes       Health Care Directive  Patient does not have a Health Care Directive: Discussed advance care planning with patient; however, patient declined at this time.      1/8/2025   General Health   How would you rate your overall physical health? (!) FAIR   Feel stress (tense,  anxious, or unable to sleep) Not at all          1/8/2025   Nutrition   Diet: Carbohydrate counting         1/8/2025   Exercise   Days per week of moderate/strenous exercise 3 days           1/8/2025   Social Factors   Frequency of gathering with friends or relatives Three times a week   Worry food won't last until get money to buy more No   Food not last or not have enough money for food? No   Do you have housing? (Housing is defined as stable permanent housing and does not include staying ouside in a car, in a tent, in an abandoned building, in an overnight shelter, or couch-surfing.) Yes   Are you worried about losing your housing? No   Lack of transportation? No   Unable to get utilities (heat,electricity)? No           1/8/2025   Fall Risk   Fallen 2 or more times in the past year? Yes   Trouble with walking or balance? Yes   Reason Gait Speed Test Not Completed Patient does not tolerate an upright or standing position (e.g. wheelchair)      He does home exercise program.        1/8/2025   Activities of Daily Living- Home Safety   Needs help with the following daily activites Housework    Laundry   Safety concerns in the home None of the above       Multiple values from one day are sorted in reverse-chronological order         1/8/2025   Dental   Dentist two times every year? (!) NO         1/8/2025   Hearing Screening   Hearing concerns? None of the above         1/8/2025   Driving Risk Screening   Patient/family members have concerns about driving No         1/8/2025   General Alertness/Fatigue Screening   Have you been more tired than usual lately? No         1/8/2025   Urinary Incontinence Screening   Bothered by leaking urine in past 6 months No         1/8/2025   TB Screening   Were you born outside of the [place]? Yes       Social History     Tobacco Use    Smoking status: Never    Smokeless tobacco: Never   Vaping Use    Vaping status: Never Used   Substance Use Topics    Alcohol use: Yes     Comment: 2-3 4x  /week    Drug use: No         Today's PHQ-2 Score:       1/8/2025     2:25 PM   PHQ-2 ( 1999 [employer])   Q1: Little interest or pleasure in doing things 0   Q2: Feeling down, depressed or hopeless 0   PHQ-2 Score 0    Q1: Little interest or pleasure in doing things Not at all   Q2: Feeling down, depressed or hopeless Not at all   PHQ-2 Score 0       Patient-reported           1/8/2025   AAA Screening   Family history of Abdominal Aortic Aneurysm (AAA)? No   Last PSA:   PSA   Date Value Ref Range Status   12/27/2008 4.74 (H) 0 - 4 ug/L Final     Prostate Specific Antigen Screen   Date Value Ref Range Status   10/07/2022 15.00 (H) 0.00 - 4.00 ug/L Final     ASCVD Risk   The 10-year ASCVD risk score (Chloe ARMENTA, et al., 2019) is: 20.2%    Values used to calculate the score:      Age: 69 years      Sex: Male      Is Non- : No      Diabetic: No      Tobacco smoker: No      Systolic Blood Pressure: 161 mmHg      Is BP treated: Yes      HDL Cholesterol: 66 mg/dL      Total Cholesterol: 180 mg/dL              Reviewed and updated as needed this visit by Provider                    Past Medical History:   Diagnosis Date    Essential hypertension, benign 01/01/2001    Family history of alcoholism      Past Surgical History:   Procedure Laterality Date    EXPLORE SPINE, REMOVE HARDWARE, COMBINED N/A 4/29/2024    Procedure: Removal Thoracic 10 Pelvis instrumentation;  Surgeon: Eliseo Aggarwal MD;  Location:  OR    OPTICAL [employer] SYSTEM FUSION SPINE POSTERIOR LUMBAR THREE+ LEVELS N/A 12/15/2022    Procedure: Thoracic 10 to Sacral 1 posterior segmental instrumentation. Pelvic instrumentation. Screw cement augmentation L10 to L11. Lumbar 2 to Sacral 1 bilateral decompression and transforaminal interbody fusion. Posterior arthrodesis Thoracic 10-Sacral 1.;  Surgeon: Eliseo Aggarwal MD;  Location:  OR     Lab work is in process  Labs reviewed in EPIC  BP Readings from Last 3 Encounters:    01/08/25 (!) 161/95   05/04/24 (!) 144/95   04/25/24 130/84    Wt Readings from Last 3 Encounters:   01/08/25 97.5 kg (215 lb)   04/29/24 95.3 kg (210 lb)   04/25/24 95.3 kg (210 lb)                  Patient Active Problem List   Diagnosis    Onychomycosis    CARDIOVASCULAR SCREENING; LDL GOAL LESS THAN 130    HTN, goal below 140/90    Spondylolisthesis of lumbar region    Spinal stenosis of lumbar region with radiculopathy    Other spondylosis, lumbar region    Lumbar spine pain    Other osteoporosis without current pathological fracture    Other secondary scoliosis, lumbar region    S/P spinal fusion    Loosening of hardware in spine    Pain from implanted hardware    Elevated creatine kinase level    Hepatic steatosis    Alcohol abuse    Abnormal echocardiogram     Past Surgical History:   Procedure Laterality Date    EXPLORE SPINE, REMOVE HARDWARE, COMBINED N/A 4/29/2024    Procedure: Removal Thoracic 10 Pelvis instrumentation;  Surgeon: Eliseo Aggarwal MD;  Location:  OR    OPTICAL TRACKING SYSTEM FUSION SPINE POSTERIOR LUMBAR THREE+ LEVELS N/A 12/15/2022    Procedure: Thoracic 10 to Sacral 1 posterior segmental instrumentation. Pelvic instrumentation. Screw cement augmentation L10 to L11. Lumbar 2 to Sacral 1 bilateral decompression and transforaminal interbody fusion. Posterior arthrodesis Thoracic 10-Sacral 1.;  Surgeon: Eliseo Aggarwal MD;  Location:  OR       Social History     Tobacco Use    Smoking status: Never    Smokeless tobacco: Never   Substance Use Topics    Alcohol use: Yes     Comment: 3 drinks daily     Family History   Problem Relation Age of Onset    Hypertension Father          Current Outpatient Medications   Medication Sig Dispense Refill    amLODIPine (NORVASC) 2.5 MG tablet Take 1 tablet (2.5 mg) by mouth daily. 90 tablet 1    gabapentin (NEURONTIN) 300 MG capsule Take 1 capsule (300 mg) by mouth 3 times daily as needed for neuropathic pain. 90 capsule 0     hydroCHLOROthiazide (HYDRODIURIL) 25 MG tablet Take 1 tablet (25 mg) by mouth daily. 30 tablet 1    hydrOXYzine HCl (ATARAX) 10 MG tablet Take 1 tablet (10 mg) by mouth every 6 hours as needed for itching or other (pain). 30 tablet 3    lisinopril (ZESTRIL) 40 MG tablet TAKE ONE TABLET BY MOUTH EVERY DAY 90 tablet 1    omeprazole (PRILOSEC) 20 MG DR capsule Take 20 mg by mouth daily      Vitamin D3 (CHOLECALCIFEROL) 25 mcg (1000 units) tablet Take 1 tablet (25 mcg) by mouth daily 30 tablet 0    zinc sulfate (ZINCATE) 220 (50 Zn) MG capsule Take 1 capsule (220 mg) by mouth daily 30 capsule 0     Allergies   Allergen Reactions    No Known Drug Allergy      Recent Labs   Lab Test 09/23/24  1322 05/04/24  0840 12/17/22  1150 12/16/22  1305 12/02/22  0850 10/07/22  1523 09/26/22  1246 06/17/22  1526   LDL  --   --   --   --   --  96  --   --    HDL  --   --   --   --   --  66  --   --    TRIG  --   --   --   --   --  90  --   --    ALT  --   --   --  34  --   --   --  39   CR 0.69 0.58*   < > 1.22   < > 0.65*   < > 0.75   GFRESTIMATED >90 >90   < > 66   < > >90   < > >90   POTASSIUM 3.9 4.5   < > 3.7   < > 4.1  --  4.1   TSH  --   --   --   --   --   --   --  4.17    < > = values in this interval not displayed.        Current providers sharing in care for this patient include:  Patient Care Team:  Eliseo Aggarwal MD as PCP - General (Neurological Surgery)  Amber Kruse MD as Hospitalist (Endocrinology, Diabetes, and Metabolism)  Babar Issa MD as MD (Neurology)  Eliseo Aggarwal MD as MD (Neurological Surgery)  Eliseo Aggarwal MD as Assigned Neuroscience Provider  Deena Thompson DO as MD (Hematology & Oncology)  Rubio Albright MD as Assigned Surgical Provider  Josefina Turk MD as Assigned PCP    The following health maintenance items are reviewed in Epic and correct as of today:  Health Maintenance   Topic Date Due    DTAP/TDAP/TD IMMUNIZATION (1 - Tdap) Never done     "Pneumococcal Vaccine: 50+ Years (1 of 1 - PCV) Never done    ZOSTER IMMUNIZATION (1 of 2) Never done    MEDICARE ANNUAL WELLNESS VISIT  12/11/2022    ANNUAL REVIEW OF HM ORDERS  10/07/2023    COLORECTAL CANCER SCREENING  02/03/2024    INFLUENZA VACCINE (1) Never done    COVID-19 Vaccine (1 - 2024-25 season) Never done    BMP  09/23/2025    FALL RISK ASSESSMENT  01/08/2026    GLUCOSE  09/23/2027    LIPID  10/07/2027    ADVANCE CARE PLANNING  04/25/2029    RSV VACCINE (1 - 1-dose 75+ series) 12/11/2032    HEPATITIS C SCREENING  Completed    PHQ-2 (once per calendar year)  Completed    HPV IMMUNIZATION  Aged Out    MENINGITIS IMMUNIZATION  Aged Out    RSV MONOCLONAL ANTIBODY  Aged Out       Appropriate preventive services were discussed with this patient, including applicable screening as appropriate for fall prevention, nutrition, physical activity, Tobacco-use cessation, weight loss and cognition.  Checklist reviewing preventive services available has been given to the patient.           1/8/2025   Mini Cog   Clock Draw Score 2 Normal   3 Item Recall 3 objects recalled   Mini Cog Total Score 5                Objective    BP (!) 161/95 (BP Location: Right arm, Patient Position: Sitting, Cuff Size: Adult Large)   Pulse 90   Temp 97.8  F (36.6  C) (Oral)   Resp 16   Ht 1.727 m (5' 8\")   Wt 97.5 kg (215 lb)   SpO2 99%   BMI 32.69 kg/m    Body mass index is 32.69 kg/m .  Physical Exam   GENERAL: alert and no distress  RESP: lungs clear to auscultation - no rales, rhonchi or wheezes  CV: regular rates and rhythm, normal S1 S2, no S3 or S4, no murmur, click or rub, and 1+ bilateral lower extremity pitting edema to mid shin  MS: Ambulates with seated walker  SKIN: venous stasis dermatitis  PSYCH: mentation appears normal, affect flat, and fatigued    Labs reviewed in care everywhere    Lab on 09/23/2024   Component Date Value Ref Range Status    Sodium 09/23/2024 132 (L)  135 - 145 mmol/L Final    Potassium 09/23/2024 " 3.9  3.4 - 5.3 mmol/L Final    Chloride 09/23/2024 92 (L)  98 - 107 mmol/L Final    Carbon Dioxide (CO2) 09/23/2024 25  22 - 29 mmol/L Final    Anion Gap 09/23/2024 15  7 - 15 mmol/L Final    Urea Nitrogen 09/23/2024 9.6  8.0 - 23.0 mg/dL Final    Creatinine 09/23/2024 0.69  0.67 - 1.17 mg/dL Final    GFR Estimate 09/23/2024 >90  >60 mL/min/1.73m2 Final    eGFR calculated using 2021 CKD-EPI equation.    Calcium 09/23/2024 9.5  8.8 - 10.4 mg/dL Final    Reference intervals for this test were updated on 7/16/2024 to reflect our healthy population more accurately. There may be differences in the flagging of prior results with similar values performed with this method. Those prior results can be interpreted in the context of the updated reference intervals.    Glucose 09/23/2024 108 (H)  70 - 99 mg/dL Final           Signed Electronically by: Josefina Mireles MD

## 2025-01-09 LAB
ALBUMIN SERPL BCG-MCNC: 4.2 G/DL (ref 3.5–5.2)
ALP SERPL-CCNC: 137 U/L (ref 40–150)
ALT SERPL W P-5'-P-CCNC: 73 U/L (ref 0–70)
ANION GAP SERPL CALCULATED.3IONS-SCNC: 15 MMOL/L (ref 7–15)
ANION GAP SERPL CALCULATED.3IONS-SCNC: 15 MMOL/L (ref 7–15)
AST SERPL W P-5'-P-CCNC: 102 U/L (ref 0–45)
BILIRUB SERPL-MCNC: 1.2 MG/DL
BUN SERPL-MCNC: 15.2 MG/DL (ref 8–23)
BUN SERPL-MCNC: 15.2 MG/DL (ref 8–23)
CALCIUM SERPL-MCNC: 9.5 MG/DL (ref 8.8–10.4)
CALCIUM SERPL-MCNC: 9.5 MG/DL (ref 8.8–10.4)
CHLORIDE SERPL-SCNC: 99 MMOL/L (ref 98–107)
CHLORIDE SERPL-SCNC: 99 MMOL/L (ref 98–107)
CHOLEST SERPL-MCNC: 171 MG/DL
CK SERPL-CCNC: 71 U/L (ref 39–308)
CREAT SERPL-MCNC: 0.55 MG/DL (ref 0.67–1.17)
CREAT SERPL-MCNC: 0.55 MG/DL (ref 0.67–1.17)
EGFRCR SERPLBLD CKD-EPI 2021: >90 ML/MIN/1.73M2
EGFRCR SERPLBLD CKD-EPI 2021: >90 ML/MIN/1.73M2
FASTING STATUS PATIENT QL REPORTED: NO
FERRITIN SERPL-MCNC: 166 NG/ML (ref 31–409)
GLUCOSE SERPL-MCNC: 92 MG/DL (ref 70–99)
GLUCOSE SERPL-MCNC: 92 MG/DL (ref 70–99)
HCO3 SERPL-SCNC: 23 MMOL/L (ref 22–29)
HCO3 SERPL-SCNC: 23 MMOL/L (ref 22–29)
HDLC SERPL-MCNC: 67 MG/DL
IRON BINDING CAPACITY (ROCHE): 262 UG/DL (ref 240–430)
IRON SATN MFR SERPL: 25 % (ref 15–46)
IRON SERPL-MCNC: 66 UG/DL (ref 61–157)
LDLC SERPL CALC-MCNC: 91 MG/DL
NONHDLC SERPL-MCNC: 104 MG/DL
POTASSIUM SERPL-SCNC: 4.4 MMOL/L (ref 3.4–5.3)
POTASSIUM SERPL-SCNC: 4.4 MMOL/L (ref 3.4–5.3)
PROT SERPL-MCNC: 7.5 G/DL (ref 6.4–8.3)
PSA FREE MFR SERPL: 15.97 %
PSA FREE SERPL-MCNC: 1 NG/ML
PSA SERPL DL<=0.01 NG/ML-MCNC: 6.26 NG/ML (ref 0–4.5)
SODIUM SERPL-SCNC: 137 MMOL/L (ref 135–145)
SODIUM SERPL-SCNC: 137 MMOL/L (ref 135–145)
T4 FREE SERPL-MCNC: 1.13 NG/DL (ref 0.9–1.7)
TRIGL SERPL-MCNC: 66 MG/DL
TSH SERPL DL<=0.005 MIU/L-ACNC: 8.09 UIU/ML (ref 0.3–4.2)

## 2025-01-12 ENCOUNTER — MYC MEDICAL ADVICE (OUTPATIENT)
Dept: FAMILY MEDICINE | Facility: CLINIC | Age: 70
End: 2025-01-12
Payer: MEDICARE

## 2025-01-12 LAB — BACTERIA UR CULT: ABNORMAL

## 2025-01-13 DIAGNOSIS — R94.6 ABNORMAL FINDING ON THYROID FUNCTION TEST: Primary | ICD-10-CM

## 2025-01-13 DIAGNOSIS — K76.0 HEPATIC STEATOSIS: Primary | ICD-10-CM

## 2025-01-13 DIAGNOSIS — R79.89 ELEVATED LFTS: ICD-10-CM

## 2025-01-13 DIAGNOSIS — F10.10 ALCOHOL ABUSE: ICD-10-CM

## 2025-02-05 ENCOUNTER — TELEPHONE (OUTPATIENT)
Dept: FAMILY MEDICINE | Facility: CLINIC | Age: 70
End: 2025-02-05
Payer: MEDICARE

## 2025-02-05 NOTE — TELEPHONE ENCOUNTER
Called and spoke to patient.  - patient has telephone visit scheduled for 2/7/25 with [facility] and wanting to discuss forms at this time.      Torri RODRIGUES, - [facility]  Primary Care- [facility] [place]  [facility]

## 2025-02-05 NOTE — TELEPHONE ENCOUNTER
Forms/Letter Request    Type of form/letter: Disability- Social Security    Is Release of Information needed?: Yes  Was an ANI obtained?  Yes- ANI signed by company and located with medical statement forms    Do we have the form/letter: Yes: in provider basket    Who is the form from? Gregorio Marcano    Where did/will the form come from? Form was mailed in    When is form/letter needed by: ASAP    How would you like the form/letter returned: Fax : [phone]- Gregorio RODRIGUES, - [place] [address]  Primary Care- [facility] [place] [facility]

## 2025-02-07 ENCOUNTER — MYC MEDICAL ADVICE (OUTPATIENT)
Dept: FAMILY MEDICINE | Facility: CLINIC | Age: 70
End: 2025-02-07

## 2025-02-07 ENCOUNTER — VIRTUAL VISIT (OUTPATIENT)
Dept: FAMILY MEDICINE | Facility: CLINIC | Age: 70
End: 2025-02-07
Payer: MEDICARE

## 2025-02-07 DIAGNOSIS — F10.10 ALCOHOL ABUSE: ICD-10-CM

## 2025-02-07 DIAGNOSIS — I10 HTN, GOAL BELOW 140/90: ICD-10-CM

## 2025-02-07 DIAGNOSIS — Z02.9 ADMINISTRATIVE ENCOUNTER: Primary | ICD-10-CM

## 2025-02-07 DIAGNOSIS — M48.061 SPINAL STENOSIS OF LUMBAR REGION WITH RADICULOPATHY: ICD-10-CM

## 2025-02-07 DIAGNOSIS — R97.20 ELEVATED PROSTATE SPECIFIC ANTIGEN (PSA): ICD-10-CM

## 2025-02-07 DIAGNOSIS — K76.0 HEPATIC STEATOSIS: Primary | ICD-10-CM

## 2025-02-07 DIAGNOSIS — K76.0 HEPATIC STEATOSIS: ICD-10-CM

## 2025-02-07 DIAGNOSIS — M54.16 SPINAL STENOSIS OF LUMBAR REGION WITH RADICULOPATHY: ICD-10-CM

## 2025-02-07 DIAGNOSIS — M43.16 SPONDYLOLISTHESIS OF LUMBAR REGION: ICD-10-CM

## 2025-02-07 DIAGNOSIS — R79.89 ELEVATED LFTS: ICD-10-CM

## 2025-02-07 PROBLEM — M54.50 LUMBAR SPINE PAIN: Status: RESOLVED | Noted: 2022-02-11 | Resolved: 2025-02-07

## 2025-02-07 PROBLEM — T84.498A LOOSENING OF HARDWARE IN SPINE: Status: RESOLVED | Noted: 2023-03-27 | Resolved: 2025-02-07

## 2025-02-07 PROBLEM — T85.848A PAIN FROM IMPLANTED HARDWARE: Status: RESOLVED | Noted: 2024-04-29 | Resolved: 2025-02-07

## 2025-02-07 PROCEDURE — 99417 PROLNG OP E/M EACH 15 MIN: CPT | Mod: 93 | Performed by: FAMILY MEDICINE

## 2025-02-07 PROCEDURE — 98015 SYNCH AUDIO-ONLY EST HIGH 40: CPT | Performed by: FAMILY MEDICINE

## 2025-02-07 RX ORDER — SPIRONOLACTONE 50 MG/1
50 TABLET, FILM COATED ORAL DAILY
Qty: 90 TABLET | Refills: 3 | Status: SHIPPED | OUTPATIENT
Start: 2025-02-07

## 2025-02-07 RX ORDER — GABAPENTIN 300 MG/1
300 CAPSULE ORAL 3 TIMES DAILY PRN
Qty: 270 CAPSULE | Refills: 3 | Status: SHIPPED | OUTPATIENT
Start: 2025-02-07

## 2025-02-07 NOTE — PROGRESS NOTES
Philipp is a 69 year old who is being evaluated via a billable telephone visit.    What phone number would you like to be contacted at? [phone]   How would you like to obtain your AVS? Marty  Originating Location (pt. Location): Home    Distant Location (provider location):  On-site  Telephone visit completed due to the patient did not have access to video, while the distant provider did.    Assessment & Plan     Administrative encounter  Spinal stenosis of lumbar region with radiculopathy  Spondylolisthesis of lumbar region  Social Security Disability paperwork completed and will be return faxed.  Continue Gabapentin TID, he has an appointment with pain management next month.  - gabapentin (NEURONTIN) 300 MG capsule; Take 1 capsule (300 mg) by mouth 3 times daily as needed for neuropathic pain.    Elevated prostate specific antigen (PSA)  Referral to urology for follow up.  Patient declines antibiotics right now for possible UTI, consider repeating UA and culture.  - Adult Urology  Referral; Future    HTN, goal below 140/90  Recommend changing Lisinopril to Valsartan 160 mg for one week, then increase to 240 mg daily.  Do not suspect readings will go down enough, so will add spironolactone 50 mg once daily.  Follow up in one month with BP readings, or sooner as needed.  Labs to be done in 2 weeks.  - spironolactone (ALDACTONE) 50 MG tablet; Take 1 tablet (50 mg) by mouth daily.  - Basic metabolic panel  (Ca, Cl, CO2, Creat, Gluc, K, Na, BUN); Future    Hepatic steatosis  Elevated LFTs  Alcohol abuse  Patient had US to Diagnose FLD, likely alcoholic.  Will get MR elastography given US was challenging per radiology report.    The longitudinal plan of care for the diagnosis(es)/condition(s) as documented were addressed during this visit. Due to the added complexity in care, I will continue to support Philipp in the subsequent management and with ongoing continuity of care.    Review of prior external note(s)  "from - CareEverywhere information from [place] reviewed  Review of the result(s) of each unique test -    Independent interpretation of a test performed by another physician/other qualified health care professional (not separately reported) -    Ordering of each unique test  Prescription drug management    85 minutes spent by me on the date of the encounter doing chart review, history and exam, documentation and further activities per the note    BMI  Estimated body mass index is 32.69 kg/m  as calculated from the following:    Height as of 1/8/25: 1.727 m (5' 8\").    Weight as of 1/8/25: 97.5 kg (215 lb).         See Patient Instructions    Sherry Segal is a 69 year old, presenting for the following health issues:  Forms (Disability forms you should have currently) and Hypertension (BP follow up )      2/7/2025     4:07 PM   Additional Questions   Roomed by tyler cannon     History of Present Illness       Hypertension: He presents for follow up of hypertension.  He does not check blood pressure  regularly outside of the clinic. Outside blood pressures have been over 140/90. He does not follow a low salt diet.     He eats 2-3 servings of fruits and vegetables daily.He consumes 4 sweetened beverage(s) daily.He exercises with enough effort to increase his heart rate 9 or less minutes per day.  He exercises with enough effort to increase his heart rate 7 days per week.   He is taking medications regularly.       Needs paperwork for social security disability.  He can stand for less than a minute, has a 10 lb lifting restriction.  He can drive, walk down a ramp with the walker, but not for long or far distances.  He cannot stand for more than a minute or so.  Must have the walker to ambulate.  He has foot drop in both feet.  The pain in his back can be prohibitive to use of his arms at time.  He \"tweaked\" his right shoulder a while back.  Pain has improved, but still is a problem for him.    2.   Elevated PSA, " "has a history of this, was seeing urology in the past. Prostate biopsy was benign, advised to follow up yearly.    3.   Urine grew staph epidermidis.  He does have some urinary symptoms, but not as bad as before. Dark urine, needs to drink more water.  He does NOT want to do an antibiotic at this time.    4.   Blood pressure: Lisinopril and chlorthalidone had worked in the past, but not any longer.  Chlorthalidone caused him to have low sodium levels. He was started on Amlodipine 2.5  mg daily.  He has continued the Lisinopril 40 mg daily.  Amlodipine was stopped because of swelling in his legs.  Hydrochlorothiazide was started to see if diuretic would help and not lower sodium.   Blood pressure today is still high.      5.  He has been decreasing the alcohol use and instead is using a THC drink to help with pain.  Liver enzymes were elevated at last check, so US was ordered to evaluate for hepatic steatosis.  Review of chart indicates that patient already had US and found FLD, as well as renal cysts, gallstones, fatty atrophy of pancreas, adrenal nodule, diverticulosis, hiatal hernia and vascular calcifications.    6.  Gabapentin has helped with his pain, would like to continue current dosing.        Objective    Vitals - Patient Reported  Systolic (Patient Reported): (!) 155  Diastolic (Patient Reported): (!) 93  Blood pressure taken with manual cuff (will exclude from quality measure): Yes  Weight (Patient Reported): 95.3 kg (210 lb)  Height (Patient Reported): 172.7 cm (5' 8\")  BMI (Based on Pt Reported Ht/Wt): 31.93  Pain Score: Moderate Pain (4)  Pain Loc: Low Back        Physical Exam   General: Alert and no distress //Respiratory: No audible wheeze, cough, or shortness of breath // Psychiatric:  Appropriate affect, tone, and pace of words      Office Visit on 01/08/2025   Component Date Value Ref Range Status    Sodium 01/08/2025 137  135 - 145 mmol/L Final    Potassium 01/08/2025 4.4  3.4 - 5.3 mmol/L Final "    Chloride 01/08/2025 99  98 - 107 mmol/L Final    Carbon Dioxide (CO2) 01/08/2025 23  22 - 29 mmol/L Final    Anion Gap 01/08/2025 15  7 - 15 mmol/L Final    Urea Nitrogen 01/08/2025 15.2  8.0 - 23.0 mg/dL Final    Creatinine 01/08/2025 0.55 (L)  0.67 - 1.17 mg/dL Final    GFR Estimate 01/08/2025 >90  >60 mL/min/1.73m2 Final    eGFR calculated using 2021 CKD-EPI equation.  eGFR calculated using 2021 CKD-EPI equation.    Calcium 01/08/2025 9.5  8.8 - 10.4 mg/dL Final    Reference intervals for this test were updated on 7/16/2024 to reflect our healthy population more accurately. There may be differences in the flagging of prior results with similar values performed with this method. Those prior results can be interpreted in the context of the updated reference intervals.    Glucose 01/08/2025 92  70 - 99 mg/dL Final    Patient Fasting > 8hrs? 01/08/2025 No   Final    Cholesterol 01/08/2025 171  <200 mg/dL Final    Triglycerides 01/08/2025 66  <150 mg/dL Final    Direct Measure HDL 01/08/2025 67  >=40 mg/dL Final    LDL Cholesterol Calculated 01/08/2025 91  <100 mg/dL Final    Non HDL Cholesterol 01/08/2025 104  <130 mg/dL Final    Patient Fasting > 8hrs? 01/08/2025 No   Final    CK 01/08/2025 71  39 - 308 U/L Final    Color Urine 01/08/2025 Macey (A)  Colorless, Straw, Light Yellow, Yellow Final    Appearance Urine 01/08/2025 Cloudy (A)  Clear Final    Glucose Urine 01/08/2025 Negative  Negative mg/dL Final    Bilirubin Urine 01/08/2025 Small (A)  Negative Final    Ketones Urine 01/08/2025 40 (A)  Negative mg/dL Final    Specific Gravity Urine 01/08/2025 >=1.030  1.003 - 1.035 Final    Blood Urine 01/08/2025 Negative  Negative Final    pH Urine 01/08/2025 5.5  5.0 - 7.0 Final    Protein Albumin Urine 01/08/2025 30 (A)  Negative mg/dL Final    Urobilinogen Urine 01/08/2025 2.0 (A)  0.2, 1.0 E.U./dL Final    Nitrite Urine 01/08/2025 Positive (A)  Negative Final    Leukocyte Esterase Urine 01/08/2025 Trace (A)   Negative Final    Bacteria Urine 01/08/2025 Many (A)  None Seen /HPF Final    RBC Urine 01/08/2025 2-5 (A)  0-2 /HPF /HPF Final    WBC Urine 01/08/2025 5-10 (A)  0-5 /HPF /HPF Final    Squamous Epithelials Urine 01/08/2025 Many (A)  None Seen /LPF Final    WBC Clumps Urine 01/08/2025 Present (A)  None Seen /HPF Final    Mucus Urine 01/08/2025 Present (A)  None Seen /LPF Final    Hyaline Casts Urine 01/08/2025 0-2 (A)  None Seen /LPF Final    Culture 01/08/2025 >100,000 CFU/mL Staphylococcus epidermidis (A)   Final    TSH 01/08/2025 8.09 (H)  0.30 - 4.20 uIU/mL Final    PSA Free 01/08/2025 1.0  ng/mL Final    PSA Percent Free 01/08/2025 15.97  % Final    When Total PSA concentration is in the range of 4.0-10.0 ng/mL    Probability of Prostate Cancer:                                Patient Age   % Free PSA           50 to 59yrs    60 to 69yrs    >= 70yrs   0.00 to 10.00%       49%            58%            65%  10.01 to 18.00%      27%            34%            41%  18.01 to 25.00%      18%            24%            30%  > = 25.00%            9%            12%            16%    PSA Total 01/08/2025 6.26 (H)  0.00 - 4.50 ng/mL Final    Iron 01/08/2025 66  61 - 157 ug/dL Final    Iron Binding Capacity 01/08/2025 262  240 - 430 ug/dL Final    Iron Sat Index 01/08/2025 25  15 - 46 % Final    Ferritin 01/08/2025 166  31 - 409 ng/mL Final    Sodium 01/08/2025 137  135 - 145 mmol/L Final    Potassium 01/08/2025 4.4  3.4 - 5.3 mmol/L Final    Carbon Dioxide (CO2) 01/08/2025 23  22 - 29 mmol/L Final    Anion Gap 01/08/2025 15  7 - 15 mmol/L Final    Urea Nitrogen 01/08/2025 15.2  8.0 - 23.0 mg/dL Final    Creatinine 01/08/2025 0.55 (L)  0.67 - 1.17 mg/dL Final    GFR Estimate 01/08/2025 >90  >60 mL/min/1.73m2 Final    eGFR calculated using 2021 CKD-EPI equation.    Calcium 01/08/2025 9.5  8.8 - 10.4 mg/dL Final    Reference intervals for this test were updated on 7/16/2024 to reflect our healthy population more accurately.  There may be differences in the flagging of prior results with similar values performed with this method. Those prior results can be interpreted in the context of the updated reference intervals.    Chloride 01/08/2025 99  98 - 107 mmol/L Final    Glucose 01/08/2025 92  70 - 99 mg/dL Final    Alkaline Phosphatase 01/08/2025 137  40 - 150 U/L Final    AST 01/08/2025 102 (H)  0 - 45 U/L Final    ALT 01/08/2025 73 (H)  0 - 70 U/L Final    Protein Total 01/08/2025 7.5  6.4 - 8.3 g/dL Final    Albumin 01/08/2025 4.2  3.5 - 5.2 g/dL Final    Bilirubin Total 01/08/2025 1.2  <=1.2 mg/dL Final    Patient Fasting > 8hrs? 01/08/2025 No   Final    Free T4 01/08/2025 1.13  0.90 - 1.70 ng/dL Final         Phone call duration: 45 minutes  Signed Electronically by: Josefina Mireles MD

## 2025-02-07 NOTE — PROGRESS NOTES
"Philipp is a 69 year old who is being evaluated via a billable video visit.    What phone number would you like to be contacted at? [phone]  How would you like to obtain your AVS? MyChart  {If patient encounters technical issues they should call [phone] :073358}    {PROVIDER CHARTING PREFERENCE:237467}    Subjective   Philipp is a 69 year old, presenting for the following health issues:  Forms (Disability forms you should have currently) and Hypertension (BP follow up )        2/7/2025     4:07 PM   Additional Questions   Roomed by tyler cannon     Video Start Time: {video visit start/end time for provider to select:666468}    History of Present Illness       Hypertension: He presents for follow up of hypertension.  He does not check blood pressure  regularly outside of the clinic. Outside blood pressures have been over 140/90. He does not follow a low salt diet.     He eats 2-3 servings of fruits and vegetables daily.He consumes 4 sweetened beverage(s) daily.He exercises with enough effort to increase his heart rate 9 or less minutes per day.  He exercises with enough effort to increase his heart rate 7 days per week.   He is taking medications regularly.       {SUPERLIST (Optional):675751}  {additonal problems for provider to add (Optional):347034}    {ROS Picklists (Optional):804469}      Objective    Vitals - Patient Reported  Systolic (Patient Reported): (!) 155  Diastolic (Patient Reported): (!) 93  Blood pressure taken with manual cuff (will exclude from quality measure): Yes  Weight (Patient Reported): 95.3 kg (210 lb)  Height (Patient Reported): 172.7 cm (5' 8\")  BMI (Based on Pt Reported Ht/Wt): 31.93  Pain Score: Moderate Pain (4)  Pain Loc: Low Back        Physical Exam   {video visit exam brief selected:735782}    {Diagnostic Test Results (Optional):203613}      Video-Visit Details    Type of service:  Video Visit   Video End Time:{video visit start/end time for provider to " "select:305110}  Originating Location (pt. Location): {video visit patient location:073855::\"Home\"}  {PROVIDER LOCATION On-site should be selected for visits conducted from your clinic location or adjoining [facility] hospital, academic office, or other nearby [facility] building. Off-site should be selected for all other provider locations, including home:038188}  Distant Location (provider location):  {virtual location provider:580081}  Platform used for Video Visit: {Virtual Visit Platforms:026915::\"[employer]\"}  Signed Electronically by: Josefina Mireles MD  {Email feedback regarding this note to [email or web address]   :347352}  "

## 2025-02-08 RX ORDER — VALSARTAN 160 MG/1
240 TABLET ORAL DAILY
Qty: 135 TABLET | Refills: 3 | Status: SHIPPED | OUTPATIENT
Start: 2025-02-08

## 2025-02-10 NOTE — TELEPHONE ENCOUNTER
Forms completed by provider. Faxed to Gregorio Marcano and abstracting.  Copy in station gold cabinet- originals in completed faxes bin.      Torri RODRIGUES, - [place] [address]  Primary Care- [facility] [place]  [facility]

## 2025-02-11 NOTE — TELEPHONE ENCOUNTER
Called patient, left message.    There is some confusion regarding blood pressure management plan.    At visit, discussed adding Spironolactone to his Lisinopril and hydrochlorothiazide.    However, after visit, after further review, I felt it was not likely enough to bring down his pressures and sent a message recommending we stop lisinopril and start valsartan in its place.  160 mg for one week, then increase to 240 mg (1.5 tablets).    Plan is:  Start Spironolactone 50 mg once daily. Simultaneously stop Lisinopril and start Valsartan 160 mg once daily.  Continue hydrochlorothiazide.  Monitor blood pressures daily.  In one week, if pressures above 140/90, increase Valsartan to 240 mg (1.5 tablets daily)  Labs to be done when he gets his imaging done.  Follow up in one month, scarlett ok.

## 2025-02-19 ENCOUNTER — MYC MEDICAL ADVICE (OUTPATIENT)
Dept: FAMILY MEDICINE | Facility: CLINIC | Age: 70
End: 2025-02-19
Payer: MEDICARE

## 2025-03-03 ENCOUNTER — ANCILLARY PROCEDURE (OUTPATIENT)
Dept: MRI IMAGING | Facility: CLINIC | Age: 70
End: 2025-03-03
Attending: FAMILY MEDICINE
Payer: MEDICARE

## 2025-03-03 DIAGNOSIS — F10.10 ALCOHOL ABUSE: ICD-10-CM

## 2025-03-03 DIAGNOSIS — K76.0 HEPATIC STEATOSIS: ICD-10-CM

## 2025-03-03 DIAGNOSIS — R79.89 ELEVATED LFTS: ICD-10-CM

## 2025-03-03 PROCEDURE — 74183 MRI ABD W/O CNTR FLWD CNTR: CPT | Performed by: RADIOLOGY

## 2025-03-03 PROCEDURE — 76391 MR ELASTOGRAPHY: CPT | Performed by: RADIOLOGY

## 2025-03-03 PROCEDURE — A9585 GADOBUTROL INJECTION: HCPCS | Mod: JZ | Performed by: RADIOLOGY

## 2025-03-03 RX ORDER — GADOBUTROL 604.72 MG/ML
10 INJECTION INTRAVENOUS ONCE
Status: COMPLETED | OUTPATIENT
Start: 2025-03-03 | End: 2025-03-03

## 2025-03-03 RX ADMIN — GADOBUTROL 10 ML: 604.72 INJECTION INTRAVENOUS at 19:20

## 2025-03-04 ENCOUNTER — MYC MEDICAL ADVICE (OUTPATIENT)
Dept: FAMILY MEDICINE | Facility: CLINIC | Age: 70
End: 2025-03-04
Payer: MEDICARE

## 2025-03-04 DIAGNOSIS — K74.00 LIVER FIBROSIS: Primary | ICD-10-CM

## 2025-03-04 DIAGNOSIS — R79.9 ABNORMAL FINDING OF BLOOD CHEMISTRY, UNSPECIFIED: ICD-10-CM

## 2025-03-04 DIAGNOSIS — K86.89 ATROPHY OF PANCREAS: Primary | ICD-10-CM

## 2025-03-04 DIAGNOSIS — K76.0 HEPATIC STEATOSIS: ICD-10-CM

## 2025-03-04 DIAGNOSIS — R79.89 ELEVATED LFTS: ICD-10-CM

## 2025-03-04 DIAGNOSIS — F10.10 ALCOHOL ABUSE: ICD-10-CM

## 2025-03-04 DIAGNOSIS — K86.89 FATTY PANCREAS: ICD-10-CM

## 2025-03-04 NOTE — DISCHARGE INSTRUCTIONS
MRI Contrast Discharge Instructions    The IV contrast you received today will pass out of your body in your  urine. This will happen in the next 24 hours. You will not feel this process.  Your urine will not change color.    Drink at least 4 extra glasses of water or juice today (unless your doctor  has restricted your fluids). This reduces the stress on your kidneys.  You may take your regular medicines.    If you are on dialysis: It is best to have dialysis today.    If you have a reaction: Most reactions happen right away. If you have  any new symptoms after leaving the hospital (such as hives or swelling),  call your hospital at the correct number below. Or call your family doctor.  If you have breathing distress or wheezing, call 911.    Special instructions: ***    I have read and understand the above information.    Signature:______________________________________ Date:___________    Staff:__________________________________________ Date:___________     Time:__________    [facility] Departments:    ___[place]: [phone]  ___[facility]: [phone]  ___[place]: [phone] ___[facility]: [phone]  ___[facility]: [phone]  ___[facility]: [phone]  ___[phone]  ___[facility]: [phone]  ___[phone]

## 2025-03-05 ENCOUNTER — VIRTUAL VISIT (OUTPATIENT)
Dept: FAMILY MEDICINE | Facility: CLINIC | Age: 70
End: 2025-03-05
Payer: MEDICARE

## 2025-03-05 DIAGNOSIS — K74.00 LIVER FIBROSIS: ICD-10-CM

## 2025-03-05 DIAGNOSIS — F10.10 ALCOHOL ABUSE: ICD-10-CM

## 2025-03-05 DIAGNOSIS — M43.16 SPONDYLOLISTHESIS OF LUMBAR REGION: ICD-10-CM

## 2025-03-05 DIAGNOSIS — K80.20 GALLSTONES: ICD-10-CM

## 2025-03-05 DIAGNOSIS — M48.061 SPINAL STENOSIS OF LUMBAR REGION WITH RADICULOPATHY: ICD-10-CM

## 2025-03-05 DIAGNOSIS — K76.0 HEPATIC STEATOSIS: ICD-10-CM

## 2025-03-05 DIAGNOSIS — K86.89 FATTY PANCREAS: ICD-10-CM

## 2025-03-05 DIAGNOSIS — N28.1 RENAL CYST: ICD-10-CM

## 2025-03-05 DIAGNOSIS — D13.5 ADENOMYOMATOSIS OF GALLBLADDER: ICD-10-CM

## 2025-03-05 DIAGNOSIS — R79.89 ELEVATED LFTS: Primary | ICD-10-CM

## 2025-03-05 DIAGNOSIS — R16.0 HEPATOMEGALY: ICD-10-CM

## 2025-03-05 DIAGNOSIS — K57.30 DIVERTICULOSIS OF LARGE INTESTINE WITHOUT HEMORRHAGE: ICD-10-CM

## 2025-03-05 DIAGNOSIS — K44.9 HIATAL HERNIA: ICD-10-CM

## 2025-03-05 DIAGNOSIS — M54.16 SPINAL STENOSIS OF LUMBAR REGION WITH RADICULOPATHY: ICD-10-CM

## 2025-03-05 DIAGNOSIS — E27.9 ADRENAL NODULE: ICD-10-CM

## 2025-03-05 RX ORDER — METHOCARBAMOL 750 MG/1
750 TABLET, FILM COATED ORAL 4 TIMES DAILY PRN
Qty: 90 TABLET | Refills: 3 | Status: SHIPPED | OUTPATIENT
Start: 2025-03-05

## 2025-03-05 RX ORDER — IBUPROFEN 800 MG/1
800 TABLET, FILM COATED ORAL EVERY 8 HOURS PRN
COMMUNITY

## 2025-03-05 RX ORDER — GABAPENTIN 300 MG/1
600 CAPSULE ORAL 3 TIMES DAILY PRN
Qty: 270 CAPSULE | Refills: 3 | Status: SHIPPED | OUTPATIENT
Start: 2025-03-05

## 2025-03-05 NOTE — PROGRESS NOTES
Philipp is a 69 year old who is being evaluated via a billable telephone visit.    What phone number would you like to be contacted at? [phone]   How would you like to obtain your AVS? Samanthahart  Originating Location (pt. Location): Home    Distant Location (provider location):  Off-site  Telephone visit completed due to the patient did not have access to video, while the distant provider did.    Assessment & Plan     Elevated LFTs  Hepatic steatosis  Hepatomegaly  Liver fibrosis  MR Elastography done, shows stage 3-4 Fibrosis with mild hepatomegaly.  Update LFTs.  Referral to Hepatology placed already.   Advised abstinence from alcohol and avoidance of Tylenol.    - Comprehensive metabolic panel (BMP + Alb, Alk Phos, ALT, AST, Total. Bili, TP); Future    Alcohol abuse  Advised complete cessation of alcohol use, given that his liver disease is likely attributed to his chronic daily use.    Gallstones  Adenomyomatosis of gallbladder  Asymptomatic.  Benign nature of adenomyomatosis requires no specific follow up.  Follow up if developing RUQ pain, minimize fatty foods.    Renal cyst  No follow up needed.    Adrenal nodule  Stable, felt to be benign fatty adenoma.  No follow up needed.    Fatty pancreas  Recommend checking an A1c to evaluate for diabetes or prediabetes.    Hiatal hernia  No GERD symptoms.  Nothing to do right now, monitor.    Diverticulosis of large intestine without hemorrhage  Noted on imaging. Follow up as needed.    Spinal stenosis of lumbar region with radiculopathy  Spondylolisthesis of lumbar region  Patient has had improved pain with the Gabapentin.  Discussed pain regimen and need to avoid Tylenol, he does not use.  Discussed important of maintaining good renal function and avoiding renal injury.  May continue to use Ibuprofen PRN, will monitor labs.  May increase Gabapentin to 900 mg TID PRN.  Will also add Methocarbamol for PRN use.  Continue to monitor.  - methocarbamol (ROBAXIN) 750 MG  "tablet; Take 1 tablet (750 mg) by mouth 4 times daily as needed for muscle spasms.  - gabapentin (NEURONTIN) 300 MG capsule; Take 2 capsules (600 mg) by mouth 3 times daily as needed for neuropathic pain.    The longitudinal plan of care for the diagnosis(es)/condition(s) as documented were addressed during this visit. Due to the added complexity in care, I will continue to support Philipp in the subsequent management and with ongoing continuity of care.      46 minutes spent by me on the date of the encounter doing chart review, history and exam, documentation and further activities per the note    BMI  Estimated body mass index is 32.69 kg/m  as calculated from the following:    Height as of 1/8/25: 1.727 m (5' 8\").    Weight as of 1/8/25: 97.5 kg (215 lb).         See Patient Instructions    Subjective   Philipp is a 69 year old, presenting for the following health issues:  Results (MRI)      3/5/2025     1:16 PM   Additional Questions   Roomed by Deena FRANCO MA     HPI      Go over MRI results.    Pain: He takes Ibuprofen 800 mg in AM and 600 mg in the PM.  He is currently taking 600 mg Gabapentin TID.  This is helpful.  Diclofenac gel has not helped.  He has used methocarbamol in the past and that helped him     Alcohol is the best for pain management for him unfortunately.  He has had some relief with THC.    Current Outpatient Medications   Medication Sig Dispense Refill    gabapentin (NEURONTIN) 300 MG capsule Take 1 capsule (300 mg) by mouth 3 times daily as needed for neuropathic pain. 270 capsule 3    hydroCHLOROthiazide (HYDRODIURIL) 25 MG tablet Take 1 tablet (25 mg) by mouth daily. 30 tablet 1    hydrOXYzine HCl (ATARAX) 10 MG tablet Take 1 tablet (10 mg) by mouth every 6 hours as needed for itching or other (pain). 30 tablet 3    omeprazole (PRILOSEC) 20 MG DR capsule Take 20 mg by mouth daily      spironolactone (ALDACTONE) 50 MG tablet Take 1 tablet (50 mg) by mouth daily. 90 tablet 3    valsartan (DIOVAN) " "160 MG tablet Take 1.5 tablets (240 mg) by mouth daily. 135 tablet 3    Vitamin D3 (CHOLECALCIFEROL) 25 mcg (1000 units) tablet Take 1 tablet (25 mcg) by mouth daily 30 tablet 0    zinc sulfate (ZINCATE) 220 (50 Zn) MG capsule Take 1 capsule (220 mg) by mouth daily 30 capsule 0           Objective    Vitals - Patient Reported  Weight (Patient Reported): 95.3 kg (210 lb)  Height (Patient Reported): 172.7 cm (5' 8\")  BMI (Based on Pt Reported Ht/Wt): 31.93    Physical Exam   General: Alert and no distress //Respiratory: No audible wheeze, cough, or shortness of breath // Psychiatric:  Appropriate affect, tone, and pace of words    MR Elastography w Liver w/o&w Contrast    Result Date: 3/3/2025  MRI ABDOMEN ELASTOGRAPHY WITHOUT CONTRAST CLINICAL HISTORY: hepatitic steatosis, now with elevated liver enzymes, alcohol abuse; Hepatic steatosis; Elevated LFTs; Alcohol abuse TECHNIQUE: Technique: Images were acquired with and without intravenous contrast through the abdomen. The following MR images were acquired: TrueFISP, multiplanar T2 weighted, axial T1 in/out of phase, axial fat-saturated T1, diffusion-weighted, elastography. Multiplanar T1-weighted images with fat saturation were obtained before contrast administration and at multiple time points following the administration of intravenous contrast. Contrast dose: 10 mL Gadavist Comparison study: 4/30/2024 ultrasound, 10/12/2022 lumbar spine     CT FINDINGS:   Liver: The liver is mildly enlarged, measuring 17.5 cm in craniocaudal dimension at mid clavicular line. Markedly decreased hepatic parenchymal signal on out of phase images compared to in phase images with a fat fraction of 35.0%. The liver morphology is not nodular. MR elastography images are satisfactory for interpretation. The average liver stiffness measures 4.4 kPa. No focal suspicious hepatic lesion.     Gallbladder/biliary tree: Cholelithiasis. No gallbladder wall thickening or pericholecystic fluid. Focal " adenomyomatosis of the gallbladder fundus. No intra or extrahepatic biliary dilation.     Spleen: Normal.     Kidneys: Bilateral parapelvic cysts and an exophytic simple cyst arising from the midpole of the left kidney. No suspicious renal cortical lesion. No hydronephrosis.     Adrenal glands: Stable 1.5 cm left adrenal nodule, previously characterized as a lipid rich adenoma on prior noncontrast CTs. Normal right adrenal gland     Pancreas: Mild to moderate fatty atrophy of the pancreas with preserved parenchymal T1 signal. No main pancreatic ductal dilation.     Bowel: No dilated small or large bowel. Moderate hiatal hernia. Colonic diverticulosis.     Lymph nodes: No lymphadenopathy.     Blood vessels: The major abdominal vasculature is patent.     Mesentery and abdominal wall: Normal.     Ascites: None.     Lung bases: Clear.     Bones and soft tissues: Multilevel degenerative change of the spine. Surgical changes related to removal of thoracolumbar spinal fusion instrumentation.     IMPRESSION:  1. MR Elastography: The averaged liver stiffness index (kPa) measures 4.4. This is compatible with stage 3-4 fibrosis. 2. Severe hepatic steatosis mild hepatomegaly. 3. Moderate hiatal hernia. 4. Cholelithiasis. ------------------------------- Suggested guidelines for interpretation of liver stiffness with MRE at 60 Hz: (Magn Reson Imaging Clin N Am. 2014 Aug;22(3):433-46) <2.5 kPa: normal 2.5 - 2.9 kPa: normal or inflammation 2.9 - 3.5 kPa: stage 1 to 2 fibrosis 3.5 - 4 kPa: stage 2 to 3 fibrosis 4 - 5 kPa: stage 3 to 4 fibrosis >5 kPa: stage 4 fibrosis or cirrhosis ADRIAN CREWS DO   SYSTEM ID:  [id number]       Phone call duration: 33 minutes  Signed Electronically by: Josefina Mireles MD

## 2025-03-05 NOTE — PATIENT INSTRUCTIONS
GI/Liver: If you don t hear from a representative within 2 business days, please call [phone].     For pain:  Continue Gabapentin, 600 mg three times per day.  You can increase to 900 mg three times per day if needed.  Continue Ibuprofen, up to 800 mg three times per day as needed.  Try Methocarbamol, 750 mg, up to four times per day as needed.

## 2025-03-05 NOTE — TELEPHONE ENCOUNTER
Called and spoke to patient.  - scheduled for telephone visit with Dr. Dudley Mireles on 3/5/25 @ 2pm.      Torri RODRIGUES, - [facility]  Primary Care- [facility]

## 2025-03-05 NOTE — TELEPHONE ENCOUNTER
Routing to care team to schedule.  Natasha Centeno, BON Segal,     As you have seen, your MRI came back showing you have severe liver fibrosis.  I am not able to call you today to discuss this, but I can have my staff put you on my schedule this week to go over any questions you have.  I am going to place a referral to the Liver specialist, called a Hepatologist.  They will reach out to you to get you scheduled for a visit.  Philipp,     In addition to the liver findings, you also have gallstones.  Nothing needs to be done about this unless you develop symptoms: Pain in the right upper abdomen, particularly after eating fatty food, etc.  You also have something called Gallbladder Adenomyomatosis, which is a BENIGN thickening of the gallbladder wall.  Nothing to be done about this either.     There are some cysts around the Kidneys, nothing to do about these, no monitoring needed.     There is a nodule on the left adrenal gland which previously was felt to be benign fatty growth, unchanged in size.  Nothing to be done.     You have fatty atrophy of the pancreas, which is similar to fatty liver disease in that the normal tissue changes to a fatty tissue.  I would like to get an A1c on you to check your blood sugars, as the pancreas is involved in insulin production.     Moderate hiatal hernia is noted, where the stomach starts to protrude into the chest cavity.  This is a common cause of acid reflux/heart burn.  Nothing to be done.     Lots of information here, we can discuss during phone visit. Call [phone] to schedule if you dont hear from someone   Written by Josefina Mireles MD on 3/4/2025  5:58 PM CST  Seen by patient Philipp Bland on 3/4/2025  7:38 PM

## 2025-03-23 NOTE — PROGRESS NOTES
March 24, 2025        COMPREHENSIVE PAIN CLINIC INITIAL EVALUATION  I had the pleasure of meeting Mr. Philipp Bland on 3/24/2025 in the Chronic Pain Clinic in consult for April Coming SE Mireles with regards to his pain.  The patient is a 69 year old male with past medical history of Lumbar Spondylolisthesis, lumbar spinal stenosis with radiculopathy, HTN, osteoporosis, S/p spinal fusion, alcohol abuse, elevated LFT, chronic intractable pain who presents for evaluation of chronic pain.        History of of chronic pain on initial exam 3/24/2025                               Subjective:  He presents alone using a rolling walker.    Patient endorses chronic pain in low back L>R that startedyears prior to his initial lumbar surgery 12/2/2022 by Dr. Aggarwal.  He subsequently had the instrumentation removed by Dr. Eliseo Aggarwal 4/29/2024.  He also report neuropathy in b/l LE in the stocking feet distribution.     The patient describes the pain as constant, aching to sharp.  Patient has numbness and tingling in b/l LE due to neuropathy.  Patient reports weakness in b/l feet due to foot drop.  He reports that the pain is made worse by walking or standing.  His pain is improved with stretching.   He rates his currenty pain score at 3/10, but it can be as low as 2/10 or as severe as 8/10.  Physical therapy was completed after the instrumentation was removed.  He continues to do the exercises on a regular basis. He has fallen twice at home in the last two years. He reports the neuropathic pain is very tolerable.    Patient denies anxiety and depression.  Patient does not follow with a mental health care provider.        Progress Notes Reviewed:  3/5/2025 Josefina Mireles CNP, PCP - elevated LFT's and referred to Hepatology, avoid alcohol and APAP  2/7/2025  3/5/2025 Josefina Mireles CNP, PCP - SSD paperwork completed  1/8/2025 3/5/2025 Josefina Mireles CNP, PCP - blood pressure control  8/2/2024 Dr. Eliseo Aggarwal, Neurosurgeon virtual visit  5/4/2024  Discharge Summary Jeff Lopez PA-C - removal of hardware.    He denies any new problems with falls or balance, any new numbness or weakness of the arms or legs, any new bowel or bladder incontinence, any night sweats or unexplained fevers, or any sudden or unexpected weight loss.  He denies saddle anesthesia.     Philipp Bland has not been seen at a pain clinic in the past.        Current Treatments:  Gabapentin 300mg 3 tabs TID - current dose for the last 2 month  Robaxin 750mg BID - TID PRN - helpful  THC 10mg drinks twice a day  Ibuprofen 200mg 4 tabs in am and pm    Anticoagulation:  none  Implantable devices:  none      Previous Medication Treatments Included:  Anti-convulsants: never tried lyrica  Muscle relaxors: no  Anti-depressants: never tried duloxetine  Benzodiazapine's: no  Acetaminophen/NSAIDs:   Topicals: voltaren gel is not helpful  Opioids: no      Other Treatments Have Included:  Physical therapy: completed after instrumentation was removed  Pain Psychology: no  Chiropractic: yes in [place]  Acupuncture: no  TENs Unit: no  Injections: lumbar injections through [facility]  Surgeries: 2 lumbar surgeries  Dry Needling: no  Massage:no      Past Medical History:  Medical history reviewed.  Past Medical History:   Diagnosis Date    Essential hypertension, benign 01/01/2001    Family history of alcoholism     Loosening of hardware in spine 03/27/2023    Pain from implanted hardware 04/29/2024      Patient Active Problem List   Diagnosis    Onychomycosis    HTN, goal below 140/90    Spondylolisthesis of lumbar region    Spinal stenosis of lumbar region with radiculopathy    Other spondylosis, lumbar region    Other osteoporosis without current pathological fracture    Other secondary scoliosis, lumbar region    S/P spinal fusion    Elevated creatine kinase level    Hepatic steatosis    Alcohol abuse    Abnormal echocardiogram    Liver fibrosis    Elevated LFTs         Past Surgical History:   Pertinent surgical history reviewed.  Past Surgical History:   Procedure Laterality Date    EXPLORE SPINE, REMOVE HARDWARE, COMBINED N/A 4/29/2024    Procedure: Removal Thoracic 10 Pelvis instrumentation;  Surgeon: Eliseo Aggarwal MD;  Location:  OR    OPTICAL TRACKING SYSTEM FUSION SPINE POSTERIOR LUMBAR THREE+ LEVELS N/A 12/15/2022    Procedure: Thoracic 10 to Sacral 1 posterior segmental instrumentation. Pelvic instrumentation. Screw cement augmentation L10 to L11. Lumbar 2 to Sacral 1 bilateral decompression and transforaminal interbody fusion. Posterior arthrodesis Thoracic 10-Sacral 1.;  Surgeon: Eliseo Aggarwal MD;  Location:  OR          Medications: Pertinent medications reviewed.  Current Outpatient Medications   Medication Sig Dispense Refill    gabapentin (NEURONTIN) 300 MG capsule Take 2 capsules (600 mg) by mouth 3 times daily as needed for neuropathic pain. 270 capsule 3    hydroCHLOROthiazide (HYDRODIURIL) 25 MG tablet Take 1 tablet (25 mg) by mouth daily. 30 tablet 1    hydrOXYzine HCl (ATARAX) 10 MG tablet Take 1 tablet (10 mg) by mouth every 6 hours as needed for itching or other (pain). 30 tablet 3    ibuprofen (ADVIL/MOTRIN) 800 MG tablet Take 800 mg by mouth every 8 hours as needed for moderate pain.      methocarbamol (ROBAXIN) 750 MG tablet Take 1 tablet (750 mg) by mouth 4 times daily as needed for muscle spasms. 90 tablet 3    omeprazole (PRILOSEC) 20 MG DR capsule Take 20 mg by mouth daily      spironolactone (ALDACTONE) 50 MG tablet Take 1 tablet (50 mg) by mouth daily. 90 tablet 3    valsartan (DIOVAN) 160 MG tablet Take 1.5 tablets (240 mg) by mouth daily. 135 tablet 3    Vitamin D3 (CHOLECALCIFEROL) 25 mcg (1000 units) tablet Take 1 tablet (25 mcg) by mouth daily 30 tablet 0    zinc sulfate (ZINCATE) 220 (50 Zn) MG capsule Take 1 capsule (220 mg) by mouth daily 30 capsule 0       [place] Prescription Monitoring Program reviewed 3/23/2025.  No concern for abuse or misuse of  controlled medications based on this report.  2/9/2025 Gabapentin 300mg 270 tabs for  1/6/2025 Gabapentin 300mg 90 tabs for  5/3/2024 Oxycodone 5mg 28 tabs      Allergies: Pertinent allergies reviewed.     Allergies   Allergen Reactions    No Known Drug Allergy        Family History:   family history includes Hypertension in his father.    Social History:   He is single and lives in [place], [place].  He is waiting on SSD.  He is independent in ADL's.  History of alcohol abuse.     He  reports that he has never smoked. He has never used smokeless tobacco. He reports current alcohol use. He reports that he does not use drugs.  Social History     Social History Narrative    Not on file         Review of Systems:      (Positive responses bolded)  GENERAL: fever/chills, fatigue, general unwell feeling, weight gain/loss  HEAD/EYES:  headache, dizziness, or vision changes  EARS/NOSE/THROAT: nosebleeds, hearing loss, sinus infection, earache, tinnitus  IMMUNE:  allergies, cancer, immune deficiency, or infections  SKIN:  itching, rash, hives  HEME/Lymphatic: anemia, easy bruising, easy bleeding  RESPIRATORY: cough, wheezing, or shortness of breath  CARDIOVASCULAR/Circulation: extremity edema, syncope, hypertension, tachycardia, or angina  GASTROINTESTINAL: abdominal pain, nausea/emesis, diarrhea, constipation, hematochezia, or melena  ENDOCRINE:  diabetes, steroid use, thyroid disease or osteoporosis  MUSCULOSKELETAL: myalgias, joint pain, stiffness, neck pain, back pain, arthritis, or gout  GENITOURINARY: frequency, urgency, dysuria, difficulty voiding, hematuria or incontinence  NEUROLOGIC: weakness, numbness, paresthesias, seizure, tremor, stroke or memory loss  PSYCHIATRIC: depression, anxiety, stress, suicidal thoughts/attempts or mood swings      Physical Exam:  BP (!) 157/95   Pulse 90   SpO2 98%     Constitutional: He is oriented to person, place, and time.  He is obese.  He is not in acute distress.   HENT:     Head:  Normocephalic and atraumatic.     Eyes: Pupils are equal, round, and reactive to light. EOM are normal. No scleral icterus.   Pulmonary/Chest:  NWOB. No respiratory distress.   Neurological: He is alert and oriented to person, place, and time. Coordination grossly normal.    Skin: Skin is warm and dry. He is not diaphoretic.   Psychiatric: He has a normal mood and affect. His behavior is normal. Judgment and thought content normal.  Patient answers questions appropriately.  MSK: Gait was not observed.        Imaging:  Thoracic and Lumbar spine CT without contrast     History: evaluate spinal fusion, increasing back pain; S/P spinal  fusion; Pseudarthrosis following spinal fusion.     Comparison: Lumbar spine CT 4/17/2023     Technique: Axial, coronal, and sagittal multiplanar reconstructions  obtained from acquisition of thoracic and lumbar spine CT scan. Dose  reduction techniques were used.      Findings:  Postsurgical changes of instrumented posterior spinal fusion T10-S1,  anterior spinal fusion L2-S1 and bilateral sacroiliac joint fusion.  Slightly increased lucency surrounding the left greater than right S1  pedicle screws, suggesting loosening. Multilevel facetectomies.  Decompressive laminectomy at L5-S1. There is near complete posterior  partial anterior bony fusion across these levels.      Thoracic spine:   Vertebroplasty changes at T10 and T11.      No vertebral body height loss. Multilevel Schmorl's node-like endplate  indentations.      Erosion at the opposing T7-T8 endplates with prevertebral soft tissue  edema extending from T6 down to T8.     There is no acute fracture or subluxation.      Multilevel degenerative changes with loss of disc, osteophyte  formation and facet arthropathy. No significant spinal canal or neural  foraminal stenosis.      No soft tissue abnormality in the visualized paraspinous tissues  anteriorly. Calcified mediastinal lymph nodes.     Lumbar Spine:  Partial lumbarization  of S1.     Unchanged 8 mm anterolisthesis at L5-S1. Mild retrolisthesis at L4-5     No acute fracture or subluxation.     No high-grade spinal canal stenosis. Anterolisthesis and posterior  osteophyte formation resulting in severe bilateral neural foraminal  stenosis at L5-S1.      The visualized paraspinous tissues anteriorly are unremarkable.                                                                      Impression:   1. Postsurgical changes of instrumented posterior spinal fusion  T10-S1, anterior spinal fusion L2-S1 and bilateral sacroiliac joint  fusion. Slightly increased lucency surrounding the left greater than  right S1 pedicle screws, suggesting loosening. There is near complete  posterior and partial anterior bony fusion across these levels.   2. Erosion at the opposing T7-T8 endplates with prevertebral soft  tissue edema extending from T6 down to T8. This can represent discitis  osteomyelitis. Recommend thoracic MR with contrast.   3. No high-grade spinal canal stenosis in thoracic or lumbar spine.  4. Severe bilateral neural foraminal stenosis at L5-S1 secondary to  anterolisthesis and bony overgrowth.     CHRIS BLAKE MD       EMG:  na      Diagnosis:  (Z98.1) S/P spinal fusion  (primary encounter diagnosis)  Comment:   Plan:     (M48.061,  M54.16) Spinal stenosis of lumbar region with radiculopathy  Comment:   Plan:     (M43.16) Spondylolisthesis of lumbar region  Comment:   Plan:     (F10.10) Alcohol abuse  Comment:   Plan:     (G89.29) Chronic intractable pain  Comment:   Plan:         Plan on initial consult on 3/23/2025:  A multimodal plan was developed today to treat your pain.  Multimodal analgesia is a strategy that reduces reliance on opioids through the use of non-opioid analgesics and therapies that have different mechanisms of action.      Diagnostics:   Reviewed thoracic and lumbar imaging.        Medications: He reports he doesn't like taking pills.  Discussed  duloxetine.  Discussed medical cannabis.    The following OTC pain medications may be helpful, use as directed: Voltaren Gel 1%, CBD products, Arnica products, Capsaicin products, Australian Dream Cream, Epson It, Lidocaine Patch, Solanpas, Biofreeze, Aspercream, Tiger Balm and Colby Emu cream.  Apply heat or cold PRN.      Therapies:  Discussed anti-inflammatory lifestyle.  He follows a low carb diet.    PHYSICAL THERAPY -   Continue to do exercises learned at PT on a daily basis.  Discussed the importance of core strengthening, ROM, stretching exercises with the patient and how each of these entities is important in decreasing pain.  Explained to the patient that the purpose of physical therapy is to teach the patient a home exercise program.  These exercises need to be performed every day in order to decrease pain and prevent future occurrences of pain.        Discussed Grounding Mat - He does grounding.  https://www.[employer].com/watch?v=WeAUQN0Jg0M    Discussed Frequency Specific Microcurrent - handout provided  Treatment for Neuropathic Pain.   [facility] [phone]  [facility] [phone]  [facility] [phone]  [facility] [phone]  May be able to be billed as a chiropractic service depending on your insurance coverage.     Discussed Acupuncture.    Discussed TENs unit.  He is very interested in the TENs unit.      Interventions:  He is not interested in injections.    Follow up:   Return to clinic as needed.        DAWIT Arenas, DAVIDP  [facility]/[place] Locations        BILLING TIME DOCUMENTATION:   The total TIME spent on this patient on the date of the encounter/appointment was 47 minutes.

## 2025-03-24 ENCOUNTER — OFFICE VISIT (OUTPATIENT)
Dept: PALLIATIVE MEDICINE | Facility: CLINIC | Age: 70
End: 2025-03-24
Attending: FAMILY MEDICINE
Payer: MEDICARE

## 2025-03-24 VITALS — DIASTOLIC BLOOD PRESSURE: 95 MMHG | OXYGEN SATURATION: 98 % | SYSTOLIC BLOOD PRESSURE: 157 MMHG | HEART RATE: 90 BPM

## 2025-03-24 DIAGNOSIS — M48.061 SPINAL STENOSIS OF LUMBAR REGION WITH RADICULOPATHY: ICD-10-CM

## 2025-03-24 DIAGNOSIS — G89.29 CHRONIC INTRACTABLE PAIN: ICD-10-CM

## 2025-03-24 DIAGNOSIS — M54.16 SPINAL STENOSIS OF LUMBAR REGION WITH RADICULOPATHY: ICD-10-CM

## 2025-03-24 DIAGNOSIS — M43.16 SPONDYLOLISTHESIS OF LUMBAR REGION: ICD-10-CM

## 2025-03-24 DIAGNOSIS — Z98.1 S/P SPINAL FUSION: Primary | ICD-10-CM

## 2025-03-24 DIAGNOSIS — F10.10 ALCOHOL ABUSE: ICD-10-CM

## 2025-03-24 PROCEDURE — 3080F DIAST BP >= 90 MM HG: CPT | Performed by: NURSE PRACTITIONER

## 2025-03-24 PROCEDURE — 99204 OFFICE O/P NEW MOD 45 MIN: CPT | Performed by: NURSE PRACTITIONER

## 2025-03-24 PROCEDURE — 3077F SYST BP >= 140 MM HG: CPT | Performed by: NURSE PRACTITIONER

## 2025-03-24 PROCEDURE — 1125F AMNT PAIN NOTED PAIN PRSNT: CPT | Performed by: NURSE PRACTITIONER

## 2025-03-24 ASSESSMENT — PAIN SCALES - PAIN ENJOYMENT GENERAL ACTIVITY SCALE (PEG)
AVG_PAIN_PASTWEEK: 4
INTERFERED_ENJOYMENT_LIFE: 8
INTERFERED_GENERAL_ACTIVITY: 8
INTERFERED_ENJOYMENT_LIFE: 8
PEG_TOTALSCORE: 6.67
AVG_PAIN_PASTWEEK: 4
PEG_TOTALSCORE: 6.67
INTERFERED_GENERAL_ACTIVITY: 8

## 2025-03-24 ASSESSMENT — PAIN SCALES - GENERAL: PAINLEVEL_OUTOF10: MODERATE PAIN (4)

## 2025-03-24 NOTE — PATIENT INSTRUCTIONS
Plan on initial consult on 3/23/2025:  A multimodal plan was developed today to treat your pain.  Multimodal analgesia is a strategy that reduces reliance on opioids through the use of non-opioid analgesics and therapies that have different mechanisms of action.      Diagnostics:   Reviewed thoracic and lumbar imaging.        Medications: He reports he doesn't like taking pills.  Discussed duloxetine.  Discussed medical cannabis.    The following OTC pain medications may be helpful, use as directed: Voltaren Gel 1%, CBD products, Arnica products, Capsaicin products, Australian Dream Cream, Epson It, Lidocaine Patch, Solanpas, Biofreeze, Aspercream, Tiger Balm and Colby Emu cream.  Apply heat or cold PRN.      Therapies:  Discussed anti-inflammatory lifestyle.  He follows a low carb diet.        PHYSICAL THERAPY -   Continue to do exercises learned at PT on a daily basis.  Discussed the importance of core strengthening, ROM, stretching exercises with the patient and how each of these entities is important in decreasing pain.  Explained to the patient that the purpose of physical therapy is to teach the patient a home exercise program.  These exercises need to be performed every day in order to decrease pain and prevent future occurrences of pain.        Discussed Grounding Mat - He does grounding.  https://www."[employer].com/watch?v=WjXITB4Bg5Y    Discussed Frequency Specific Microcurrent - handout provided  Treatment for Neuropathic Pain.   [facility] [phone]  [facility] [phone]  [facility] [phone]  [facility] [phone]  May be able to be billed as a chiropractic service depending on your insurance coverage.     Discussed Acupuncture.    Discussed TENs unit.  He is very interested in the TENs unit.      Interventions:  He is not interested in injections.    Follow up:   Return to clinic as needed.        DAWIT Arenas, DAVIDP  [facility]  [facility] [place]        Clinic Number:  [phone]   Call with any questions about your care and for scheduling assistance.   Calls are returned Monday through Friday between 8 AM and 4:30 PM. We usually get back to you within 2 business days depending on the issue/request.    If we are prescribing your medications:  For opioid medication refills, call the clinic or send a [employer] message 7 days in advance.  Please include:  Name of requested medication  Name of the pharmacy.  For non-opioid medications, call your pharmacy directly to request a refill. Please allow 3-4 days to be processed.   Per [place] State Law:  All controlled substance prescriptions must be filled within 30 days of being written.    For those controlled substances allowing refills, pickup must occur within 30 days of last fill.      We believe regular attendance is key to your success in our program!    Any time you are unable to keep your appointment we ask that you call us at least 24 hours in advance to cancel.This will allow us to offer the appointment time to another patient.   Multiple missed appointments may lead to dismissal from the clinic.

## 2025-05-12 ENCOUNTER — MYC MEDICAL ADVICE (OUTPATIENT)
Dept: FAMILY MEDICINE | Facility: CLINIC | Age: 70
End: 2025-05-12
Payer: MEDICARE

## 2025-05-12 NOTE — TELEPHONE ENCOUNTER
Schedule visit, video okay with myself or Manfred to discuss medication.  He was supposed to have follow up in April

## 2025-05-12 NOTE — TELEPHONE ENCOUNTER
See my chart - awaiting patient reply to see if able to provide more than the one BP reading     Will likely need a visit for BP follow up     Muna Charlton, Registered Nurse  [facility]

## 2025-05-12 NOTE — TELEPHONE ENCOUNTER
Dr. Dudley Mireles   Please see my chart, concern with elevated BP   Patient sent only a few readings  Would you like BP follow-up visit? Evisit?    Muna Charlton, Registered Nurse  [facility]

## 2025-05-20 DIAGNOSIS — T85.848D PAIN FROM IMPLANTED HARDWARE, SUBSEQUENT ENCOUNTER: ICD-10-CM

## 2025-05-21 RX ORDER — HYDROXYZINE HYDROCHLORIDE 10 MG/1
TABLET, FILM COATED ORAL
Qty: 30 TABLET | Refills: 3 | Status: SHIPPED | OUTPATIENT
Start: 2025-05-21

## 2025-06-10 DIAGNOSIS — Z11.59 ENCOUNTER FOR SCREENING FOR OTHER VIRAL DISEASES: ICD-10-CM

## 2025-06-10 DIAGNOSIS — K70.0 ALCOHOLIC FATTY LIVER: ICD-10-CM

## 2025-06-10 DIAGNOSIS — R79.89 ELEVATED LFTS: Primary | ICD-10-CM

## 2025-07-09 ENCOUNTER — RESULTS FOLLOW-UP (OUTPATIENT)
Dept: FAMILY MEDICINE | Facility: CLINIC | Age: 70
End: 2025-07-09
Payer: MEDICARE

## 2025-07-09 DIAGNOSIS — I10 HTN, GOAL BELOW 140/90: Primary | ICD-10-CM

## 2025-07-09 NOTE — TELEPHONE ENCOUNTER
Dr. Dudley Mireles- see [employer] message below.  Please advise.  Natasha Centeno, RN    5/23/2025  [facility]    HTN, goal below 140/90  Blood pressures have improved with cessation of alcohol use.  Recommended continued monitoring and update in 2-4 weeks.  Continue Valsartan 160 mg once daily for now.       He has cut out alcohol for a little over a week and his BP was 128/85.  He is taking valsartan only, one tablet not 1.5 tablets as he had trouble breaking them in half.  He is not taking the spironolactone or hydrochlorothiazide, as he ran out.  He is not drinking at all and can see the improvements in his blood pressure.      Signed Electronically by: Josefina Mireles MD

## 2025-07-21 ENCOUNTER — MYC MEDICAL ADVICE (OUTPATIENT)
Dept: FAMILY MEDICINE | Facility: CLINIC | Age: 70
End: 2025-07-21
Payer: MEDICARE

## 2025-07-21 NOTE — TELEPHONE ENCOUNTER
Dr. Dudley Mireles    Please see my chart.    Radha Rush RN BSN  Clinic Nurse  [facility]

## 2025-07-22 NOTE — TELEPHONE ENCOUNTER
See [employer] message.     Replied via [employer].     Luzma FITZPATRICK RN   Clinic RN  [facility]

## 2025-08-12 DIAGNOSIS — T85.848D PAIN FROM IMPLANTED HARDWARE, SUBSEQUENT ENCOUNTER: ICD-10-CM

## 2025-08-12 RX ORDER — HYDROXYZINE HYDROCHLORIDE 10 MG/1
TABLET, FILM COATED ORAL
Qty: 30 TABLET | Refills: 3 | Status: SHIPPED | OUTPATIENT
Start: 2025-08-12

## (undated) DEVICE — SOL WATER IRRIG 1000ML BOTTLE 2F7114

## (undated) DEVICE — GLOVE BIOGEL PI MICRO SZ 7.5 48575

## (undated) DEVICE — PACK UNIVERSAL SPLIT 29131

## (undated) DEVICE — SU VICRYL 2-0 CT-1 18' J739D

## (undated) DEVICE — GLOVE BIOGEL PI MICRO INDICATOR UNDERGLOVE SZ 8.5 48985

## (undated) DEVICE — RX SURGIFLO HEMOSTATIC MATRIX W/THROMBIN 8ML 2994

## (undated) DEVICE — SPONGE RAY-TEC 4X8" 7318

## (undated) DEVICE — Device

## (undated) DEVICE — NDL BLUNT 19GA 1.5"

## (undated) DEVICE — DRAPE IOBAN INCISE 23X17" 6650EZ

## (undated) DEVICE — SU ETHILON 3-0 FS-1 18" 669H

## (undated) DEVICE — SU MONOCRYL 4-0 PS-2 18" UND Y496G

## (undated) DEVICE — CUSHION INSERT LG PRONE VIEW JACKSON TABLE

## (undated) DEVICE — SPONGE SURGIFOAM 100 1974

## (undated) DEVICE — SU VICRYL 2-0 CT-2 CR 8X18" J726D

## (undated) DEVICE — GOWN XXLG REINFORCED 9071EL

## (undated) DEVICE — DRAPE MICROSCOPE LEICA 54X150" AR8033650

## (undated) DEVICE — SURGICEL HEMOSTAT 2X3" 1953

## (undated) DEVICE — KIT PATIENT JACKSON 1 SPK10118

## (undated) DEVICE — WIPES FOLEY CARE SURESTEP PROVON DFC100

## (undated) DEVICE — CATH TRAY FOLEY SURESTEP 16FR W/URINE MTR STATLK LF A303416A

## (undated) DEVICE — NDL COUNTER 10CT

## (undated) DEVICE — SPONGE COTTONOID 1X3" 80-1408

## (undated) DEVICE — DRAIN ROUND W/RESERV KIT JACKSON PRATT 10FR 400ML SU130-402D

## (undated) DEVICE — DRAPE U SPLIT 74X120" 29440

## (undated) DEVICE — DRAPE O ARM TUBE 9732722

## (undated) DEVICE — NDL SPINAL 18GA 3.5" 405184

## (undated) DEVICE — GOWN XXL 9575

## (undated) DEVICE — MARKER SPHERES PASSIVE MEDT PACK 5 8801075

## (undated) DEVICE — DRAPE MAYO STAND 23X54 8337

## (undated) DEVICE — RX SURGIFLO HEMOSTATIC MATRIX 10ML 199102S

## (undated) DEVICE — PACK LARGE SPINE SNE15LSFSE

## (undated) DEVICE — SUCTION MANIFOLD NEPTUNE SGL

## (undated) DEVICE — NDL BLUNT 17GA 1.5" 8881202330

## (undated) DEVICE — SU VICRYL 0 CT-1 CR 8X18" J740D

## (undated) DEVICE — SET CLSVR 5+ ELT ANCOAG ASP ATF TBG LF DISP 00208-00

## (undated) DEVICE — ESU GROUND PAD UNIVERSAL W/O CORD

## (undated) DEVICE — BLADE BONE MILL STRK 3.2MM FINE 5400-702-000

## (undated) DEVICE — PREP CHLORAPREP 26ML TINTED HI-LITE ORANGE 930815

## (undated) DEVICE — ESU GROUND PAD ADULT W/CORD E7507

## (undated) DEVICE — RSRV 150UM CLSVR 5+ ELT SMARTSUCTION HRMN 3L RS FLTR 0020500

## (undated) DEVICE — PACK SPINE SM CUSTOM SNE15SSFSK

## (undated) DEVICE — CELL SAVER SET VACUUM LINE AUTOTRANSFUSION TUBING HAR-A-1000

## (undated) DEVICE — DRSG TELFA ISLAND 4X14" 7544

## (undated) DEVICE — POSITIONER PT PRONESAFE HEAD REST W/DERMAPROX INSERT 40599

## (undated) DEVICE — SPONGE COTTONOID 1/2X3" 80-1407

## (undated) DEVICE — SPONGE SURGIFOAM 50

## (undated) DEVICE — DRAIN HEMOVAC RESERVOIR KIT 10FR 1/8" MED 00-2550-002-10

## (undated) DEVICE — DRAPE SHEET REV FOLD 3/4 9349

## (undated) DEVICE — SU ETHILON 3-0 PS-2 18" 1669H

## (undated) DEVICE — LINEN TOWEL PACK X5 5464

## (undated) DEVICE — TOOL DISSECT MIDAS MR8 14CM MATCH HEAD 3MM MR8-14MH30

## (undated) DEVICE — SUCTION FRAZIER 12FR W/HANDLE K73

## (undated) DEVICE — DRSG GAUZE 4X4" 3033

## (undated) DEVICE — SHAFT DRVR ADPR QC 7480741T

## (undated) DEVICE — IMPLANTABLE DEVICE: Type: IMPLANTABLE DEVICE | Site: BACK | Status: NON-FUNCTIONAL

## (undated) DEVICE — MANIFOLD NEPTUNE 4 PORT 700-20

## (undated) DEVICE — ESU ELEC BLADE 2.75" COATED/INSULATED E1455

## (undated) DEVICE — SYR 30ML LL W/O NDL 302832

## (undated) DEVICE — NDL COUNTER 40CT  31142311

## (undated) DEVICE — DRAPE VARI-LENS II MICROSCOPE 52X150" 6120VL2

## (undated) DEVICE — SU ETHILON 3-0 PS-1 18" 1663G

## (undated) DEVICE — SU VICRYL 0 UR-6 27" J603H

## (undated) RX ORDER — BUPIVACAINE HYDROCHLORIDE AND EPINEPHRINE 5; 5 MG/ML; UG/ML
INJECTION, SOLUTION EPIDURAL; INTRACAUDAL; PERINEURAL
Status: DISPENSED
Start: 2022-12-15

## (undated) RX ORDER — HYDROMORPHONE HCL IN WATER/PF 6 MG/30 ML
PATIENT CONTROLLED ANALGESIA SYRINGE INTRAVENOUS
Status: DISPENSED
Start: 2022-12-15

## (undated) RX ORDER — DEXAMETHASONE SODIUM PHOSPHATE 4 MG/ML
INJECTION, SOLUTION INTRA-ARTICULAR; INTRALESIONAL; INTRAMUSCULAR; INTRAVENOUS; SOFT TISSUE
Status: DISPENSED
Start: 2022-12-15

## (undated) RX ORDER — REMIFENTANIL HYDROCHLORIDE 1 MG/ML
INJECTION, POWDER, LYOPHILIZED, FOR SOLUTION INTRAVENOUS
Status: DISPENSED
Start: 2022-12-15

## (undated) RX ORDER — ONDANSETRON 2 MG/ML
INJECTION INTRAMUSCULAR; INTRAVENOUS
Status: DISPENSED
Start: 2022-12-15

## (undated) RX ORDER — FENTANYL CITRATE 50 UG/ML
INJECTION, SOLUTION INTRAMUSCULAR; INTRAVENOUS
Status: DISPENSED
Start: 2024-04-29

## (undated) RX ORDER — BUPIVACAINE HYDROCHLORIDE AND EPINEPHRINE 2.5; 5 MG/ML; UG/ML
INJECTION, SOLUTION EPIDURAL; INFILTRATION; INTRACAUDAL; PERINEURAL
Status: DISPENSED
Start: 2024-04-29

## (undated) RX ORDER — HYDROMORPHONE HYDROCHLORIDE 1 MG/ML
INJECTION, SOLUTION INTRAMUSCULAR; INTRAVENOUS; SUBCUTANEOUS
Status: DISPENSED
Start: 2024-04-29

## (undated) RX ORDER — PROPOFOL 10 MG/ML
INJECTION, EMULSION INTRAVENOUS
Status: DISPENSED
Start: 2022-12-15

## (undated) RX ORDER — HYDROXYZINE HYDROCHLORIDE 50 MG/ML
INJECTION, SOLUTION INTRAMUSCULAR
Status: DISPENSED
Start: 2022-12-15

## (undated) RX ORDER — VASOPRESSIN 20 U/ML
INJECTION PARENTERAL
Status: DISPENSED
Start: 2024-04-29

## (undated) RX ORDER — PROPOFOL 10 MG/ML
INJECTION, EMULSION INTRAVENOUS
Status: DISPENSED
Start: 2024-04-29

## (undated) RX ORDER — CEFAZOLIN SODIUM/WATER 2 G/20 ML
SYRINGE (ML) INTRAVENOUS
Status: DISPENSED
Start: 2024-04-29

## (undated) RX ORDER — FENTANYL CITRATE 50 UG/ML
INJECTION, SOLUTION INTRAMUSCULAR; INTRAVENOUS
Status: DISPENSED
Start: 2022-12-15

## (undated) RX ORDER — HYDROMORPHONE HYDROCHLORIDE 1 MG/ML
INJECTION, SOLUTION INTRAMUSCULAR; INTRAVENOUS; SUBCUTANEOUS
Status: DISPENSED
Start: 2022-12-15

## (undated) RX ORDER — CEFAZOLIN SODIUM/WATER 2 G/20 ML
SYRINGE (ML) INTRAVENOUS
Status: DISPENSED
Start: 2022-12-15

## (undated) RX ORDER — OXYCODONE HCL 10 MG/1
TABLET, FILM COATED, EXTENDED RELEASE ORAL
Status: DISPENSED
Start: 2022-12-15

## (undated) RX ORDER — DEXAMETHASONE SODIUM PHOSPHATE 4 MG/ML
INJECTION, SOLUTION INTRA-ARTICULAR; INTRALESIONAL; INTRAMUSCULAR; INTRAVENOUS; SOFT TISSUE
Status: DISPENSED
Start: 2024-04-29

## (undated) RX ORDER — FENTANYL CITRATE 0.05 MG/ML
INJECTION, SOLUTION INTRAMUSCULAR; INTRAVENOUS
Status: DISPENSED
Start: 2024-04-29

## (undated) RX ORDER — ONDANSETRON 2 MG/ML
INJECTION INTRAMUSCULAR; INTRAVENOUS
Status: DISPENSED
Start: 2024-04-29